# Patient Record
Sex: MALE | Race: BLACK OR AFRICAN AMERICAN | Employment: OTHER | URBAN - METROPOLITAN AREA
[De-identification: names, ages, dates, MRNs, and addresses within clinical notes are randomized per-mention and may not be internally consistent; named-entity substitution may affect disease eponyms.]

---

## 2018-06-16 ENCOUNTER — APPOINTMENT (OUTPATIENT)
Dept: GENERAL RADIOLOGY | Age: 73
End: 2018-06-16
Attending: EMERGENCY MEDICINE
Payer: MEDICARE

## 2018-06-16 ENCOUNTER — HOSPITAL ENCOUNTER (EMERGENCY)
Age: 73
Discharge: HOME | End: 2018-06-16
Attending: EMERGENCY MEDICINE
Payer: MEDICARE

## 2018-06-16 ENCOUNTER — APPOINTMENT (OUTPATIENT)
Dept: CT IMAGING | Age: 73
End: 2018-06-16
Attending: EMERGENCY MEDICINE
Payer: MEDICARE

## 2018-06-16 VITALS
SYSTOLIC BLOOD PRESSURE: 152 MMHG | BODY MASS INDEX: 33.75 KG/M2 | RESPIRATION RATE: 20 BRPM | TEMPERATURE: 98 F | HEART RATE: 80 BPM | HEIGHT: 66 IN | WEIGHT: 210 LBS | DIASTOLIC BLOOD PRESSURE: 73 MMHG | OXYGEN SATURATION: 95 %

## 2018-06-16 DIAGNOSIS — S16.1XXA STRAIN OF NECK MUSCLE, INITIAL ENCOUNTER: ICD-10-CM

## 2018-06-16 DIAGNOSIS — S05.12XA PERIORBITAL CONTUSION, LEFT, INITIAL ENCOUNTER: Primary | ICD-10-CM

## 2018-06-16 DIAGNOSIS — C91.10 CLL (CHRONIC LYMPHOCYTIC LEUKEMIA) (HCC): ICD-10-CM

## 2018-06-16 DIAGNOSIS — R06.89 CHRONIC RESPIRATORY INSUFFICIENCY: ICD-10-CM

## 2018-06-16 DIAGNOSIS — Z91.199 MEDICAL NON-COMPLIANCE: ICD-10-CM

## 2018-06-16 LAB
ALBUMIN SERPL-MCNC: 3.3 G/DL (ref 3.4–5)
ALBUMIN/GLOB SERPL: 0.8 {RATIO} (ref 0.8–1.7)
ALP SERPL-CCNC: 80 U/L (ref 45–117)
ALT SERPL-CCNC: 25 U/L (ref 16–61)
AMPHET UR QL SCN: NEGATIVE
ANION GAP SERPL CALC-SCNC: 5 MMOL/L (ref 3–18)
APPEARANCE UR: CLEAR
ARTERIAL PATENCY WRIST A: YES
AST SERPL-CCNC: 44 U/L (ref 15–37)
ATRIAL RATE: 91 BPM
BACTERIA URNS QL MICRO: NEGATIVE /HPF
BARBITURATES UR QL SCN: NEGATIVE
BASE EXCESS BLD CALC-SCNC: 17 MMOL/L
BASOPHILS # BLD: 0 K/UL (ref 0–0.1)
BASOPHILS NFR BLD: 0 % (ref 0–3)
BDY SITE: ABNORMAL
BENZODIAZ UR QL: NEGATIVE
BILIRUB SERPL-MCNC: 0.4 MG/DL (ref 0.2–1)
BILIRUB UR QL: NEGATIVE
BUN SERPL-MCNC: 21 MG/DL (ref 7–18)
BUN/CREAT SERPL: 13 (ref 12–20)
CALCIUM SERPL-MCNC: 9.1 MG/DL (ref 8.5–10.1)
CALCULATED P AXIS, ECG09: 66 DEGREES
CALCULATED R AXIS, ECG10: 5 DEGREES
CALCULATED T AXIS, ECG11: 67 DEGREES
CANNABINOIDS UR QL SCN: NEGATIVE
CHLORIDE SERPL-SCNC: 102 MMOL/L (ref 100–108)
CK MB CFR SERPL CALC: 0.9 % (ref 0–4)
CK MB SERPL-MCNC: 2.7 NG/ML (ref 5–25)
CK SERPL-CCNC: 292 U/L (ref 39–308)
CO2 SERPL-SCNC: 38 MMOL/L (ref 21–32)
COCAINE UR QL SCN: NEGATIVE
COLOR UR: YELLOW
CREAT SERPL-MCNC: 1.56 MG/DL (ref 0.6–1.3)
DIAGNOSIS, 93000: NORMAL
DIFFERENTIAL METHOD BLD: ABNORMAL
EOSINOPHIL # BLD: 0.2 K/UL (ref 0–0.4)
EOSINOPHIL NFR BLD: 1 % (ref 0–5)
EPITH CASTS URNS QL MICRO: NORMAL /LPF (ref 0–5)
ERYTHROCYTE [DISTWIDTH] IN BLOOD BY AUTOMATED COUNT: 14.7 % (ref 11.6–14.5)
ETHANOL SERPL-MCNC: <3 MG/DL (ref 0–3)
GAS FLOW.O2 O2 DELIVERY SYS: ABNORMAL L/MIN
GAS FLOW.O2 SETTING OXYMISER: 2 L/M
GLOBULIN SER CALC-MCNC: 4.2 G/DL (ref 2–4)
GLUCOSE SERPL-MCNC: 94 MG/DL (ref 74–99)
GLUCOSE UR STRIP.AUTO-MCNC: NEGATIVE MG/DL
HCO3 BLD-SCNC: 41.7 MMOL/L (ref 22–26)
HCT VFR BLD AUTO: 34.4 % (ref 36–48)
HDSCOM,HDSCOM: NORMAL
HGB BLD-MCNC: 10.1 G/DL (ref 13–16)
HGB UR QL STRIP: NEGATIVE
HYALINE CASTS URNS QL MICRO: NORMAL /LPF (ref 0–2)
KETONES UR QL STRIP.AUTO: NEGATIVE MG/DL
LACTATE BLD-SCNC: 1.4 MMOL/L (ref 0.4–2)
LACTATE SERPL-SCNC: 1.3 MMOL/L (ref 0.4–2)
LEUKOCYTE ESTERASE UR QL STRIP.AUTO: NEGATIVE
LYMPHOCYTES # BLD: 12.2 K/UL (ref 0.8–3.5)
LYMPHOCYTES NFR BLD: 62 % (ref 20–51)
MCH RBC QN AUTO: 25.1 PG (ref 24–34)
MCHC RBC AUTO-ENTMCNC: 29.4 G/DL (ref 31–37)
MCV RBC AUTO: 85.6 FL (ref 74–97)
METHADONE UR QL: NEGATIVE
MONOCYTES # BLD: 1.6 K/UL (ref 0–1)
MONOCYTES NFR BLD: 8 % (ref 2–9)
NEUTS BAND NFR BLD MANUAL: 1 % (ref 0–5)
NEUTS SEG # BLD: 5.5 K/UL (ref 1.8–8)
NEUTS SEG NFR BLD: 28 % (ref 42–75)
NITRITE UR QL STRIP.AUTO: NEGATIVE
O2/TOTAL GAS SETTING VFR VENT: 28 %
OPIATES UR QL: NEGATIVE
P-R INTERVAL, ECG05: 172 MS
PCO2 BLD: 68.2 MMHG (ref 35–45)
PCP UR QL: NEGATIVE
PH BLD: 7.39 [PH] (ref 7.35–7.45)
PH UR STRIP: 8.5 [PH] (ref 5–8)
PLATELET # BLD AUTO: 171 K/UL (ref 135–420)
PLATELET COMMENTS,PCOM: ABNORMAL
PMV BLD AUTO: 11.4 FL (ref 9.2–11.8)
PO2 BLD: 69 MMHG (ref 80–100)
POTASSIUM SERPL-SCNC: 4.7 MMOL/L (ref 3.5–5.5)
PROT SERPL-MCNC: 7.5 G/DL (ref 6.4–8.2)
PROT UR STRIP-MCNC: 100 MG/DL
Q-T INTERVAL, ECG07: 330 MS
QRS DURATION, ECG06: 72 MS
QTC CALCULATION (BEZET), ECG08: 405 MS
RBC # BLD AUTO: 4.02 M/UL (ref 4.7–5.5)
RBC #/AREA URNS HPF: NORMAL /HPF (ref 0–5)
RBC MORPH BLD: ABNORMAL
SAO2 % BLD: 92 % (ref 92–97)
SERVICE CMNT-IMP: ABNORMAL
SODIUM SERPL-SCNC: 145 MMOL/L (ref 136–145)
SP GR UR REFRACTOMETRY: 1.02 (ref 1–1.03)
SPECIMEN TYPE: ABNORMAL
TOTAL RESP. RATE, ITRR: 20
TROPONIN I SERPL-MCNC: <0.02 NG/ML (ref 0–0.06)
UROBILINOGEN UR QL STRIP.AUTO: 1 EU/DL (ref 0.2–1)
VENTRICULAR RATE, ECG03: 91 BPM
WBC # BLD AUTO: 19.7 K/UL (ref 4.6–13.2)
WBC MORPH BLD: ABNORMAL
WBC URNS QL MICRO: NORMAL /HPF (ref 0–5)

## 2018-06-16 PROCEDURE — 93005 ELECTROCARDIOGRAM TRACING: CPT

## 2018-06-16 PROCEDURE — 83605 ASSAY OF LACTIC ACID: CPT | Performed by: EMERGENCY MEDICINE

## 2018-06-16 PROCEDURE — 36600 WITHDRAWAL OF ARTERIAL BLOOD: CPT

## 2018-06-16 PROCEDURE — 82550 ASSAY OF CK (CPK): CPT | Performed by: EMERGENCY MEDICINE

## 2018-06-16 PROCEDURE — 80053 COMPREHEN METABOLIC PANEL: CPT | Performed by: EMERGENCY MEDICINE

## 2018-06-16 PROCEDURE — 81001 URINALYSIS AUTO W/SCOPE: CPT | Performed by: EMERGENCY MEDICINE

## 2018-06-16 PROCEDURE — 83605 ASSAY OF LACTIC ACID: CPT

## 2018-06-16 PROCEDURE — 80307 DRUG TEST PRSMV CHEM ANLYZR: CPT | Performed by: EMERGENCY MEDICINE

## 2018-06-16 PROCEDURE — 87040 BLOOD CULTURE FOR BACTERIA: CPT | Performed by: EMERGENCY MEDICINE

## 2018-06-16 PROCEDURE — 70450 CT HEAD/BRAIN W/O DYE: CPT

## 2018-06-16 PROCEDURE — 82803 BLOOD GASES ANY COMBINATION: CPT

## 2018-06-16 PROCEDURE — 85025 COMPLETE CBC W/AUTO DIFF WBC: CPT | Performed by: EMERGENCY MEDICINE

## 2018-06-16 PROCEDURE — 72125 CT NECK SPINE W/O DYE: CPT

## 2018-06-16 PROCEDURE — 99285 EMERGENCY DEPT VISIT HI MDM: CPT

## 2018-06-16 PROCEDURE — 71045 X-RAY EXAM CHEST 1 VIEW: CPT

## 2018-06-16 RX ORDER — CARVEDILOL 6.25 MG/1
TABLET ORAL 2 TIMES DAILY WITH MEALS
COMMUNITY
End: 2018-07-12

## 2018-06-16 RX ORDER — ALLOPURINOL 100 MG/1
TABLET ORAL DAILY
COMMUNITY
End: 2018-07-12

## 2018-06-16 RX ORDER — FUROSEMIDE 40 MG/1
TABLET ORAL DAILY
COMMUNITY

## 2018-06-16 NOTE — ED TRIAGE NOTES
Patient ambu to ED with wife with c/o unwitnessed fall occurring this AM. Second fall this week. Patient noted to have bump to LEFT side of eye and abrasion to nose and 3rd digit of LEFT hand. Patient on continuous 2L of O2 via NC.  PmHx COPD

## 2018-06-16 NOTE — DISCHARGE INSTRUCTIONS
COPD Exacerbation Plan: Care Instructions  Your Care Instructions    If you have chronic obstructive pulmonary disease (COPD), your usual shortness of breath could suddenly get worse. You may start coughing more and have more mucus. This flare-up is called a COPD exacerbation (say \"vr-OTK-[id number]-BAY-vamsi\"). A lung infection or air pollution could set off an exacerbation. Sometimes it can happen after a quick change in temperature or being around chemicals. Work with your doctor to make a plan for dealing with an exacerbation. You can better manage it if you plan ahead. Follow-up care is a key part of your treatment and safety. Be sure to make and go to all appointments, and call your doctor if you are having problems. It's also a good idea to know your test results and keep a list of the medicines you take. How can you care for yourself at home? During an exacerbation  · Do not panic if you start to have one. Quick treatment at home may help you prevent serious breathing problems. If you have a COPD exacerbation plan that you developed with your doctor, follow it. · Take your medicines exactly as your doctor tells you. ¨ Use your inhaler as directed by your doctor. If your symptoms do not get better after you use your medicine, have someone take you to the emergency room. Call an ambulance if necessary. ¨ With inhaled medicines, a spacer or a nebulizer may help you get more medicine to your lungs. Ask your doctor or pharmacist how to use them properly. Practice using the spacer in front of a mirror before you have an exacerbation. This may help you get the medicine into your lungs quickly. ¨ If your doctor has given you steroid pills, take them as directed. ¨ Your doctor may have given you a prescription for antibiotics, which you can fill if you need it. ¨ Talk to your doctor if you have any problems with your medicine.  And call your doctor if you have to use your antibiotic or steroid pills.  Preventing an exacerbation  · Do not smoke. This is the most important step you can take to prevent more damage to your lungs and prevent problems. If you already smoke, it is never too late to stop. If you need help quitting, talk to your doctor about stop-smoking programs and medicines. These can increase your chances of quitting for good. · Take your daily medicines as prescribed. · Avoid colds and flu. ¨ Get a pneumococcal vaccine. ¨ Get a flu vaccine each year, as soon as it is available. Ask those you live or work with to do the same, so they will not get the flu and infect you. ¨ Try to stay away from people with colds or the flu. ¨ Wash your hands often. · Avoid secondhand smoke; air pollution; cold, dry air; hot, humid air; and high altitudes. Stay at home with your windows closed when air pollution is bad. · Learn breathing techniques for COPD, such as breathing through pursed lips. These techniques can help you breathe easier during an exacerbation. When should you call for help? Call 911 anytime you think you may need emergency care. For example, call if:  ? · You have severe trouble breathing. ? · You have severe chest pain. ?Call your doctor now or seek immediate medical care if:  ? · You have new or worse shortness of breath. ? · You develop new chest pain. ? · You are coughing more deeply or more often, especially if you notice more mucus or a change in the color of your mucus. ? · You cough up blood. ? · You have new or increased swelling in your legs or belly. ? · You have a fever. ? Watch closely for changes in your health, and be sure to contact your doctor if:  ? · You need to use your antibiotic or steroid pills. ? · Your symptoms are getting worse. Where can you learn more? Go to http://dee-marisel.info/. Enter R710 in the search box to learn more about \"COPD Exacerbation Plan: Care Instructions. \"  Current as of:  May 12, 2017  Content Version: 11.4  © 0896-8520 [employer]. Care instructions adapted under license by [employer] (which disclaims liability or warranty for this information). If you have questions about a medical condition or this instruction, always ask your healthcare professional. [address] any warranty or liability for your use of this information. Neck Strain: Care Instructions  Your Care Instructions    You have strained the muscles and ligaments in your neck. A sudden, awkward movement can strain the neck. This often occurs with falls or car accidents or during certain sports. Everyday activities like working on a computer or sleeping can also cause neck strain if they force you to hold your neck in an awkward position for a long time. It is common for neck pain to get worse for a day or two after an injury, but it should start to feel better after that. You may have more pain and stiffness for several days before it gets better. This is expected. It may take a few weeks or longer for it to heal completely. Good home treatment can help you get better faster and avoid future neck problems. Follow-up care is a key part of your treatment and safety. Be sure to make and go to all appointments, and call your doctor if you are having problems. It's also a good idea to know your test results and keep a list of the medicines you take. How can you care for yourself at home? · If you were given a neck brace (cervical collar) to limit neck motion, wear it as instructed for as many days as your doctor tells you to. Do not wear it longer than you were told to. Wearing a brace for too long can make neck stiffness worse and weaken the neck muscles. · You can try using heat or ice to see if it helps. ¨ Try using a heating pad on a low or medium setting for 15 to 20 minutes every 2 to 3 hours. Try a warm shower in place of one session with the heating pad.  You can also buy single-use heat wraps that last up to 8 hours. ¨ You can also try an ice pack for 10 to 15 minutes every 2 to 3 hours. · Take pain medicines exactly as directed. ¨ If the doctor gave you a prescription medicine for pain, take it as prescribed. ¨ If you are not taking a prescription pain medicine, ask your doctor if you can take an over-the-counter medicine. · Gently rub the area to relieve pain and help with blood flow. Do not massage the area if it hurts to do so. · Do not do anything that makes the pain worse. Take it easy for a couple of days. You can do your usual activities if they do not hurt your neck or put it at risk for more stress or injury. · Try sleeping on a special neck pillow. Place it under your neck, not under your head. Placing a tightly rolled-up towel under your neck while you sleep will also work. If you use a neck pillow or rolled towel, do not use your regular pillow at the same time. · To prevent future neck pain, do exercises to stretch and strengthen your neck and back. Learn how to use good posture, safe lifting techniques, and proper body mechanics. When should you call for help? Call 911 anytime you think you may need emergency care. For example, call if:  ? · You are unable to move an arm or a leg at all. ?Call your doctor now or seek immediate medical care if:  ? · You have new or worse symptoms in your arms, legs, chest, belly, or buttocks. Symptoms may include:  ¨ Numbness or tingling. ¨ Weakness. ¨ Pain. ? · You lose bladder or bowel control. ? Watch closely for changes in your health, and be sure to contact your doctor if:  ? · You are not getting better as expected. Where can you learn more? Go to http://dee-marisel.info/. Enter M253 in the search box to learn more about \"Neck Strain: Care Instructions. \"  Current as of: March 21, 2017  Content Version: 11.4  © 4984-3844 [employer].  Care instructions adapted under license by [facility] (which disclaims liability or warranty for this information). If you have questions about a medical condition or this instruction, always ask your healthcare professional. [place] [address] any warranty or liability for your use of this information. Bruises: Care Instructions  Your Care Instructions    Bruises occur when small blood vessels under the skin tear or rupture, most often from a twist, bump, or fall. Blood leaks into tissues under the skin and causes a black-and-blue spot that often turns colors, including purplish black, reddish blue, or yellowish green, as the bruise heals. Bruises hurt, but most are not serious and will go away on their own within 2 to 4 weeks. Sometimes, gravity causes them to spread down the body. A leg bruise usually will take longer to heal than a bruise on the face or arms. Follow-up care is a key part of your treatment and safety. Be sure to make and go to all appointments, and call your doctor if you are having problems. It's also a good idea to know your test results and keep a list of the medicines you take. How can you care for yourself at home? · Take pain medicines exactly as directed. ¨ If the doctor gave you a prescription medicine for pain, take it as prescribed. ¨ If you are not taking a prescription pain medicine, ask your doctor if you can take an over-the-counter medicine. · Put ice or a cold pack on the area for 10 to 20 minutes at a time. Put a thin cloth between the ice and your skin. · If you can, prop up the bruised area on pillows as much as possible for the next few days. Try to keep the bruise above the level of your heart. When should you call for help? Call your doctor now or seek immediate medical care if:  ? · You have signs of infection, such as:  ¨ Increased pain, swelling, warmth, or redness. ¨ Red streaks leading from the bruise. ¨ Pus draining from the bruise. ¨ A fever.    ? · You have a bruise on your leg and signs of a blood clot, such as:  ¨ Increasing redness and swelling along with warmth, tenderness, and pain in the bruised area. ¨ Pain in your calf, back of the knee, thigh, or groin. ¨ Redness and swelling in your leg or groin. ? · Your pain gets worse. ? Watch closely for changes in your health, and be sure to contact your doctor if:  ? · You do not get better as expected. Where can you learn more? Go to http://dee-marisel.info/. Enter [phone] [id number] in the search box to learn more about \"Bruises: Care Instructions. \"  Current as of: March 20, 2017  Content Version: 11.4  © 5863-0495 [employer]. Care instructions adapted under license by [employer] (which disclaims liability or warranty for this information). If you have questions about a medical condition or this instruction, always ask your healthcare professional. [place] [address] any warranty or liability for your use of this information.

## 2018-06-16 NOTE — ED NOTES
Discharge instructions reviewed with the patient/spouse with opportunity for questions given. The patient/spouse verbalized understanding. Patient armband removed and shredded. Patient in stable condition at time of discharge.

## 2018-06-16 NOTE — ED PROVIDER NOTES
EMERGENCY DEPARTMENT HISTORY AND PHYSICAL EXAM    Date: 6/16/2018  Patient Name: Lanre Charles. History of Presenting Illness     Chief Complaint   Patient presents with    Fall    Altered mental status         History Provided By: Patient and Patients partner    Chief Complaint: Mechanical fall  Duration: 2 Hours  Timing:  Acute  Location: Head  Severity: Mild  Associated Symptoms:  Back pain, laceration on nose and forehead, edema on his right side of his face (below eye), hematoma to left upper eyebrow, and confusion    Additional History (Context):   9:16 AM  Lanre Garduno is a 74 y.o. male with PMHX of COPD, emphysema, CLL, and arthritis who presents to the emergency department via EMS with wife C/O mechanical fall, onset PTA s/p bending down to  his slipper while outside, slipping, and hitting the left side of his head. Associated sxs include abrasion to the bridge of the nose and swelling to the right eyebrow. No LOC, no blood thinner use. Pt was seen on 4 days ago s/p falling onto concrete while walking outside to find his step-daughter and was subsequently seen at [facility]. CT Head was done which was normal, and pt was discharged. Wife complains that pt is confused and is talking about some step-daughter, but he doesnt have one because he hasnt been  to anyone else.  In the ED, pt is alert and oriented to person, place, time, and situation. Pt has been evaluated by neurology this year and Alzheimers and dementia were ruled out. Wife states pt was hospitalized and intubated twice in the past 6 months. Pt refused to be put on BiPAP. Pulmonologist is Dr. Jorgito Arguelles. Endorses former tobacco use (quit 1 month ago). Pt denies any other sxs or complaints.          PCP: Dinah Lockwood MD      Past History     Past Medical History:  Past Medical History:   Diagnosis Date    Chronic obstructive pulmonary disease [facility])        Past Surgical History:  Past Surgical History:   Procedure Laterality Date    HX CATARACT REMOVAL      HX HERNIA REPAIR      HX NEPHROSTOMY         Family History:  History reviewed. No pertinent family history. Social History:  Social History   Substance Use Topics    Smoking status: Former Smoker    Smokeless tobacco: Never Used    Alcohol use No      Comment: former ETOH user; stopped December 2017       Allergies:  No Known Allergies      Review of Systems   Review of Systems   Constitutional: Negative for activity change, appetite change, chills, fever and unexpected weight change. HENT: Positive for facial swelling (swelling to the left eyebrow). Negative for congestion and sore throat. Eyes: Negative for pain and redness. Respiratory: Negative for cough and shortness of breath. Cardiovascular: Negative for chest pain and palpitations. Gastrointestinal: Negative for abdominal pain, diarrhea, nausea and vomiting. Endocrine: Negative for polydipsia and polyuria. Genitourinary: Negative for difficulty urinating and dysuria. Musculoskeletal: Negative for neck pain. Skin: Positive for color change (bruising to right side of the face) and wound (on nasal bridge, forehead, and under right eye). Negative for pallor and rash. Neurological: Negative for headaches. Psychiatric/Behavioral: Positive for confusion. All other systems reviewed and are negative. Physical Exam     Vitals:    06/16/18 1227 06/16/18 1256 06/16/18 1325 06/16/18 1326   BP:   152/73    Pulse:   78 80   Resp:   27 20   Temp:  98 °F (36.7 °C)     SpO2: 94%   95%   Weight:       Height:         Physical Exam   Constitutional: He is oriented to person, place, and time. He appears well-developed and well-nourished. No distress. On O2 NC   HENT:   Head: Normocephalic and atraumatic.    Right Ear: External ear normal.   Left Ear: External ear normal.   Nose: Nose normal.   Mouth/Throat: Oropharynx is clear and moist.   With old facial abrasions at base of nose and right cheek.   Eyes: Conjunctivae and EOM are normal. Pupils are equal, round, and reactive to light. Right eye exhibits no discharge. Left eye exhibits no discharge. No scleral icterus. Left upper eyelid and eyebrow swelling and tenderness. Neck: Normal range of motion. Neck supple. No JVD present. No tracheal deviation present. No thyromegaly present. With lower midline and paracervical tenderness   Cardiovascular: Normal rate, regular rhythm, normal heart sounds and intact distal pulses. Exam reveals no gallop and no friction rub. No murmur heard. Pulmonary/Chest: Effort normal and breath sounds normal. No respiratory distress. He has no wheezes. He has no rales. Abdominal: Soft. Bowel sounds are normal. He exhibits no distension and no mass. There is no tenderness. There is no rebound and no guarding. Musculoskeletal: Normal range of motion. He exhibits no edema. Cervical back: He exhibits tenderness (mild lower). Neurological: He is alert and oriented to person, place, and time. He has normal reflexes. No cranial nerve deficit. He exhibits normal muscle tone. Coordination normal.   CN II-XII intact, no facial droop or asymmetry; No pronator drift; finger-nose-finger intact; good  and equal strength 5/5 bilateral upper and lower extremities; DTRs: 2+ upper and lower extremities, symmetric bilaterally; sensation is intact to light touch and position sense upper and lower extremities symmetric bilaterally; gait intact   Skin: Skin is warm and dry. Abrasion (bridge of nose) and bruising (right facial contusion with abrasion) noted. No rash noted. Left upper eyebrow hematoma which is expanding   Psychiatric: He has a normal mood and affect. His behavior is normal.   Slow to respond to questioning   Nursing note and vitals reviewed.         Diagnostic Study Results     Labs -     Recent Results (from the past 12 hour(s))   CBC WITH AUTOMATED DIFF    Collection Time: 06/16/18  9:39 AM   Result Value Ref Range    WBC 19.7 (H) 4.6 - 13.2 K/uL    RBC 4.02 (L) 4.70 - 5.50 M/uL    HGB 10.1 (L) 13.0 - 16.0 g/dL    HCT 34.4 (L) 36.0 - 48.0 %    MCV 85.6 74.0 - 97.0 FL    MCH 25.1 24.0 - 34.0 PG    MCHC 29.4 (L) 31.0 - 37.0 g/dL    RDW 14.7 (H) 11.6 - 14.5 %    PLATELET 537 113 - 291 K/uL    MPV 11.4 9.2 - 11.8 FL    NEUTROPHILS 28 (L) 42 - 75 %    BAND NEUTROPHILS 1 0 - 5 %    LYMPHOCYTES 62 (H) 20 - 51 %    MONOCYTES 8 2 - 9 %    EOSINOPHILS 1 0 - 5 %    BASOPHILS 0 0 - 3 %    ABS. NEUTROPHILS 5.5 1.8 - 8.0 K/UL    ABS. LYMPHOCYTES 12.2 (H) 0.8 - 3.5 K/UL    ABS. MONOCYTES 1.6 (H) 0 - 1.0 K/UL    ABS. EOSINOPHILS 0.2 0.0 - 0.4 K/UL    ABS. BASOPHILS 0.0 0.0 - 0.1 K/UL    PLATELET COMMENTS LARGE PLATELETS      RBC COMMENTS TARGET CELLS  FEW        WBC COMMENTS REACTIVE LYMPHS      DF MANUAL     METABOLIC PANEL, COMPREHENSIVE    Collection Time: 06/16/18  9:39 AM   Result Value Ref Range    Sodium 145 136 - 145 mmol/L    Potassium 4.7 3.5 - 5.5 mmol/L    Chloride 102 100 - 108 mmol/L    CO2 38 (H) 21 - 32 mmol/L    Anion gap 5 3.0 - 18 mmol/L    Glucose 94 74 - 99 mg/dL    BUN 21 (H) 7.0 - 18 MG/DL    Creatinine 1.56 (H) 0.6 - 1.3 MG/DL    BUN/Creatinine ratio 13 12 - 20      GFR est AA 53 (L) >60 ml/min/1.73m2    GFR est non-AA 44 (L) >60 ml/min/1.73m2    Calcium 9.1 8.5 - 10.1 MG/DL    Bilirubin, total 0.4 0.2 - 1.0 MG/DL    ALT (SGPT) 25 16 - 61 U/L    AST (SGOT) 44 (H) 15 - 37 U/L    Alk.  phosphatase 80 45 - 117 U/L    Protein, total 7.5 6.4 - 8.2 g/dL    Albumin 3.3 (L) 3.4 - 5.0 g/dL    Globulin 4.2 (H) 2.0 - 4.0 g/dL    A-G Ratio 0.8 0.8 - 1.7     ETHYL ALCOHOL    Collection Time: 06/16/18  9:39 AM   Result Value Ref Range    ALCOHOL(ETHYL),SERUM <3 0 - 3 MG/DL   CARDIAC PANEL,(CK, CKMB & TROPONIN)    Collection Time: 06/16/18  9:39 AM   Result Value Ref Range     39 - 308 U/L    CK - MB 2.7 <3.6 ng/ml    CK-MB Index 0.9 0.0 - 4.0 %    Troponin-I, Qt. <0.02 0.00 - 0.06 NG/ML   POC G3    Collection Time: 06/16/18  9:47 AM   Result Value Ref Range    Device: NASAL CANNULA      Flow rate (POC) 2 L/M    FIO2 (POC) 28 %    pH (POC) 7.394 7.35 - 7.45      pCO2 (POC) 68.2 (H) 35.0 - 45.0 MMHG    pO2 (POC) 69 (L) 80 - 100 MMHG    HCO3 (POC) 41.7 (H) 22 - 26 MMOL/L    sO2 (POC) 92 92 - 97 %    Base excess (POC) 17 mmol/L    Allens test (POC) YES      Total resp.  rate 20      Site RIGHT RADIAL      Specimen type (POC) ARTERIAL      Performed by [facility]    EKG, 12 LEAD, INITIAL    Collection Time: 06/16/18 10:01 AM   Result Value Ref Range    Ventricular Rate 91 BPM    Atrial Rate 91 BPM    P-R Interval 172 ms    QRS Duration 72 ms    Q-T Interval 330 ms    QTC Calculation (Bezet) 405 ms    Calculated P Axis 66 degrees    Calculated R Axis 5 degrees    Calculated T Axis 67 degrees    Diagnosis       Normal sinus rhythm  Normal ECG  Confirmed by Murphy Chadwick MD, [facility] ([id number]) on 6/16/2018 3:28:03 PM     LACTIC ACID    Collection Time: 06/16/18 11:29 AM   Result Value Ref Range    Lactic acid 1.3 0.4 - 2.0 MMOL/L   POC LACTIC ACID    Collection Time: 06/16/18 11:47 AM   Result Value Ref Range    Lactic Acid (POC) 1.4 0.4 - 2.0 mmol/L   URINALYSIS W/ RFLX MICROSCOPIC    Collection Time: 06/16/18 12:20 PM   Result Value Ref Range    Color YELLOW      Appearance CLEAR      Specific gravity 1.018 1.005 - 1.030      pH (UA) 8.5 (H) 5.0 - 8.0      Protein 100 (A) NEG mg/dL    Glucose NEGATIVE  NEG mg/dL    Ketone NEGATIVE  NEG mg/dL    Bilirubin NEGATIVE  NEG      Blood NEGATIVE  NEG      Urobilinogen 1.0 0.2 - 1.0 EU/dL    Nitrites NEGATIVE  NEG      Leukocyte Esterase NEGATIVE  NEG     DRUG SCREEN, URINE    Collection Time: 06/16/18 12:20 PM   Result Value Ref Range    BENZODIAZEPINES NEGATIVE  NEG      BARBITURATES NEGATIVE  NEG      THC (TH-CANNABINOL) NEGATIVE  NEG      OPIATES NEGATIVE  NEG      PCP(PHENCYCLIDINE) NEGATIVE  NEG      COCAINE NEGATIVE  NEG      AMPHETAMINES NEGATIVE  NEG      METHADONE NEGATIVE  NEG HDSCOM (NOTE)    URINE MICROSCOPIC ONLY    Collection Time: 06/16/18 12:20 PM   Result Value Ref Range    WBC 0 to 1 0 - 5 /hpf    RBC NONE 0 - 5 /hpf    Epithelial cells FEW 0 - 5 /lpf    Bacteria NEGATIVE  NEG /hpf    Hyaline cast 0 to 1 0 - 2 /lpf       Radiologic Studies -     CT Results  (Last 48 hours)               06/16/18 1043  CT SPINE CERV WO CONT Final result    Impression:  IMPRESSION:       No cervical spine fracture or subluxation. Multilevel degenerative changes involving the endplates and facet joints with   additional OPLL contributing to spinal canal, narrowing including at least   moderate central stenosis at C3-C4, asymmetric to the left. No prior comparison   study. Please note this cervical spine CT was performed with patient supine. This   supine study does not evaluate for ligamentous injury or exclude instability. If the patient has persistent symptoms or if otherwise clinically indicated,   erect cervical spine plain films are recommended. Narrative:  EXAM: CT Cervical spine       INDICATION: Cervical Pain, trauma       COMPARISON: No prior comparison study       TECHNIQUE: Axial CT imaging of the cervical spine was performed from the skull   base to the upper thoracic spine without intravenous contrast. Multiplanar   reformats were generated. One or more dose reduction techniques were used on   this CT: automated exposure control, adjustment of the mAs and/or kVp according   to patient's size, and iterative reconstruction techniques. The specific   techniques utilized on this CT exam have been documented in the patient's   electronic medical record.       _______________       FINDINGS:       VERTEBRAE AND DISCS: Straightening of the normal lordosis is present which is   nonspecific but may be due to muscle spasm or positioning. No fracture or   subluxation is seen. Multilevel mild facet arthropathy is present.  Multilevel   disc height loss with endplate osteophyte formation is seen. SPINAL CANAL AND FORAMINA: Elongated calcification is seen along the posterior   aspects of the mid cervical vertebral bodies, C3 through the C6 levels   representing mild ossification of the posterior longitudinal ligament. There is   associated at least moderate central stenosis at the C3-4 level asymmetric   towards the left. Additional multilevel foraminal stenosis is present due to   hypertrophic degenerative changes. PREVERTEBRAL SOFT TISSUES: Unremarkable       VISIBLE INTRACRANIAL CONTENTS: Unremarkable. LUNG APICES: Clear. OTHER: None.       _______________           06/16/18 1042  CT HEAD WO CONT Final result    Impression:  IMPRESSION:       1. No acute intracranial hemorrhage, mass effect, midline shift, or herniation. No definite CT evidence of acute cortical infarct is seen. Please note that   noncontrast head CT may be normal in early acute infarct. 2. Minimal nonspecific white matter disease likely representing chronic small   vessel changes. 3. Diffuse volume loss. 4. Lateral left periorbital soft tissue contusion. No underlying fracture is   seen. Narrative:  EXAM: CT head       INDICATION: Trauma, facial and forehead lacerations, confusion       COMPARISON: No prior comparison study. TECHNIQUE: Axial CT imaging of the head was performed without intravenous   contrast.  Additional coronal and sagittal reconstructions were performed. One   or more dose reduction techniques were used on this CT: automated exposure   control, adjustment of the mAs and/or kVp according to patient's size, and   iterative reconstruction techniques. The specific techniques utilized on this CT   exam have been documented in the patient's electronic medical record.   _______________       FINDINGS:       Motion artifact is present which limits evaluation.        VENTRICLES/EXTRA-AXIAL SPACES: The ventricles and sulci are mildly enlarged consistent with diffuse volume loss. BRAIN PARENCHYMA: There is no evidence of acute intracranial hemorrhage, mass   effect, midline shift, or herniation. No definite CT evidence of acute cortical   infarct is seen. A minimal amount of hypodense white matter lesions are seen   within the periventricular and subcortical white matter which are nonspecific,   but likely represent chronic small vessel changes. ORBITS: Bilateral left periorbital hyperdensity and soft tissue swelling is   present overlying the zygoma consistent with contusion. Left ocular globe is   intact. Orbits otherwise unremarkable. PARANASAL SINUSES/MASTOIDS: Visualized paranasal sinuses are clear. Visualized   mastoid air cells are clear. OSSEOUS STRUCTURES: No fracture is seen. OTHER: None.         _______________               CXR Results  (Last 48 hours)               06/16/18 1002  XR CHEST PORT Final result    Impression:  IMPRESSION:       Probable subsegmental atelectasis versus scarring lateral left lung base. Hypoinflation. Narrative:  Portable Chest         History: Shortness of breath       Comparison: No prior studies       Portable view of the chest demonstrates the cardiomediastinal silhouette is   within normal limits. Linear opacity is present laterally at the left lung base. Mild hypoinflation is present. Cardiac monitoring leads are present. No pleural   effusion or pneumothorax is seen. Degenerative changes are in the spine. Medications given in the ED-  Medications - No data to display      Medical Decision Making   I am the first provider for this patient. I reviewed the vital signs, available nursing notes, past medical history, past surgical history, family history and social history. Vital Signs-Reviewed the patient's vital signs.     Pulse Oximetry Analysis - 93% on NC    Cardiac Monitor:  Rate: 90 bpm  Rhythm: NSR    EKG interpretation: (Preliminary)  10:01 AM   91 bpm, NSR, no acute changes. EKG read by Jocelyn Woodward MD at 10:01 AM     Records Reviewed: Nursing Notes and Old Medical Records   On EMR review, CT Head showed NAP. CT facial showed evidence of nasal fracture and arthritis of the TMJ. CT Neck showed DJD on the C-Spine. Provider Notes (Medical Decision Making):   DDX: COPD exacerbation, hypercapnia, hypoxemia, hypoglycemia, dehydration, infection    Procedures:  Procedures    ED Course:   9:16 AM Initial assessment performed. The patients presenting problems have been discussed, and they are in agreement with the care plan formulated and outlined with them. I have encouraged them to ask questions as they arise throughout their visit.    1:09 PM Discussed results with pt and wife. Extensively discussed the importance of pt following up with his pulmonologist.     Diagnosis and Disposition       DISCHARGE NOTE:  1:09 PM   Eliseo Ewinganne Lopez's  results have been reviewed with him. He has been counseled regarding his diagnosis, treatment, and plan. He verbally conveys understanding and agreement of the signs, symptoms, diagnosis, treatment and prognosis and additionally agrees to follow up as discussed. He also agrees with the care-plan and conveys that all of his questions have been answered. I have also provided discharge instructions for him that include: educational information regarding their diagnosis and treatment, and list of reasons why they would want to return to the ED prior to their follow-up appointment, should his condition change. He has been provided with education for proper emergency department utilization. Discussion: Pt was stable in the ED. ECG and troponin showed no evidence of ACS. CT Head and C-spine showed no evidence intracranial hemorrhage, fx, or dislocation. Pt has DJD and left periorbital hematoma. CXR showed atelectasis, which is stable compared to CXR from April 2018.  Labs were remarkable for elevated CO2, BUN/Cr, and leukocytosis with predominant lymphocytes. ABG showed compensated respiratory insufficiency, which is at baseline compared to prior ABGs reviewed from [facility] noting pts baseline CO2 runs in the high 60s low 70s. Leukocytosis is also stable, pt has CLL. The pt was at his baseline mental status which as per daughter and has been deteriorating slowly over the past year. Pt is noncompliant with medical therapy. He has refused BiPAP. I spoke with the pt and wife at length about the necessity for utilizing BiPAP. I spoke to wife stating they need to f/u with pulmonologist on Monday to make sure he can get a prescription for BiPAP filled and start using immediately. I also spoke with the pt and recommended that he stay in the house and not wander. The wife states she is trying to get placement for him so he can have increased care. Advise them to return if he has worse confusion, fever, cough, or recurrent falls. CLINICAL IMPRESSION:    1. Periorbital contusion, left, initial encounter    2. Strain of neck muscle, initial encounter    3. Chronic respiratory insufficiency    4. CLL (chronic lymphocytic leukemia) ([facility] [address]    5. Medical non-compliance        PLAN:  1. D/C Home  2. Discharge Medication List as of 6/16/2018  1:09 PM        3. Follow-up Information     Follow up With Details Comments Contact Info    Rachael Faulkner MD Schedule an appointment as soon as possible for a visit in 2 days For follow up with PCP Kelvin Mott [facility]  [phone]      Ilia Carrasco MD Schedule an appointment as soon as possible for a visit in 2 days For follow up with pulmonologist  [address] [place] [address] [facility] [phone]          _______________________________    Attestations:   This note is prepared by [employer], acting as Scribe for Socorro Montgomery MD.    Socorro Montgomery MD:  The scribe's documentation has been prepared under my direction and personally reviewed by me in its entirety.   I confirm that the note above accurately reflects all work, treatment, procedures, and medical decision making performed by me.  _______________________________

## 2018-06-18 LAB — PERIPHERAL SMEAR,PSM: NORMAL

## 2018-06-22 LAB
BACTERIA SPEC CULT: NORMAL
BACTERIA SPEC CULT: NORMAL
SERVICE CMNT-IMP: NORMAL
SERVICE CMNT-IMP: NORMAL

## 2018-07-03 ENCOUNTER — APPOINTMENT (OUTPATIENT)
Dept: GENERAL RADIOLOGY | Age: 73
DRG: 189 | End: 2018-07-03
Attending: EMERGENCY MEDICINE
Payer: MEDICARE

## 2018-07-03 ENCOUNTER — HOSPITAL ENCOUNTER (INPATIENT)
Age: 73
LOS: 9 days | Discharge: NURSING, REHAB OR LONG-TERM CARE | DRG: 189 | End: 2018-07-12
Attending: EMERGENCY MEDICINE | Admitting: HOSPITALIST
Payer: MEDICARE

## 2018-07-03 ENCOUNTER — APPOINTMENT (OUTPATIENT)
Dept: CT IMAGING | Age: 73
DRG: 189 | End: 2018-07-03
Attending: EMERGENCY MEDICINE
Payer: MEDICARE

## 2018-07-03 DIAGNOSIS — R41.82 ALTERED MENTAL STATUS, UNSPECIFIED ALTERED MENTAL STATUS TYPE: ICD-10-CM

## 2018-07-03 DIAGNOSIS — I10 HYPERTENSION, UNSPECIFIED TYPE: ICD-10-CM

## 2018-07-03 DIAGNOSIS — R06.89 HYPERCAPNIA: Primary | ICD-10-CM

## 2018-07-03 PROBLEM — J96.02 ACUTE RESPIRATORY FAILURE WITH HYPERCAPNIA (HCC): Status: ACTIVE | Noted: 2018-07-03

## 2018-07-03 PROBLEM — G93.40 ACUTE ENCEPHALOPATHY: Status: ACTIVE | Noted: 2018-07-03

## 2018-07-03 PROBLEM — J96.21 ACUTE ON CHRONIC RESPIRATORY FAILURE WITH HYPOXIA AND HYPERCAPNIA (HCC): Status: ACTIVE | Noted: 2018-07-03

## 2018-07-03 PROBLEM — C91.10 CLL (CHRONIC LYMPHOCYTIC LEUKEMIA) (HCC): Status: ACTIVE | Noted: 2018-07-03

## 2018-07-03 PROBLEM — R41.3 MEMORY LOSS: Status: ACTIVE | Noted: 2018-07-03

## 2018-07-03 PROBLEM — J43.9 EMPHYSEMA LUNG (HCC): Status: ACTIVE | Noted: 2018-07-03

## 2018-07-03 PROBLEM — J96.22 ACUTE ON CHRONIC RESPIRATORY FAILURE WITH HYPOXIA AND HYPERCAPNIA (HCC): Status: ACTIVE | Noted: 2018-07-03

## 2018-07-03 LAB
ALBUMIN SERPL-MCNC: 3.6 G/DL (ref 3.4–5)
ALBUMIN/GLOB SERPL: 0.7 {RATIO} (ref 0.8–1.7)
ALP SERPL-CCNC: 99 U/L (ref 45–117)
ALT SERPL-CCNC: 26 U/L (ref 16–61)
ANION GAP SERPL CALC-SCNC: 3 MMOL/L (ref 3–18)
APPEARANCE UR: CLEAR
ARTERIAL PATENCY WRIST A: ABNORMAL
ARTERIAL PATENCY WRIST A: YES
AST SERPL-CCNC: 26 U/L (ref 15–37)
BACTERIA URNS QL MICRO: ABNORMAL /HPF
BASE EXCESS BLD CALC-SCNC: 15 MMOL/L
BASE EXCESS BLD CALC-SCNC: 16 MMOL/L
BASOPHILS # BLD: 0 K/UL (ref 0–0.06)
BASOPHILS NFR BLD: 0 % (ref 0–2)
BDY SITE: ABNORMAL
BDY SITE: ABNORMAL
BILIRUB SERPL-MCNC: 0.3 MG/DL (ref 0.2–1)
BILIRUB UR QL: NEGATIVE
BNP SERPL-MCNC: 89 PG/ML (ref 0–900)
BODY TEMPERATURE: 97.8
BUN SERPL-MCNC: 17 MG/DL (ref 7–18)
BUN/CREAT SERPL: 15 (ref 12–20)
CALCIUM SERPL-MCNC: 9 MG/DL (ref 8.5–10.1)
CHLORIDE SERPL-SCNC: 101 MMOL/L (ref 100–108)
CK MB CFR SERPL CALC: 1.2 % (ref 0–4)
CK MB SERPL-MCNC: 1.9 NG/ML (ref 5–25)
CK SERPL-CCNC: 160 U/L (ref 39–308)
CO2 SERPL-SCNC: 37 MMOL/L (ref 21–32)
COLOR UR: YELLOW
CREAT SERPL-MCNC: 1.17 MG/DL (ref 0.6–1.3)
DIFFERENTIAL METHOD BLD: ABNORMAL
EOSINOPHIL # BLD: 0.2 K/UL (ref 0–0.4)
EOSINOPHIL NFR BLD: 1 % (ref 0–5)
EPITH CASTS URNS QL MICRO: ABNORMAL /LPF (ref 0–5)
ERYTHROCYTE [DISTWIDTH] IN BLOOD BY AUTOMATED COUNT: 15 % (ref 11.6–14.5)
GAS FLOW.O2 O2 DELIVERY SYS: ABNORMAL L/MIN
GAS FLOW.O2 O2 DELIVERY SYS: ABNORMAL L/MIN
GAS FLOW.O2 SETTING OXYMISER: 2 L/M
GAS FLOW.O2 SETTING OXYMISER: 3 L/M
GLOBULIN SER CALC-MCNC: 5 G/DL (ref 2–4)
GLUCOSE SERPL-MCNC: 89 MG/DL (ref 74–99)
GLUCOSE UR STRIP.AUTO-MCNC: NEGATIVE MG/DL
HCO3 BLD-SCNC: 41.1 MMOL/L (ref 22–26)
HCO3 BLD-SCNC: 41.3 MMOL/L (ref 22–26)
HCT VFR BLD AUTO: 40.9 % (ref 36–48)
HGB BLD-MCNC: 11.9 G/DL (ref 13–16)
HGB UR QL STRIP: NEGATIVE
HYALINE CASTS URNS QL MICRO: ABNORMAL /LPF (ref 0–2)
KETONES UR QL STRIP.AUTO: NEGATIVE MG/DL
LEUKOCYTE ESTERASE UR QL STRIP.AUTO: NEGATIVE
LYMPHOCYTES # BLD: 13.1 K/UL (ref 0.9–3.6)
LYMPHOCYTES NFR BLD: 68 % (ref 21–52)
MCH RBC QN AUTO: 24.4 PG (ref 24–34)
MCHC RBC AUTO-ENTMCNC: 29.1 G/DL (ref 31–37)
MCV RBC AUTO: 84 FL (ref 74–97)
MONOCYTES # BLD: 1.4 K/UL (ref 0.05–1.2)
MONOCYTES NFR BLD: 7 % (ref 3–10)
MUCOUS THREADS URNS QL MICRO: ABNORMAL /LPF
NEUTS SEG # BLD: 4.6 K/UL (ref 1.8–8)
NEUTS SEG NFR BLD: 24 % (ref 40–73)
NITRITE UR QL STRIP.AUTO: NEGATIVE
O2/TOTAL GAS SETTING VFR VENT: 0.32 %
O2/TOTAL GAS SETTING VFR VENT: 28 %
PCO2 BLD: 64.3 MMHG (ref 35–45)
PCO2 BLD: 85 MMHG (ref 35–45)
PH BLD: 7.29 [PH] (ref 7.35–7.45)
PH BLD: 7.41 [PH] (ref 7.35–7.45)
PH UR STRIP: 7 [PH] (ref 5–8)
PLATELET # BLD AUTO: 220 K/UL (ref 135–420)
PMV BLD AUTO: 10.3 FL (ref 9.2–11.8)
PO2 BLD: 69 MMHG (ref 80–100)
PO2 BLD: 69 MMHG (ref 80–100)
POTASSIUM SERPL-SCNC: 4.4 MMOL/L (ref 3.5–5.5)
PROT SERPL-MCNC: 8.6 G/DL (ref 6.4–8.2)
PROT UR STRIP-MCNC: 300 MG/DL
RBC # BLD AUTO: 4.87 M/UL (ref 4.7–5.5)
RBC #/AREA URNS HPF: NEGATIVE /HPF (ref 0–5)
RBC MORPH BLD: ABNORMAL
RBC MORPH BLD: ABNORMAL
SAO2 % BLD: 90 % (ref 92–97)
SAO2 % BLD: 93 % (ref 92–97)
SERVICE CMNT-IMP: ABNORMAL
SERVICE CMNT-IMP: ABNORMAL
SODIUM SERPL-SCNC: 141 MMOL/L (ref 136–145)
SP GR UR REFRACTOMETRY: 1.02 (ref 1–1.03)
SPECIMEN TYPE: ABNORMAL
SPECIMEN TYPE: ABNORMAL
TOTAL RESP. RATE, ITRR: 17
TOTAL RESP. RATE, ITRR: 33
TROPONIN I SERPL-MCNC: <0.02 NG/ML (ref 0–0.06)
UROBILINOGEN UR QL STRIP.AUTO: 0.2 EU/DL (ref 0.2–1)
WBC # BLD AUTO: 19.3 K/UL (ref 4.6–13.2)
WBC URNS QL MICRO: ABNORMAL /HPF (ref 0–5)

## 2018-07-03 PROCEDURE — 77030029184 HC NEB SOLO AERONEB AEPM -A

## 2018-07-03 PROCEDURE — 96374 THER/PROPH/DIAG INJ IV PUSH: CPT

## 2018-07-03 PROCEDURE — 77010033678 HC OXYGEN DAILY

## 2018-07-03 PROCEDURE — 82803 BLOOD GASES ANY COMBINATION: CPT

## 2018-07-03 PROCEDURE — 80307 DRUG TEST PRSMV CHEM ANLYZR: CPT | Performed by: HOSPITALIST

## 2018-07-03 PROCEDURE — 74011000250 HC RX REV CODE- 250: Performed by: HOSPITALIST

## 2018-07-03 PROCEDURE — 51798 US URINE CAPACITY MEASURE: CPT

## 2018-07-03 PROCEDURE — 94660 CPAP INITIATION&MGMT: CPT

## 2018-07-03 PROCEDURE — 99285 EMERGENCY DEPT VISIT HI MDM: CPT

## 2018-07-03 PROCEDURE — 5A09357 ASSISTANCE WITH RESPIRATORY VENTILATION, LESS THAN 24 CONSECUTIVE HOURS, CONTINUOUS POSITIVE AIRWAY PRESSURE: ICD-10-PCS | Performed by: HOSPITALIST

## 2018-07-03 PROCEDURE — 80053 COMPREHEN METABOLIC PANEL: CPT | Performed by: EMERGENCY MEDICINE

## 2018-07-03 PROCEDURE — 36600 WITHDRAWAL OF ARTERIAL BLOOD: CPT

## 2018-07-03 PROCEDURE — 81001 URINALYSIS AUTO W/SCOPE: CPT | Performed by: EMERGENCY MEDICINE

## 2018-07-03 PROCEDURE — 71045 X-RAY EXAM CHEST 1 VIEW: CPT

## 2018-07-03 PROCEDURE — 82550 ASSAY OF CK (CPK): CPT | Performed by: EMERGENCY MEDICINE

## 2018-07-03 PROCEDURE — 94640 AIRWAY INHALATION TREATMENT: CPT

## 2018-07-03 PROCEDURE — 93005 ELECTROCARDIOGRAM TRACING: CPT

## 2018-07-03 PROCEDURE — 83880 ASSAY OF NATRIURETIC PEPTIDE: CPT | Performed by: EMERGENCY MEDICINE

## 2018-07-03 PROCEDURE — 74011250636 HC RX REV CODE- 250/636: Performed by: HOSPITALIST

## 2018-07-03 PROCEDURE — 74011250637 HC RX REV CODE- 250/637: Performed by: HOSPITALIST

## 2018-07-03 PROCEDURE — 85025 COMPLETE CBC W/AUTO DIFF WBC: CPT | Performed by: EMERGENCY MEDICINE

## 2018-07-03 PROCEDURE — 70450 CT HEAD/BRAIN W/O DYE: CPT

## 2018-07-03 PROCEDURE — 74011000250 HC RX REV CODE- 250: Performed by: EMERGENCY MEDICINE

## 2018-07-03 PROCEDURE — 65610000006 HC RM INTENSIVE CARE

## 2018-07-03 RX ORDER — FUROSEMIDE 10 MG/ML
20 INJECTION INTRAMUSCULAR; INTRAVENOUS DAILY
Status: DISCONTINUED | OUTPATIENT
Start: 2018-07-03 | End: 2018-07-04

## 2018-07-03 RX ORDER — NITROGLYCERIN 40 MG/1
1 PATCH TRANSDERMAL EVERY 24 HOURS
Status: DISCONTINUED | OUTPATIENT
Start: 2018-07-03 | End: 2018-07-12 | Stop reason: HOSPADM

## 2018-07-03 RX ORDER — ALFUZOSIN HYDROCHLORIDE 10 MG/1
10 TABLET, EXTENDED RELEASE ORAL DAILY
COMMUNITY

## 2018-07-03 RX ORDER — ARFORMOTEROL TARTRATE 15 UG/2ML
15 SOLUTION RESPIRATORY (INHALATION)
Status: DISCONTINUED | OUTPATIENT
Start: 2018-07-03 | End: 2018-07-10

## 2018-07-03 RX ORDER — LATANOPROST 50 UG/ML
1 SOLUTION/ DROPS OPHTHALMIC
COMMUNITY

## 2018-07-03 RX ORDER — LABETALOL HYDROCHLORIDE 5 MG/ML
20 INJECTION, SOLUTION INTRAVENOUS
Status: COMPLETED | OUTPATIENT
Start: 2018-07-03 | End: 2018-07-03

## 2018-07-03 RX ORDER — NALOXONE HYDROCHLORIDE 0.4 MG/ML
0.4 INJECTION, SOLUTION INTRAMUSCULAR; INTRAVENOUS; SUBCUTANEOUS AS NEEDED
Status: DISCONTINUED | OUTPATIENT
Start: 2018-07-03 | End: 2018-07-12 | Stop reason: HOSPADM

## 2018-07-03 RX ORDER — GUAIFENESIN 100 MG/5ML
81 LIQUID (ML) ORAL DAILY
COMMUNITY

## 2018-07-03 RX ORDER — LEVOFLOXACIN 5 MG/ML
500 INJECTION, SOLUTION INTRAVENOUS EVERY 24 HOURS
Status: COMPLETED | OUTPATIENT
Start: 2018-07-03 | End: 2018-07-08

## 2018-07-03 RX ORDER — DOCUSATE SODIUM 100 MG/1
100 CAPSULE, LIQUID FILLED ORAL 2 TIMES DAILY
Status: DISCONTINUED | OUTPATIENT
Start: 2018-07-03 | End: 2018-07-12 | Stop reason: HOSPADM

## 2018-07-03 RX ORDER — ONDANSETRON 2 MG/ML
4 INJECTION INTRAMUSCULAR; INTRAVENOUS
Status: DISCONTINUED | OUTPATIENT
Start: 2018-07-03 | End: 2018-07-12 | Stop reason: HOSPADM

## 2018-07-03 RX ORDER — IPRATROPIUM BROMIDE AND ALBUTEROL SULFATE 2.5; .5 MG/3ML; MG/3ML
3 SOLUTION RESPIRATORY (INHALATION)
Status: DISCONTINUED | OUTPATIENT
Start: 2018-07-03 | End: 2018-07-12 | Stop reason: HOSPADM

## 2018-07-03 RX ORDER — CYCLOSPORINE 0.5 MG/ML
1 EMULSION OPHTHALMIC 2 TIMES DAILY
COMMUNITY

## 2018-07-03 RX ORDER — ACETAMINOPHEN 325 MG/1
650 TABLET ORAL
Status: DISCONTINUED | OUTPATIENT
Start: 2018-07-03 | End: 2018-07-12 | Stop reason: HOSPADM

## 2018-07-03 RX ORDER — GLUCOSAMINE SULFATE 1500 MG
POWDER IN PACKET (EA) ORAL DAILY
COMMUNITY
End: 2018-07-12

## 2018-07-03 RX ORDER — HYDRALAZINE HYDROCHLORIDE 20 MG/ML
10 INJECTION INTRAMUSCULAR; INTRAVENOUS
Status: DISCONTINUED | OUTPATIENT
Start: 2018-07-03 | End: 2018-07-04

## 2018-07-03 RX ORDER — HEPARIN SODIUM 5000 [USP'U]/ML
5000 INJECTION, SOLUTION INTRAVENOUS; SUBCUTANEOUS EVERY 8 HOURS
Status: DISCONTINUED | OUTPATIENT
Start: 2018-07-03 | End: 2018-07-08

## 2018-07-03 RX ORDER — DIPHENHYDRAMINE HYDROCHLORIDE 50 MG/ML
12.5 INJECTION, SOLUTION INTRAMUSCULAR; INTRAVENOUS
Status: DISCONTINUED | OUTPATIENT
Start: 2018-07-03 | End: 2018-07-06

## 2018-07-03 RX ORDER — BUDESONIDE 0.5 MG/2ML
500 INHALANT ORAL
Status: DISCONTINUED | OUTPATIENT
Start: 2018-07-03 | End: 2018-07-12 | Stop reason: HOSPADM

## 2018-07-03 RX ORDER — DICLOFENAC SODIUM 10 MG/G
GEL TOPICAL 4 TIMES DAILY
COMMUNITY
End: 2018-07-12

## 2018-07-03 RX ORDER — KETOROLAC TROMETHAMINE 30 MG/ML
15 INJECTION, SOLUTION INTRAMUSCULAR; INTRAVENOUS
Status: DISPENSED | OUTPATIENT
Start: 2018-07-03 | End: 2018-07-08

## 2018-07-03 RX ORDER — FAMOTIDINE 20 MG/1
20 TABLET, FILM COATED ORAL 2 TIMES DAILY
COMMUNITY

## 2018-07-03 RX ADMIN — LABETALOL HYDROCHLORIDE 20 MG: 5 INJECTION, SOLUTION INTRAVENOUS at 17:56

## 2018-07-03 RX ADMIN — FUROSEMIDE 20 MG: 10 INJECTION, SOLUTION INTRAMUSCULAR; INTRAVENOUS at 19:03

## 2018-07-03 RX ADMIN — DOCUSATE SODIUM 100 MG: 100 CAPSULE, LIQUID FILLED ORAL at 20:07

## 2018-07-03 RX ADMIN — ARFORMOTEROL TARTRATE 15 MCG: 15 SOLUTION RESPIRATORY (INHALATION) at 21:05

## 2018-07-03 RX ADMIN — IPRATROPIUM BROMIDE AND ALBUTEROL SULFATE 3 ML: .5; 3 SOLUTION RESPIRATORY (INHALATION) at 23:35

## 2018-07-03 RX ADMIN — IPRATROPIUM BROMIDE AND ALBUTEROL SULFATE 3 ML: .5; 3 SOLUTION RESPIRATORY (INHALATION) at 21:05

## 2018-07-03 RX ADMIN — LEVOFLOXACIN 500 MG: 5 INJECTION, SOLUTION INTRAVENOUS at 20:00

## 2018-07-03 RX ADMIN — HYDRALAZINE HYDROCHLORIDE 10 MG: 20 INJECTION INTRAMUSCULAR; INTRAVENOUS at 22:02

## 2018-07-03 RX ADMIN — HEPARIN SODIUM 5000 UNITS: 5000 INJECTION, SOLUTION INTRAVENOUS; SUBCUTANEOUS at 20:00

## 2018-07-03 RX ADMIN — FAMOTIDINE 20 MG: 10 INJECTION, SOLUTION INTRAVENOUS at 20:07

## 2018-07-03 RX ADMIN — METHYLPREDNISOLONE SODIUM SUCCINATE 60 MG: 125 INJECTION, POWDER, FOR SOLUTION INTRAMUSCULAR; INTRAVENOUS at 22:02

## 2018-07-03 RX ADMIN — BUDESONIDE 500 MCG: 0.5 INHALANT RESPIRATORY (INHALATION) at 21:05

## 2018-07-03 NOTE — ED TRIAGE NOTES
Per family member pt has had altered mental status today, slurred speech, no PO intake today, refused his medications today.

## 2018-07-03 NOTE — ED NOTES
Patient alert to voice, follows commands equal  noted. Patient attempted to communicated without much success d/t the patient on bipap.awaiting respiratory therapist for patient to go to ct.

## 2018-07-03 NOTE — H&P
History & Physical    Patient: Willy Nuñez MRN: [id number]  CSN: [id number]    YOB: 1945  Age: 74 y.o. Sex: male      DOA: 7/3/2018  Primary Care Provider:  Ailyn Woodard MD      Assessment/Plan     Hospital Problems  Never Reviewed          Codes Class Noted POA    Hypercapnia ICD-10-CM: R06.89  ICD-9-CM: 786.09  7/3/2018 Unknown        Acute on chronic respiratory failure with hypoxia and hypercapnia (HCC) ICD-10-CM: J96.21, J96.22  ICD-9-CM: 518.84, 786.09, 799.02  7/3/2018 Unknown        CLL (chronic lymphocytic leukemia) ([facility] ICD-10-CM: C91.90  ICD-9-CM: 204.10  7/3/2018 Unknown        Emphysema lung ([facility] ICD-10-CM: J43.9  ICD-9-CM: 492.8  7/3/2018 Unknown        Memory loss ICD-10-CM: R41.3  ICD-9-CM: 780.93  7/3/2018 Unknown        Acute encephalopathy ICD-10-CM: G93.40  ICD-9-CM: 348.30  7/3/2018 Unknown                Admit to icu     Respiratory   Acute on chronic respiratory failure with hypercapnia and hypoxia   pco2 85 po2 69  Continue  bipap, breath tx     iv lasix and iv steroid   cxr reviewed   emphysema lung-case discussed with Dr. Alphonso Gregory       card:  Hypertensive urgency   Lasix and nitro patch   Better controlled     Hemo  cll , leukocytosis no change     Neuro   Acute encephalopathy due to hypercapnia    Ct head scheduled     Renal  Will continue monitor renal function and  Urine out-put. Endo   F/u glucose   Full code      70 minutes of critical care time spent in the direct evaluation and treatment of this high risk patient. The reason for providing this level of medical care for this critically ill patient was due a critical illness that impaired one or more vital organ systems such that there was a high probability of imminent or life threatening deterioration in the patients condition.  This care involved high complexity decision making to assess, manipulate, and support vital system functions, to treat this degreee vital organ system failure and to prevent further life threatening deterioration of the patients condition. DVT : heparin ppi proph  CC: ams        HPI:     Nitin Eagle. is a 74 y.o. male who hx emphysema, htn,memorry loss, CLL was sent to ER due to ams. Per er reported he acting abnormal since morning, refused to eat/drinking/taking medication. He responded slowly and lethargic. In ER, abg indicated pH 7.294. PCO2 85, pO2 69, HCO3 41.3.  bipap was put on. Ct head was pending. cxr: Linear opacity at the left lung base appears similar, could be scarring or partial atelectasis. There is mild crowding of basilar pulmonary  markings related to hypoventilation. The costophrenic angles are indistinct, right greater than left, suggest tiny effusions. UA was clear, ce wnl. His bp was elevated and labetalol was given in er. The above hx was coming from ER report. Visit Vitals    /83 (BP 1 Location: Left arm, BP Patient Position: Head of bed elevated (Comment degrees))    Pulse 68    Temp 98.2 °F (36.8 °C)    Resp (!) 37    Ht 5' 6\" (1.676 m)    Wt 86.2 kg (190 lb)    SpO2 98%    BMI 30.67 kg/m2    O2 Flow Rate (L/min): 2 l/min O2 Device: BIPAP      Past Medical History:   Diagnosis Date    Chronic kidney disease     stage 3 kidney disease    Chronic obstructive pulmonary disease (HCC)     CLL (chronic lymphocytic leukemia) (HCC)     Delirium     Emphysema lung (HCC)     Fall     Frequent hospital admissions     Gout     Hyperlipidemia     Hypoxemia     Memory loss     Noncompliance     Oxygen dependent     Pneumonia     Pulmonary nodules     Respiratory failure (HCC)     Risk for falls     Septic shock (HCC)     Sleep apnea     Tobacco abuse     Vitamin D deficiency        Past Surgical History:   Procedure Laterality Date    HX CATARACT REMOVAL      HX HERNIA REPAIR      HX NEPHROSTOMY         No family history on file.     Social History     Social History    Marital status: LEGALLY      Spouse name: N/A    Number of children: N/A    Years of education: N/A     Social History Main Topics    Smoking status: Former Smoker    Smokeless tobacco: Never Used    Alcohol use No      Comment: former ETOH user; stopped December 2017    Drug use: Not on file    Sexual activity: Not on file     Other Topics Concern    Not on file     Social History Narrative       Prior to Admission medications    Medication Sig Start Date End Date Taking? Authorizing Provider   alfuzosin SR (UROXATRAL) 10 mg SR tablet Take 10 mg by mouth daily. Yes Ronni Hernandez MD   aspirin 81 mg chewable tablet Take 81 mg by mouth daily. Yes Ronni Hernandez MD   cholecalciferol (VITAMIN D3) 1,000 unit cap Take  by mouth daily. Yes Ronni Hernandez MD   cycloSPORINE (RESTASIS) 0.05 % ophthalmic emulsion Administer 1 Drop to both eyes two (2) times a day. Yes Ronni Hernandez MD   diclofenac (VOLTAREN) 1 % gel Apply  to affected area four (4) times daily. Yes Ronni Hernandez MD   famotidine (PEPCID) 20 mg tablet Take 20 mg by mouth two (2) times a day. Yes Ronni Hernandez MD   latanoprost (XALATAN) 0.005 % ophthalmic solution Administer 1 Drop to both eyes nightly. Yes Ronni Hernandez MD   allopurinol (ZYLOPRIM) 100 mg tablet Take  by mouth daily. Ronni Hernandez MD   carvedilol (COREG) 6.25 mg tablet Take  by mouth two (2) times daily (with meals). Ronni Hernandez MD   furosemide (LASIX) 40 mg tablet Take  by mouth daily. Ronni Hernandez MD   Oxygen     Ronni Hernandez MD   pravastatin sodium (PRAVASTATIN PO) Take  by mouth. Ronni Hernandez MD   tiotropium (SPIRIVA WITH HANDIHALER) 18 mcg inhalation capsule Take 1 Cap by inhalation daily. Ronni Hernandez MD   fluticasone/salmeterol (ADVAIR HFA IN) Take  by inhalation. Ronni Hernandez MD   ALBUTEROL IN Take  by inhalation.     Ronni Hernandez MD       No Known Allergies    Review of Systems  Unable to obtain due to mental status         Physical Exam:     Physical Exam:  Visit Vitals    /83 (BP 1 Location: Left arm, BP Patient Position: Head of bed elevated (Comment degrees))    Pulse 68    Temp 98.2 °F (36.8 °C)    Resp (!) 37    Ht 5' 6\" (1.676 m)    Wt 86.2 kg (190 lb)    SpO2 98%    BMI 30.67 kg/m2    O2 Flow Rate (L/min): 2 l/min O2 Device: BIPAP    Temp (24hrs), Av.2 °F (36.8 °C), Min:98.2 °F (36.8 °C), Max:98.2 °F (36.8 °C)             General:   open eyes per voice , some cooperative, in  distress. Head:  Normocephalic, without obvious abnormality, atraumatic. Eyes:  Conjunctivae/corneas clear, sclera anicteric, PERRL, EOMs intact. Nose: Nares normal. No drainage or sinus tenderness. Throat: Lips, mucosa, and tongue normal. .bipap mask noted    Neck: Supple, symmetrical, trachea midline, no adenopathy. Lungs:   Coarse  Bilaterally        Heart:  Regular rate and rhythm, S1, S2 normal, +murmur, click, rub or gallop. Abdomen: Soft, non-tender. Bowel sounds normal. No masses,  No organomegaly. Extremities: Extremities normal, atraumatic, no cyanosis or edema. Pulses: 2+ and symmetric all extremities.    Skin: Skin color-pink, texture, turgor normal. Dry skin  Capillary refill normal    Neurologic: Unable to obtain due to mental status, but follow some command        Labs Reviewed:    BMP:   Lab Results   Component Value Date/Time     2018 04:40 PM    K 4.4 2018 04:40 PM     2018 04:40 PM    CO2 37 (H) 2018 04:40 PM    AGAP 3 2018 04:40 PM    GLU 89 2018 04:40 PM    BUN 17 2018 04:40 PM    CREA 1.17 2018 04:40 PM    GFRAA >60 2018 04:40 PM    GFRNA >60 2018 04:40 PM     CMP:   Lab Results   Component Value Date/Time     2018 04:40 PM    K 4.4 2018 04:40 PM     2018 04:40 PM    CO2 37 (H) 2018 04:40 PM    AGAP 3 2018 04:40 PM    GLU 89 2018 04:40 PM    BUN 17 2018 04:40 PM    CREA 1.17 2018 04:40 PM    GFRAA >60 2018 04:40 PM    GFRNA >60 2018 04:40 PM CA 9.0 07/03/2018 04:40 PM    ALB 3.6 07/03/2018 04:40 PM    TP 8.6 (H) 07/03/2018 04:40 PM    GLOB 5.0 (H) 07/03/2018 04:40 PM    AGRAT 0.7 (L) 07/03/2018 04:40 PM    SGOT 26 07/03/2018 04:40 PM    ALT 26 07/03/2018 04:40 PM     CBC:   Lab Results   Component Value Date/Time    WBC 19.3 (H) 07/03/2018 04:40 PM    HGB 11.9 (L) 07/03/2018 04:40 PM    HCT 40.9 07/03/2018 04:40 PM     07/03/2018 04:40 PM     All Cardiac Markers in the last 24 hours:   Lab Results   Component Value Date/Time     07/03/2018 04:40 PM    CKMB 1.9 07/03/2018 04:40 PM    CKND1 1.2 07/03/2018 04:40 PM    TROIQ <0.02 07/03/2018 04:40 PM     Recent Glucose Results:   Lab Results   Component Value Date/Time    GLU 89 07/03/2018 04:40 PM     ABG:   Lab Results   Component Value Date/Time    PHI 7.294 (L) 07/03/2018 05:16 PM    PCO2I 85.0 (H) 07/03/2018 05:16 PM    PO2I 69 (L) 07/03/2018 05:16 PM    HCO3I 41.3 (H) 07/03/2018 05:16 PM    FIO2I 28 07/03/2018 05:16 PM     COAGS: No results found for: APTT, PTP, INR  Liver Panel:   Lab Results   Component Value Date/Time    ALB 3.6 07/03/2018 04:40 PM    TP 8.6 (H) 07/03/2018 04:40 PM    GLOB 5.0 (H) 07/03/2018 04:40 PM    AGRAT 0.7 (L) 07/03/2018 04:40 PM    SGOT 26 07/03/2018 04:40 PM    ALT 26 07/03/2018 04:40 PM    AP 99 07/03/2018 04:40 PM     Pancreatic Markers: No results found for: AMYLPOCT, AML, LIPPOCT, LPSE    Procedures/imaging: see electronic medical records for all procedures/Xrays and details which were not copied into this note but were reviewed prior to creation of Lawanda Louis MD, Internal Medicine     CC: Cherie Fabian MD

## 2018-07-03 NOTE — ED PROVIDER NOTES
EMERGENCY DEPARTMENT HISTORY AND PHYSICAL EXAM    Date: 7/3/2018  Patient Name: Keira David. History of Presenting Illness     Chief Complaint   Patient presents with    Altered mental status         History Provided By: Patient's Wife and Patient's Daughter    Chief Complaint: AMS  Duration: 9 Hours  Timing:  Constant  Location: Generalized  Modifying Factors: No medications taken this morning  Associated Symptoms: cough, lack of appetite/thirst, slurred speech, unresponsive behavior    Additional History (Context):   4:04 PM  Keira Sandoval is a 74 y.o. male with PMHX of COPD, recurrent falls, septic shock, PNA, memory loss, hypoxemia, CKD, gout, hyperlipidemia, pulmonary nodules, and CLL who presents to the emergency department C/O AMS, onset this morning. Associated sxs include cough, lack of appetite/thirst, slurred speech, unresponsive behavior. Pt's wife reports that the pt was acting abnormally all morning; she reports that he sat in the same chair for 9 hours straight and refused to eat, drink, or take his medications. She notes that he zones out at times. Pt is able to state who he is and where he is; however, it took quite a while. At baseline, pt uses a walker or sometimes walks alone. Pt has had 2 falls in the past month. Pt denies N/V, hx of Parkinson's Disease, hx of CVA/TIA, and any other sxs or complaints. PCP: Wu Schaeffer MD    Current Facility-Administered Medications   Medication Dose Route Frequency Provider Last Rate Last Dose    nitroglycerin (NITRODUR) 0.2 mg/hr patch 1 Patch  1 Patch TransDERmal DAILY Darlene Lucero MD         Current Outpatient Prescriptions   Medication Sig Dispense Refill    alfuzosin SR (UROXATRAL) 10 mg SR tablet Take 10 mg by mouth daily.  aspirin 81 mg chewable tablet Take 81 mg by mouth daily.  cholecalciferol (VITAMIN D3) 1,000 unit cap Take  by mouth daily.       cycloSPORINE (RESTASIS) 0.05 % ophthalmic emulsion Administer 1 Drop to both eyes two (2) times a day.  diclofenac (VOLTAREN) 1 % gel Apply  to affected area four (4) times daily.  famotidine (PEPCID) 20 mg tablet Take 20 mg by mouth two (2) times a day.  latanoprost (XALATAN) 0.005 % ophthalmic solution Administer 1 Drop to both eyes nightly.  allopurinol (ZYLOPRIM) 100 mg tablet Take  by mouth daily.  carvedilol (COREG) 6.25 mg tablet Take  by mouth two (2) times daily (with meals).  furosemide (LASIX) 40 mg tablet Take  by mouth daily.  Oxygen       pravastatin sodium (PRAVASTATIN PO) Take  by mouth.  tiotropium (SPIRIVA WITH HANDIHALER) 18 mcg inhalation capsule Take 1 Cap by inhalation daily.  fluticasone/salmeterol (ADVAIR HFA IN) Take  by inhalation.  ALBUTEROL IN Take  by inhalation. Past History     Past Medical History:  Past Medical History:   Diagnosis Date    Chronic kidney disease     stage 3 kidney disease    Chronic obstructive pulmonary disease (HCC)     CLL (chronic lymphocytic leukemia) (HCC)     Delirium     Emphysema lung ([facility]     Fall     Frequent hospital admissions     Gout     Hyperlipidemia     Hypoxemia     Memory loss     Noncompliance     Oxygen dependent     Pneumonia     Pulmonary nodules     Respiratory failure (HCC)     Risk for falls     Septic shock (HCC)     Sleep apnea     Tobacco abuse     Vitamin D deficiency        Past Surgical History:  Past Surgical History:   Procedure Laterality Date    HX CATARACT REMOVAL      HX HERNIA REPAIR      HX NEPHROSTOMY         Family History:  No family history on file. Social History:  Social History   Substance Use Topics    Smoking status: Former Smoker    Smokeless tobacco: Never Used    Alcohol use No      Comment: former ETOH user; stopped December 2017       Allergies:  No Known Allergies      Review of Systems   Review of Systems   Unable to perform ROS: Mental status change   Respiratory: Positive for cough. Gastrointestinal: Negative for nausea and vomiting. Bowel incontinence   Genitourinary:        Urinary incontinence   Neurological: Positive for speech difficulty. Physical Exam     Vitals:    07/03/18 1727 07/03/18 1730 07/03/18 1738 07/03/18 1745   BP:  (!) 185/91 (!) 197/99 (!) 204/93   Pulse:  78 75 73   Resp:  19 19 (!) 38   Temp:       SpO2: 97% 97% 98% 98%   Weight:       Height:         Physical Exam   Nursing note and vitals reviewed.     Constitutional: Elderly, chronically ill appearing, no acute distress  Head: Normocephalic, Atraumatic  Eyes: Pupils are equal, round, and reactive to light, EOMI  Neck: Supple, non-tender  Cardiovascular: Regular rate and rhythm, no murmurs, rubs, or gallops; 4/6 systolic ejection murmur  Chest: Normal work of breathing and chest excursion bilaterally  Lungs: Clear to ausculation bilaterally  Abdomen: Soft, non tender, non distended, normoactive bowel sounds  Back: No evidence of trauma or deformity  Extremities: No evidence of trauma or deformity, moderate BLE edema  Skin: Warm and dry, normal cap refill  Neuro: Alert and appropriate, CN intact, slightly slurred speech, strength and sensation and symmetric bilaterally  Psychiatric: Normal mood and affect    Diagnostic Study Results     Labs -     Recent Results (from the past 12 hour(s))   URINALYSIS W/ RFLX MICROSCOPIC    Collection Time: 07/03/18  4:15 PM   Result Value Ref Range    Color YELLOW      Appearance CLEAR      Specific gravity 1.019 1.005 - 1.030      pH (UA) 7.0 5.0 - 8.0      Protein 300 (A) NEG mg/dL    Glucose NEGATIVE  NEG mg/dL    Ketone NEGATIVE  NEG mg/dL    Bilirubin NEGATIVE  NEG      Blood NEGATIVE  NEG      Urobilinogen 0.2 0.2 - 1.0 EU/dL    Nitrites NEGATIVE  NEG      Leukocyte Esterase NEGATIVE  NEG     EKG, 12 LEAD, INITIAL    Collection Time: 07/03/18  4:19 PM   Result Value Ref Range    Ventricular Rate 84 BPM    Atrial Rate 84 BPM    P-R Interval 160 ms    QRS Duration 82 ms Q-T Interval 352 ms    QTC Calculation (Bezet) 415 ms    Calculated P Axis 46 degrees    Calculated R Axis -10 degrees    Calculated T Axis 62 degrees    Diagnosis       Normal sinus rhythm  Moderate voltage criteria for LVH, may be normal variant  Borderline ECG  When compared with ECG of 16-JUN-2018 10:01,  No significant change was found     CBC WITH AUTOMATED DIFF    Collection Time: 07/03/18  4:40 PM   Result Value Ref Range    WBC 19.3 (H) 4.6 - 13.2 K/uL    RBC 4.87 4.70 - 5.50 M/uL    HGB 11.9 (L) 13.0 - 16.0 g/dL    HCT 40.9 36.0 - 48.0 %    MCV 84.0 74.0 - 97.0 FL    MCH 24.4 24.0 - 34.0 PG    MCHC 29.1 (L) 31.0 - 37.0 g/dL    RDW 15.0 (H) 11.6 - 14.5 %    PLATELET 959 329 - 880 K/uL    MPV 10.3 9.2 - 11.8 FL    NEUTROPHILS 24 (L) 40 - 73 %    LYMPHOCYTES 68 (H) 21 - 52 %    MONOCYTES 7 3 - 10 %    EOSINOPHILS 1 0 - 5 %    BASOPHILS 0 0 - 2 %    ABS. NEUTROPHILS 4.6 1.8 - 8.0 K/UL    ABS. LYMPHOCYTES 13.1 (H) 0.9 - 3.6 K/UL    ABS. MONOCYTES 1.4 (H) 0.05 - 1.2 K/UL    ABS. EOSINOPHILS 0.2 0.0 - 0.4 K/UL    ABS. BASOPHILS 0.0 0.0 - 0.06 K/UL    RBC COMMENTS ANISOCYTOSIS  1+        RBC COMMENTS HYPOCHROMIA  1+        DF MANUAL     METABOLIC PANEL, COMPREHENSIVE    Collection Time: 07/03/18  4:40 PM   Result Value Ref Range    Sodium 141 136 - 145 mmol/L    Potassium 4.4 3.5 - 5.5 mmol/L    Chloride 101 100 - 108 mmol/L    CO2 37 (H) 21 - 32 mmol/L    Anion gap 3 3.0 - 18 mmol/L    Glucose 89 74 - 99 mg/dL    BUN 17 7.0 - 18 MG/DL    Creatinine 1.17 0.6 - 1.3 MG/DL    BUN/Creatinine ratio 15 12 - 20      GFR est AA >60 >60 ml/min/1.73m2    GFR est non-AA >60 >60 ml/min/1.73m2    Calcium 9.0 8.5 - 10.1 MG/DL    Bilirubin, total 0.3 0.2 - 1.0 MG/DL    ALT (SGPT) 26 16 - 61 U/L    AST (SGOT) 26 15 - 37 U/L    Alk.  phosphatase 99 45 - 117 U/L    Protein, total 8.6 (H) 6.4 - 8.2 g/dL    Albumin 3.6 3.4 - 5.0 g/dL    Globulin 5.0 (H) 2.0 - 4.0 g/dL    A-G Ratio 0.7 (L) 0.8 - 1.7     CARDIAC PANEL,(CK, CKMB & TROPONIN)    Collection Time: 07/03/18  4:40 PM   Result Value Ref Range     39 - 308 U/L    CK - MB 1.9 <3.6 ng/ml    CK-MB Index 1.2 0.0 - 4.0 %    Troponin-I, Qt. <0.02 0.00 - 0.06 NG/ML   POC G3    Collection Time: 07/03/18  5:16 PM   Result Value Ref Range    Device: NASAL CANNULA      Flow rate (POC) 2 L/M    FIO2 (POC) 28 %    pH (POC) 7.294 (L) 7.35 - 7.45      pCO2 (POC) 85.0 (H) 35.0 - 45.0 MMHG    pO2 (POC) 69 (L) 80 - 100 MMHG    HCO3 (POC) 41.3 (H) 22 - 26 MMOL/L    sO2 (POC) 90 (L) 92 - 97 %    Base excess (POC) 15 mmol/L    Allens test (POC) YES      Total resp. rate 33      Site RIGHT RADIAL      Specimen type (POC) ARTERIAL      Performed by Munira Collier        Radiologic Studies -  XR CHEST PORT   Final Result   IMPRESSION: Linear opacity at the left lung base appears similar, could be  scarring or partial atelectasis. There is mild crowding of basilar pulmonary  markings related to hypoventilation. The costophrenic angles are indistinct,  right greater than left, suggest tiny effusions. As read by the radiologist.   Nurys FORD    (Results Pending)     CT Results  (Last 48 hours)    None        CXR Results  (Last 48 hours)               07/03/18 1649  XR CHEST PORT Final result    Impression:  IMPRESSION: Linear opacity at the left lung base appears similar, could be   scarring or partial atelectasis. There is mild crowding of basilar pulmonary   markings related to hypoventilation. The costophrenic angles are indistinct,   right greater than left, suggest tiny effusions. Narrative:  INDICATION:  altered mental status       COMPARISON:  6/16/2018       FINDINGS: A portable AP radiograph of the chest was obtained. The patient is on   a cardiac monitor. Slightly low lung volumes. Linear opacity at the left lung   base appears similar, could be scarring or partial atelectasis. There is mild   crowding of basilar pulmonary markings related to hypoventilation.  The   costophrenic angles are indistinct, right greater than left, suggest tiny   effusions. Upper lungs clear. No vascular congestion. No pneumothorax. Mediastinal contour stable. No acute bony finding. Medications given in the ED-  Medications   nitroglycerin (NITRODUR) 0.2 mg/hr patch 1 Patch (not administered)   labetalol (NORMODYNE;TRANDATE) injection 20 mg (20 mg IntraVENous Given 7/3/18 1756)         Medical Decision Making   I am the first provider for this patient. I reviewed the vital signs, available nursing notes, past medical history, past surgical history, family history and social history. Vital Signs-Reviewed the patient's vital signs. Pulse Oximetry Analysis - 97% on RA     EKG interpretation: (Preliminary)  4:19 PM   84 bpm. NSR. QRS duration of 82 ms. EKG read by Vicente Woody MD at 4:19 PM     Records Reviewed: Nursing Notes and Old Medical Records    Procedures:  Procedures    ED Course:   4:04 PM Initial assessment performed. The patients presenting problems have been discussed, and they are in agreement with the care plan formulated and outlined with them. I have encouraged them to ask questions as they arise throughout their visit. 5:15 PM  Elevated white count noted. This is baseline because he has CLL and not an indicator of sepsis. 5:16 PM  CO2 is over 80%, so he will need bipap. 6:40 PM  Updated wife that he is on bipap and going to ICU. DISCUSSION:  75 y/o male brought by family for AMS. Here, no focal abnormalities, though sleepy and slow to respond. Family reports noncompliance with c-pap at night. Labs consistent w/ hypercapnic respiratory failure here. Requires bi-pap. BP also elevated d/t medication noncompliance. Will require further ICU management. Diagnosis and Disposition     CONSULT NOTE:   6:04 PM  Vicente Woody MD spoke with Belinda Kate MD   Specialty: Hospitalist  Discussed pt's hx, disposition, and available diagnostic and imaging results.  Reviewed care plans. Consulting physician agrees with plans as outlined. Accepts pt. Recommends consulting intensivist.   Written by CHANG Jacobs, as dictated by Suri Carson MD    CONSULT NOTE:   6:05 PM  Suri Carson MD spoke with    Specialty: Intensivist  Discussed pt's hx, disposition, and available diagnostic and imaging results  over the telephone. Reviewed care plans. Consulting physician agrees with plans as outlined. Will see pt in the morning. ADMISSION NOTE:  6:08 PM  Patient is being admitted to the hospital by Estrada Fuller MD. The results of their tests and reasons for their admission have been discussed with them and/or available family. They convey agreement and understanding for the need to be admitted and for their admission diagnosis. Written by CHANG Jacobs, as dictated by Suri Carson MD.    CONDITIONS ON ADMISSION:  Sepsis is not present at the time of admission. Deep Vein Thrombosis is not present at the time of admission. Thrombosis is not present at the time of admission. Urinary Tract Infection is present at the time of admission. Pneumonia is not present at the time of admission. MRSA is not present at the time of admission. Wound infection is not present at the time of admission. Pressure Ulcer is not present at the time of admission. 6:03 PM  I have spent 50 minutes of critical care time involved in lab review, consultations with specialist, family decision-making, and documentation. During this entire length of time I was immediately available to the patient. Critical Care: The reason for providing this level of medical care for this critically ill patient was due a critical illness that impaired one or more vital organ systems such that there was a high probability of imminent or life threatening deterioration in the patients condition.  This care involved high complexity decision making to assess, manipulate, and support vital system functions, to treat this degreee vital organ system failure and to prevent further life threatening deterioration of the patients condition. CLINICAL IMPRESSION    1. Hypercapnia    2. Altered mental status, unspecified altered mental status type    3. Hypertension, unspecified type        _______________________________    Attestations: This note is prepared by Harrison Ren, acting as Scribe for Lamonte Amor MD.    Lamonte Amor MD:  The scribe's documentation has been prepared under my direction and personally reviewed by me in its entirety.   I confirm that the note above accurately reflects all work, treatment, procedures, and medical decision making performed by me.  _______________________________

## 2018-07-04 LAB
AMPHET UR QL SCN: NEGATIVE
ANION GAP SERPL CALC-SCNC: 4 MMOL/L (ref 3–18)
ARTERIAL PATENCY WRIST A: YES
BARBITURATES UR QL SCN: NEGATIVE
BASE EXCESS BLD CALC-SCNC: 15 MMOL/L
BASOPHILS # BLD: 0 K/UL (ref 0–0.06)
BASOPHILS NFR BLD: 0 % (ref 0–2)
BDY SITE: ABNORMAL
BENZODIAZ UR QL: NEGATIVE
BUN SERPL-MCNC: 16 MG/DL (ref 7–18)
BUN/CREAT SERPL: 12 (ref 12–20)
CALCIUM SERPL-MCNC: 9.4 MG/DL (ref 8.5–10.1)
CANNABINOIDS UR QL SCN: NEGATIVE
CHLORIDE SERPL-SCNC: 98 MMOL/L (ref 100–108)
CO2 SERPL-SCNC: 37 MMOL/L (ref 21–32)
COCAINE UR QL SCN: NEGATIVE
CREAT SERPL-MCNC: 1.3 MG/DL (ref 0.6–1.3)
DIFFERENTIAL METHOD BLD: ABNORMAL
EOSINOPHIL # BLD: 0 K/UL (ref 0–0.4)
EOSINOPHIL NFR BLD: 0 % (ref 0–5)
ERYTHROCYTE [DISTWIDTH] IN BLOOD BY AUTOMATED COUNT: 15 % (ref 11.6–14.5)
GAS FLOW.O2 O2 DELIVERY SYS: ABNORMAL L/MIN
GAS FLOW.O2 SETTING OXYMISER: 3 L/M
GLUCOSE SERPL-MCNC: 167 MG/DL (ref 74–99)
HCO3 BLD-SCNC: 39.4 MMOL/L (ref 22–26)
HCT VFR BLD AUTO: 41.3 % (ref 36–48)
HDSCOM,HDSCOM: NORMAL
HGB BLD-MCNC: 12.2 G/DL (ref 13–16)
LYMPHOCYTES # BLD: 4.7 K/UL (ref 0.9–3.6)
LYMPHOCYTES NFR BLD: 34 % (ref 21–52)
MAGNESIUM SERPL-MCNC: 1.8 MG/DL (ref 1.6–2.6)
MCH RBC QN AUTO: 24.4 PG (ref 24–34)
MCHC RBC AUTO-ENTMCNC: 29.5 G/DL (ref 31–37)
MCV RBC AUTO: 82.8 FL (ref 74–97)
METHADONE UR QL: NEGATIVE
MONOCYTES # BLD: 0.2 K/UL (ref 0.05–1.2)
MONOCYTES NFR BLD: 2 % (ref 3–10)
NEUTS SEG # BLD: 8.7 K/UL (ref 1.8–8)
NEUTS SEG NFR BLD: 64 % (ref 40–73)
O2/TOTAL GAS SETTING VFR VENT: 32 %
OPIATES UR QL: NEGATIVE
PCO2 BLD: 58.9 MMHG (ref 35–45)
PCP UR QL: NEGATIVE
PH BLD: 7.43 [PH] (ref 7.35–7.45)
PLATELET # BLD AUTO: 243 K/UL (ref 135–420)
PMV BLD AUTO: 10.9 FL (ref 9.2–11.8)
PO2 BLD: 65 MMHG (ref 80–100)
POTASSIUM SERPL-SCNC: 3.8 MMOL/L (ref 3.5–5.5)
RBC # BLD AUTO: 4.99 M/UL (ref 4.7–5.5)
SAO2 % BLD: 92 % (ref 92–97)
SERVICE CMNT-IMP: ABNORMAL
SODIUM SERPL-SCNC: 139 MMOL/L (ref 136–145)
SPECIMEN TYPE: ABNORMAL
WBC # BLD AUTO: 13.7 K/UL (ref 4.6–13.2)

## 2018-07-04 PROCEDURE — 74011250637 HC RX REV CODE- 250/637: Performed by: HOSPITALIST

## 2018-07-04 PROCEDURE — 36600 WITHDRAWAL OF ARTERIAL BLOOD: CPT

## 2018-07-04 PROCEDURE — 85025 COMPLETE CBC W/AUTO DIFF WBC: CPT | Performed by: HOSPITALIST

## 2018-07-04 PROCEDURE — 74011000250 HC RX REV CODE- 250: Performed by: INTERNAL MEDICINE

## 2018-07-04 PROCEDURE — 94640 AIRWAY INHALATION TREATMENT: CPT

## 2018-07-04 PROCEDURE — 36415 COLL VENOUS BLD VENIPUNCTURE: CPT | Performed by: HOSPITALIST

## 2018-07-04 PROCEDURE — 65610000006 HC RM INTENSIVE CARE

## 2018-07-04 PROCEDURE — 77010033678 HC OXYGEN DAILY

## 2018-07-04 PROCEDURE — 74011250636 HC RX REV CODE- 250/636: Performed by: HOSPITALIST

## 2018-07-04 PROCEDURE — 87040 BLOOD CULTURE FOR BACTERIA: CPT | Performed by: INTERNAL MEDICINE

## 2018-07-04 PROCEDURE — 74011250637 HC RX REV CODE- 250/637: Performed by: INTERNAL MEDICINE

## 2018-07-04 PROCEDURE — 74011250636 HC RX REV CODE- 250/636: Performed by: INTERNAL MEDICINE

## 2018-07-04 PROCEDURE — 80048 BASIC METABOLIC PNL TOTAL CA: CPT | Performed by: HOSPITALIST

## 2018-07-04 PROCEDURE — 83735 ASSAY OF MAGNESIUM: CPT | Performed by: HOSPITALIST

## 2018-07-04 PROCEDURE — 82803 BLOOD GASES ANY COMBINATION: CPT

## 2018-07-04 PROCEDURE — 74011000250 HC RX REV CODE- 250: Performed by: HOSPITALIST

## 2018-07-04 RX ORDER — AMLODIPINE BESYLATE 5 MG/1
10 TABLET ORAL DAILY
Status: DISCONTINUED | OUTPATIENT
Start: 2018-07-04 | End: 2018-07-12 | Stop reason: HOSPADM

## 2018-07-04 RX ORDER — FUROSEMIDE 10 MG/ML
40 INJECTION INTRAMUSCULAR; INTRAVENOUS DAILY
Status: DISCONTINUED | OUTPATIENT
Start: 2018-07-04 | End: 2018-07-05

## 2018-07-04 RX ORDER — CARVEDILOL 3.12 MG/1
6.25 TABLET ORAL 2 TIMES DAILY WITH MEALS
Status: DISCONTINUED | OUTPATIENT
Start: 2018-07-04 | End: 2018-07-06

## 2018-07-04 RX ORDER — LORAZEPAM 2 MG/ML
1 INJECTION INTRAMUSCULAR
Status: DISCONTINUED | OUTPATIENT
Start: 2018-07-04 | End: 2018-07-06

## 2018-07-04 RX ORDER — HYDRALAZINE HYDROCHLORIDE 20 MG/ML
20 INJECTION INTRAMUSCULAR; INTRAVENOUS
Status: DISCONTINUED | OUTPATIENT
Start: 2018-07-04 | End: 2018-07-12 | Stop reason: HOSPADM

## 2018-07-04 RX ORDER — OLANZAPINE 2.5 MG/1
2.5 TABLET ORAL EVERY EVENING
Status: DISCONTINUED | OUTPATIENT
Start: 2018-07-04 | End: 2018-07-05

## 2018-07-04 RX ADMIN — IPRATROPIUM BROMIDE AND ALBUTEROL SULFATE 3 ML: .5; 3 SOLUTION RESPIRATORY (INHALATION) at 07:25

## 2018-07-04 RX ADMIN — CARVEDILOL 6.25 MG: 3.12 TABLET, FILM COATED ORAL at 11:08

## 2018-07-04 RX ADMIN — OLANZAPINE 2.5 MG: 2.5 TABLET ORAL at 11:32

## 2018-07-04 RX ADMIN — FUROSEMIDE 20 MG: 10 INJECTION, SOLUTION INTRAMUSCULAR; INTRAVENOUS at 08:34

## 2018-07-04 RX ADMIN — DOCUSATE SODIUM 100 MG: 100 CAPSULE, LIQUID FILLED ORAL at 08:34

## 2018-07-04 RX ADMIN — AMLODIPINE BESYLATE 10 MG: 5 TABLET ORAL at 11:07

## 2018-07-04 RX ADMIN — FOLIC ACID: 5 INJECTION, SOLUTION INTRAMUSCULAR; INTRAVENOUS; SUBCUTANEOUS at 12:30

## 2018-07-04 RX ADMIN — ARFORMOTEROL TARTRATE 15 MCG: 15 SOLUTION RESPIRATORY (INHALATION) at 20:57

## 2018-07-04 RX ADMIN — METHYLPREDNISOLONE SODIUM SUCCINATE 60 MG: 125 INJECTION, POWDER, FOR SOLUTION INTRAMUSCULAR; INTRAVENOUS at 05:48

## 2018-07-04 RX ADMIN — FAMOTIDINE 20 MG: 10 INJECTION, SOLUTION INTRAVENOUS at 20:09

## 2018-07-04 RX ADMIN — HEPARIN SODIUM 5000 UNITS: 5000 INJECTION, SOLUTION INTRAVENOUS; SUBCUTANEOUS at 02:58

## 2018-07-04 RX ADMIN — BUDESONIDE 500 MCG: 0.5 INHALANT RESPIRATORY (INHALATION) at 07:25

## 2018-07-04 RX ADMIN — LEVOFLOXACIN 500 MG: 5 INJECTION, SOLUTION INTRAVENOUS at 20:04

## 2018-07-04 RX ADMIN — METHYLPREDNISOLONE SODIUM SUCCINATE 40 MG: 125 INJECTION, POWDER, FOR SOLUTION INTRAMUSCULAR; INTRAVENOUS at 20:09

## 2018-07-04 RX ADMIN — HEPARIN SODIUM 5000 UNITS: 5000 INJECTION, SOLUTION INTRAVENOUS; SUBCUTANEOUS at 10:43

## 2018-07-04 RX ADMIN — IPRATROPIUM BROMIDE AND ALBUTEROL SULFATE 3 ML: .5; 3 SOLUTION RESPIRATORY (INHALATION) at 11:08

## 2018-07-04 RX ADMIN — HYDRALAZINE HYDROCHLORIDE 20 MG: 20 INJECTION INTRAMUSCULAR; INTRAVENOUS at 11:08

## 2018-07-04 RX ADMIN — HYDRALAZINE HYDROCHLORIDE 20 MG: 20 INJECTION INTRAMUSCULAR; INTRAVENOUS at 23:05

## 2018-07-04 RX ADMIN — FUROSEMIDE 40 MG: 10 INJECTION, SOLUTION INTRAMUSCULAR; INTRAVENOUS at 11:08

## 2018-07-04 RX ADMIN — HEPARIN SODIUM 5000 UNITS: 5000 INJECTION, SOLUTION INTRAVENOUS; SUBCUTANEOUS at 18:39

## 2018-07-04 RX ADMIN — BUDESONIDE 500 MCG: 0.5 INHALANT RESPIRATORY (INHALATION) at 20:57

## 2018-07-04 RX ADMIN — IPRATROPIUM BROMIDE AND ALBUTEROL SULFATE 3 ML: .5; 3 SOLUTION RESPIRATORY (INHALATION) at 20:57

## 2018-07-04 RX ADMIN — DOCUSATE SODIUM 100 MG: 100 CAPSULE, LIQUID FILLED ORAL at 20:09

## 2018-07-04 RX ADMIN — ARFORMOTEROL TARTRATE 15 MCG: 15 SOLUTION RESPIRATORY (INHALATION) at 07:25

## 2018-07-04 RX ADMIN — HYDRALAZINE HYDROCHLORIDE 10 MG: 20 INJECTION INTRAMUSCULAR; INTRAVENOUS at 09:56

## 2018-07-04 RX ADMIN — IPRATROPIUM BROMIDE AND ALBUTEROL SULFATE 3 ML: .5; 3 SOLUTION RESPIRATORY (INHALATION) at 15:11

## 2018-07-04 RX ADMIN — IPRATROPIUM BROMIDE AND ALBUTEROL SULFATE 3 ML: .5; 3 SOLUTION RESPIRATORY (INHALATION) at 23:34

## 2018-07-04 RX ADMIN — IPRATROPIUM BROMIDE AND ALBUTEROL SULFATE 3 ML: .5; 3 SOLUTION RESPIRATORY (INHALATION) at 04:46

## 2018-07-04 RX ADMIN — FAMOTIDINE 20 MG: 10 INJECTION, SOLUTION INTRAVENOUS at 08:34

## 2018-07-04 RX ADMIN — KETOROLAC TROMETHAMINE 15 MG: 30 INJECTION, SOLUTION INTRAMUSCULAR at 12:16

## 2018-07-04 RX ADMIN — CARVEDILOL 6.25 MG: 3.12 TABLET, FILM COATED ORAL at 17:02

## 2018-07-04 NOTE — PROGRESS NOTES
Hospitalist Progress Note    Patient: Jonah Bennett MRN: [id number]  CSN: [id number]    YOB: 1945  Age: 74 y.o. Sex: male    DOA: 7/3/2018 LOS:  LOS: 1 day            Assessment/Plan     Principal Problem:    Acute on chronic respiratory failure with hypoxia and hypercapnia ([address] (7/3/2018)    Active Problems:    Hypercapnia (7/3/2018)      CLL (chronic lymphocytic leukemia) ([address] (7/3/2018)      Emphysema lung ([address] (7/3/2018)      Memory loss (7/3/2018)      Acute encephalopathy (7/3/2018)         Respiratory   Acute on chronic respiratory failure with hypercapnia and hypoxia   pco2 85 po2 69 yesterday, repeat ABG  Continue  bipap, breath tx     iv lasix and iv steroid   cxr reviewed   emphysema lung-case discussed with Dr. Josesito Burris  CAD:   Hypertensive urgency   Lasix and nitro patch, add coreg and norvasc  Better controlled      Hemo  CLL , leukocytosis no change      Neuro   Acute encephalopathy due to hypercapnia    CT head scheduled      Renal  Will continue monitor renal function and  Urine out-put. ID: On Levaquin, will f/u Cx     Endo   F/u glucose     DVT/GI prophylaxis     Full code      CC:   \" I live by myself, my wife is somewhere on the street\"         Subjective:     Pt was seen and examined with the nurse in the morning round. Off BIPAP, more alert and awake, oriented X 3. Review of systems  General: No fevers or chills. Cardiovascular: No chest pain or pressure. No palpitations. Pulmonary: No cough, SOB  Gastrointestinal: No nausea, vomiting. Objective:      Visit Vitals    /85    Pulse (!) 103    Temp 98.1 °F (36.7 °C)    Resp (!) 45    Ht 5' 6\" (1.676 m)    Wt 86.2 kg (190 lb)    SpO2 96%    BMI 30.67 kg/m2       Physical Exam:    Gen: NAD, non-toxic. Heent:  MMM, NC, AT. Cor: Tachycardia  Resp:  Decreased BS b/l  Abd:  NT ND.  BS positive. No rebound or guarding. No masses. Ext: No edema or cyanosis.       Intake and Output:  Current Shift:     Last three shifts:  07/02 1901 - 07/04 0700  In: -   Out: 800 [Urine:800]    Labs: Results:       Chemistry Recent Labs      07/04/18   0254  07/03/18   1640   GLU  167*  89   NA  139  141   K  3.8  4.4   CL  98*  101   CO2  37*  37*   BUN  16  17   CREA  1.30  1.17   CA  9.4  9.0   AGAP  4  3   BUCR  12  15   AP   --   99   TP   --   8.6*   ALB   --   3.6   GLOB   --   5.0*   AGRAT   --   0.7*      CBC w/Diff Recent Labs      07/04/18   0254  07/03/18   1640   WBC  13.7*  19.3*   RBC  4.99  4.87   HGB  12.2*  11.9*   HCT  41.3  40.9   PLT  243  220   GRANS  64  24*   LYMPH  34  68*   EOS  0  1      Cardiac Enzymes Recent Labs      07/03/18   1640   CPK  160   CKND1  1.2      Coagulation No results for input(s): PTP, INR, APTT in the last 72 hours. No lab exists for component: INREXT    Lipid Panel No results found for: CHOL, CHOLPOCT, CHOLX, CHLST, CHOLV, 022090, HDL, LDL, LDLC, DLDLP, 408906, VLDLC, VLDL, TGLX, TRIGL, TRIGP, TGLPOCT, CHHD, CHHDX   BNP No results for input(s): BNPP in the last 72 hours.    Liver Enzymes Recent Labs      07/03/18   1640   TP  8.6*   ALB  3.6   AP  99   SGOT  26      Thyroid Studies No results found for: T4, T3U, TSH, TSHEXT     Procedures/imaging: see electronic medical records for all procedures/Xrays and details which were not copied into this note but were reviewed prior to creation of Plan      Medications Reviewed  Doug Molina MD

## 2018-07-04 NOTE — PROGRESS NOTES
0710 Completed shift report and assumed care from Jaskaran Woods, BRYN. We reviewed SBAR, labs, meds, and plan of care  0745 Completed shift assessment  1746 Spoke with Arleen Arrieta RT, via telephone. Pt is resting with RR 35-45/min and O2 sat 95%, might be helpful to put back on bipap. She advised she will come assess pt asap  [address], from respiratory evaluating pt  1115 Notified pt and sitter that we need to obtain sputum sample if pt coughs any mucus, provided sample cup  1130 Reassessed pt  [address] to inquire about pt's IV fluids, they said they will bring ASAP  1300 Pt resting with equal, bilateral breathing  1400 Attempted to complete admission documentation, but pt is not very informative, is sleepy  1545 Reassessed pt, asleep, but arousable  1730 Pt resting with unlabored breathing, sitter present  1915 Completed shift report and transferred care to Jaskaran Woods RN.  We reviewed SBAR, labs, meds, and plan of care

## 2018-07-04 NOTE — ED NOTES
Pt personal belongings, home o2 concentrator, and walker were transported to ICU with pt and given to RN taking over care in ICU.

## 2018-07-04 NOTE — PROGRESS NOTES
@2130 pt admitted from ER awake alert oriented x3 in bed on BIPAP accompanied by RT and nurse, on monitor, CHG bath completed and skin assessment done. Lungs remains diminished. Assessment done and documented in appropriate flow sheets Nursing management continues. @2343 pt pulled BIPAP off saying that he is claustrophobic and that he wanted to speak to his wife. Pt wife was called and phone given to him and wife updated . Pt is awake alert and oriented x4 refuses BIPAP, Pt was placed on 3L/nc and RT was called and came to bedside. Zackary was called and updated on patient stat ABG was ordered and done. @0000 Dr Zion Bird was called and informed of consult updated on patient, and ABG's were communicated to him , he already aware of consult no new orders care continues. @0130 pt requested to speak to wife , wife was called and pt given phone, Pt insist that he is leaving hospital and wanted to speak to physician. Dr De Felipe was called and informed. Dr De Felipe spoke to pt on phone care continues , pt agree to stay at this time but continues to refuse BIPAP. Nurse remains in room with patient. @0200 Nursing assistant sitting with patient as he has continuously attempted to climb out of bed, bed alarm remains on, Bed in lowered position side rails raised care continues. @4224 pt sitting up at the side of bed . Vital signs fluctuates. Pt alert and oriented observation continues. @5011 pt requested that wife be called same was done and pt verbalized he will let her rest while he wait for dinner. Pt refused to wear continuous pulse ox so spo2 is being spot checked  @0720 Bedside and Verbal shift change report given to Velia Orozco  (oncoming nurse) by Ricardo Wagner (offgoing nurse). Report included the following information SBAR, Kardex, Intake/Output, MAR, Recent Results and Med Rec Status.    Alarm parameters reviewed, on and audible Appropriate for patient clinical condition

## 2018-07-04 NOTE — PROGRESS NOTES
Problem: Pressure Injury - Risk of  Goal: *Prevention of pressure injury  Document Percy Scale and appropriate interventions in the flowsheet.    Outcome: Progressing Towards Goal  Pressure Injury Interventions:  Sensory Interventions: Assess changes in LOC    Moisture Interventions: Absorbent underpads, Check for incontinence Q2 hours and as needed    Activity Interventions: PT/OT evaluation    Mobility Interventions: PT/OT evaluation    Nutrition Interventions: Discuss nutritional consult with provider    Friction and Shear Interventions: Lift sheet, Minimize layers

## 2018-07-04 NOTE — ED NOTES
Pt heavily incontinent post lasix admin. Complete bed linen change performed. Bath given.   Nitro patch noted to pt left chest.

## 2018-07-04 NOTE — CONSULTS
[address]  [place] [id number] [address] [facility] [address]  Phone [phone] Fax [phone]  Pulmonary Critical Care & Sleep Medicine       Name: Aurora Ortiz. MRN: [id number]   : 1945 Hospital: [facility]   Date: 2018        Critical Care Initial Patient Consult    Requesting MD:                                                  Reason for CC Consult:    IMPRESSION:   Patient Active Problem List   Diagnosis Code    Hypercapnia R06.89    Acute on chronic respiratory failure with hypoxia and hypercapnia (HCC) J96.21, J96.22    CLL (chronic lymphocytic leukemia) (HCC) C91.90    Emphysema lung ([address] J43.9    Memory loss R41.3    Acute encephalopathy G93.40 ·   AMS ? Due to co2 narcosis but back to baseline, ? Due to htn urgency and htn encephalopathy, ?  Delirium due to etoh  · Advance dementia per notes  · Possible fluid overload due to elevated BP and right sided effusion   · COPD with FEV1 of 1.23 or 55% predicted but normal ratio follows with Dr. Sydni Leiva on Spiriva and advair at home- Recent 6 min walk in may suggested requires home oxygen 2-4 liter  · RUPERTO unknown severity on BIPAP2 non compliant     PLAN:  -- CXR with possible congetion will add lasix  -- No wheezing on exam will decrease solumedrol  -- On levaquin recently treated for sepsis at [facility] will recheck cultures and sputum culture will continue levaquin <sepsis was 3 month ago> adjust per culture  -- Add thiamine and folic acid due to ETOH history  -- Monitor for DTs  -- Add zyprexa  -- Use ativan prn but again balance with respiratory failure and CO2 narcosis   -- Monitor for HTN add coreg <home dose> add norvasc use hydralazine prn if needed will start cardene drip   CVS:BP is elevated monitor with Coreg Norvasc and prn meds, Give lasix due to possible Right sided effusion   RS:Steroids BD, Aspiration precaution, Keep HOB elevated, Recheck ABG, Use bipap 21/17 at night <home setting>  ID:On levaquin check culture and adjsut   ENDO:ssi  GI:PPI diet as tolerated   RENAL:Monitor Cr   CNS:AMS multifactorial treat for HTN, Start Thiamine and Folic acid   HEMATOLOGY:CLL by history WBC normal monitor if needed consider hematology consult  MUSCULOSKELETAL:no acute issue  PAIN AND SEDATION:PRN ativan add zyprexa  · Skin/Wound: No acute issue  · Electrolytes: Replace electrolytes per ICU electrolyte replacement protocol. · IVF: Banana Bag  · Nutrition: Diet as tolerated  · Prophylaxis: DVT Prophylaxis with Heparin,. GI Prophylaxis. · Restraints: none  · PT/OT eval and treat. OOB when appropriate. · Lines/Tubes: none. No muller or central line. ADVANCE DIRECTIVE:Full code needs palliative consult   FAMILY DISCUSSION:spoke with patinent no one else available  Quality Care: PPI, DVT prophylaxis, HOB elevated, Infection control all reviewed and addressed. Events and notes from last 24 hours reviewed. Care plan discussed with nursing CC TIME:45 min    · Labs and images personally seen and available reports reviewed. · All current medicines are reviewed and doses and prescription adjusted. Subjective/History:   Jung Reynolds is a 74 y.o. male who hx emphysema, htn,memorry loss, CLL was sent to ER due to ams. Per er reported he acting abnormal since morning, refused to eat/drinking/taking medication. He responded slowly and lethargic. In ER, abg indicated pH 7.294. PCO2 85, pO2 69, HCO3 41.3.  bipap was put on. Ct head was pending. cxr: Linear opacity at the left lung base appears similar, could be scarring or partial atelectasis.  There is mild crowding of basilar pulmonary  Recently admitted to [facility] with respiratory failure, Aspiration PNA  Advance dementia recent fall  Overnight found to be agitated and confused managed with sitter  ABG showed improvement   On home BIPAP 21/17   H/O ETOH per notes       Past Medical History:   Diagnosis Date    Chronic kidney disease     stage 3 kidney disease    Chronic obstructive pulmonary disease (HCC)     CLL (chronic lymphocytic leukemia) (HCC)     Delirium     [facility]     Fall     Frequent hospital admissions     Gout     Hyperlipidemia     Hypoxemia     Memory loss     Noncompliance     Oxygen dependent     Pneumonia     Pulmonary nodules     Respiratory failure (HCC)     Risk for falls     Septic shock (HCC)     Sleep apnea     Tobacco abuse     Vitamin D deficiency       Past Surgical History:   Procedure Laterality Date    HX CATARACT REMOVAL      HX HERNIA REPAIR      HX NEPHROSTOMY        Prior to Admission medications    Medication Sig Start Date End Date Taking? Authorizing Provider   alfuzosin SR (UROXATRAL) 10 mg SR tablet Take 10 mg by mouth daily. Yes Ronni Hernandez MD   aspirin 81 mg chewable tablet Take 81 mg by mouth daily. Yes Ronni Hernandez MD   cholecalciferol (VITAMIN D3) 1,000 unit cap Take  by mouth daily. Yes Ronni Hernandez MD   cycloSPORINE (RESTASIS) 0.05 % ophthalmic emulsion Administer 1 Drop to both eyes two (2) times a day. Yes Ronni Hernandez MD   diclofenac (VOLTAREN) 1 % gel Apply  to affected area four (4) times daily. Yes Ronni Hernandez MD   famotidine (PEPCID) 20 mg tablet Take 20 mg by mouth two (2) times a day. Yes Ronni Hernandez MD   latanoprost (XALATAN) 0.005 % ophthalmic solution Administer 1 Drop to both eyes nightly. Yes Ronni Hernandez MD   allopurinol (ZYLOPRIM) 100 mg tablet Take  by mouth daily. Ronni Hernandez MD   carvedilol (COREG) 6.25 mg tablet Take  by mouth two (2) times daily (with meals). Ronni Hernandez MD   furosemide (LASIX) 40 mg tablet Take  by mouth daily. Ronni Hernandez MD   Oxygen     oRnni Hernandez MD   pravastatin sodium (PRAVASTATIN PO) Take  by mouth. Ronni Hernandez MD   tiotropium (SPIRIVA WITH HANDIHALER) 18 mcg inhalation capsule Take 1 Cap by inhalation daily.     Ronni Hernandez MD   fluticasone/salmeterol (ADVAIR HFA IN) Take  by inhalation. Ronni Hernandez MD   ALBUTEROL IN Take  by inhalation. Ronni Hernandez MD     Current Facility-Administered Medications   Medication Dose Route Frequency    furosemide (LASIX) injection 40 mg  40 mg IntraVENous DAILY    methylPREDNISolone (PF) (SOLU-MEDROL) injection 40 mg  40 mg IntraVENous Q12H    carvedilol (COREG) tablet 6.25 mg  6.25 mg Oral BID WITH MEALS    OLANZapine (ZyPREXA) tablet 2.5 mg  2.5 mg Oral QPM    amLODIPine (NORVASC) tablet 10 mg  10 mg Oral DAILY    nitroglycerin (NITRODUR) 0.2 mg/hr patch 1 Patch  1 Patch TransDERmal Q24H    docusate sodium (COLACE) capsule 100 mg  100 mg Oral BID    heparin (porcine) injection 5,000 Units  5,000 Units SubCUTAneous Q8H    famotidine (PF) (PEPCID) 20 mg in sodium chloride 0.9% 10 mL injection  20 mg IntraVENous Q12H    arformoterol (BROVANA) neb solution 15 mcg  15 mcg Nebulization BID RT    budesonide (PULMICORT) 500 mcg/2 ml nebulizer suspension  500 mcg Nebulization BID RT    albuterol-ipratropium (DUO-NEB) 2.5 MG-0.5 MG/3 ML  3 mL Nebulization Q4H RT    levoFLOXacin (LEVAQUIN) 500 mg in D5W IVPB  500 mg IntraVENous Q24H     No Known Allergies   Social History   Substance Use Topics    Smoking status: Former Smoker    Smokeless tobacco: Never Used    Alcohol use No      Comment: former ETOH user; stopped 2017      No family history on file.      Objective:   Vital Signs:    Visit Vitals    /81    Pulse (!) 105    Temp 98.7 °F (37.1 °C)    Resp (!) 35    Ht 5' 6\" (1.676 m)    Wt 86.2 kg (190 lb)    SpO2 95%    BMI 30.67 kg/m2       O2 Device: Nasal cannula   O2 Flow Rate (L/min): 3 l/min   Temp (24hrs), Av.2 °F (36.8 °C), Min:97.4 °F (36.3 °C), Max:98.7 °F (37.1 °C)       Intake/Output:   Last shift:         Last 3 shifts:  1901 -  0700  In: -   Out: 800 [Urine:800]    Intake/Output Summary (Last 24 hours) at 18 1050  Last data filed at 18 0318   Gross per 24 hour   Intake 0 ml   Output              800 ml   Net             -800 ml       Ventilator Settings:  Mode Rate Tidal Volume Pressure FiO2 PEEP            32 %       Peak airway pressure:      Minute ventilation: 12.2 l/min        Review of Systems:  Not able to do due to patient condition     Objective:    General: comfortable, no acute distress altered not oriented  HEENT: pupils reactive, sclera anicteric, EOM intact  Neck: No adenopathy or thyroid swelling, no lymphadenopathy or JVD, supple  CVS: S1S2 no murmurs  RS: Mod AE bilaterally, decrease at base  Abd: soft, non tender, no hepatosplenomegaly  Neuro: non focal, awake, alert  Extrm: no leg edema, clubbing or cyanosis  Lymph node: No obvious palpable lymph node appreciated. Skin: no rash  CBC w/Diff Recent Labs      07/04/18   0254  07/03/18   1640   WBC  13.7*  19.3*   RBC  4.99  4.87   HGB  12.2*  11.9*   HCT  41.3  40.9   PLT  243  220   GRANS  64  24*   LYMPH  34  68*   EOS  0  1        Chemistry Recent Labs      07/04/18   0254  07/03/18   1640   GLU  167*  89   NA  139  141   K  3.8  4.4   CL  98*  101   CO2  37*  37*   BUN  16  17   CREA  1.30  1.17   CA  9.4  9.0   MG  1.8   --    AGAP  4  3   BUCR  12  15   AP   --   99   TP   --   8.6*   ALB   --   3.6   GLOB   --   5.0*   AGRAT   --   0.7*        Lactic Acid Lactic acid   Date Value Ref Range Status   06/16/2018 1.3 0.4 - 2.0 MMOL/L Final     No results for input(s): LAC in the last 72 hours. Micro  No results for input(s): SDES, CULT in the last 72 hours. No results for input(s): CULT in the last 72 hours.      ABG Recent Labs      07/03/18   2343  07/03/18   1716   PHI  7.412  7.294*   PCO2I  64.3*  85.0*   PO2I  69*  69*   HCO3I  41.1*  41.3*   FIO2I  0.32  28        Liver Enzymes Protein, total   Date Value Ref Range Status   07/03/2018 8.6 (H) 6.4 - 8.2 g/dL Final     Albumin   Date Value Ref Range Status   07/03/2018 3.6 3.4 - 5.0 g/dL Final     Globulin   Date Value Ref Range Status   07/03/2018 5.0 (H) 2.0 - 4.0 g/dL Final     A-G Ratio   Date Value Ref Range Status   07/03/2018 0.7 (L) 0.8 - 1.7   Final     AST (SGOT)   Date Value Ref Range Status   07/03/2018 26 15 - 37 U/L Final     Alk. phosphatase   Date Value Ref Range Status   07/03/2018 99 45 - 117 U/L Final     Recent Labs      07/03/18   1640   TP  8.6*   ALB  3.6   GLOB  5.0*   AGRAT  0.7*   SGOT  26   AP  99        Cardiac Enzymes Lab Results   Component Value Date/Time     07/03/2018 04:40 PM    CKMB 1.9 07/03/2018 04:40 PM    CKND1 1.2 07/03/2018 04:40 PM    TROIQ <0.02 07/03/2018 04:40 PM        BNP No results found for: BNP, BNPP, XBNPT     Coagulation No results for input(s): PTP, INR, APTT in the last 72 hours. No lab exists for component: INREXT      Thyroid  No results found for: T4, T3U, TSH, TSHEXT       Lipid Panel No results found for: CHOL, CHOLPOCT, CHOLX, CHLST, CHOLV, 909534, HDL, LDL, LDLC, DLDLP, 976012, VLDLC, VLDL, TGLX, TRIGL, TRIGP, TGLPOCT, CHHD, CHHDX       Urinalysis Lab Results   Component Value Date/Time    Color YELLOW 07/03/2018 04:15 PM    Appearance CLEAR 07/03/2018 04:15 PM    Specific gravity 1.019 07/03/2018 04:15 PM    pH (UA) 7.0 07/03/2018 04:15 PM    Protein 300 (A) 07/03/2018 04:15 PM    Glucose NEGATIVE  07/03/2018 04:15 PM    Ketone NEGATIVE  07/03/2018 04:15 PM    Bilirubin NEGATIVE  07/03/2018 04:15 PM    Urobilinogen 0.2 07/03/2018 04:15 PM    Nitrites NEGATIVE  07/03/2018 04:15 PM    Leukocyte Esterase NEGATIVE  07/03/2018 04:15 PM    Epithelial cells FEW 07/03/2018 04:15 PM    Bacteria FEW (A) 07/03/2018 04:15 PM    WBC 0 to 3 07/03/2018 04:15 PM    RBC NEGATIVE  07/03/2018 04:15 PM        XR (Most Recent). CXR reviewed by me and compared with previous CXR   Results from Hospital Encounter encounter on 07/03/18   XR CHEST PORT   Narrative INDICATION:  altered mental status    COMPARISON:  6/16/2018    FINDINGS: A portable AP radiograph of the chest was obtained.  The patient is on  a cardiac monitor. Slightly low lung volumes. Linear opacity at the left lung  base appears similar, could be scarring or partial atelectasis. There is mild  crowding of basilar pulmonary markings related to hypoventilation. The  costophrenic angles are indistinct, right greater than left, suggest tiny  effusions. Upper lungs clear. No vascular congestion. No pneumothorax. Mediastinal contour stable. No acute bony finding. Impression IMPRESSION: Linear opacity at the left lung base appears similar, could be  scarring or partial atelectasis. There is mild crowding of basilar pulmonary  markings related to hypoventilation. The costophrenic angles are indistinct,  right greater than left, suggest tiny effusions. CT (Most Recent)   Results from Hospital Encounter encounter on 07/03/18   CT HEAD WO CONT   Narrative EXAM: CT head    INDICATION: Altered mental status. Decreased alertness. COMPARISON: 6/16/2018    TECHNIQUE: Axial CT imaging of the head was performed without intravenous  contrast. One or more dose reduction techniques were used on this CT: automated  exposure control, adjustment of the mAs and/or kVp according to patient's size,  and iterative reconstruction techniques. The specific techniques utilized on  this CT exam have been documented in the patient's electronic medical record.    _______________    FINDINGS:    BRAIN:  Intraparenchymal hemorrhage: None. Mass effect/edema: None. Infarcts/encephalomalacia: None. White matter: Normal.  Brain volume: Normal for age. EXTRA-AXIAL SPACES:  Hemorrhage: None. Mass: None. Hydrocephalus: None. SINUSES: Clear. CALVARIUM: Unremarkable. OTHER: None.    _______________         Impression IMPRESSION:     No evidence of acute intracranial process. EKG No results found for this or any previous visit. ECHO  12/2017 [facility]    NORMAL LEFT VENTRICULAR CAVITY SIZE. MODERATE CONCENTRIC LEFT VENTRICULAR HYPERTROPHY. NORMAL LEFT VENTRICULAR SYSTOLIC FUNCTION WITH AN EJECTION FRACTION   ESTIMATED AT 60%. MILD  DIASTOLIC DYSFUNCTION. NORMAL LEFT ATRIAL SIZE. PRIOR ECHO REPORT FINDINGS ON 10-:     NORMAL LEFT VENTRICULAR CAVITY SIZE. MILD CONCENTRIC LEFT VENTRICULAR HYPERTROPHY. DYNAMIC  GLOBAL LEFT VENTRICULAR SYSTOLIC FUNCTION. EJECTION FRACTION IS 70%. MILD DIASTOLIC DYSFUNCTION. NORMAL CHAMBER SIZES. NO HEMODYNAMICALLY SIGNIFICANT VALVULAR PATHOLOGY. NORMAL PULMONARY ARTERY PRESSURE OF 22 MMHG. PFT PFTs: 1/11/18    FVC  1.35 (44 %)  FEV1 1.23 (55 %)  FEV1/FVC  92 %  TLC 62 %   %  DL/VA 31 %       Other Interpretation: Ambulatory oximetry Does show significant desaturation;starting O2 sat 90 %; low O2 sat 85 %;   starting HR 78 bpm; high HR 99 bpm  Dyspnea scale: beginning 0; ending 0  Total distance : 330 ft   Total time: 3 minutes    Recommendation: Ambulatory O2 was recommended. Imaging:  I have personally reviewed the patients radiographs and have reviewed the reports:     Best practice :  Fluids started at 30 ml/kg with aim to keep CVP 8 or above  Lactic acid ordered- initial and repeat Q6hrs if elevated till normalized. Cultures drawn. Antibiotic administered within 1hr-ICU  And 3hrs ED  Pressors aim MAP >65mmHg         Sharan Coulter M.D.   Pulmonary Critical Care & Sleep Medicine

## 2018-07-05 ENCOUNTER — APPOINTMENT (OUTPATIENT)
Dept: GENERAL RADIOLOGY | Age: 73
DRG: 189 | End: 2018-07-05
Attending: INTERNAL MEDICINE
Payer: MEDICARE

## 2018-07-05 PROBLEM — R79.89 ELEVATED SERUM CREATININE: Status: ACTIVE | Noted: 2018-07-05

## 2018-07-05 LAB
ALBUMIN SERPL-MCNC: 3.5 G/DL (ref 3.4–5)
ALBUMIN/GLOB SERPL: 0.7 {RATIO} (ref 0.8–1.7)
ALP SERPL-CCNC: 94 U/L (ref 45–117)
ALT SERPL-CCNC: 25 U/L (ref 16–61)
ANION GAP SERPL CALC-SCNC: 4 MMOL/L (ref 3–18)
ANION GAP SERPL CALC-SCNC: 8 MMOL/L (ref 3–18)
ARTERIAL PATENCY WRIST A: YES
AST SERPL-CCNC: 20 U/L (ref 15–37)
BASE EXCESS BLD CALC-SCNC: 14 MMOL/L
BASOPHILS # BLD: 0.2 K/UL (ref 0–0.1)
BASOPHILS NFR BLD: 1 % (ref 0–3)
BDY SITE: ABNORMAL
BILIRUB SERPL-MCNC: 0.2 MG/DL (ref 0.2–1)
BODY TEMPERATURE: 98.6
BUN SERPL-MCNC: 32 MG/DL (ref 7–18)
BUN SERPL-MCNC: 40 MG/DL (ref 7–18)
BUN/CREAT SERPL: 19 (ref 12–20)
BUN/CREAT SERPL: 23 (ref 12–20)
CALCIUM SERPL-MCNC: 9.1 MG/DL (ref 8.5–10.1)
CALCIUM SERPL-MCNC: 9.6 MG/DL (ref 8.5–10.1)
CHLORIDE SERPL-SCNC: 102 MMOL/L (ref 100–108)
CHLORIDE SERPL-SCNC: 102 MMOL/L (ref 100–108)
CO2 SERPL-SCNC: 31 MMOL/L (ref 21–32)
CO2 SERPL-SCNC: 36 MMOL/L (ref 21–32)
CREAT SERPL-MCNC: 1.65 MG/DL (ref 0.6–1.3)
CREAT SERPL-MCNC: 1.74 MG/DL (ref 0.6–1.3)
DIFFERENTIAL METHOD BLD: ABNORMAL
EOSINOPHIL # BLD: 0 K/UL (ref 0–0.4)
EOSINOPHIL NFR BLD: 0 % (ref 0–5)
ERYTHROCYTE [DISTWIDTH] IN BLOOD BY AUTOMATED COUNT: 15.5 % (ref 11.6–14.5)
GAS FLOW.O2 O2 DELIVERY SYS: ABNORMAL L/MIN
GAS FLOW.O2 SETTING OXYMISER: 3 L/M
GLOBULIN SER CALC-MCNC: 5.2 G/DL (ref 2–4)
GLUCOSE BLD STRIP.AUTO-MCNC: 130 MG/DL (ref 70–110)
GLUCOSE BLD STRIP.AUTO-MCNC: 137 MG/DL (ref 70–110)
GLUCOSE BLD STRIP.AUTO-MCNC: 152 MG/DL (ref 70–110)
GLUCOSE BLD STRIP.AUTO-MCNC: 89 MG/DL (ref 70–110)
GLUCOSE SERPL-MCNC: 139 MG/DL (ref 74–99)
GLUCOSE SERPL-MCNC: 139 MG/DL (ref 74–99)
HCO3 BLD-SCNC: 39.6 MMOL/L (ref 22–26)
HCT VFR BLD AUTO: 40.5 % (ref 36–48)
HGB BLD-MCNC: 12 G/DL (ref 13–16)
LYMPHOCYTES # BLD: 6.1 K/UL (ref 0.8–3.5)
LYMPHOCYTES NFR BLD: 30 % (ref 20–51)
MAGNESIUM SERPL-MCNC: 2.2 MG/DL (ref 1.6–2.6)
MCH RBC QN AUTO: 24 PG (ref 24–34)
MCHC RBC AUTO-ENTMCNC: 29.6 G/DL (ref 31–37)
MCV RBC AUTO: 81.2 FL (ref 74–97)
MONOCYTES # BLD: 1 K/UL (ref 0–1)
MONOCYTES NFR BLD: 5 % (ref 2–9)
NEUTS SEG # BLD: 13.1 K/UL (ref 1.8–8)
NEUTS SEG NFR BLD: 64 % (ref 42–75)
O2/TOTAL GAS SETTING VFR VENT: 32 %
PCO2 BLD: 73.7 MMHG (ref 35–45)
PH BLD: 7.34 [PH] (ref 7.35–7.45)
PLATELET # BLD AUTO: 255 K/UL (ref 135–420)
PLATELET COMMENTS,PCOM: ABNORMAL
PMV BLD AUTO: 10.5 FL (ref 9.2–11.8)
PO2 BLD: 85 MMHG (ref 80–100)
POTASSIUM SERPL-SCNC: 4.3 MMOL/L (ref 3.5–5.5)
POTASSIUM SERPL-SCNC: 4.4 MMOL/L (ref 3.5–5.5)
PROT SERPL-MCNC: 8.7 G/DL (ref 6.4–8.2)
RBC # BLD AUTO: 4.99 M/UL (ref 4.7–5.5)
RBC MORPH BLD: ABNORMAL
SAO2 % BLD: 95 % (ref 92–97)
SERVICE CMNT-IMP: ABNORMAL
SODIUM SERPL-SCNC: 141 MMOL/L (ref 136–145)
SODIUM SERPL-SCNC: 142 MMOL/L (ref 136–145)
SPECIMEN TYPE: ABNORMAL
TOTAL RESP. RATE, ITRR: 20
WBC # BLD AUTO: 20.4 K/UL (ref 4.6–13.2)
WBC MORPH BLD: ABNORMAL

## 2018-07-05 PROCEDURE — 74011000250 HC RX REV CODE- 250: Performed by: INTERNAL MEDICINE

## 2018-07-05 PROCEDURE — 80053 COMPREHEN METABOLIC PANEL: CPT | Performed by: INTERNAL MEDICINE

## 2018-07-05 PROCEDURE — 77010033678 HC OXYGEN DAILY

## 2018-07-05 PROCEDURE — 71045 X-RAY EXAM CHEST 1 VIEW: CPT

## 2018-07-05 PROCEDURE — 83735 ASSAY OF MAGNESIUM: CPT | Performed by: HOSPITALIST

## 2018-07-05 PROCEDURE — 85025 COMPLETE CBC W/AUTO DIFF WBC: CPT | Performed by: HOSPITALIST

## 2018-07-05 PROCEDURE — 74011000250 HC RX REV CODE- 250: Performed by: HOSPITALIST

## 2018-07-05 PROCEDURE — 76450000000

## 2018-07-05 PROCEDURE — 82803 BLOOD GASES ANY COMBINATION: CPT

## 2018-07-05 PROCEDURE — 94640 AIRWAY INHALATION TREATMENT: CPT

## 2018-07-05 PROCEDURE — 65610000006 HC RM INTENSIVE CARE

## 2018-07-05 PROCEDURE — 36415 COLL VENOUS BLD VENIPUNCTURE: CPT | Performed by: HOSPITALIST

## 2018-07-05 PROCEDURE — 74011250636 HC RX REV CODE- 250/636: Performed by: HOSPITALIST

## 2018-07-05 PROCEDURE — 74011250636 HC RX REV CODE- 250/636: Performed by: INTERNAL MEDICINE

## 2018-07-05 PROCEDURE — 36600 WITHDRAWAL OF ARTERIAL BLOOD: CPT

## 2018-07-05 PROCEDURE — 80048 BASIC METABOLIC PNL TOTAL CA: CPT | Performed by: HOSPITALIST

## 2018-07-05 PROCEDURE — 74011250637 HC RX REV CODE- 250/637: Performed by: HOSPITALIST

## 2018-07-05 PROCEDURE — 74011250637 HC RX REV CODE- 250/637: Performed by: INTERNAL MEDICINE

## 2018-07-05 PROCEDURE — 82962 GLUCOSE BLOOD TEST: CPT

## 2018-07-05 PROCEDURE — 94660 CPAP INITIATION&MGMT: CPT

## 2018-07-05 RX ORDER — OLANZAPINE 2.5 MG/1
2.5 TABLET ORAL 2 TIMES DAILY
Status: DISCONTINUED | OUTPATIENT
Start: 2018-07-05 | End: 2018-07-08

## 2018-07-05 RX ADMIN — OLANZAPINE 2.5 MG: 2.5 TABLET ORAL at 21:08

## 2018-07-05 RX ADMIN — CARVEDILOL 6.25 MG: 3.12 TABLET, FILM COATED ORAL at 16:33

## 2018-07-05 RX ADMIN — IPRATROPIUM BROMIDE AND ALBUTEROL SULFATE 3 ML: .5; 3 SOLUTION RESPIRATORY (INHALATION) at 11:34

## 2018-07-05 RX ADMIN — ARFORMOTEROL TARTRATE 15 MCG: 15 SOLUTION RESPIRATORY (INHALATION) at 19:52

## 2018-07-05 RX ADMIN — FAMOTIDINE 20 MG: 10 INJECTION, SOLUTION INTRAVENOUS at 21:07

## 2018-07-05 RX ADMIN — IPRATROPIUM BROMIDE AND ALBUTEROL SULFATE 3 ML: .5; 3 SOLUTION RESPIRATORY (INHALATION) at 19:52

## 2018-07-05 RX ADMIN — BUDESONIDE 500 MCG: 0.5 INHALANT RESPIRATORY (INHALATION) at 19:52

## 2018-07-05 RX ADMIN — LEVOFLOXACIN 500 MG: 5 INJECTION, SOLUTION INTRAVENOUS at 21:09

## 2018-07-05 RX ADMIN — CARVEDILOL 6.25 MG: 3.12 TABLET, FILM COATED ORAL at 07:18

## 2018-07-05 RX ADMIN — FOLIC ACID: 5 INJECTION, SOLUTION INTRAMUSCULAR; INTRAVENOUS; SUBCUTANEOUS at 12:00

## 2018-07-05 RX ADMIN — FUROSEMIDE 40 MG: 10 INJECTION, SOLUTION INTRAMUSCULAR; INTRAVENOUS at 09:36

## 2018-07-05 RX ADMIN — BUDESONIDE 500 MCG: 0.5 INHALANT RESPIRATORY (INHALATION) at 07:22

## 2018-07-05 RX ADMIN — FAMOTIDINE 20 MG: 10 INJECTION, SOLUTION INTRAVENOUS at 09:38

## 2018-07-05 RX ADMIN — IPRATROPIUM BROMIDE AND ALBUTEROL SULFATE 3 ML: .5; 3 SOLUTION RESPIRATORY (INHALATION) at 07:22

## 2018-07-05 RX ADMIN — HEPARIN SODIUM 5000 UNITS: 5000 INJECTION, SOLUTION INTRAVENOUS; SUBCUTANEOUS at 11:41

## 2018-07-05 RX ADMIN — IPRATROPIUM BROMIDE AND ALBUTEROL SULFATE 3 ML: .5; 3 SOLUTION RESPIRATORY (INHALATION) at 04:45

## 2018-07-05 RX ADMIN — ARFORMOTEROL TARTRATE 15 MCG: 15 SOLUTION RESPIRATORY (INHALATION) at 07:22

## 2018-07-05 RX ADMIN — DOCUSATE SODIUM 100 MG: 100 CAPSULE, LIQUID FILLED ORAL at 21:08

## 2018-07-05 RX ADMIN — METHYLPREDNISOLONE SODIUM SUCCINATE 40 MG: 125 INJECTION, POWDER, FOR SOLUTION INTRAMUSCULAR; INTRAVENOUS at 09:37

## 2018-07-05 RX ADMIN — HEPARIN SODIUM 5000 UNITS: 5000 INJECTION, SOLUTION INTRAVENOUS; SUBCUTANEOUS at 03:19

## 2018-07-05 RX ADMIN — METHYLPREDNISOLONE SODIUM SUCCINATE 20 MG: 125 INJECTION, POWDER, FOR SOLUTION INTRAMUSCULAR; INTRAVENOUS at 21:07

## 2018-07-05 RX ADMIN — DOCUSATE SODIUM 100 MG: 100 CAPSULE, LIQUID FILLED ORAL at 09:36

## 2018-07-05 RX ADMIN — HEPARIN SODIUM 5000 UNITS: 5000 INJECTION, SOLUTION INTRAVENOUS; SUBCUTANEOUS at 18:48

## 2018-07-05 RX ADMIN — AMLODIPINE BESYLATE 10 MG: 5 TABLET ORAL at 09:36

## 2018-07-05 RX ADMIN — IPRATROPIUM BROMIDE AND ALBUTEROL SULFATE 3 ML: .5; 3 SOLUTION RESPIRATORY (INHALATION) at 15:12

## 2018-07-05 NOTE — PROGRESS NOTES
Hospitalist Progress Note-critical care note     Patient: Keira Sandoval MRN: [id number]  CSN: [id number]    YOB: 1945  Age: 74 y.o. Sex: male    DOA: 7/3/2018 LOS:  LOS: 2 days            Chief complaint: acute on chronic respiratory failure , emphysema lung,     Assessment/Plan         Hospital Problems  Never Reviewed          Codes Class Noted POA    Elevated serum creatinine ICD-10-CM: R79.89  ICD-9-CM: 790.99  7/5/2018 Unknown        Hypercapnia ICD-10-CM: R06.89  ICD-9-CM: 786.09  7/3/2018 Unknown        * (Principal)Acute on chronic respiratory failure with hypoxia and hypercapnia (HCC) ICD-10-CM: J96.21, J96.22  ICD-9-CM: 518.84, 786.09, 799.02  7/3/2018 Unknown        CLL (chronic lymphocytic leukemia) ([facility] ICD-10-CM: C91.90  ICD-9-CM: 204.10  7/3/2018 Unknown        Emphysema lung ([facility] ICD-10-CM: J43.9  ICD-9-CM: 492.8  7/3/2018 Unknown        Memory loss ICD-10-CM: R41.3  ICD-9-CM: 780.93  7/3/2018 Unknown        Acute encephalopathy ICD-10-CM: G93.40  ICD-9-CM: 348.30  7/3/2018 Unknown            Acute on chronic respiratory failure with hypercapnia and hypoxia   Repeated abg still pCO2 73.7   Continue  bipap, breath tx    iv lasix and iv steroid   cxr reviewed     emphysema lung  pulm on board per Dr. Liss Jain    Hypertensive urgency   Lasix , norvasc and coreg , resolved          cll , leukocytosis no change        Acute encephalopathy due to hypercapnia    Improving,   Ct head : no acute issue      Cr elevated   Will continue monitor renal function and  Urine out-put.          Disposition : tbd   Subjective: I feel fine   Nurse: repeated abg , co2 still high   Review of systems:    General: No fevers or chills. Cardiovascular: No chest pain or pressure. No palpitations. Pulmonary: No shortness of breath. Gastrointestinal: No nausea, vomiting.      Vital signs/Intake and Output:  Visit Vitals    /89    Pulse 93    Temp 97.9 °F (36.6 °C)    Resp (!) 36    Ht 5' 6\" (1.676 m)    Wt 90 kg (198 lb 6.6 oz)    SpO2 100%    BMI 32.02 kg/m2     Current Shift:  07/05 0701 - 07/05 1900  In: 600 [P.O.:600]  Out: -   Last three shifts:  07/03 1901 - 07/05 0700  In: 1327.5 [P.O.:90; I.V.:1237.5]  Out: 1925 [Urine:1925]    Physical Exam:  General: WD, WN. Alert, cooperative, no acute distress    HEENT: NC, Atraumatic. PERRLA, anicteric sclerae. Lungs: CTA Bilaterally. No Wheezing/Rhonchi/Rales. Heart:  Regular  rhythm,  +murmur, No Rubs, No Gallops  Abdomen: Soft, Non distended, Non tender.  +Bowel sounds,   Extremities: No c/c/e  Psych:   Not anxious or agitated. Neurologic:  No acute neurological deficit. Labs: Results:       Chemistry Recent Labs      07/05/18   0630 07/04/18 0254 07/03/18   1640   GLU  139*  167*  89   NA  142  139  141   K  4.3  3.8  4.4   CL  102  98*  101   CO2  36*  37*  37*   BUN  32*  16  17   CREA  1.65*  1.30  1.17   CA  9.1  9.4  9.0   AGAP  4  4  3   BUCR  19  12  15   AP   --    --   99   TP   --    --   8.6*   ALB   --    --   3.6   GLOB   --    --   5.0*   AGRAT   --    --   0.7*      CBC w/Diff Recent Labs      07/05/18 0630 07/04/18   0254 07/03/18   1640   WBC  20.4*  13.7*  19.3*   RBC  4.99  4.99  4.87   HGB  12.0*  12.2*  11.9*   HCT  40.5  41.3  40.9   PLT  255  243  220   GRANS  64  64  24*   LYMPH  30  34  68*   EOS  0  0  1      Cardiac Enzymes Recent Labs      07/03/18   1640   CPK  160   CKND1  1.2      Coagulation No results for input(s): PTP, INR, APTT in the last 72 hours. No lab exists for component: INREXT, INREXT    Lipid Panel No results found for: CHOL, CHOLPOCT, CHOLX, CHLST, CHOLV, 185034, HDL, LDL, LDLC, DLDLP, 212389, VLDLC, VLDL, TGLX, TRIGL, TRIGP, TGLPOCT, CHHD, CHHDX   BNP No results for input(s): BNPP in the last 72 hours.    Liver Enzymes Recent Labs      07/03/18   1640   TP  8.6*   ALB  3.6   AP  99   SGOT  26      Thyroid Studies No results found for: T4, T3U, TSH, TSHEXT, TSHEXT Procedures/imaging: see electronic medical records for all procedures/Xrays and details which were not copied into this note but were reviewed prior to creation of Luis Andres MD

## 2018-07-05 NOTE — PROGRESS NOTES
@0324 pt taken over awake alert oriented to self and place, sitter at bedside. Remains on 3L/nc spo2 99% lungs sound clear but diminished in bases. Pt eating dinner, bed rails raised, bed in lowered position. Vital signs fluctuates, pt voids clear yellow urine via urinal. Assessment done and documented in appropriate flow sheets nursing management continues. @2100 no new developments care continues. @7966 RT in with patient , pt refused BIPAP he says he do not wear it at home and he is not going to wear it tonight. Pt care continues. @2320 pt resting comfortably, sitter remains present, assisted with repositioning . Nursing observation continues. @0130 pt allowed BIPAP to be placed on him he continues to have intermittent confusion. Sitter remains with patient. @0300 pt asleep intermittently, continues to deny pain , vital signs continues to fluctuate, nursing management continues. @0500 no changes. @0600 CHG bath completed pt maintains BIPAP care continues. @6600 pt removed BIPAP sitter remains with patient  observation continues. @0700 breakfast was called in for patient as requested. @6732 Bedside and Verbal shift change report given to Gertrude Valentine (oncoming nurse) by Evangelina Guerra (offgoing nurse). Report included the following information SBAR, Kardex, Intake/Output, MAR, Recent Results and Med Rec Status.     Alarm parameters reviewed, on and audible Appropriate for patient clinical condition

## 2018-07-05 NOTE — PROGRESS NOTES
Pt refusing to wear bipap for HS. Pt states he cannot tolerate to wear it, and that he does not use his machine at home. Pt respiratory status stable and pt is in no distress. RN notified. Will continue to monitor.

## 2018-07-05 NOTE — PROGRESS NOTES
Pt fell asleep and became difficult to arouse. Bipap placed on pt with settings 21/17, Fio2 28%. Pt very tachypneic with a respiratory rate of 46 and shallow. Pt compliant with placement of bipap at this time. RN aware. Will continue to monitor.

## 2018-07-05 NOTE — PROGRESS NOTES
Problem: Pressure Injury - Risk of  Goal: *Prevention of pressure injury  Document Percy Scale and appropriate interventions in the flowsheet. Outcome: Progressing Towards Goal  Pressure Injury Interventions:  Sensory Interventions: Assess changes in LOC, Check visual cues for pain, Discuss PT/OT consult with provider, Keep linens dry and wrinkle-free, Minimize linen layers, Pressure redistribution bed/mattress (bed type), Turn and reposition approx. every two hours (pillows and wedges if needed)    Moisture Interventions: Absorbent underpads, Apply protective barrier, creams and emollients, Check for incontinence Q2 hours and as needed, Maintain skin hydration (lotion/cream), Offer toileting Q_hr    Activity Interventions: Increase time out of bed, Pressure redistribution bed/mattress(bed type)    Mobility Interventions: Pressure redistribution bed/mattress (bed type), PT/OT evaluation, Turn and reposition approx. every two hours(pillow and wedges)    Nutrition Interventions: Document food/fluid/supplement intake    Friction and Shear Interventions: Apply protective barrier, creams and emollients, Minimize layers               Problem: Falls - Risk of  Goal: *Absence of Falls  Document Stan Fall Risk and appropriate interventions in the flowsheet.    Outcome: Progressing Towards Goal  Fall Risk Interventions:  Mobility Interventions: Patient to call before getting OOB, Strengthening exercises (ROM-active/passive)    Mentation Interventions: Adequate sleep, hydration, pain control, Door open when patient unattended, Evaluate medications/consider consulting pharmacy, Family/sitter at bedside, Increase mobility, More frequent rounding, Reorient patient, Toileting rounds, Update white board    Medication Interventions: Evaluate medications/consider consulting pharmacy, Patient to call before getting OOB    Elimination Interventions: Call light in reach, Patient to call for help with toileting needs, Toileting schedule/hourly rounds, Urinal in reach    History of Falls Interventions: Door open when patient unattended, Investigate reason for fall, Evaluate medications/consider consulting pharmacy

## 2018-07-05 NOTE — PROGRESS NOTES
Dr. Tiara Cleary made aware of ABGs- attempted to place pt on BIPAP- constantly pulling off and refusing.

## 2018-07-05 NOTE — PROGRESS NOTES
Reason for Admission:  AMS                   RRAT Score: 14                 Do you (patient/family) have any concerns for transition/discharge? Pt has confusion with memory loss, not safe to care for self               Plan for utilizing home health: prior to admission using [facility] home health services. Likelihood of readmission? TBD             Transition of Care Plan:    Chart reviewed and attended IDR's ,per ED noted family had c/o pt having altered mental status,slurred speech and not eating,cm did speak with pt at bedside,sitter present, pt alert oriented to self,able to recognize wife voice during phone conversation,cm did speak with wife via phone,informed cm that  lives alone  for many years,noticed  has become more forgetful and confused which he has been found hours later wandering outside,states she has been looking for assistance for her  but hasn't found help,considering long term placement at this point,cm informed that pt does have adult children in [address] [place] [address] Street lives approx 5 min away,wife states pt is an  unsure he has long term benefits with [facility],wife informed at this time she and her family should plan as if pt is returning back home and to come up with plan that pt is not left alone for backup cm will contact [employer] in AM to verify any benefits for long term care,notify [employer] of medicaid status wife states  applied in June of this year,patient may have to go to inpatient rehab if he meets criteria first than follow thru with transition to long term care if no [facility] benefits and medicaid pending which can take up to 45 days, if he goes to rehab wife prefers [facility] or the [facility] completed.

## 2018-07-05 NOTE — PROGRESS NOTES
9895 Completed shift report and assumed care from Ric Estrada RN. We reviewed SBAR, labs, meds, and plan of care  0730 Completed shift assessment  1045 Completed IDR for pt  1120 Completed reassessment  [address] to ask for banana bag, will bring ASAP they said  1145 Pt's ABG came back with elevated CO2 (74), so Dr. Abbe Wu requested pt be put back on bipap, place pt on bipap  1215 Pt refusing bipap, keeps taking off, Alicia Craig, RT, also tried to get him to wear it, but still refusing  1430 Pt resting with unlabored breathing  1600 Reassessed pt  1930 Completed shift report and transferred care to Cheryl Tellez RN.  We reviewed SBAR, labs, meds, and plan of care

## 2018-07-05 NOTE — PROGRESS NOTES
GAURAV [address] [place] [address]  [address] [facility] [address]  Phone [phone] Fax [phone]  Pulmonary Critical Care & Sleep Medicine       Name: Susan Ferguson MRN: [id number]   : 1945 Hospital: [facility] [place]   Date: 2018        CCM follow up     Requesting MD:                                                  Reason for CC Consult:    IMPRESSION:   Patient Active Problem List   Diagnosis Code    Hypercapnia R06.89    Acute on chronic respiratory failure with hypoxia and hypercapnia (HCC) J96.21, J96.22    CLL (chronic lymphocytic leukemia) (HCC) C91.90    Emphysema lung ([address] J43.9    Memory loss R41.3    Acute encephalopathy G93.40 ·   AMS ? Due to co2 narcosis but back to baseline, ? Due to htn urgency and htn encephalopathy, ? Delirium due to etoh  · Advance dementia per notes  · Possible fluid overload due to elevated BP and right sided effusion   · COPD with FEV1 of 1.23 or 55% predicted but normal ratio follows with Dr. Ketan Tobin on Spiriva and advair at home- Recent 6 min walk in may suggested requires home oxygen 2-4 liter  · RUPERTO unknown severity on BIPAP2 non compliant   PLAN:  --On and off confusion will increase zyprexa not sure sundowning or dementia  -- Decrease/stop lasix as Cr going up  -- Back to baseline oxygen 2-4 liter  -- Elevated WBC ?  Due to CLL vs Steroids  -- BP improved with PO meds  -- Check ABG  --cxr is better less effusion   -- No wheezing on exam will decrease solumedrol-- 20Q12 and po from tomorrow  -- On levaquin recently treated for sepsis at [facility] will recheck cultures and sputum culture will continue levaquin <sepsis was 3 month ago> adjust per culture-- Blood culture negative sputum never sent  -- On thiamine and folic acid due to ETOH history  -- Monitor for DTs  -- Use ativan prn but again balance with respiratory failure and CO2 narcosis   -- Monitor for HTN add coreg <home dose> add norvasc use hydralazine prn if needed will start cardene drip  -- BIPAP 21/17 at night and prn    -- Overall doing well on and off confusion but hemodynamically stable oxygenating well will check abg if ok can be transferred out of ICU    CVS:BP is elevated monitor with Coreg Norvasc and prn meds,   RS:Steroids BD, Aspiration precaution, Keep HOB elevated, Recheck ABG, Use bipap 21/17 at night <home setting>  ID:On levaquin check culture and adjsut   ENDO:ssi  GI:PPI diet as tolerated   RENAL:Monitor Cr   CNS:AMS multifactorial treat for HTN, Start Thiamine and Folic acid ? [employer]  HEMATOLOGY:CLL by history WBC normal monitor if needed consider hematology consult  MUSCULOSKELETAL:no acute issue  PAIN AND SEDATION:PRN ativan add zyprexa  · Skin/Wound: No acute issue  · Electrolytes: Replace electrolytes per ICU electrolyte replacement protocol. · IVF: Banana Bag  · Nutrition: Diet as tolerated  · Prophylaxis: DVT Prophylaxis with Heparin,. GI Prophylaxis. · Restraints: none  · PT/OT eval and treat. OOB when appropriate. · Lines/Tubes: none. No muller or central line. ADVANCE DIRECTIVE:Full code needs palliative consult   FAMILY DISCUSSION:spoke with patinent no one else available  Quality Care: PPI, DVT prophylaxis, HOB elevated, Infection control all reviewed and addressed. Events and notes from last 24 hours reviewed. Care plan discussed with nursing CC TIME:42 min    · Labs and images personally seen and available reports reviewed. · All current medicines are reviewed and doses and prescription adjusted. Subjective/History:  7/4/18  Doing well BP is better  On and off confused at night ? Due to underline dementia  Oxygenation wise ok on 2-3 liter  Currently have a sitter   Rebecca is 30  WBC elevated 20 but on steroids and h/o CLL   Xiomara Cords. is a 74 y.o. male who hx emphysema, htn,memorry loss, CLL was sent to ER due to ams.  Per er reported he acting abnormal since morning, refused to eat/drinking/taking medication. He responded slowly and lethargic. In ER, abg indicated pH 7.294. PCO2 85, pO2 69, HCO3 41.3.  bipap was put on. Ct head was pending. cxr: Linear opacity at the left lung base appears similar, could be scarring or partial atelectasis. There is mild crowding of basilar pulmonary  Recently admitted to [facility] with respiratory failure, Aspiration PNA  Advance dementia recent fall  Overnight found to be agitated and confused managed with sitter  ABG showed improvement   On home BIPAP 21/17   H/O ETOH per notes     Prior to Admission medications    Medication Sig Start Date End Date Taking? Authorizing Provider   alfuzosin SR (UROXATRAL) 10 mg SR tablet Take 10 mg by mouth daily. Yes Ronni Hernandez MD   aspirin 81 mg chewable tablet Take 81 mg by mouth daily. Yes Ronni Hernandez MD   cholecalciferol (VITAMIN D3) 1,000 unit cap Take  by mouth daily. Yes Ronni Hernandez MD   cycloSPORINE (RESTASIS) 0.05 % ophthalmic emulsion Administer 1 Drop to both eyes two (2) times a day. Yes Ronni Hernandez MD   diclofenac (VOLTAREN) 1 % gel Apply  to affected area four (4) times daily. Yes Ronni Hernandez MD   famotidine (PEPCID) 20 mg tablet Take 20 mg by mouth two (2) times a day. Yes Ronni Hernandez MD   latanoprost (XALATAN) 0.005 % ophthalmic solution Administer 1 Drop to both eyes nightly. Yes Ronni Hernandez MD   allopurinol (ZYLOPRIM) 100 mg tablet Take  by mouth daily. Ronni Hernandez MD   carvedilol (COREG) 6.25 mg tablet Take  by mouth two (2) times daily (with meals). Ronni Hernandez MD   furosemide (LASIX) 40 mg tablet Take  by mouth daily. Ronni Hernandez MD   Oxygen     Ronni Hernandez MD   pravastatin sodium (PRAVASTATIN PO) Take  by mouth. Ronni Hernandez MD   tiotropium (SPIRIVA WITH HANDIHALER) 18 mcg inhalation capsule Take 1 Cap by inhalation daily. Ronni Hernandez MD   fluticasone/salmeterol (ADVAIR HFA IN) Take  by inhalation. Ronni Hernandez MD   ALBUTEROL IN Take  by inhalation.     Ronni Hernandez MD Current Facility-Administered Medications   Medication Dose Route Frequency    methylPREDNISolone (PF) (SOLU-MEDROL) injection 20 mg  20 mg IntraVENous Q12H    OLANZapine (ZyPREXA) tablet 2.5 mg  2.5 mg Oral BID    carvedilol (COREG) tablet 6.25 mg  6.25 mg Oral BID WITH MEALS    amLODIPine (NORVASC) tablet 10 mg  10 mg Oral DAILY    0.9% sodium chloride 1,000 mL with mvi, adult no. 4 with vit K 10 mL, thiamine 019 mg, folic acid 1 mg infusion   IntraVENous Q24H    nitroglycerin (NITRODUR) 0.2 mg/hr patch 1 Patch  1 Patch TransDERmal Q24H    docusate sodium (COLACE) capsule 100 mg  100 mg Oral BID    heparin (porcine) injection 5,000 Units  5,000 Units SubCUTAneous Q8H    famotidine (PF) (PEPCID) 20 mg in sodium chloride 0.9% 10 mL injection  20 mg IntraVENous Q12H    arformoterol (BROVANA) neb solution 15 mcg  15 mcg Nebulization BID RT    budesonide (PULMICORT) 500 mcg/2 ml nebulizer suspension  500 mcg Nebulization BID RT    albuterol-ipratropium (DUO-NEB) 2.5 MG-0.5 MG/3 ML  3 mL Nebulization Q4H RT    levoFLOXacin (LEVAQUIN) 500 mg in D5W IVPB  500 mg IntraVENous Q24H     No Known Allergies   Social History   Substance Use Topics    Smoking status: Former Smoker    Smokeless tobacco: Never Used    Alcohol use No      Comment: former ETOH user; stopped 2017      No family history on file.      Objective:   Vital Signs:    Visit Vitals    /60    Pulse 97    Temp 98.3 °F (36.8 °C)    Resp (!) 37    Ht 5' 6\" (1.676 m)    Wt 90 kg (198 lb 6.6 oz)    SpO2 92%    BMI 32.02 kg/m2       O2 Device: Nasal cannula   O2 Flow Rate (L/min): 3 l/min   Temp (24hrs), Av.4 °F (36.9 °C), Min:97.8 °F (36.6 °C), Max:99.4 °F (37.4 °C)       Intake/Output:   Last shift:      701 -  1900  In: 360 [P.O.:360]  Out: -   Last 3 shifts: 1901 -  0700  In: 1327.5 [P.O.:90; I.V.:1237.5]  Out:  [Urine:]    Intake/Output Summary (Last 24 hours) at 18 1011  Last data filed at 07/05/18 3758   Gross per 24 hour   Intake           1687.5 ml   Output             1125 ml   Net            562.5 ml       Ventilator Settings:  Mode Rate Tidal Volume Pressure FiO2 PEEP            28 %       Peak airway pressure:      Minute ventilation: 7.5 l/min        Review of Systems:  Not able to do due to patient condition     Objective:    General: comfortable, no acute distress altered not oriented  HEENT: pupils reactive, sclera anicteric, EOM intact  Neck: No adenopathy or thyroid swelling, no lymphadenopathy or JVD, supple  CVS: S1S2 no murmurs  RS: Mod AE bilaterally, decrease at base  Abd: soft, non tender, no hepatosplenomegaly  Neuro: non focal, awake, alert  Extrm: no leg edema, clubbing or cyanosis  Lymph node: No obvious palpable lymph node appreciated. Skin: no rash  CBC w/Diff Recent Labs      07/05/18   0630  07/04/18   0254  07/03/18   1640   WBC  20.4*  13.7*  19.3*   RBC  4.99  4.99  4.87   HGB  12.0*  12.2*  11.9*   HCT  40.5  41.3  40.9   PLT  255  243  220   GRANS  64  64  24*   LYMPH  30  34  68*   EOS  0  0  1        Chemistry Recent Labs      07/05/18   0630  07/04/18   0254  07/03/18   1640   GLU  139*  167*  89   NA  142  139  141   K  4.3  3.8  4.4   CL  102  98*  101   CO2  36*  37*  37*   BUN  32*  16  17   CREA  1.65*  1.30  1.17   CA  9.1  9.4  9.0   MG  2.2  1.8   --    AGAP  4  4  3   BUCR  19  12  15   AP   --    --   99   TP   --    --   8.6*   ALB   --    --   3.6   GLOB   --    --   5.0*   AGRAT   --    --   0.7*        Lactic Acid Lactic acid   Date Value Ref Range Status   06/16/2018 1.3 0.4 - 2.0 MMOL/L Final     No results for input(s): LAC in the last 72 hours.      Micro  Recent Labs      07/04/18   1433   CULT  NO GROWTH AFTER 16 HOURS     Recent Labs      07/04/18   1433   CULT  NO GROWTH AFTER 16 HOURS        ABG Recent Labs      07/04/18   1110  07/03/18   2343  07/03/18   1716   PHI  7.433  7.412  7.294*   PCO2I  58.9*  64.3*  85.0*   PO2I  65* 69*  69*   HCO3I  39.4*  41.1*  41.3*   FIO2I  32  0.32  28        Liver Enzymes Protein, total   Date Value Ref Range Status   07/03/2018 8.6 (H) 6.4 - 8.2 g/dL Final     Albumin   Date Value Ref Range Status   07/03/2018 3.6 3.4 - 5.0 g/dL Final     Globulin   Date Value Ref Range Status   07/03/2018 5.0 (H) 2.0 - 4.0 g/dL Final     A-G Ratio   Date Value Ref Range Status   07/03/2018 0.7 (L) 0.8 - 1.7   Final     AST (SGOT)   Date Value Ref Range Status   07/03/2018 26 15 - 37 U/L Final     Alk. phosphatase   Date Value Ref Range Status   07/03/2018 99 45 - 117 U/L Final     Recent Labs      07/03/18   1640   TP  8.6*   ALB  3.6   GLOB  5.0*   AGRAT  0.7*   SGOT  26   AP  99        Cardiac Enzymes No results found for: CPK, CK, CKMMB, CKMB, RCK3, CKMBT, CKNDX, CKND1, LOREN, TROPT, TROIQ, APRIL, TROPT, TNIPOC, BNP, BNPP     BNP No results found for: BNP, BNPP, XBNPT     Coagulation No results for input(s): PTP, INR, APTT in the last 72 hours. No lab exists for component: INREXT, INREXT      Thyroid  No results found for: T4, T3U, TSH, TSHEXT, TSHEXT       Lipid Panel No results found for: CHOL, CHOLPOCT, CHOLX, CHLST, CHOLV, 944215, HDL, LDL, LDLC, DLDLP, 877210, VLDLC, VLDL, TGLX, TRIGL, TRIGP, TGLPOCT, CHHD, CHHDX       Urinalysis Lab Results   Component Value Date/Time    Color YELLOW 07/03/2018 04:15 PM    Appearance CLEAR 07/03/2018 04:15 PM    Specific gravity 1.019 07/03/2018 04:15 PM    pH (UA) 7.0 07/03/2018 04:15 PM    Protein 300 (A) 07/03/2018 04:15 PM    Glucose NEGATIVE  07/03/2018 04:15 PM    Ketone NEGATIVE  07/03/2018 04:15 PM    Bilirubin NEGATIVE  07/03/2018 04:15 PM    Urobilinogen 0.2 07/03/2018 04:15 PM    Nitrites NEGATIVE  07/03/2018 04:15 PM    Leukocyte Esterase NEGATIVE  07/03/2018 04:15 PM    Epithelial cells FEW 07/03/2018 04:15 PM    Bacteria FEW (A) 07/03/2018 04:15 PM    WBC 0 to 3 07/03/2018 04:15 PM    RBC NEGATIVE  07/03/2018 04:15 PM        XR (Most Recent).  CXR reviewed by me and compared with previous CXR   Results from [place] encounter on 07/03/18   XR CHEST PORT   Narrative Chest, single view    Indication: Hypoxia. Altered mental status with decreased level of alertness    Comparison: July 3, 2018    Findings:  Portable upright AP view of the chest was obtained. No focal  pneumonic opacity, pneumothorax, or pleural effusion. Linear scarring and/or  atelectasis at the left lung is noted, unchanged. Cardiac size and mediastinal  contours are normal. No acute osseous abnormality. Impression IMPRESSION:  1. Minimal linear scarring or atelectasis at the left lung base unchanged from  prior available exams without superimposed acute radiographic abnormality. CT (Most Recent)   Results from Hospital Encounter encounter on 07/03/18   CT HEAD WO CONT   Narrative EXAM: CT head    INDICATION: Altered mental status. Decreased alertness. COMPARISON: 6/16/2018    TECHNIQUE: Axial CT imaging of the head was performed without intravenous  contrast. One or more dose reduction techniques were used on this CT: automated  exposure control, adjustment of the mAs and/or kVp according to patient's size,  and iterative reconstruction techniques. The specific techniques utilized on  this CT exam have been documented in the patient's electronic medical record.    _______________    FINDINGS:    BRAIN:  Intraparenchymal hemorrhage: None. Mass effect/edema: None. Infarcts/encephalomalacia: None. White matter: Normal.  Brain volume: Normal for age. EXTRA-AXIAL SPACES:  Hemorrhage: None. Mass: None. Hydrocephalus: None. SINUSES: Clear. CALVARIUM: Unremarkable. OTHER: None.    _______________         Impression IMPRESSION:     No evidence of acute intracranial process. EKG No results found for this or any previous visit. ECHO  12/2017 [facility]    NORMAL LEFT VENTRICULAR CAVITY SIZE. MODERATE CONCENTRIC LEFT VENTRICULAR HYPERTROPHY.    NORMAL LEFT VENTRICULAR SYSTOLIC FUNCTION WITH AN EJECTION FRACTION   ESTIMATED AT 60%. MILD  DIASTOLIC DYSFUNCTION. NORMAL LEFT ATRIAL SIZE. PRIOR ECHO REPORT FINDINGS ON 10-:     NORMAL LEFT VENTRICULAR CAVITY SIZE. MILD CONCENTRIC LEFT VENTRICULAR HYPERTROPHY. DYNAMIC  GLOBAL LEFT VENTRICULAR SYSTOLIC FUNCTION. EJECTION FRACTION IS 70%. MILD DIASTOLIC DYSFUNCTION. NORMAL CHAMBER SIZES. NO HEMODYNAMICALLY SIGNIFICANT VALVULAR PATHOLOGY. NORMAL PULMONARY ARTERY PRESSURE OF 22 MMHG. PFT PFTs: 1/11/18    FVC  1.35 (44 %)  FEV1 1.23 (55 %)  FEV1/FVC  92 %  TLC 62 %   %  DL/VA 31 %       Other Interpretation: Ambulatory oximetry Does show significant desaturation;starting O2 sat 90 %; low O2 sat 85 %;   starting HR 78 bpm; high HR 99 bpm  Dyspnea scale: beginning 0; ending 0  Total distance : 330 ft   Total time: 3 minutes    Recommendation: Ambulatory O2 was recommended. Imaging:  I have personally reviewed the patients radiographs and have reviewed the reports:              Salvador Kumar M.D.   Pulmonary Critical Care & Sleep Medicine

## 2018-07-05 NOTE — INTERDISCIPLINARY ROUNDS
CRITICAL CARE INTERDISCIPLINARY ROUNDS    Patient Information:    Name:   Yonathan Wren. Age:   74 y.o. Admission Date:   7/3/2018    Critical Care Day: 3    Surgery Date: n/a    Attending Provider:   Mickey Baltazar MD    Surgeon: Santosh Mcdonald):     Critical Care Physician:  Dr. Jarett Montiel    Code Status: Full Code    Problem List:     Patient Active Problem List   Diagnosis Code    Hypercapnia R06.89    Acute on chronic respiratory failure with hypoxia and hypercapnia (HCC) J96.21, J96.22    CLL (chronic lymphocytic leukemia) ([facility] C91.90    Emphysema lung ([facility] J43.9    Memory loss R41.3    Acute encephalopathy G93.40       Principal Problem:  Acute on chronic respiratory failure with hypoxia and hypercapnia ([facility]    During rounds the following quality care indicators and evidence based practices were addressed :  DVT Prophylaxis, PUD Prophylaxis, Pressure Injury Prevention, Nutritional Status and Critical Care Interventions Airways and Lines Shanks Day n/a (M-Care n/a) ; Central Line Day: n/a Isolation:n/a; Antibiotic Stewardship: levaquin      Glycemic Control:   Recent Labs      07/05/18   0630  07/04/18   0254  07/03/18   1640   GLU  139*  167*  89   ;  Recent Labs      07/04/18   1110  07/03/18   2343  07/03/18   1716   PHI  7.433  7.412  7.294*   PCO2I  58.9*  64.3*  85.0*   PO2I  65*  69*  69*    Adjustments     Acute MI/PCI:   Not applicable    Door to Balloon: Admission Time: 1557      Heart Failure:    Not applicable     SCIP Measures for other Surgeries:   Not applicable CHG Bath Daily yes    Pneumonia:    Not applicable    Interdisciplinary team rounds were held with the following team membersCare Management, Nursing, Nutrition, Palliative Care, Pharmacy, Physical Therapy, Physician, Respiratory Therapy and Speech Therapy. Plan of care discussed.      Goals of Care/ Recommendations: continue to monitor pt and transfer safely  See clinical pathway and/or care plan for interventions and desired outcomes.     Critical Care Discharge Status:  Klaudia Shabazz

## 2018-07-05 NOTE — PROGRESS NOTES
ABG reviewed PH is 7.34 and PCO2 is 75  Patient is refusing to wear bipap  Patient is awake and sitting up in bed and eating his food  Most likely its chronic CO2 with compensated PH  If agreeable bipap might help but if not agreeable not sure what else we can offer  Ok to transfer out of ICU as we are pressed for beds and nothing much to offer  Will let hospitalist know      GAURAV Mclean.  Roel Narvaez [address] [address]  Phone [phone]   Fax [phone]  Pulmonary Critical Care & Sleep Medicine

## 2018-07-06 ENCOUNTER — APPOINTMENT (OUTPATIENT)
Dept: GENERAL RADIOLOGY | Age: 73
DRG: 189 | End: 2018-07-06
Attending: INTERNAL MEDICINE
Payer: MEDICARE

## 2018-07-06 LAB
ANION GAP SERPL CALC-SCNC: 2 MMOL/L (ref 3–18)
ARTERIAL PATENCY WRIST A: YES
BASE EXCESS BLD CALC-SCNC: 12 MMOL/L
BASE EXCESS BLD CALC-SCNC: 13 MMOL/L
BASE EXCESS BLD CALC-SCNC: 14 MMOL/L
BASE EXCESS BLD CALC-SCNC: 15 MMOL/L
BASOPHILS # BLD: 0 K/UL (ref 0–0.1)
BASOPHILS NFR BLD: 0 % (ref 0–3)
BDY SITE: ABNORMAL
BODY TEMPERATURE: 97.9
BODY TEMPERATURE: 98.6
BUN SERPL-MCNC: 41 MG/DL (ref 7–18)
BUN/CREAT SERPL: 26 (ref 12–20)
CALCIUM SERPL-MCNC: 8.9 MG/DL (ref 8.5–10.1)
CHLORIDE SERPL-SCNC: 103 MMOL/L (ref 100–108)
CO2 SERPL-SCNC: 35 MMOL/L (ref 21–32)
CREAT SERPL-MCNC: 1.55 MG/DL (ref 0.6–1.3)
DIFFERENTIAL METHOD BLD: ABNORMAL
EOSINOPHIL # BLD: 0 K/UL (ref 0–0.4)
EOSINOPHIL NFR BLD: 0 % (ref 0–5)
ERYTHROCYTE [DISTWIDTH] IN BLOOD BY AUTOMATED COUNT: 15.5 % (ref 11.6–14.5)
GAS FLOW.O2 O2 DELIVERY SYS: ABNORMAL L/MIN
GAS FLOW.O2 SETTING OXYMISER: 2 L/M
GLUCOSE BLD STRIP.AUTO-MCNC: 101 MG/DL (ref 70–110)
GLUCOSE BLD STRIP.AUTO-MCNC: 101 MG/DL (ref 70–110)
GLUCOSE BLD STRIP.AUTO-MCNC: 185 MG/DL (ref 70–110)
GLUCOSE BLD STRIP.AUTO-MCNC: 96 MG/DL (ref 70–110)
GLUCOSE SERPL-MCNC: 132 MG/DL (ref 74–99)
HCO3 BLD-SCNC: 39.5 MMOL/L (ref 22–26)
HCO3 BLD-SCNC: 39.6 MMOL/L (ref 22–26)
HCO3 BLD-SCNC: 40.1 MMOL/L (ref 22–26)
HCO3 BLD-SCNC: 41.3 MMOL/L (ref 22–26)
HCT VFR BLD AUTO: 41.2 % (ref 36–48)
HGB BLD-MCNC: 12 G/DL (ref 13–16)
LYMPHOCYTES # BLD: 12.1 K/UL (ref 0.8–3.5)
LYMPHOCYTES NFR BLD: 55 % (ref 20–51)
MAGNESIUM SERPL-MCNC: 2.5 MG/DL (ref 1.6–2.6)
MCH RBC QN AUTO: 24.3 PG (ref 24–34)
MCHC RBC AUTO-ENTMCNC: 29.1 G/DL (ref 31–37)
MCV RBC AUTO: 83.6 FL (ref 74–97)
MONOCYTES # BLD: 1.5 K/UL (ref 0–1)
MONOCYTES NFR BLD: 7 % (ref 2–9)
NEUTS SEG # BLD: 8.4 K/UL (ref 1.8–8)
NEUTS SEG NFR BLD: 38 % (ref 42–75)
O2/TOTAL GAS SETTING VFR VENT: 21 %
O2/TOTAL GAS SETTING VFR VENT: 28 %
PCO2 BLD: 75.3 MMHG (ref 35–45)
PCO2 BLD: 83.3 MMHG (ref 35–45)
PCO2 BLD: 88.9 MMHG (ref 35–45)
PCO2 BLD: 94.1 MMHG (ref 35–45)
PEEP RESPIRATORY: 12 CMH2O
PEEP RESPIRATORY: 14 CMH2O
PEEP RESPIRATORY: 17 CMH2O
PH BLD: 7.24 [PH] (ref 7.35–7.45)
PH BLD: 7.26 [PH] (ref 7.35–7.45)
PH BLD: 7.3 [PH] (ref 7.35–7.45)
PH BLD: 7.33 [PH] (ref 7.35–7.45)
PIP ISTAT,IPIP: 21
PIP ISTAT,IPIP: 24
PIP ISTAT,IPIP: 25
PLATELET # BLD AUTO: 221 K/UL (ref 135–420)
PLATELET COMMENTS,PCOM: ABNORMAL
PMV BLD AUTO: 11.3 FL (ref 9.2–11.8)
PO2 BLD: 108 MMHG (ref 80–100)
PO2 BLD: 49 MMHG (ref 80–100)
PO2 BLD: 74 MMHG (ref 80–100)
PO2 BLD: 98 MMHG (ref 80–100)
POTASSIUM SERPL-SCNC: 5.1 MMOL/L (ref 3.5–5.5)
RBC # BLD AUTO: 4.93 M/UL (ref 4.7–5.5)
RBC MORPH BLD: ABNORMAL
SAO2 % BLD: 79 % (ref 92–97)
SAO2 % BLD: 92 % (ref 92–97)
SAO2 % BLD: 96 % (ref 92–97)
SAO2 % BLD: 97 % (ref 92–97)
SERVICE CMNT-IMP: 28 L/MIN
SERVICE CMNT-IMP: ABNORMAL
SODIUM SERPL-SCNC: 140 MMOL/L (ref 136–145)
SPECIMEN TYPE: ABNORMAL
SPONTANEOUS TIMED, IST: YES
TOTAL RESP. RATE, ITRR: 20
TOTAL RESP. RATE, ITRR: 23
TOTAL RESP. RATE, ITRR: 45
WBC # BLD AUTO: 22 K/UL (ref 4.6–13.2)
WBC MORPH BLD: ABNORMAL

## 2018-07-06 PROCEDURE — 74011250636 HC RX REV CODE- 250/636: Performed by: HOSPITALIST

## 2018-07-06 PROCEDURE — 65610000006 HC RM INTENSIVE CARE

## 2018-07-06 PROCEDURE — 94660 CPAP INITIATION&MGMT: CPT

## 2018-07-06 PROCEDURE — 74011000250 HC RX REV CODE- 250: Performed by: INTERNAL MEDICINE

## 2018-07-06 PROCEDURE — 74011250636 HC RX REV CODE- 250/636: Performed by: INTERNAL MEDICINE

## 2018-07-06 PROCEDURE — 83735 ASSAY OF MAGNESIUM: CPT | Performed by: HOSPITALIST

## 2018-07-06 PROCEDURE — 36600 WITHDRAWAL OF ARTERIAL BLOOD: CPT

## 2018-07-06 PROCEDURE — 94640 AIRWAY INHALATION TREATMENT: CPT

## 2018-07-06 PROCEDURE — 36415 COLL VENOUS BLD VENIPUNCTURE: CPT | Performed by: HOSPITALIST

## 2018-07-06 PROCEDURE — 74011250637 HC RX REV CODE- 250/637: Performed by: INTERNAL MEDICINE

## 2018-07-06 PROCEDURE — 74011000250 HC RX REV CODE- 250: Performed by: HOSPITALIST

## 2018-07-06 PROCEDURE — 82803 BLOOD GASES ANY COMBINATION: CPT

## 2018-07-06 PROCEDURE — 71045 X-RAY EXAM CHEST 1 VIEW: CPT

## 2018-07-06 PROCEDURE — 77010033678 HC OXYGEN DAILY

## 2018-07-06 PROCEDURE — 85025 COMPLETE CBC W/AUTO DIFF WBC: CPT | Performed by: HOSPITALIST

## 2018-07-06 PROCEDURE — 82962 GLUCOSE BLOOD TEST: CPT

## 2018-07-06 PROCEDURE — 80048 BASIC METABOLIC PNL TOTAL CA: CPT | Performed by: HOSPITALIST

## 2018-07-06 PROCEDURE — 74011250637 HC RX REV CODE- 250/637: Performed by: HOSPITALIST

## 2018-07-06 RX ORDER — FAMOTIDINE 20 MG/1
20 TABLET, FILM COATED ORAL EVERY 12 HOURS
Status: DISCONTINUED | OUTPATIENT
Start: 2018-07-06 | End: 2018-07-12 | Stop reason: HOSPADM

## 2018-07-06 RX ORDER — MEDROXYPROGESTERONE ACETATE 2.5 MG/1
10 TABLET ORAL DAILY
Status: DISPENSED | OUTPATIENT
Start: 2018-07-06 | End: 2018-07-09

## 2018-07-06 RX ORDER — CARVEDILOL 12.5 MG/1
12.5 TABLET ORAL 2 TIMES DAILY WITH MEALS
Status: DISCONTINUED | OUTPATIENT
Start: 2018-07-06 | End: 2018-07-12 | Stop reason: HOSPADM

## 2018-07-06 RX ADMIN — IPRATROPIUM BROMIDE AND ALBUTEROL SULFATE 3 ML: .5; 3 SOLUTION RESPIRATORY (INHALATION) at 00:01

## 2018-07-06 RX ADMIN — AMLODIPINE BESYLATE 10 MG: 5 TABLET ORAL at 10:16

## 2018-07-06 RX ADMIN — HEPARIN SODIUM 5000 UNITS: 5000 INJECTION, SOLUTION INTRAVENOUS; SUBCUTANEOUS at 10:16

## 2018-07-06 RX ADMIN — CARVEDILOL 12.5 MG: 12.5 TABLET, FILM COATED ORAL at 17:49

## 2018-07-06 RX ADMIN — IPRATROPIUM BROMIDE AND ALBUTEROL SULFATE 3 ML: .5; 3 SOLUTION RESPIRATORY (INHALATION) at 07:28

## 2018-07-06 RX ADMIN — METHYLPREDNISOLONE SODIUM SUCCINATE 20 MG: 125 INJECTION, POWDER, FOR SOLUTION INTRAMUSCULAR; INTRAVENOUS at 20:32

## 2018-07-06 RX ADMIN — IPRATROPIUM BROMIDE AND ALBUTEROL SULFATE 3 ML: .5; 3 SOLUTION RESPIRATORY (INHALATION) at 20:05

## 2018-07-06 RX ADMIN — IPRATROPIUM BROMIDE AND ALBUTEROL SULFATE 3 ML: .5; 3 SOLUTION RESPIRATORY (INHALATION) at 15:23

## 2018-07-06 RX ADMIN — DOCUSATE SODIUM 100 MG: 100 CAPSULE, LIQUID FILLED ORAL at 10:16

## 2018-07-06 RX ADMIN — BUDESONIDE 500 MCG: 0.5 INHALANT RESPIRATORY (INHALATION) at 20:05

## 2018-07-06 RX ADMIN — HEPARIN SODIUM 5000 UNITS: 5000 INJECTION, SOLUTION INTRAVENOUS; SUBCUTANEOUS at 03:08

## 2018-07-06 RX ADMIN — HEPARIN SODIUM 5000 UNITS: 5000 INJECTION, SOLUTION INTRAVENOUS; SUBCUTANEOUS at 20:32

## 2018-07-06 RX ADMIN — CARVEDILOL 6.25 MG: 3.12 TABLET, FILM COATED ORAL at 10:17

## 2018-07-06 RX ADMIN — BUDESONIDE 500 MCG: 0.5 INHALANT RESPIRATORY (INHALATION) at 07:28

## 2018-07-06 RX ADMIN — IPRATROPIUM BROMIDE AND ALBUTEROL SULFATE 3 ML: .5; 3 SOLUTION RESPIRATORY (INHALATION) at 04:27

## 2018-07-06 RX ADMIN — FOLIC ACID: 5 INJECTION, SOLUTION INTRAMUSCULAR; INTRAVENOUS; SUBCUTANEOUS at 11:47

## 2018-07-06 RX ADMIN — LEVOFLOXACIN 500 MG: 5 INJECTION, SOLUTION INTRAVENOUS at 20:31

## 2018-07-06 RX ADMIN — FAMOTIDINE 20 MG: 20 TABLET ORAL at 20:33

## 2018-07-06 RX ADMIN — MEDROXYPROGESTERONE ACETATE 10 MG: 2.5 TABLET ORAL at 17:49

## 2018-07-06 RX ADMIN — OLANZAPINE 2.5 MG: 2.5 TABLET ORAL at 10:17

## 2018-07-06 RX ADMIN — DOCUSATE SODIUM 100 MG: 100 CAPSULE, LIQUID FILLED ORAL at 20:33

## 2018-07-06 RX ADMIN — ARFORMOTEROL TARTRATE 15 MCG: 15 SOLUTION RESPIRATORY (INHALATION) at 07:28

## 2018-07-06 RX ADMIN — FAMOTIDINE 20 MG: 10 INJECTION, SOLUTION INTRAVENOUS at 10:16

## 2018-07-06 RX ADMIN — ARFORMOTEROL TARTRATE 15 MCG: 15 SOLUTION RESPIRATORY (INHALATION) at 20:05

## 2018-07-06 RX ADMIN — IPRATROPIUM BROMIDE AND ALBUTEROL SULFATE 3 ML: .5; 3 SOLUTION RESPIRATORY (INHALATION) at 11:23

## 2018-07-06 RX ADMIN — OLANZAPINE 2.5 MG: 2.5 TABLET ORAL at 20:33

## 2018-07-06 RX ADMIN — METHYLPREDNISOLONE SODIUM SUCCINATE 20 MG: 125 INJECTION, POWDER, FOR SOLUTION INTRAMUSCULAR; INTRAVENOUS at 10:16

## 2018-07-06 NOTE — ROUTINE PROCESS
Bedside shift change report given to Neris Hughes RN (oncoming nurse) by Brett Sheikh RN (offgoing nurse).  Report included the following information SBAR, Kardex and MAR. '

## 2018-07-06 NOTE — PROGRESS NOTES
Pt resting in bed, no acute distress noted, refused bipap placement, will attempt placement later,sitter at bedside.

## 2018-07-06 NOTE — PROGRESS NOTES
Palliative medicine consult noted and appreciated. Call placed to patient's wife as PM team is not able to discuss goals of care with Mr. Sravan Pittman. Message left with contact information. PM team will continue attempts to speak with Mrs. Gomes. Thank you for the opportunity to participate in the care of this patient.     Andrez Marie RN

## 2018-07-06 NOTE — PROGRESS NOTES
Problem: Pressure Injury - Risk of  Goal: *Prevention of pressure injury  Document Percy Scale and appropriate interventions in the flowsheet. Outcome: Progressing Towards Goal  Pressure Injury Interventions:  Sensory Interventions: Assess changes in LOC    Moisture Interventions: Absorbent underpads, Minimize layers, Offer toileting Q_hr    Activity Interventions: Increase time out of bed    Mobility Interventions: PT/OT evaluation, HOB 30 degrees or less    Nutrition Interventions: Document food/fluid/supplement intake    Friction and Shear Interventions: Minimize layers, HOB 30 degrees or less               Problem: Falls - Risk of  Goal: *Absence of Falls  Document Stan Fall Risk and appropriate interventions in the flowsheet.    Outcome: Progressing Towards Goal  Fall Risk Interventions:  Mobility Interventions: Patient to call before getting OOB, Strengthening exercises (ROM-active/passive)    Mentation Interventions: Adequate sleep, hydration, pain control, More frequent rounding    Medication Interventions: Patient to call before getting OOB, Teach patient to arise slowly, Evaluate medications/consider consulting pharmacy    Elimination Interventions: Call light in reach, Toilet paper/wipes in reach, Toileting schedule/hourly rounds, Urinal in reach    History of Falls Interventions: Evaluate medications/consider consulting pharmacy, Door open when patient unattended (sitter at bedside)

## 2018-07-06 NOTE — PROGRESS NOTES
Assumed care of pt from [facility]. 0900: Pt assessed by Dr. Dakota Greco and respiratory blood gases drawn slight improvement pt still needs Bipap on. 0930: pt pulled off Bipap Dr. Zion Bird wanted to take pt off bipap for 1 hr to give pt a break and redraw ABGs after. 1145:pt had to go back on Bipap while sleeping. Pt was hard to arouse while asleep. 1200: Pt remains on bipap no signs of distress. 1300: Pt still on Bipap no sign of distress  saw pt blood gas redraw at 2pm.  1405: Blood gas redrawn C02 slightly improved. Pt to remain on bipap  Til pt came remain awake for longer period of time. 1700: Pt alert no sign of distress. Pt off bipap and alert awaiting dinner. 1830. Pt eating dinner with sitter at bedside.

## 2018-07-06 NOTE — PROGRESS NOTES
Bipap changes made post-abg results. A.M. abg results were as follows; PH: 7.24, PCO2: 94.1, PO2: 98, HCO3: 40.1, BE: 13, SO2: 96%. New bipap settings: 25/14, I-time: 0.8. Critical results called to Dr. Chela Nuñez; orders are to repeat ABG later in the A.M.  Nurse made aware of changes and discussion with Dr. Chela Nuñez.

## 2018-07-06 NOTE — PROGRESS NOTES
Palliative Medicine  [place] [phone] (COPE)        GOALS OF CARE: Continue current care of aggressive measures       TREATMENT PREFERENCES:   Code Status: Full Code    Advance Care Planning:  Advance Care Planning 7/6/2018   Patient's Healthcare Decision Maker is: Named in scanned ACP document   Primary Decision Maker Name Buck Arceo   Primary Decision Maker Phone Number [phone]   Primary Decision Maker Relationship to Patient Spouse   Secondary Decision Maker Name Kelsey Herrera   Secondary Decision Maker Phone Number [phone]   Secondary Decision Maker Relationship to Patient Adult child   Confirm Advance Directive Yes, on file   Does the patient have other document types Power m2p-labs [facility]       Family Meeting Documentation    Participants: Wife Pankaj Harrell, patient was unresponsive wearing BiPAP, Palliative RN Cassie Whitney, and myself. Psychosocial, Spiritual History: wife shared patient retired from the [employer]. They are legally still  but haven't been living together for 16 years of the 36 years of marriage. They have three children; one daughter who lives [facility], son Majo Sneed lives [facility] and then another son who lives in [place]. No spiritual concerns were mentioned. Discussion: Wife shared about patient's decline physically and mentally over the past 6 months. Since the start of his decline the family has had difficulty with patient's ability to understand medical necessity of BiPAP, medications, and physical safety in home. Patient was living alone down in [address] [place] [address] but they recently relocated him to live closer to two children. Wife has taken on the majority of care for patient. She's feeling burnt out but is relieved patient's in safe environment now. She has inquired about ling-term care in NH or community based assistance through [place]. She's started the Medicaid application and has been speaking with a . Patient has been intubated twice before while hospitalized at [facility] and [facility]. Wife shared both time were due to low CO2 levels and in respiratory distress. He was on ventilator support for about 3 days. Wife stated wanting to continue with aggressive care at this time and would be ok with intubation if needed. She shared Ajay Davey thought he was going to die at [facility]". Patient remains Full Code. Wife brought in patient's Advanced Medical Directive and Durable POA paperwork. AMD names wife Aureliano Hennessy as primary medical agent and then son Alexa Green secondary agent. Currently wife states wish is for all aggressive measures to include intubation and CPR. We recognized wife needed a safe environment to vent stressors and share concerns. We provided her compassionate listening and safe environment. Outcome / Plan: Continue current aggressive care. Full Code. Follow Up: As needed with wife and family. Thank you for the opportunity to assist in the care of Mr. Gomes. Waldo Lockwood, MSW  Palliative Medicine         Total Time:  Time spent in counseling / coordination:  >50% of time in counseling / coordination?  Yes

## 2018-07-06 NOTE — ACP (ADVANCE CARE PLANNING)
Wife brought in patient's Advanced Medical Directive and Durable POA paperwork. AMD names wife Zuleima Henry as primary medical agent and then son Emilia Miles secondary agent. Currently wife states wish is for all aggressive measures to include intubation and CPR. FULL CODE.     MPOA: Zuleima Henry (wife)  [phone]    2nd MPOA: Emilia Miles (son)  [phone]

## 2018-07-06 NOTE — PROGRESS NOTES
Problem: Falls - Risk of  Goal: *Absence of Falls  Document Stan Fall Risk and appropriate interventions in the flowsheet.    Outcome: Progressing Towards Goal  Fall Risk Interventions:  Mobility Interventions: Patient to call before getting OOB, Strengthening exercises (ROM-active/passive)    Mentation Interventions: Adequate sleep, hydration, pain control, More frequent rounding    Medication Interventions: Patient to call before getting OOB, Teach patient to arise slowly, Evaluate medications/consider consulting pharmacy    Elimination Interventions: Call light in reach, Toilet paper/wipes in reach, Toileting schedule/hourly rounds, Urinal in reach    History of Falls Interventions: Evaluate medications/consider consulting pharmacy, Door open when patient unattended (sitter at bedside)

## 2018-07-06 NOTE — PROGRESS NOTES
1915- Report and care received, assessment completed per flow sheet. Alert, pleasantly confused, sitter at bedside. IVF infusing via IV pump without difficulty. NAD.    2300- Reassessment completed and without change. 0330- Reassessment completed and without change.

## 2018-07-06 NOTE — PROGRESS NOTES
Hospitalist Progress Note    Patient: Ninfa Fonseca MRN: [id number]  CSN: [id number]    YOB: 1945  Age: 74 y.o. Sex: male    DOA: 7/3/2018 LOS:  LOS: 3 days          Chief Complaint: Ac hypercapneic resp failure        Assessment/Plan     Acute on chronic hypercapneic resp failure  RUPERTO noncompliant with CPAP  CLL  Dementia  COPD exacerbation  Leukocytosis likely due to CLL      Continue to monitor in ICU  He was confused last night and c02 was high on ANG, repeat it has come down  Continue current treatments with Pulvon STAPLETON on case  DVT prophylaxis  Nebs  steroids IV    DVT prophylaxis    He may require rehab placement  Disposition :  Patient Active Problem List   Diagnosis Code    Hypercapnia R06.89    Acute on chronic respiratory failure with hypoxia and hypercapnia (HCC) J96.21, J96.22    CLL (chronic lymphocytic leukemia) ([facility]) C91.90    Emphysema lung ([facility] [address] J43.9    Memory loss R41.3    Acute encephalopathy G93.40    Elevated serum creatinine R79.89       Subjective: This am, he denies CP, SOB  Feels ok  co2 was high and he is back on bipap    Review of systems:    Constitutional: denies fevers, chills, myalgias  Respiratory: denies SOB, cough  Cardiovascular: denies chest pain, palpitations  Gastrointestinal: denies nausea, vomiting, diarrhea      Vital signs/Intake and Output:  Visit Vitals    /60    Pulse 89    Temp 97.3 °F (36.3 °C)    Resp (!) 40    Ht 5' 6\" (1.676 m)    Wt 90 kg (198 lb 6.6 oz)    SpO2 97%    BMI 32.02 kg/m2     Current Shift:     Last three shifts:  07/04 1901 - 07/06 0700  In: 5187 [P.O.:1320;  I.V.:1385]  Out: 850 [Urine:850]    Exam:    General: elderly BM, NAD, alert, NAD, OX1  Head/Neck: NCAT, supple, No masses, No lymphadenopathy  CVS:Regular rate and rhythm, no M/R/G, S1/S2 heard, no thrill  Lungs:Clear to auscultation bilaterally, no wheezes, rhonchi, or rales  Abdomen: Soft, Nontender, No distention, Normal Bowel sounds, No hepatomegaly  Extremities: stasis edema LE BL  Skin:normal texture and turgor, no rashes, no lesions  Neuro:grossly normal , follows commands  Psych:appropriate                Labs: Results:       Chemistry Recent Labs      07/06/18   0301 07/05/18   1701  07/05/18   0630   GLU  132*  139*  139*   NA  140  141  142   K  5.1  4.4  4.3   CL  103  102  102   CO2  35*  31  36*   BUN  41*  40*  32*   CREA  1.55*  1.74*  1.65*   CA  8.9  9.6  9.1   AGAP  2*  8  4   BUCR  26*  23*  19   AP   --   94   --    TP   --   8.7*   --    ALB   --   3.5   --    GLOB   --   5.2*   --    AGRAT   --   0.7*   --       CBC w/Diff Recent Labs      07/06/18   0301 07/05/18   0630  07/04/18   0254   WBC  22.0*  20.4*  13.7*   RBC  4.93  4.99  4.99   HGB  12.0*  12.0*  12.2*   HCT  41.2  40.5  41.3   PLT  221  255  243   GRANS  38*  64  64   LYMPH  55*  30  34   EOS  0  0  0      Cardiac Enzymes No results for input(s): CPK, CKND1, LOREN in the last 72 hours. No lab exists for component: CKRMB, TROIP   Coagulation No results for input(s): PTP, INR, APTT in the last 72 hours. No lab exists for component: INREXT    Lipid Panel No results found for: CHOL, CHOLPOCT, CHOLX, CHLST, CHOLV, 805065, HDL, LDL, LDLC, DLDLP, 451492, VLDLC, VLDL, TGLX, TRIGL, TRIGP, TGLPOCT, CHHD, CHHDX   BNP No results for input(s): BNPP in the last 72 hours.    Liver Enzymes Recent Labs      07/05/18   1701   TP  8.7*   ALB  3.5   AP  94   SGOT  20      Thyroid Studies No results found for: T4, T3U, TSH, TSHEXT     Procedures/imaging: see electronic medical records for all procedures/Xrays and details which were not copied into this note but were reviewed prior to creation of Callie MD Olamide

## 2018-07-06 NOTE — PROGRESS NOTES
ABG rechecked on Current BPAP 24/12  PH 7.33 PCO2 75 improved from am  Will continue current BIPAP  Recheck abg tomorrow at 7 am  Will follow along  Monitor in ICU      Render GAURAV Amaro.  Kenny Craig [address] [facility] [address]  Phone [phone]   Fax [phone]  Pulmonary Critical Care & Sleep Medicine

## 2018-07-07 ENCOUNTER — APPOINTMENT (OUTPATIENT)
Dept: GENERAL RADIOLOGY | Age: 73
DRG: 189 | End: 2018-07-07
Attending: INTERNAL MEDICINE
Payer: MEDICARE

## 2018-07-07 LAB
ALBUMIN SERPL-MCNC: 2.8 G/DL (ref 3.4–5)
ALBUMIN/GLOB SERPL: 0.7 {RATIO} (ref 0.8–1.7)
ALP SERPL-CCNC: 72 U/L (ref 45–117)
ALT SERPL-CCNC: 20 U/L (ref 16–61)
ANION GAP SERPL CALC-SCNC: 0 MMOL/L (ref 3–18)
ARTERIAL PATENCY WRIST A: YES
AST SERPL-CCNC: 13 U/L (ref 15–37)
BASE EXCESS BLD CALC-SCNC: 12 MMOL/L
BDY SITE: ABNORMAL
BILIRUB SERPL-MCNC: 0.2 MG/DL (ref 0.2–1)
BODY TEMPERATURE: 97.3
BUN SERPL-MCNC: 38 MG/DL (ref 7–18)
BUN/CREAT SERPL: 26 (ref 12–20)
CALCIUM SERPL-MCNC: 8.6 MG/DL (ref 8.5–10.1)
CHLORIDE SERPL-SCNC: 106 MMOL/L (ref 100–108)
CO2 SERPL-SCNC: 35 MMOL/L (ref 21–32)
CREAT SERPL-MCNC: 1.45 MG/DL (ref 0.6–1.3)
ERYTHROCYTE [DISTWIDTH] IN BLOOD BY AUTOMATED COUNT: 15.4 % (ref 11.6–14.5)
GAS FLOW.O2 O2 DELIVERY SYS: ABNORMAL L/MIN
GAS FLOW.O2 SETTING OXYMISER: 2 L/M
GLOBULIN SER CALC-MCNC: 3.9 G/DL (ref 2–4)
GLUCOSE BLD STRIP.AUTO-MCNC: 111 MG/DL (ref 70–110)
GLUCOSE BLD STRIP.AUTO-MCNC: 171 MG/DL (ref 70–110)
GLUCOSE BLD STRIP.AUTO-MCNC: 177 MG/DL (ref 70–110)
GLUCOSE BLD STRIP.AUTO-MCNC: 95 MG/DL (ref 70–110)
GLUCOSE SERPL-MCNC: 139 MG/DL (ref 74–99)
HCO3 BLD-SCNC: 38.5 MMOL/L (ref 22–26)
HCT VFR BLD AUTO: 35.9 % (ref 36–48)
HGB BLD-MCNC: 10.2 G/DL (ref 13–16)
MAGNESIUM SERPL-MCNC: 2.4 MG/DL (ref 1.6–2.6)
MCH RBC QN AUTO: 23.7 PG (ref 24–34)
MCHC RBC AUTO-ENTMCNC: 28.4 G/DL (ref 31–37)
MCV RBC AUTO: 83.5 FL (ref 74–97)
O2/TOTAL GAS SETTING VFR VENT: 28 %
PCO2 BLD: 74.9 MMHG (ref 35–45)
PH BLD: 7.32 [PH] (ref 7.35–7.45)
PLATELET # BLD AUTO: 193 K/UL (ref 135–420)
PMV BLD AUTO: 11.2 FL (ref 9.2–11.8)
PO2 BLD: 79 MMHG (ref 80–100)
POTASSIUM SERPL-SCNC: 4.6 MMOL/L (ref 3.5–5.5)
PROT SERPL-MCNC: 6.7 G/DL (ref 6.4–8.2)
RBC # BLD AUTO: 4.3 M/UL (ref 4.7–5.5)
SAO2 % BLD: 94 % (ref 92–97)
SERVICE CMNT-IMP: ABNORMAL
SODIUM SERPL-SCNC: 141 MMOL/L (ref 136–145)
SPECIMEN TYPE: ABNORMAL
WBC # BLD AUTO: 11.3 K/UL (ref 4.6–13.2)

## 2018-07-07 PROCEDURE — 74011250636 HC RX REV CODE- 250/636: Performed by: INTERNAL MEDICINE

## 2018-07-07 PROCEDURE — 74011000250 HC RX REV CODE- 250: Performed by: INTERNAL MEDICINE

## 2018-07-07 PROCEDURE — 94760 N-INVAS EAR/PLS OXIMETRY 1: CPT

## 2018-07-07 PROCEDURE — 74011250637 HC RX REV CODE- 250/637: Performed by: HOSPITALIST

## 2018-07-07 PROCEDURE — 65660000000 HC RM CCU STEPDOWN

## 2018-07-07 PROCEDURE — 82803 BLOOD GASES ANY COMBINATION: CPT

## 2018-07-07 PROCEDURE — 36415 COLL VENOUS BLD VENIPUNCTURE: CPT | Performed by: HOSPITALIST

## 2018-07-07 PROCEDURE — 94640 AIRWAY INHALATION TREATMENT: CPT

## 2018-07-07 PROCEDURE — 74011250636 HC RX REV CODE- 250/636: Performed by: HOSPITALIST

## 2018-07-07 PROCEDURE — 77010033678 HC OXYGEN DAILY

## 2018-07-07 PROCEDURE — 85027 COMPLETE CBC AUTOMATED: CPT | Performed by: HOSPITALIST

## 2018-07-07 PROCEDURE — 74011250637 HC RX REV CODE- 250/637: Performed by: INTERNAL MEDICINE

## 2018-07-07 PROCEDURE — 36600 WITHDRAWAL OF ARTERIAL BLOOD: CPT

## 2018-07-07 PROCEDURE — 97161 PT EVAL LOW COMPLEX 20 MIN: CPT

## 2018-07-07 PROCEDURE — 74011000250 HC RX REV CODE- 250: Performed by: HOSPITALIST

## 2018-07-07 PROCEDURE — 71045 X-RAY EXAM CHEST 1 VIEW: CPT

## 2018-07-07 PROCEDURE — 83735 ASSAY OF MAGNESIUM: CPT | Performed by: HOSPITALIST

## 2018-07-07 PROCEDURE — 74011636637 HC RX REV CODE- 636/637: Performed by: INTERNAL MEDICINE

## 2018-07-07 PROCEDURE — 80053 COMPREHEN METABOLIC PANEL: CPT | Performed by: HOSPITALIST

## 2018-07-07 PROCEDURE — 94660 CPAP INITIATION&MGMT: CPT

## 2018-07-07 PROCEDURE — 82962 GLUCOSE BLOOD TEST: CPT

## 2018-07-07 PROCEDURE — 97116 GAIT TRAINING THERAPY: CPT

## 2018-07-07 RX ORDER — PREDNISONE 20 MG/1
20 TABLET ORAL
Status: DISCONTINUED | OUTPATIENT
Start: 2018-07-07 | End: 2018-07-08

## 2018-07-07 RX ORDER — FOLIC ACID 1 MG/1
1 TABLET ORAL DAILY
Status: DISCONTINUED | OUTPATIENT
Start: 2018-07-07 | End: 2018-07-12 | Stop reason: HOSPADM

## 2018-07-07 RX ORDER — ASPIRIN 325 MG/1
100 TABLET, FILM COATED ORAL DAILY
Status: DISCONTINUED | OUTPATIENT
Start: 2018-07-07 | End: 2018-07-12 | Stop reason: HOSPADM

## 2018-07-07 RX ADMIN — PREDNISONE 20 MG: 20 TABLET ORAL at 12:00

## 2018-07-07 RX ADMIN — HEPARIN SODIUM 5000 UNITS: 5000 INJECTION, SOLUTION INTRAVENOUS; SUBCUTANEOUS at 18:03

## 2018-07-07 RX ADMIN — AMLODIPINE BESYLATE 10 MG: 5 TABLET ORAL at 08:28

## 2018-07-07 RX ADMIN — CARVEDILOL 12.5 MG: 12.5 TABLET, FILM COATED ORAL at 18:03

## 2018-07-07 RX ADMIN — IPRATROPIUM BROMIDE AND ALBUTEROL SULFATE 3 ML: .5; 3 SOLUTION RESPIRATORY (INHALATION) at 07:22

## 2018-07-07 RX ADMIN — FAMOTIDINE 20 MG: 20 TABLET ORAL at 22:57

## 2018-07-07 RX ADMIN — ARFORMOTEROL TARTRATE 15 MCG: 15 SOLUTION RESPIRATORY (INHALATION) at 20:19

## 2018-07-07 RX ADMIN — IPRATROPIUM BROMIDE AND ALBUTEROL SULFATE 3 ML: .5; 3 SOLUTION RESPIRATORY (INHALATION) at 11:57

## 2018-07-07 RX ADMIN — Medication 100 MG: at 11:48

## 2018-07-07 RX ADMIN — HEPARIN SODIUM 5000 UNITS: 5000 INJECTION, SOLUTION INTRAVENOUS; SUBCUTANEOUS at 10:34

## 2018-07-07 RX ADMIN — OLANZAPINE 2.5 MG: 2.5 TABLET ORAL at 08:28

## 2018-07-07 RX ADMIN — DOCUSATE SODIUM 100 MG: 100 CAPSULE, LIQUID FILLED ORAL at 08:28

## 2018-07-07 RX ADMIN — IPRATROPIUM BROMIDE AND ALBUTEROL SULFATE 3 ML: .5; 3 SOLUTION RESPIRATORY (INHALATION) at 15:38

## 2018-07-07 RX ADMIN — METHYLPREDNISOLONE SODIUM SUCCINATE 20 MG: 40 INJECTION, POWDER, FOR SOLUTION INTRAMUSCULAR; INTRAVENOUS at 08:28

## 2018-07-07 RX ADMIN — HYDRALAZINE HYDROCHLORIDE 20 MG: 20 INJECTION INTRAMUSCULAR; INTRAVENOUS at 15:33

## 2018-07-07 RX ADMIN — FOLIC ACID 1 MG: 1 TABLET ORAL at 11:48

## 2018-07-07 RX ADMIN — CARVEDILOL 12.5 MG: 12.5 TABLET, FILM COATED ORAL at 08:28

## 2018-07-07 RX ADMIN — ARFORMOTEROL TARTRATE 15 MCG: 15 SOLUTION RESPIRATORY (INHALATION) at 07:22

## 2018-07-07 RX ADMIN — HEPARIN SODIUM 5000 UNITS: 5000 INJECTION, SOLUTION INTRAVENOUS; SUBCUTANEOUS at 03:38

## 2018-07-07 RX ADMIN — IPRATROPIUM BROMIDE AND ALBUTEROL SULFATE 3 ML: .5; 3 SOLUTION RESPIRATORY (INHALATION) at 20:19

## 2018-07-07 RX ADMIN — IPRATROPIUM BROMIDE AND ALBUTEROL SULFATE 3 ML: .5; 3 SOLUTION RESPIRATORY (INHALATION) at 04:57

## 2018-07-07 RX ADMIN — FOLIC ACID: 5 INJECTION, SOLUTION INTRAMUSCULAR; INTRAVENOUS; SUBCUTANEOUS at 09:29

## 2018-07-07 RX ADMIN — DOCUSATE SODIUM 100 MG: 100 CAPSULE, LIQUID FILLED ORAL at 22:56

## 2018-07-07 RX ADMIN — BUDESONIDE 500 MCG: 0.5 INHALANT RESPIRATORY (INHALATION) at 20:19

## 2018-07-07 RX ADMIN — IPRATROPIUM BROMIDE AND ALBUTEROL SULFATE 3 ML: .5; 3 SOLUTION RESPIRATORY (INHALATION) at 00:30

## 2018-07-07 RX ADMIN — LEVOFLOXACIN 500 MG: 5 INJECTION, SOLUTION INTRAVENOUS at 22:56

## 2018-07-07 RX ADMIN — MEDROXYPROGESTERONE ACETATE 10 MG: 2.5 TABLET ORAL at 09:28

## 2018-07-07 RX ADMIN — BUDESONIDE 500 MCG: 0.5 INHALANT RESPIRATORY (INHALATION) at 07:22

## 2018-07-07 RX ADMIN — FAMOTIDINE 20 MG: 20 TABLET ORAL at 08:29

## 2018-07-07 RX ADMIN — OLANZAPINE 2.5 MG: 2.5 TABLET ORAL at 22:57

## 2018-07-07 NOTE — PROGRESS NOTES
Lata Carey M.D 
[address] [facility] [address] [place]  [address] [facility] [address] [facility] [address] Phone [phone] Fax [phone] Pulmonary Critical Care & Sleep Medicine Name: Felipe Mercedes MRN: [id number] : 1945 Hospital: [facility] [place] Date: 2018 CCM follow up Requesting MD:                                                  Reason for CC Consult: 
 
IMPRESSION:  
Patient Active Problem List  
Diagnosis Code  Hypercapnia R06.89  
 Acute on chronic respiratory failure with hypoxia and hypercapnia (HCC) J96.21, J96.22  
 CLL (chronic lymphocytic leukemia) ([facility]) C91.90  Emphysema lung ([address] J43.9  Memory loss R41.3  Acute encephalopathy G93.40  Elevated serum creatinine R79.89 · AMS ? Due to co2 narcosis but back to baseline, ? Due to htn urgency and htn encephalopathy, ? Delirium due to etoh doing better · Advance dementia per notes · Possible fluid overload due to elevated BP and right sided effusion -- On lasix cr going up so on hold · COPD with FEV1 of 1.23 or 55% predicted but normal ratio follows with Dr. Patterson Habermann on Spiriva and advair at home- Recent 6 min walk in may suggested requires home oxygen 2-4 liter · RUPERTO unknown severity on BIPAP2 non compliant-- not able to tolerate but best response noted on BIPAP  PLAN: 
-- On provera-- ABG done on oxygen looks well compensated 
-- Increase coreg bp better  
--On and off confusion will increase zyprexa not sure sundowning or dementia -- Decrease/stop lasix as Cr going up-- CR is improved will dc iv fluids today and start on PO thiamine and folic acid -- Back to baseline oxygen 2-4 liter 
-- Elevated WBC ?  Due to CLL vs Steroids-- Improving 
-- No wheezing on exam will decrease solumedrol--  po from today 
-- On levaquin recently treated for sepsis at [facility] [place] will recheck cultures and sputum culture will continue levaquin <sepsis was 3 month ago> adjust per culture-- Blood culture negative sputum never sent-- Change to PO on 7/6 tptal 7 days planned 
-- BIPAP 24/12 at night and prn 
-- Advance demential lives alone will need long term care and placement 
-- OT and PT to evaluate 
-- Overall doing well, Looks like back to baseline more awak and answering appropriately ok to transfer out of ICU to tele with nocturnal BIPAP will follow on floor CVS:BP monitor with Coreg Norvasc and prn meds, RS:Steroids BD, Aspiration precaution, Keep HOB elevated, Use bipap 24/12 at night ID:On levaquin check culture and adjsut -- Change to PO give total 5-7 days for LRTI 
ENDO:ssi GI:PPI diet as tolerated RENAL:Monitor Cr -- Mild improvement noted decrease Banana bag to 50/hr CNS:AMS multifactorial treat for HTN, Start Thiamine and Folic acid ? [employer] HEMATOLOGY:CLL by history WBC normal monitor if needed consider hematology consult MUSCULOSKELETAL:no acute issue PAIN AND SEDATION: zyprexa · Skin/Wound: No acute issue · Electrolytes: Replace electrolytes per ICU electrolyte replacement protocol. · IVF: Banana Bag · Nutrition: Diet as tolerated · Prophylaxis: DVT Prophylaxis with Heparin,. GI Prophylaxis. · Restraints: none 
· PT/OT eval and treat. OOB when appropriate. · Lines/Tubes: none. No muller or central line. ADVANCE DIRECTIVE:Full code needs palliative consult FAMILY DISCUSSION:spoke with patinent no one else available Quality Care: PPI, DVT prophylaxis, HOB elevated, Infection control all reviewed and addressed. Events and notes from last 24 hours reviewed. Care plan discussed with nursing CC TIME:34 min · Labs and images personally seen and available reports reviewed. · All current medicines are reviewed and doses and prescription adjusted. Subjective/History: 
7/7/18 Doing well BP is better CO2 and mentation is better -- Refused BIPAP at night wore for 4 horus On and off confused at night ?  Due to underline dementia vs worsening CO2 went up to 88 BIPAP adjusted in am ABG done on oxygen was doing better with Ph and PCO2 improvement Oxygenation wise ok on 2-3 liter Currently have a sitter WBC elevated 11 but on steroids and h/o CLL-- Improvement noted Yonathan Wren. is a 74 y.o. male who hx emphysema, htn,memorry loss, CLL was sent to ER due to ams. Per er reported he acting abnormal since morning, refused to eat/drinking/taking medication. He responded slowly and lethargic. In ER, abg indicated pH 7.294. PCO2 85, pO2 69, HCO3 41.3.  bipap was put on. Ct head was pending. cxr: Linear opacity at the left lung base appears similar, could be scarring or partial atelectasis. There is mild crowding of basilar pulmonary [facility], Aspiration PNA Advance dementia recent fall Overnight found to be agitated and confused managed with sitter ABG showed improvement On home BIPAP 21/17 H/O ETOH per notes Prior to Admission medications Medication Sig Start Date End Date Taking? Authorizing Provider  
alfuzosin SR (UROXATRAL) 10 mg SR tablet Take 10 mg by mouth daily. Yes Ronni Hernandez MD  
aspirin 81 mg chewable tablet Take 81 mg by mouth daily. Yes Ronni Hernandez MD  
cholecalciferol (VITAMIN D3) 1,000 unit cap Take  by mouth daily. Yes Ronni Hernandez MD  
cycloSPORINE (RESTASIS) 0.05 % ophthalmic emulsion Administer 1 Drop to both eyes two (2) times a day. Yes Ronni Hernandez MD  
diclofenac (VOLTAREN) 1 % gel Apply  to affected area four (4) times daily. Yes Ronni Hernandez MD  
famotidine (PEPCID) 20 mg tablet Take 20 mg by mouth two (2) times a day. Yes Ronni Hernandez MD  
latanoprost (XALATAN) 0.005 % ophthalmic solution Administer 1 Drop to both eyes nightly. Yes Ronni Hernandez MD  
allopurinol (ZYLOPRIM) 100 mg tablet Take  by mouth daily. Ronni Hernandez MD  
carvedilol (COREG) 6.25 mg tablet Take  by mouth two (2) times daily (with meals).     Ronni Hernandez MD  
furosemide (LASIX) 40 mg tablet Take  by mouth daily. Ronni Hernandez MD  
Oxygen     Ronni Hernandez MD  
pravastatin sodium (PRAVASTATIN PO) Take  by mouth. Ronni Hernandez MD  
tiotropium (SPIRIVA WITH HANDIHALER) 18 mcg inhalation capsule Take 1 Cap by inhalation daily. Ronni Hernandez MD  
fluticasone/salmeterol (ADVAIR HFA IN) Take  by inhalation. Ronni Hernandez MD  
ALBUTEROL IN Take  by inhalation. Ronni Hernandez MD  
 
Current Facility-Administered Medications Medication Dose Route Frequency  methylPREDNISolone (PF) (SOLU-MEDROL) injection 20 mg  20 mg IntraVENous Q12H  
 0.9% sodium chloride 1,000 mL with mvi, adult no. 4 with vit K 10 mL, thiamine 642 mg, folic acid 1 mg infusion   IntraVENous DAILY  carvedilol (COREG) tablet 12.5 mg  12.5 mg Oral BID WITH MEALS  medroxyPROGESTERone (PROVERA) tablet 10 mg  10 mg Oral DAILY  famotidine (PEPCID) tablet 20 mg  20 mg Oral Q12H  
 OLANZapine (ZyPREXA) tablet 2.5 mg  2.5 mg Oral BID  amLODIPine (NORVASC) tablet 10 mg  10 mg Oral DAILY  nitroglycerin (NITRODUR) 0.2 mg/hr patch 1 Patch  1 Patch TransDERmal Q24H  
 docusate sodium (COLACE) capsule 100 mg  100 mg Oral BID  heparin (porcine) injection 5,000 Units  5,000 Units SubCUTAneous Q8H  
 arformoterol (BROVANA) neb solution 15 mcg  15 mcg Nebulization BID RT  
 budesonide (PULMICORT) 500 mcg/2 ml nebulizer suspension  500 mcg Nebulization BID RT  
 albuterol-ipratropium (DUO-NEB) 2.5 MG-0.5 MG/3 ML  3 mL Nebulization Q4H RT  
 levoFLOXacin (LEVAQUIN) 500 mg in D5W IVPB  500 mg IntraVENous Q24H No Known Allergies Social History Substance Use Topics  Smoking status: Former Smoker  Smokeless tobacco: Never Used  Alcohol use No  
   Comment: former ETOH user; stopped December 2017 No family history on file. Objective:  
Vital Signs:   
Visit Vitals  /65  Pulse 92  Temp 98 °F (36.7 °C)  Resp 18  Ht 5' 6\" (1.676 m)  Wt 90 kg (198 lb 6.6 oz)  SpO2 100%  BMI 32.02 kg/m2 O2 Device: Nasal cannula O2 Flow Rate (L/min): 2 l/min Temp (24hrs), Av.7 °F (36.5 °C), Min:97.3 °F (36.3 °C), Max:98 °F (36.7 °C) Intake/Output:  
Last shift:      701 - 1900 In: 240 [P.O.:240] Out: 300 [Urine:300] Last 3 shifts: 1901 -  07 In: 6227 [P.O.:1680; I.V.:790] Out: 650 [Urine:650] Intake/Output Summary (Last 24 hours) at 18 1055 Last data filed at 18 1043 Gross per 24 hour Intake             1890 ml Output              750 ml Net             1140 ml Ventilator Settings: 
Mode Rate Tidal Volume Pressure FiO2 PEEP  
         25 % Peak airway pressure:     
Minute ventilation: 9.7 l/min Review of Systems: 
Not able to do due to patient condition Objective: 
 
General: comfortable, no acute distress altered not oriented HEENT: pupils reactive, sclera anicteric, EOM intact Neck: No adenopathy or thyroid swelling, no lymphadenopathy or JVD, supple CVS: S1S2 no murmurs RS: Mod AE bilaterally, decrease at base Abd: soft, non tender, no hepatosplenomegaly Neuro: non focal, awake, alert Extrm: no leg edema, clubbing or cyanosis Lymph node: No obvious palpable lymph node appreciated. Skin: no rash CBC w/Diff Recent Labs  
   18 
 04018 
 0301  18 
 0630 WBC  11.3  22.0*  20.4*  
RBC  4.30*  4.93  4.99  
HGB  10.2*  12.0*  12.0*  
HCT  35.9*  41.2  40.5 PLT  193  221  255 GRANS   --   38*  64  
LYMPH   --   55*  30 EOS   --   0  0 Chemistry Recent Labs  
   18 
 0401  18 
 0301  18 
 1701  18 
 0630 GLU  139*  132*  139*  139* NA  141  140  141  142  
K  4.6  5.1  4.4  4.3 CL  106  103  102  102 CO2  35*  35*  31  36* BUN  38*  41*  40*  32* CREA  1.45*  1.55*  1.74*  1.65* CA  8.6  8.9  9.6  9.1 MG  2.4  2.5   --   2.2 AGAP  0*  2*  8  4 BUCR  26*  26*  23*  19  
AP  72   --   94   --   
TP  6.7   --   8.7*   --   
ALB  2.8*   -- 3.5   --   
GLOB  3.9   --   5.2*   --   
AGRAT  0.7*   --   0.7*   --   
  
 
Lactic Acid Lactic acid Date Value Ref Range Status 06/16/2018 1.3 0.4 - 2.0 MMOL/L Final  
 
No results for input(s): LAC in the last 72 hours. Micro  Recent Labs  
   07/04/18 
 1433  07/04/18 
 1337 CULT  NO GROWTH 3 DAYS  NO GROWTH 3 DAYS Recent Labs  
   07/04/18 
 1433  07/04/18 
 1337 CULT  NO GROWTH 3 DAYS  NO GROWTH 3 DAYS  
  
 
ABG Recent Labs  
   07/07/18 
 0552  07/06/18 
 1358  07/06/18 
 1042 PHI  7.316*  7.328*  7.303* PCO2I  74.9*  75.3*  83.3*  
PO2I  79*  49*  74* HCO3I  38.5*  39.5*  41.3*  
FIO2I  28  21  28 Liver Enzymes Protein, total  
Date Value Ref Range Status 07/07/2018 6.7 6.4 - 8.2 g/dL Final  
 
Albumin Date Value Ref Range Status 07/07/2018 2.8 (L) 3.4 - 5.0 g/dL Final  
 
Globulin Date Value Ref Range Status 07/07/2018 3.9 2.0 - 4.0 g/dL Final  
 
A-G Ratio Date Value Ref Range Status 07/07/2018 0.7 (L) 0.8 - 1.7   Final  
 
AST (SGOT) Date Value Ref Range Status 07/07/2018 13 (L) 15 - 37 U/L Final  
 
Alk. phosphatase Date Value Ref Range Status 07/07/2018 72 45 - 117 U/L Final  
 
Recent Labs  
   07/07/18 
 0401  07/05/18 
 1701 TP  6.7  8.7* ALB  2.8*  3.5 GLOB  3.9  5.2* AGRAT  0.7*  0.7* SGOT  13*  20  
AP  72  94 Cardiac Enzymes No results found for: CPK, CK, CKMMB, CKMB, RCK3, CKMBT, CKNDX, CKND1, LOREN, TROPT, TROIQ, APRIL, TROPT, TNIPOC, BNP, BNPP  
 
BNP No results found for: BNP, BNPP, XBNPT Coagulation No results for input(s): PTP, INR, APTT in the last 72 hours. No lab exists for component: INREXT, INREXT Thyroid  No results found for: T4, T3U, TSH, TSHEXT, TSHEXT Lipid Panel No results found for: CHOL, CHOLPOCT, CHOLX, CHLST, CHOLV, 916076, HDL, LDL, LDLC, DLDLP, 317607, VLDLC, VLDL, TGLX, TRIGL, TRIGP, TGLPOCT, CHHD, CHHDX Urinalysis Lab Results Component Value Date/Time  Color YELLOW 07/03/2018 04:15 PM  
 Appearance CLEAR 07/03/2018 04:15 PM  
 Specific gravity 1.019 07/03/2018 04:15 PM  
 pH (UA) 7.0 07/03/2018 04:15 PM  
 Protein 300 (A) 07/03/2018 04:15 PM  
 Glucose NEGATIVE  07/03/2018 04:15 PM  
 Ketone NEGATIVE  07/03/2018 04:15 PM  
 Bilirubin NEGATIVE  07/03/2018 04:15 PM  
 Urobilinogen 0.2 07/03/2018 04:15 PM  
 Nitrites NEGATIVE  07/03/2018 04:15 PM  
 Leukocyte Esterase NEGATIVE  07/03/2018 04:15 PM  
 Epithelial cells FEW 07/03/2018 04:15 PM  
 Bacteria FEW (A) 07/03/2018 04:15 PM  
 WBC 0 to 3 07/03/2018 04:15 PM  
 RBC NEGATIVE  07/03/2018 04:15 PM  
  
 
XR (Most Recent). CXR reviewed by me and compared with previous CXR Results from Hospital Encounter encounter on 07/03/18 XR CHEST PORT Narrative Chest, single view Indication: Hypoxia Comparison: July 6, 2018 Findings:  Portable upright AP view of the chest was obtained. The lungs are 
progressively underexpanded by comparison to the prior days exam, with increased 
linear opacities at each lung base. No pneumothorax or pleural effusion. Stable 
cardiac size and mediastinal contours. No acute osseous abnormality. Impression Impression: 
Mild progressive pulmonary hypoinflation with parallel increased in bibasilar 
atelectasis. CT (Most Recent) Results from Hospital Encounter encounter on 07/03/18 CT HEAD WO CONT Narrative EXAM: CT head INDICATION: Altered mental status. Decreased alertness. COMPARISON: 6/16/2018 TECHNIQUE: Axial CT imaging of the head was performed without intravenous 
contrast. One or more dose reduction techniques were used on this CT: automated 
exposure control, adjustment of the mAs and/or kVp according to patient's size, 
and iterative reconstruction techniques. The specific techniques utilized on 
this CT exam have been documented in the patient's electronic medical record. 
 
_______________ FINDINGS: 
 
BRAIN: 
Intraparenchymal hemorrhage: None.  
Mass effect/edema: None. Infarcts/encephalomalacia: None. White matter: Normal. 
Brain volume: Normal for age. EXTRA-AXIAL SPACES: 
Hemorrhage: None. Mass: None. Hydrocephalus: None. SINUSES: Clear. CALVARIUM: Unremarkable. OTHER: None. 
 
_______________ Impression IMPRESSION:  
 
No evidence of acute intracranial process. EKG No results found for this or any previous visit. ECHO 
12/2017 [facility] NORMAL LEFT VENTRICULAR CAVITY SIZE. MODERATE CONCENTRIC LEFT VENTRICULAR HYPERTROPHY. NORMAL LEFT VENTRICULAR SYSTOLIC FUNCTION WITH AN EJECTION FRACTION 
 ESTIMATED AT 60%. MILD  DIASTOLIC DYSFUNCTION. NORMAL LEFT ATRIAL SIZE. PRIOR ECHO REPORT FINDINGS ON 10-: 
 
 NORMAL LEFT VENTRICULAR CAVITY SIZE. MILD CONCENTRIC LEFT VENTRICULAR HYPERTROPHY. DYNAMIC  GLOBAL LEFT VENTRICULAR SYSTOLIC FUNCTION. EJECTION FRACTION IS 70%. MILD DIASTOLIC DYSFUNCTION. NORMAL CHAMBER SIZES. NO HEMODYNAMICALLY SIGNIFICANT VALVULAR PATHOLOGY. NORMAL PULMONARY ARTERY PRESSURE OF 22 MMHG. PFT PFTs: 1/11/18   
FVC  1.35 (44 %) FEV1 1.23 (55 %) FEV1/FVC  92 % TLC 62 %  % DL/VA 31 % Other Interpretation: Ambulatory oximetry Does show significant desaturation;starting O2 sat 90 %; low O2 sat 85 %;  
starting HR 78 bpm; high HR 99 bpm 
Dyspnea scale: beginning 0; ending 0 Total distance : 330 ft Total time: 3 minutes Recommendation: Ambulatory O2 was recommended. Imaging: 
I have personally reviewed the patients radiographs and have reviewed the reports: 
  
 
 
  
 
Mira Pickett M.D. Pulmonary Critical Care & Sleep Medicine

## 2018-07-07 NOTE — PROGRESS NOTES
Hospitalist Progress Note Patient: Mallory Farias MRN: [id number]  CSN: [id number] YOB: 1945  Age: 74 y.o. Sex: male DOA: 7/3/2018 LOS:  LOS: 4 days Assessment/Plan 1. Metabolic encephalopathy, multifactorial- hypercapnia, advance dementia w sundowning, alcohol 2. CLL 3. HTn controlled 4. COPD Plan: 
- continue steroids, inhaled bronchodialtors 
- transfer out of [facility] 
- continue zyprexa 
- pt/ot Patient Active Problem List  
Diagnosis Code  Hypercapnia R06.89  
 Acute on chronic respiratory failure with hypoxia and hypercapnia (HCC) J96.21, J96.22  
 CLL (chronic lymphocytic leukemia) ([facility]) C91.90  Emphysema lung ([address] J43.9  Memory loss R41.3  Acute encephalopathy G93.40  Elevated serum creatinine R79.89 Subjective:  
 cc: encephaloathy Pt confused No acute events overnight REVIEW OF SYSTEMS: 
Unable to obtain 2/2 confusion Objective:  
  
 
Vital signs/Intake and Output: 
Visit Vitals  /65  Pulse 92  Temp 98 °F (36.7 °C)  Resp 18  Ht 5' 6\" (1.676 m)  Wt 90 kg (198 lb 6.6 oz)  SpO2 100%  BMI 32.02 kg/m2 Current Shift:  07/07 0701 - 07/07 1900 In: 240 [P.O.:240] Out: 300 [Urine:300] Last three shifts:  07/05 1901 - 07/07 0700 In: 9432 [P.O.:1680; I.V.:790] Out: 650 [Urine:650] Body mass index is 32.02 kg/(m^2). Physical Exam: 
GEN: Alert and oriented times one, NAD 
EYES: conjunctiva normal, lids with out lesions HEENT: MMM, No thyromegaly, no lymphadenopathy HEART: RRR +S1 +S2, no JVD, pulses 2+ distally LUNGS: CTA B/L no wheezes, rales or rhonchi ABDOMEN: + BS, soft NT/ND no organomegaly,  No rebound EXTREMITIES: No edema cyanosis, cap refill normal 
 SKIN: no rashes or skin breakdown, no nodules, normal turgor Current Facility-Administered Medications Medication Dose Route Frequency  Thiamine Mononitrate (B-1) tablet 100 mg  100 mg Oral DAILY  folic acid (FOLVITE) tablet 1 mg  1 mg Oral DAILY  carvedilol (COREG) tablet 12.5 mg  12.5 mg Oral BID WITH MEALS  medroxyPROGESTERone (PROVERA) tablet 10 mg  10 mg Oral DAILY  famotidine (PEPCID) tablet 20 mg  20 mg Oral Q12H  
 OLANZapine (ZyPREXA) tablet 2.5 mg  2.5 mg Oral BID  hydrALAZINE (APRESOLINE) 20 mg/mL injection 20 mg  20 mg IntraVENous Q6H PRN  
 amLODIPine (NORVASC) tablet 10 mg  10 mg Oral DAILY  nitroglycerin (NITRODUR) 0.2 mg/hr patch 1 Patch  1 Patch TransDERmal Q24H  
 acetaminophen (TYLENOL) tablet 650 mg  650 mg Oral Q4H PRN  
 ketorolac (TORADOL) injection 15 mg  15 mg IntraVENous Q6H PRN  
 naloxone (NARCAN) injection 0.4 mg  0.4 mg IntraVENous PRN  
 ondansetron (ZOFRAN) injection 4 mg  4 mg IntraVENous Q4H PRN  
 docusate sodium (COLACE) capsule 100 mg  100 mg Oral BID  heparin (porcine) injection 5,000 Units  5,000 Units SubCUTAneous Q8H  
 arformoterol (BROVANA) neb solution 15 mcg  15 mcg Nebulization BID RT  
 budesonide (PULMICORT) 500 mcg/2 ml nebulizer suspension  500 mcg Nebulization BID RT  
 albuterol-ipratropium (DUO-NEB) 2.5 MG-0.5 MG/3 ML  3 mL Nebulization Q4H RT  
 levoFLOXacin (LEVAQUIN) 500 mg in D5W IVPB  500 mg IntraVENous Q24H All the patient's labs over the past 24 hours were reviewed both during my initial daily workflow process and at the time notated as \"note time\" in [address] [place] [address]. (It is not time stamped separately in this workflow.)  Select labs are listed below. Labs: Results:  
   
Chemistry Recent Labs  
   07/07/18 
 0401  07/06/18 
 0301  07/05/18 
 1701 GLU  139*  132*  139* NA  141  140  141  
K  4.6  5.1  4.4 CL  106  103  102 CO2  35*  35*  31 BUN  38*  41*  40* CREA  1.45*  1.55*  1.74* CA  8.6  8.9  9.6 AGAP  0*  2*  8  
BUCR  26*  26*  23* AP  72   --   94  
TP  6.7   --   8.7* ALB  2.8*   --   3.5 GLOB  3.9   --   5.2* AGRAT  0.7*   --   0.7* CBC w/Diff Recent Labs 07/07/18 
 0401  07/06/18 
 0301  07/05/18 
 0630 WBC  11.3  22.0*  20.4*  
RBC  4.30*  4.93  4.99  
HGB  10.2*  12.0*  12.0*  
HCT  35.9*  41.2  40.5 PLT  193  221  255 GRANS   --   38*  64  
LYMPH   --   55*  30 EOS   --   0  0 Liver Enzymes Recent Labs  
   07/07/18 
 0401 TP  6.7 ALB  2.8* AP  72 SGOT  13* Procedures/imaging: see electronic medical records for all procedures/Xrays and details which were not copied into this note but were reviewed prior to creation of Plan Flip Potter DO Internal Medicine/Geriatrics

## 2018-07-07 NOTE — PROGRESS NOTES
Bedside and Verbal shift change report given to MARY BETH Barrios RN (oncoming nurse) by Kelsey Wise RN (offgoing nurse). Report included the following information SBAR, Kardex and MAR.  
 
0800 - Patient assessed and medications administered. 1055 - Patient was assessed by Intensivist at bedside. MD stated that patient is cleared to be transferred to telemetry if Hospitalist orders. Dr. Winter Cowan was notified that patient was cleared. 1300 - Nurse notified patient will transfer to [address] [place] [address]. Report given via telephone to Beverly Greco RN.  
 
1400 - Patient was transported to [address] [place] [address]. Patient appeared to have no noted distress. Patient skin was intact. Questions answered at bedside for nurse. No additional issues discussed. Signed telemetry strip of NSR.

## 2018-07-07 NOTE — PROGRESS NOTES
Problem: Mobility Impaired (Adult and Pediatric) Goal: *Acute Goals and Plan of Care (Insert Text) In 1-7 days pt will be able to perform: ST.  Bed mobility:  Rolling L to R to L modified independent for positioning. 2.  Supine to sit to supine S with HR for meals. 3.  Sit to stand to sit S with RW in prep for ambulation. LT.  Gait:  Ambulate >150ft S with RW, WBAT, for home/community mobility. 2.  Activity tolerance: Tolerate up in chair 1-2 hours for ADLs. 3.  Patient/Family Education:  Patient/family to be independent with HEP for follow-up care and safe discharge. physical Therapy EVALUATION Patient: Rita Morton (69 y.o. male) Date: 2018 Primary Diagnosis: Hypercapnia Precautions:   Fall ASSESSMENT : 
Based on the objective data described below, the patient presents with decreased functional mobility and independence in regard to bed mobility, transfers, gt quality and tolerance, generalized strength, impaired balance, and safety. Pt denies pain at this time. Pt confused (A&O x3) and cantankerous but agreeable to participate w/ PT. Pt required SBA for supine>sit. Sit <>stand CGA. Pt able to participate in gt training w/ RW,  GB and CGA/min A in hallway w/ deviated pattern. Pt returned from stand to supine without sitting min A as pt unsafe but wanting to \"levitate\" into bed. Pt has safety concerns and needs A for all mobility due to fall risk. Pt needs RW for stability/balance during gt. Noted SpO2 on RA prior to gt 98% and after gt 88% w/ mild BARRERA. With rest pt able to improve <1' to 97%. Pt left supine in bed w/ all needs within reach. Nurse [place]side aware and sitter present. [facility] w/  care upon hospital d/c. Patient will benefit from skilled intervention to address the above impairments. Patients rehabilitation potential is considered to be Good Factors which may influence rehabilitation potential include:  
[]         None noted [x]         Mental ability/status []         Medical condition 
[x]         Home/family situation and support systems 
[x]         Safety awareness 
[]         Pain tolerance/management 
[]         Other: PLAN : 
Recommendations and Planned Interventions: 
[x]           Bed Mobility Training             []    Neuromuscular Re-Education 
[x]           Transfer Training                   []    Orthotic/Prosthetic Training 
[x]           Gait Training                          []    Modalities [x]           Therapeutic Exercises          []    Edema Management/Control 
[x]           Therapeutic Activities            [x]    Patient and Family Training/Education 
[]           Other (comment): Frequency/Duration: Patient will be followed by physical therapy 1-2 times per day/4-7 days per week to address goals. Discharge Recommendations: Aftab Huang Further Equipment Recommendations for Discharge: N/A  
 
SUBJECTIVE:  
Patient stated Oh no you should have come in earlier. I can't be having all these people come.  OBJECTIVE DATA SUMMARY:  
 
Past Medical History:  
Diagnosis Date  Chronic kidney disease   
 stage 3 kidney disease  [facility] obstructive pulmonary disease [facility]  CLL (chronic lymphocytic leukemia) ([facility]  Delirium  Emphysema lung ([facility]  Fall  Frequent hospital admissions  Gout  Hyperlipidemia  Hypoxemia  Memory loss  Noncompliance  Oxygen dependent  Pneumonia  Pulmonary nodules  Respiratory failure ([facility]  Risk for falls  Septic shock ([facility]  Sleep apnea  Tobacco abuse  Vitamin D deficiency Past Surgical History:  
Procedure Laterality Date  HX CATARACT REMOVAL    
 HX HERNIA REPAIR    
 HX NEPHROSTOMY Barriers to Learning/Limitations: yes;  altered mental status (i.e.Sedation, Confusion) Compensate with: visual, verbal, tactile, kinesthetic cues/model Prior Level of Function/Home Situation:  
Home Situation Home Environment: Apartment # Steps to Enter: 0 One/Two Story Residence: One story Living Alone: Yes Support Systems: Family member(s) Patient Expects to be [facility] Current DME Used/Available at Home: Walker, rolling Critical Behavior: 
Neurologic State: Alert;Confused;Irritable Orientation Level: Oriented to person;Oriented to place;Oriented to time;Disoriented to situation Cognition: Follows commands; Impaired decision making;Poor safety awareness Psychosocial 
Patient Behaviors: Appears defiant;Confused; Cooperative;Flat affect Purposeful Interaction: Yes Pt Identified Daily Priority: Clinical issues (comment) [employer] Process: Nurture loving kindness;Establish trust;Attend basic human needs; Teaching/learning Caring Interventions: Reassure Reassure: Therapeutic listening; Informing Therapeutic Modalities: Intentional therapeutic touch;Humor Strength:   
Strength: Generally decreased, functional 
Tone & Sensation:  
Tone: Normal 
Sensation: Intact Range Of Motion: 
AROM: Generally decreased, functional 
Functional Mobility: 
Bed Mobility: 
Supine to Sit: Stand-by assistance; Additional time (vc) Sit to Supine: Contact guard assistance;Minimum assistance (vc) Scooting: Contact guard assistance (vc) Transfers: 
Sit to Stand: Contact guard assistance (vc) Stand to Sit: Contact guard assistance (vc) 
Balance:  
Sitting: Intact Standing: With support; Impaired Standing - Static: Fair;Constant support Standing - Dynamic : Poor Ambulation/Gait Training: 
Distance (ft): 80 Feet (ft) Assistive Device: Walker, rolling;Gait belt Ambulation - Level of Assistance: Minimal assistance;Contact guard assistance (vc) 
Gait Abnormalities: Decreased step clearance; Path deviations Base of Support: Narrowed Stance: Weight shift;Time Speed/Carie: Pace decreased (<100 feet/min) Step Length: Left shortened;Right shortened Swing Pattern: Left asymmetrical;Right asymmetrical 
Interventions: Safety awareness training; Tactile cues; Verbal cues Therapeutic Exercises:  
Pain: 
Pain Scale 1: Numeric (0 - 10) Pain Intensity 1: 0 Activity Tolerance:  
Fair Please refer to the flowsheet for vital signs taken during this treatment. After treatment:  
[]         Patient left in no apparent distress sitting up in chair 
[x]         Patient left in no apparent distress in bed 
[x]         Call bell left within reach [x]         Nursing notified 
[x]         Sitter present 
[]         Bed alarm activated COMMUNICATION/EDUCATION:  
[x]         Fall prevention education was provided and the patient/caregiver indicated understanding. [x]         Patient/family have participated as able in goal setting and plan of care. [x]         Patient/family agree to work toward stated goals and plan of care. []         Patient understands intent and goals of therapy, but is neutral about his/her participation. []         Patient is unable to participate in goal setting and plan of care. Thank you for this referral. 
Cabrera Roberts, PT Time Calculation: 34 mins Eval Complexity: History: HIGH Complexity :3+ comorbidities / personal factors will impact the outcome/ POC Exam:MEDIUM Complexity : 3 Standardized tests and measures addressing body structure, function, activity limitation and / or participation in recreation  Presentation: MEDIUM Complexity : Evolving with changing characteristics  Clinical Decision Making:Low Complexity amb >30' Overall Complexity:LOW Mobility [id number] Current  CJ= 20-39%   Goal  CI= 1-19%. The severity rating is based on the Level of Assistance required for Functional Mobility and ADLs.

## 2018-07-07 NOTE — PROGRESS NOTES
1415 TRANSFER - IN REPORT: 
 
Verbal report received from Anastasiia Cr RN(name) on [employer].  being received from ICU(unit) for routine progression of care Report consisted of patients Situation, Background, Assessment and  
Recommendations(SBAR). Information from the following report(s) SBAR, Kardex and MAR was reviewed with the receiving nurse. Opportunity for questions and clarification was provided. Assessment completed upon patients arrival to unit and care assumed. Bedside shift change report given to Ana King RN (oncoming nurse) by Ailyn Brown RN (offgoing nurse). Report included the following information SBAR, Kardex and MAR.

## 2018-07-07 NOTE — PROGRESS NOTES
1930: Verbal shift change report received from Jeevan Torres RN (off-going nurse) to [facility], RN (oncoming nurse) Report included the following information SBAR, Kardex, Intake/Output, MAR, Recent Results, Med Rec Status and anticipated POC. Pt stable. In NAD. 2020: Shift assessment complete. Pt sitting in bed talking loudly to sitter. Pt oriented to self. He stated \"You need to get me my keys so I can go home and use the bathroom; and then I'll be right back. Iris Barrio Iris Barrio \" He believed he was at the home of a neighbor. Pt reoriented. Sitter at bedside. 2315: Shift reassessment complete. Pt BP elevated 187/96; HR 97 while pt sitting on commode. Pt returned to bed. BP taken again on opposite arm = 184/96; HR: 90.  
 
0020: IV hydralazine administered slow IV push. Requested sitter recheck BP in one hour. Dr. Tennille Guerrero notified. No additional orders at this time. 0125: BP recheck 180/96. Will continue to monitor. 2322: Pt had incontinent episode. He stated that he did not feel the urge to urinate because he was sleeping. Pt cleaned, linen and gown changed. Bipap is still on with max encouragement. 0715: Bedside and Verbal shift change report given to Jeevan Torres RN (oncoming nurse) by Liya Sheppard RN (off-going nurse). Report included the following information SBAR, Kardex, Intake/Output, MAR, Recent Results, Med Rec Status and anticipated POC. Pt stable. In NAD. Manpreet Yousif RN

## 2018-07-08 ENCOUNTER — APPOINTMENT (OUTPATIENT)
Dept: GENERAL RADIOLOGY | Age: 73
DRG: 189 | End: 2018-07-08
Attending: INTERNAL MEDICINE
Payer: MEDICARE

## 2018-07-08 LAB
ALBUMIN SERPL-MCNC: 3.2 G/DL (ref 3.4–5)
ALBUMIN SERPL-MCNC: 3.3 G/DL (ref 3.4–5)
ALBUMIN/GLOB SERPL: 0.7 {RATIO} (ref 0.8–1.7)
ALBUMIN/GLOB SERPL: 0.8 {RATIO} (ref 0.8–1.7)
ALP SERPL-CCNC: 82 U/L (ref 45–117)
ALP SERPL-CCNC: 87 U/L (ref 45–117)
ALT SERPL-CCNC: 22 U/L (ref 16–61)
ALT SERPL-CCNC: 23 U/L (ref 16–61)
AMMONIA PLAS-SCNC: <10 UMOL/L (ref 11–32)
ANION GAP SERPL CALC-SCNC: 0 MMOL/L (ref 3–18)
ANION GAP SERPL CALC-SCNC: 1 MMOL/L (ref 3–18)
ARTERIAL PATENCY WRIST A: YES
AST SERPL-CCNC: 18 U/L (ref 15–37)
AST SERPL-CCNC: 24 U/L (ref 15–37)
ATRIAL RATE: 84 BPM
BASE EXCESS BLD CALC-SCNC: 17 MMOL/L
BDY SITE: ABNORMAL
BILIRUB SERPL-MCNC: 0.2 MG/DL (ref 0.2–1)
BILIRUB SERPL-MCNC: 0.2 MG/DL (ref 0.2–1)
BUN SERPL-MCNC: 26 MG/DL (ref 7–18)
BUN SERPL-MCNC: 28 MG/DL (ref 7–18)
BUN/CREAT SERPL: 21 (ref 12–20)
BUN/CREAT SERPL: 25 (ref 12–20)
CALCIUM SERPL-MCNC: 9 MG/DL (ref 8.5–10.1)
CALCIUM SERPL-MCNC: 9.4 MG/DL (ref 8.5–10.1)
CALCULATED P AXIS, ECG09: 46 DEGREES
CALCULATED R AXIS, ECG10: -10 DEGREES
CALCULATED T AXIS, ECG11: 62 DEGREES
CHLORIDE SERPL-SCNC: 102 MMOL/L (ref 100–108)
CHLORIDE SERPL-SCNC: 103 MMOL/L (ref 100–108)
CO2 SERPL-SCNC: 38 MMOL/L (ref 21–32)
CO2 SERPL-SCNC: 42 MMOL/L (ref 21–32)
CREAT SERPL-MCNC: 1.14 MG/DL (ref 0.6–1.3)
CREAT SERPL-MCNC: 1.23 MG/DL (ref 0.6–1.3)
DIAGNOSIS, 93000: NORMAL
ERYTHROCYTE [DISTWIDTH] IN BLOOD BY AUTOMATED COUNT: 15.2 % (ref 11.6–14.5)
GAS FLOW.O2 O2 DELIVERY SYS: ABNORMAL L/MIN
GLOBULIN SER CALC-MCNC: 4.2 G/DL (ref 2–4)
GLOBULIN SER CALC-MCNC: 4.3 G/DL (ref 2–4)
GLUCOSE BLD STRIP.AUTO-MCNC: 104 MG/DL (ref 70–110)
GLUCOSE SERPL-MCNC: 103 MG/DL (ref 74–99)
GLUCOSE SERPL-MCNC: 112 MG/DL (ref 74–99)
HCO3 BLD-SCNC: 42.3 MMOL/L (ref 22–26)
HCT VFR BLD AUTO: 40.1 % (ref 36–48)
HGB BLD-MCNC: 11.7 G/DL (ref 13–16)
MAGNESIUM SERPL-MCNC: 2.2 MG/DL (ref 1.6–2.6)
MCH RBC QN AUTO: 23.8 PG (ref 24–34)
MCHC RBC AUTO-ENTMCNC: 29.2 G/DL (ref 31–37)
MCV RBC AUTO: 81.7 FL (ref 74–97)
O2/TOTAL GAS SETTING VFR VENT: 24 %
P-R INTERVAL, ECG05: 160 MS
PCO2 BLD: 69.4 MMHG (ref 35–45)
PEEP RESPIRATORY: 12 CMH2O
PH BLD: 7.39 [PH] (ref 7.35–7.45)
PIP ISTAT,IPIP: 24
PLATELET # BLD AUTO: 222 K/UL (ref 135–420)
PMV BLD AUTO: 10.8 FL (ref 9.2–11.8)
PO2 BLD: 77 MMHG (ref 80–100)
POTASSIUM SERPL-SCNC: 4.1 MMOL/L (ref 3.5–5.5)
POTASSIUM SERPL-SCNC: 4.9 MMOL/L (ref 3.5–5.5)
PROT SERPL-MCNC: 7.5 G/DL (ref 6.4–8.2)
PROT SERPL-MCNC: 7.5 G/DL (ref 6.4–8.2)
Q-T INTERVAL, ECG07: 352 MS
QRS DURATION, ECG06: 82 MS
QTC CALCULATION (BEZET), ECG08: 415 MS
RBC # BLD AUTO: 4.91 M/UL (ref 4.7–5.5)
SAO2 % BLD: 94 % (ref 92–97)
SERVICE CMNT-IMP: ABNORMAL
SODIUM SERPL-SCNC: 141 MMOL/L (ref 136–145)
SODIUM SERPL-SCNC: 145 MMOL/L (ref 136–145)
SPECIMEN TYPE: ABNORMAL
SPONTANEOUS TIMED, IST: YES
TOTAL RESP. RATE, ITRR: 43
VENTRICULAR RATE, ECG03: 84 BPM
WBC # BLD AUTO: 26.3 K/UL (ref 4.6–13.2)

## 2018-07-08 PROCEDURE — 74011250636 HC RX REV CODE- 250/636: Performed by: INTERNAL MEDICINE

## 2018-07-08 PROCEDURE — 74011000250 HC RX REV CODE- 250: Performed by: HOSPITALIST

## 2018-07-08 PROCEDURE — 82140 ASSAY OF AMMONIA: CPT | Performed by: INTERNAL MEDICINE

## 2018-07-08 PROCEDURE — 65610000006 HC RM INTENSIVE CARE

## 2018-07-08 PROCEDURE — 74011250636 HC RX REV CODE- 250/636: Performed by: HOSPITALIST

## 2018-07-08 PROCEDURE — 82962 GLUCOSE BLOOD TEST: CPT

## 2018-07-08 PROCEDURE — 36600 WITHDRAWAL OF ARTERIAL BLOOD: CPT

## 2018-07-08 PROCEDURE — 74011250637 HC RX REV CODE- 250/637: Performed by: INTERNAL MEDICINE

## 2018-07-08 PROCEDURE — 95816 EEG AWAKE AND DROWSY: CPT | Performed by: INTERNAL MEDICINE

## 2018-07-08 PROCEDURE — 74011250637 HC RX REV CODE- 250/637: Performed by: HOSPITALIST

## 2018-07-08 PROCEDURE — 94660 CPAP INITIATION&MGMT: CPT

## 2018-07-08 PROCEDURE — 94640 AIRWAY INHALATION TREATMENT: CPT

## 2018-07-08 PROCEDURE — 82803 BLOOD GASES ANY COMBINATION: CPT

## 2018-07-08 PROCEDURE — 85027 COMPLETE CBC AUTOMATED: CPT | Performed by: HOSPITALIST

## 2018-07-08 PROCEDURE — 71045 X-RAY EXAM CHEST 1 VIEW: CPT

## 2018-07-08 PROCEDURE — 94760 N-INVAS EAR/PLS OXIMETRY 1: CPT

## 2018-07-08 PROCEDURE — 77010033678 HC OXYGEN DAILY

## 2018-07-08 PROCEDURE — 83735 ASSAY OF MAGNESIUM: CPT | Performed by: HOSPITALIST

## 2018-07-08 PROCEDURE — 80053 COMPREHEN METABOLIC PANEL: CPT | Performed by: INTERNAL MEDICINE

## 2018-07-08 PROCEDURE — 36415 COLL VENOUS BLD VENIPUNCTURE: CPT | Performed by: INTERNAL MEDICINE

## 2018-07-08 PROCEDURE — 80053 COMPREHEN METABOLIC PANEL: CPT | Performed by: HOSPITALIST

## 2018-07-08 RX ORDER — LORAZEPAM 0.5 MG/1
0.5 TABLET ORAL
Status: DISCONTINUED | OUTPATIENT
Start: 2018-07-08 | End: 2018-07-12 | Stop reason: HOSPADM

## 2018-07-08 RX ORDER — OLANZAPINE 2.5 MG/1
1.25 TABLET ORAL 2 TIMES DAILY
Status: DISCONTINUED | OUTPATIENT
Start: 2018-07-08 | End: 2018-07-12 | Stop reason: HOSPADM

## 2018-07-08 RX ORDER — HYDRALAZINE HYDROCHLORIDE 25 MG/1
25 TABLET, FILM COATED ORAL 3 TIMES DAILY
Status: DISCONTINUED | OUTPATIENT
Start: 2018-07-08 | End: 2018-07-12 | Stop reason: HOSPADM

## 2018-07-08 RX ORDER — HEPARIN SODIUM 5000 [USP'U]/ML
5000 INJECTION, SOLUTION INTRAVENOUS; SUBCUTANEOUS EVERY 8 HOURS
Status: DISCONTINUED | OUTPATIENT
Start: 2018-07-08 | End: 2018-07-12 | Stop reason: HOSPADM

## 2018-07-08 RX ADMIN — ARFORMOTEROL TARTRATE 15 MCG: 15 SOLUTION RESPIRATORY (INHALATION) at 07:10

## 2018-07-08 RX ADMIN — DOCUSATE SODIUM 100 MG: 100 CAPSULE, LIQUID FILLED ORAL at 20:28

## 2018-07-08 RX ADMIN — LORAZEPAM 0.5 MG: 0.5 TABLET ORAL at 14:48

## 2018-07-08 RX ADMIN — HYDRALAZINE HYDROCHLORIDE 25 MG: 25 TABLET, FILM COATED ORAL at 21:45

## 2018-07-08 RX ADMIN — IPRATROPIUM BROMIDE AND ALBUTEROL SULFATE 3 ML: .5; 3 SOLUTION RESPIRATORY (INHALATION) at 23:56

## 2018-07-08 RX ADMIN — FOLIC ACID 1 MG: 1 TABLET ORAL at 14:45

## 2018-07-08 RX ADMIN — LORAZEPAM 0.5 MG: 0.5 TABLET ORAL at 20:35

## 2018-07-08 RX ADMIN — HYDRALAZINE HYDROCHLORIDE 20 MG: 20 INJECTION INTRAMUSCULAR; INTRAVENOUS at 00:20

## 2018-07-08 RX ADMIN — OLANZAPINE 1.25 MG: 2.5 TABLET ORAL at 20:29

## 2018-07-08 RX ADMIN — IPRATROPIUM BROMIDE AND ALBUTEROL SULFATE 3 ML: .5; 3 SOLUTION RESPIRATORY (INHALATION) at 00:31

## 2018-07-08 RX ADMIN — METHYLPREDNISOLONE SODIUM SUCCINATE 40 MG: 40 INJECTION, POWDER, FOR SOLUTION INTRAMUSCULAR; INTRAVENOUS at 09:32

## 2018-07-08 RX ADMIN — HEPARIN SODIUM 5000 UNITS: 5000 INJECTION, SOLUTION INTRAVENOUS; SUBCUTANEOUS at 23:24

## 2018-07-08 RX ADMIN — IPRATROPIUM BROMIDE AND ALBUTEROL SULFATE 3 ML: .5; 3 SOLUTION RESPIRATORY (INHALATION) at 11:35

## 2018-07-08 RX ADMIN — AMLODIPINE BESYLATE 10 MG: 5 TABLET ORAL at 14:45

## 2018-07-08 RX ADMIN — HEPARIN SODIUM 5000 UNITS: 5000 INJECTION, SOLUTION INTRAVENOUS; SUBCUTANEOUS at 03:44

## 2018-07-08 RX ADMIN — IPRATROPIUM BROMIDE AND ALBUTEROL SULFATE 3 ML: .5; 3 SOLUTION RESPIRATORY (INHALATION) at 19:35

## 2018-07-08 RX ADMIN — BUDESONIDE 500 MCG: 0.5 INHALANT RESPIRATORY (INHALATION) at 19:35

## 2018-07-08 RX ADMIN — METHYLPREDNISOLONE SODIUM SUCCINATE 40 MG: 40 INJECTION, POWDER, FOR SOLUTION INTRAMUSCULAR; INTRAVENOUS at 21:45

## 2018-07-08 RX ADMIN — IPRATROPIUM BROMIDE AND ALBUTEROL SULFATE 3 ML: .5; 3 SOLUTION RESPIRATORY (INHALATION) at 15:22

## 2018-07-08 RX ADMIN — CARVEDILOL 12.5 MG: 12.5 TABLET, FILM COATED ORAL at 20:28

## 2018-07-08 RX ADMIN — DOCUSATE SODIUM 100 MG: 100 CAPSULE, LIQUID FILLED ORAL at 14:45

## 2018-07-08 RX ADMIN — FAMOTIDINE 20 MG: 20 TABLET ORAL at 14:45

## 2018-07-08 RX ADMIN — HEPARIN SODIUM 5000 UNITS: 5000 INJECTION, SOLUTION INTRAVENOUS; SUBCUTANEOUS at 14:43

## 2018-07-08 RX ADMIN — HYDRALAZINE HYDROCHLORIDE 20 MG: 20 INJECTION INTRAMUSCULAR; INTRAVENOUS at 20:35

## 2018-07-08 RX ADMIN — BUDESONIDE 500 MCG: 0.5 INHALANT RESPIRATORY (INHALATION) at 07:10

## 2018-07-08 RX ADMIN — HYDRALAZINE HYDROCHLORIDE 20 MG: 20 INJECTION INTRAMUSCULAR; INTRAVENOUS at 06:36

## 2018-07-08 RX ADMIN — IPRATROPIUM BROMIDE AND ALBUTEROL SULFATE 3 ML: .5; 3 SOLUTION RESPIRATORY (INHALATION) at 07:10

## 2018-07-08 RX ADMIN — IPRATROPIUM BROMIDE AND ALBUTEROL SULFATE 3 ML: .5; 3 SOLUTION RESPIRATORY (INHALATION) at 04:42

## 2018-07-08 RX ADMIN — CARVEDILOL 12.5 MG: 12.5 TABLET, FILM COATED ORAL at 15:00

## 2018-07-08 RX ADMIN — METHYLPREDNISOLONE SODIUM SUCCINATE 40 MG: 40 INJECTION, POWDER, FOR SOLUTION INTRAMUSCULAR; INTRAVENOUS at 17:17

## 2018-07-08 RX ADMIN — HYDRALAZINE HYDROCHLORIDE 20 MG: 20 INJECTION INTRAMUSCULAR; INTRAVENOUS at 13:57

## 2018-07-08 RX ADMIN — FAMOTIDINE 20 MG: 20 TABLET ORAL at 20:28

## 2018-07-08 RX ADMIN — ARFORMOTEROL TARTRATE 15 MCG: 15 SOLUTION RESPIRATORY (INHALATION) at 19:35

## 2018-07-08 NOTE — PROGRESS NOTES
PATIENT NOW REFUSING BIPAP. ANGRY. SAYS HE CANNOT TOLERATE IT.  RR 32-36. SCHEDULE NEB TX GIVEN.  CURRENTLY ON 2L NC.

## 2018-07-08 NOTE — PROGRESS NOTES
2681 Assumed responsibility for patient from [facility] Elisa, RN    0900 Patient appearing lethargic and tachypneic. Spoke with Dr. Vaishali Cabrera and respiratory. Patient placed back on bipap. 0753 Dr Vaishali Cabrera at bedside. Patient to be transferred to ICU.    0195 TRANSFER - OUT REPORT:    Verbal report given to BRYN Ramirez(name) on Yonathan Obey.  being transferred to ICU(unit) for change in patient condition(respiratory distress)       Report consisted of patients Situation, Background, Assessment and   Recommendations(SBAR). Information from the following report(s) SBAR, Kardex and MAR was reviewed with the receiving nurse. Lines:   Peripheral IV 07/07/18 Left; Lower Arm (Active)   Site Assessment Clean, dry, & intact 7/8/2018  3:50 AM   Phlebitis Assessment 0 7/8/2018  3:50 AM   Infiltration Assessment 0 7/8/2018  3:50 AM   Dressing Status Clean, dry, & intact 7/8/2018  3:50 AM   Dressing Type Transparent;Tape 7/8/2018  3:50 AM   Hub Color/Line Status Blue;Capped 7/8/2018  3:50 AM   Action Taken Open ports on tubing capped 7/8/2018  3:50 AM   Alcohol Cap Used Yes 7/8/2018  3:50 AM        Opportunity for questions and clarification was provided.       Patient transported with:   Monitor  O2 @ Bipap liters  Registered Nurse

## 2018-07-08 NOTE — PROGRESS NOTES
Mini Liu M.D 
[address] [facility] [place] Brianne Lin [place] [address] [facility] [place] [address] Phone [phone] Fax [phone] Pulmonary Critical Care & Sleep Medicine Name: Nitin Eagle. MRN: [id number] : 1945 Hospital: [facility] [place] Date: 2018 CCM follow up Requesting MD:                                                  Reason for CC Consult: 
 
IMPRESSION:  
Patient Active Problem List  
Diagnosis Code  Hypercapnia R06.89  
 Acute on chronic respiratory failure with hypoxia and hypercapnia (HCC) J96.21, J96.22  
 CLL (chronic lymphocytic leukemia) ([facility]) C91.90  Emphysema lung ([address] J43.9  Memory loss R41.3  Acute encephalopathy G93.40  Elevated serum creatinine R79.89 · AMS ? Due to co2 narcosis but back to baseline, ? Due to htn urgency and htn encephalopathy, ? Delirium due to etoh doing better · Advance dementia per notes · Possible fluid overload due to elevated BP and right sided effusion -- On lasix cr going up so on hold · COPD with FEV1 of 1.23 or 55% predicted but normal ratio follows with Dr. Paige Ernst on Spiriva and advair at home- Recent 6 min walk in may suggested requires home oxygen 2-4 liter · RUPERTO unknown severity on BIPAP2 non compliant-- not able to tolerate but best response noted on BIPAP  PLAN: 
-- Continue BIPAP and monitor in ICU 
-- If another episode will consider repeat Head ct was done initially and was ok 
-- I will get EEG+ ammonia levels as on and off AMS might be due to CO2 retention but abgs are ok and no further worsening noted 
-- On provera-- finish tomorrow 
-- Increase coreg bp better  
-- On zyprexa for sundowning and confusion will decrease dose  
-- Off iv fluids Cr continue to improve -- Elevated WBC ?  Due to CLL vs Steroids-- mild elevation today monitor all culture is negative so far 
-- No wheezing on exam , Resumed on solumedrol by PCP due to respiratory distress. -- On levaquin recently treated for sepsis at [facility] will recheck cultures and sputum culture will continue levaquin <sepsis was 3 month ago> adjust per culture-- Blood culture negative sputum never sent-- Change to PO on 7/6 tptal 7 days planned 
-- BIPAP 24/12 at night and prn 
-- Advance demential lives alone will need long term care and placement 
-- OT and PT to evaluate 
-- Due to on and off RRT will monitor in ICU today CVS:BP monitor with Coreg Norvasc and prn meds, RS:Steroids BD, Aspiration precaution, Keep HOB elevated, Use bipap 24/12 at night ID:On levaquin check culture and adjsut -- Change to PO give total 5-7 days for LRTI 
ENDO:ssi GI:PPI diet as tolerated RENAL:Monitor Cr -- Mild improvement noted off banana bag on thiamine and folic acid CNS:AMS multifactorial treat for HTN, Start Thiamine and Folic acid ? Baseline will get EEG ammonia levels HEMATOLOGY:CLL by history WBC elevated monitor if needed consider hematology consult MUSCULOSKELETAL:no acute issue PAIN AND SEDATION: zyprexa decrease dose · Skin/Wound: No acute issue · Electrolytes: Replace electrolytes per ICU electrolyte replacement protocol. · IVF: Off iv fluids · Nutrition: Diet as tolerated · Prophylaxis: DVT Prophylaxis with Heparin,. GI Prophylaxis. · Restraints: none 
· PT/OT eval and treat. OOB when appropriate. · Lines/Tubes: none. No muller or central line. ADVANCE DIRECTIVE:Full code Palliative is following FAMILY DISCUSSION:spoke with patinent no one else available Quality Care: PPI, DVT prophylaxis, HOB elevated, Infection control all reviewed and addressed. Events and notes from last 24 hours reviewed. Care plan discussed with nursing CC TIME:35 min · Labs and images personally seen and available reports reviewed. · All current medicines are reviewed and doses and prescription adjusted. Subjective/History: 
7/8/18 Patient was transferred to floor in am found to be lethargic ? Etiology, Did not received any meds ABG on bipap was ok now waking up BP is better On and off confused at night ? Due to underline dementia vs worsening CO2 went up to 88 BIPAP adjusted in am ABG done on oxygen was doing better with Ph and PCO2 improvement Currently have a sitter WBC elevated 26 but on steroids and h/o CLL-- Improvement noted Dunn Faster. is a 74 y.o. male who hx emphysema, htn,memorry loss, CLL was sent to ER due to ams. Per er reported he acting abnormal since morning, refused to eat/drinking/taking medication. He responded slowly and lethargic. In ER, abg indicated pH 7.294. PCO2 85, pO2 69, HCO3 41.3.  bipap was put on. Ct head was pending. cxr: Linear opacity at the left lung base appears similar, could be scarring or partial atelectasis. There is mild crowding of basilar pulmonary Recently admitted to [facility] with respiratory failure, Aspiration PNA Advance dementia recent fall Overnight found to be agitated and confused managed with sitter ABG showed improvement On home BIPAP 21/17 H/O ETOH per notes Prior to Admission medications Medication Sig Start Date End Date Taking? Authorizing Provider  
alfuzosin SR (UROXATRAL) 10 mg SR tablet Take 10 mg by mouth daily. Yes Ronni Hernandez MD  
aspirin 81 mg chewable tablet Take 81 mg by mouth daily. Yes Ronni Hernandez MD  
cholecalciferol (VITAMIN D3) 1,000 unit cap Take  by mouth daily. Yes Ronni Hernandez MD  
cycloSPORINE (RESTASIS) 0.05 % ophthalmic emulsion Administer 1 Drop to both eyes two (2) times a day. Yes Ronni Hernandez MD  
diclofenac (VOLTAREN) 1 % gel Apply  to affected area four (4) times daily. Yes Ronni Hernandez MD  
famotidine (PEPCID) 20 mg tablet Take 20 mg by mouth two (2) times a day. Yes Ronni Hernandez MD  
latanoprost (XALATAN) 0.005 % ophthalmic solution Administer 1 Drop to both eyes nightly. Yes Ronni Hernandez MD  
allopurinol (ZYLOPRIM) 100 mg tablet Take  by mouth daily.     Ronni Hernandez MD carvedilol (COREG) 6.25 mg tablet Take  by mouth two (2) times daily (with meals). Ronni Hernandez MD  
furosemide (LASIX) 40 mg tablet Take  by mouth daily. Ronni Hernandez MD  
Oxygen     Ronni Hernandez MD  
pravastatin sodium (PRAVASTATIN PO) Take  by mouth. Ronni Hernandez MD  
tiotropium (SPIRIVA WITH HANDIHALER) 18 mcg inhalation capsule Take 1 Cap by inhalation daily. Ronni Hernandez MD  
fluticasone/salmeterol (ADVAIR HFA IN) Take  by inhalation. Ronni Hernandez MD  
ALBUTEROL IN Take  by inhalation. Ronni Hernandez MD  
 
Current Facility-Administered Medications Medication Dose Route Frequency  methylPREDNISolone (PF) (SOLU-MEDROL) injection 40 mg  40 mg IntraVENous Q6H  Thiamine Mononitrate (B-1) tablet 100 mg  100 mg Oral DAILY  folic acid (FOLVITE) tablet 1 mg  1 mg Oral DAILY  carvedilol (COREG) tablet 12.5 mg  12.5 mg Oral BID WITH MEALS  medroxyPROGESTERone (PROVERA) tablet 10 mg  10 mg Oral DAILY  famotidine (PEPCID) tablet 20 mg  20 mg Oral Q12H  
 OLANZapine (ZyPREXA) tablet 2.5 mg  2.5 mg Oral BID  amLODIPine (NORVASC) tablet 10 mg  10 mg Oral DAILY  nitroglycerin (NITRODUR) 0.2 mg/hr patch 1 Patch  1 Patch TransDERmal Q24H  
 docusate sodium (COLACE) capsule 100 mg  100 mg Oral BID  heparin (porcine) injection 5,000 Units  5,000 Units SubCUTAneous Q8H  
 arformoterol (BROVANA) neb solution 15 mcg  15 mcg Nebulization BID RT  
 budesonide (PULMICORT) 500 mcg/2 ml nebulizer suspension  500 mcg Nebulization BID RT  
 albuterol-ipratropium (DUO-NEB) 2.5 MG-0.5 MG/3 ML  3 mL Nebulization Q4H RT  
 
 
 
Objective:  
Vital Signs:   
Visit Vitals  /60 (BP 1 Location: Left arm, BP Patient Position: At rest)  Pulse 98  Temp 97.8 °F (36.6 °C)  Resp 26  
 Ht 5' 6\" (1.676 m)  Wt 89.6 kg (197 lb 8.5 oz)  SpO2 95%  BMI 31.88 kg/m2 O2 Device: BIPAP  
O2 Flow Rate (L/min): 2 l/min Temp (24hrs), Av.1 °F (36.7 °C), Min:97.6 °F (36.4 °C), Max:98.5 °F (36.9 °C) Intake/Output:  
Last shift:      07/08 0701 - 07/08 1900 In: 240 [P.O.:240] Out: - Last 3 shifts: 07/06 1901 - 07/08 0700 In: 2130 [P.O.:1440; I.V.:690] Out: 1550 [PUHYB:8304] Intake/Output Summary (Last 24 hours) at 07/08/18 1114 Last data filed at 07/08/18 9768 Gross per 24 hour Intake              960 ml Output              800 ml Net              160 ml Ventilator Settings: 
Mode Rate Tidal Volume Pressure FiO2 PEEP  
         24 % Peak airway pressure:     
Minute ventilation: 14.1 l/min Review of Systems: 
Not able to do due to patient condition Objective: 
 
General: comfortable, no acute distress altered not oriented HEENT: pupils reactive, sclera anicteric, EOM intact Neck: No adenopathy or thyroid swelling, no lymphadenopathy or JVD, supple CVS: S1S2 no murmurs RS: Mod AE bilaterally, decrease at base Abd: soft, non tender, no hepatosplenomegaly Neuro: non focal, awake, alert Extrm: no leg edema, clubbing or cyanosis Lymph node: No obvious palpable lymph node appreciated. Skin: no rash CBC w/Diff Recent Labs  
   07/08/18 0330 07/07/18 
 0401 07/06/18 
 0301 WBC  26.3*  11.3  22.0*  
RBC  4.91  4.30*  4.93  
HGB  11.7*  10.2*  12.0*  
HCT  40.1  35.9*  41.2 PLT  222  193  221 GRANS   --    --   38* LYMPH   --    --   55* EOS   --    --   0 Chemistry Recent Labs  
   07/08/18 
 0330  07/07/18 
 0401  07/06/18 
 0301  07/05/18 
 1701 GLU  103*  139*  132*  139* NA  141  141  140  141  
K  4.1  4.6  5.1  4.4 CL  103  106  103  102 CO2  38*  35*  35*  31 BUN  28*  38*  41*  40* CREA  1.14  1.45*  1.55*  1.74* CA  9.0  8.6  8.9  9.6 MG  2.2  2.4  2.5   --   
AGAP  0*  0*  2*  8  
BUCR  25*  26*  26*  23* AP  82  72   --   94  
TP  7.5  6.7   --   8.7* ALB  3.2*  2.8*   --   3.5 GLOB  4.3*  3.9   --   5.2* AGRAT  0.7*  0.7*   --   0.7* Lactic Acid Lactic acid Date Value Ref Range Status 06/16/2018 1.3 0.4 - 2.0 MMOL/L Final  
 
No results for input(s): LAC in the last 72 hours. Micro  No results for input(s): SDES, CULT in the last 72 hours. No results for input(s): CULT in the last 72 hours. ABG Recent Labs  
   07/08/18 
 0924  07/07/18 
 0552  07/06/18 
 1358 PHI  7.393  7.316*  7.328* PCO2I  69.4*  74.9*  75.3*  
PO2I  77*  79*  49* HCO3I  42.3*  38.5*  39.5*  
FIO2I  24  28  21 Liver Enzymes Protein, total  
Date Value Ref Range Status 07/08/2018 7.5 6.4 - 8.2 g/dL Final  
 
Albumin Date Value Ref Range Status 07/08/2018 3.2 (L) 3.4 - 5.0 g/dL Final  
 
Globulin Date Value Ref Range Status 07/08/2018 4.3 (H) 2.0 - 4.0 g/dL Final  
 
A-G Ratio Date Value Ref Range Status 07/08/2018 0.7 (L) 0.8 - 1.7   Final  
 
AST (SGOT) Date Value Ref Range Status 07/08/2018 18 15 - 37 U/L Final  
 
Alk. phosphatase Date Value Ref Range Status 07/08/2018 82 45 - 117 U/L Final  
 
Recent Labs  
   07/08/18 
 0330  07/07/18 
 0401  07/05/18 
 1701 TP  7.5  6.7  8.7* ALB  3.2*  2.8*  3.5 GLOB  4.3*  3.9  5.2* AGRAT  0.7*  0.7*  0.7* SGOT  18  13*  20  
AP  82  72  94 Cardiac Enzymes No results found for: CPK, CK, CKMMB, CKMB, RCK3, CKMBT, CKNDX, CKND1, LOREN, TROPT, TROIQ, APRIL, TROPT, TNIPOC, BNP, BNPP  
 
BNP No results found for: BNP, BNPP, XBNPT Coagulation No results for input(s): PTP, INR, APTT in the last 72 hours. No lab exists for component: INREXT, INREXT Thyroid  No results found for: T4, T3U, TSH, TSHEXT, TSHEXT Lipid Panel No results found for: CHOL, CHOLPOCT, CHOLX, CHLST, CHOLV, 194398, HDL, LDL, LDLC, DLDLP, 266227, VLDLC, VLDL, TGLX, TRIGL, TRIGP, TGLPOCT, CHHD, CHHDX Urinalysis Lab Results Component Value Date/Time  Color YELLOW 07/03/2018 04:15 PM  
 Appearance CLEAR 07/03/2018 04:15 PM  
 Specific gravity 1.019 07/03/2018 04:15 PM  
 pH (UA) 7.0 07/03/2018 04:15 PM  
 Protein 300 (A) 07/03/2018 04:15 PM Glucose NEGATIVE  07/03/2018 04:15 PM  
 Ketone NEGATIVE  07/03/2018 04:15 PM  
 Bilirubin NEGATIVE  07/03/2018 04:15 PM  
 Urobilinogen 0.2 07/03/2018 04:15 PM  
 Nitrites NEGATIVE  07/03/2018 04:15 PM  
 Leukocyte Esterase NEGATIVE  07/03/2018 04:15 PM  
 Epithelial cells FEW 07/03/2018 04:15 PM  
 Bacteria FEW (A) 07/03/2018 04:15 PM  
 WBC 0 to 3 07/03/2018 04:15 PM  
 RBC NEGATIVE  07/03/2018 04:15 PM  
  
 
XR (Most Recent). CXR reviewed by me and compared with previous CXR Results from Hospital Encounter encounter on 07/03/18 XR CHEST PORT Narrative Chest, single view Indication: Shortness of breath Comparison: July 7, 2018 Findings:  Portable upright AP view of the chest was obtained. When he or 
atelectasis at the left lung base noted, without focal pneumonic consolidation, 
pneumothorax, or pleural effusion. Normal cardiac size and stable mediastinal 
contours. No acute osseous abnormality. Impression Impression: 
Linear atelectasis at the left lung base without superimposed acute radiographic 
abnormality. CT (Most Recent) Results from Hospital Encounter encounter on 07/03/18 CT HEAD WO CONT Narrative EXAM: CT head INDICATION: Altered mental status. Decreased alertness. COMPARISON: 6/16/2018 TECHNIQUE: Axial CT imaging of the head was performed without intravenous 
contrast. One or more dose reduction techniques were used on this CT: automated 
exposure control, adjustment of the mAs and/or kVp according to patient's size, 
and iterative reconstruction techniques. The specific techniques utilized on 
this CT exam have been documented in the patient's electronic medical record. 
 
_______________ FINDINGS: 
 
BRAIN: 
Intraparenchymal hemorrhage: None. Mass effect/edema: None. Infarcts/encephalomalacia: None. White matter: Normal. 
Brain volume: Normal for age. EXTRA-AXIAL SPACES: 
Hemorrhage: None. Mass: None. Hydrocephalus: None.  
 
SINUSES: Clear. 
 
CALVARIUM: Unremarkable. OTHER: None. 
 
_______________ Impression IMPRESSION:  
 
No evidence of acute intracranial process. EKG No results found for this or any previous visit. ECHO 
12/2017 [facility] NORMAL LEFT VENTRICULAR CAVITY SIZE. MODERATE CONCENTRIC LEFT VENTRICULAR HYPERTROPHY. NORMAL LEFT VENTRICULAR SYSTOLIC FUNCTION WITH AN EJECTION FRACTION 
 ESTIMATED AT 60%. MILD  DIASTOLIC DYSFUNCTION. NORMAL LEFT ATRIAL SIZE. PRIOR ECHO REPORT FINDINGS ON 10-: 
 
 NORMAL LEFT VENTRICULAR CAVITY SIZE. MILD CONCENTRIC LEFT VENTRICULAR HYPERTROPHY. DYNAMIC  GLOBAL LEFT VENTRICULAR SYSTOLIC FUNCTION. EJECTION FRACTION IS 70%. MILD DIASTOLIC DYSFUNCTION. NORMAL CHAMBER SIZES. NO HEMODYNAMICALLY SIGNIFICANT VALVULAR PATHOLOGY. NORMAL PULMONARY ARTERY PRESSURE OF 22 MMHG. PFT PFTs: 1/11/18   
FVC  1.35 (44 %) FEV1 1.23 (55 %) FEV1/FVC  92 % TLC 62 %  % DL/VA 31 % Other Interpretation: Ambulatory oximetry Does show significant desaturation;starting O2 sat 90 %; low O2 sat 85 %;  
starting HR 78 bpm; high HR 99 bpm 
Dyspnea scale: beginning 0; ending 0 Total distance : 330 ft Total time: 3 minutes Recommendation: Ambulatory O2 was recommended. Imaging: 
I have personally reviewed the patients radiographs and have reviewed the reports: 
  
 
 
  
 
Amrita Jamil M.D. Pulmonary Critical Care & Sleep Medicine

## 2018-07-08 NOTE — PROGRESS NOTES
DECREASED TO 1L NC PER DR. BENSON [facility]. IF PATIENT AGREES, WILL ATTEMPT ABG THIS AFTERNOON.

## 2018-07-08 NOTE — PROGRESS NOTES
Hospitalist Progress Note Patient: Fredrik Homans. MRN: [id number]  CSN: [id number] YOB: 1945  Age: 74 y.o. Sex: male DOA: 7/3/2018 LOS:  LOS: 5 days Assessment/Plan 1. Metabolic encephalopathy, waxing and waning. Lethargic w decreased respiratory drive this AM requiring BIPAP and transfer back to ICU 2. Respiratory distress 3. Leukocytosis suspect due to CLL + steroid effect rather than infectious etiology. All cultures negative thus far 4. COPD Plan: 
- transfer to ICU 
- contineu bipap 
- change steroids back to IV 
- continue levaquin 
- decrease dose of zyprexa - palliative care & pulmonology following 
- full code Patient Active Problem List  
Diagnosis Code  Hypercapnia R06.89  
 Acute on chronic respiratory failure with hypoxia and hypercapnia (HCC) J96.21, J96.22  
 CLL (chronic lymphocytic leukemia) ([facility]) C91.90  Emphysema lung ([address] J43.9  Memory loss R41.3  Acute encephalopathy G93.40  Elevated serum creatinine R79.89 Subjective:  
 cc: encephalopathy Pt lethargic this AM in respiratory distress ABG better than yesterday CXR clear REVIEW OF SYSTEMS: 
Unable to obtain 2/2 AMS. Objective:  
  
 
Vital signs/Intake and Output: 
Visit Vitals  /60 (BP 1 Location: Left arm, BP Patient Position: At rest)  Pulse 98  Temp 97.8 °F (36.6 °C)  Resp 26  
 Ht 5' 6\" (1.676 m)  Wt 89.6 kg (197 lb 8.5 oz)  SpO2 92%  BMI 31.88 kg/m2 Current Shift:  07/08 0701 - 07/08 1900 In: 240 [P.O.:240] Out: - Last three shifts:  07/06 1901 - 07/08 0700 In: 2130 [P.O.:1440; I.V.:690] Out: 1550 [RSTRL:2966] Body mass index is 31.88 kg/(m^2). Physical Exam: 
GEN: lethargic, answers questions inappropriately, in distress EYES: conjunctiva normal, lids with out lesions HEENT: MMM, No thyromegaly, no lymphadenopathy HEART: RRR +S1 +S2, no JVD, pulses 2+ distally LUNGS: CTA B/L no wheezes, rales or rhonchi ABDOMEN: + BS, soft NT/ND no organomegaly,  No rebound EXTREMITIES: No edema cyanosis, cap refill normal 
 SKIN: no rashes or skin breakdown, no nodules, normal turgor Current Facility-Administered Medications Medication Dose Route Frequency  methylPREDNISolone (PF) (SOLU-MEDROL) injection 40 mg  40 mg IntraVENous Q6H  
 OLANZapine (ZyPREXA) tablet 1.25 mg  1.25 mg Oral BID  Thiamine Mononitrate (B-1) tablet 100 mg  100 mg Oral DAILY  folic acid (FOLVITE) tablet 1 mg  1 mg Oral DAILY  carvedilol (COREG) tablet 12.5 mg  12.5 mg Oral BID WITH MEALS  medroxyPROGESTERone (PROVERA) tablet 10 mg  10 mg Oral DAILY  famotidine (PEPCID) tablet 20 mg  20 mg Oral Q12H  hydrALAZINE (APRESOLINE) 20 mg/mL injection 20 mg  20 mg IntraVENous Q6H PRN  
 amLODIPine (NORVASC) tablet 10 mg  10 mg Oral DAILY  nitroglycerin (NITRODUR) 0.2 mg/hr patch 1 Patch  1 Patch TransDERmal Q24H  
 acetaminophen (TYLENOL) tablet 650 mg  650 mg Oral Q4H PRN  
 ketorolac (TORADOL) injection 15 mg  15 mg IntraVENous Q6H PRN  
 naloxone (NARCAN) injection 0.4 mg  0.4 mg IntraVENous PRN  
 ondansetron (ZOFRAN) injection 4 mg  4 mg IntraVENous Q4H PRN  
 docusate sodium (COLACE) capsule 100 mg  100 mg Oral BID  heparin (porcine) injection 5,000 Units  5,000 Units SubCUTAneous Q8H  
 arformoterol (BROVANA) neb solution 15 mcg  15 mcg Nebulization BID RT  
 budesonide (PULMICORT) 500 mcg/2 ml nebulizer suspension  500 mcg Nebulization BID RT  
 albuterol-ipratropium (DUO-NEB) 2.5 MG-0.5 MG/3 ML  3 mL Nebulization Q4H RT All the patient's labs over the past 24 hours were reviewed both during my initial daily workflow process and at the time notated as \"note time\" in [facility]. (It is not time stamped separately in this workflow.)  Select labs are listed below.  
 
 
 
Labs: Results:  
   
Chemistry Recent Labs  
   07/08/18 
 0330  07/07/18 
 7031 07/06/18 
 0301  07/05/18 
 1701 GLU  103*  139*  132*  139* NA  141  141  140  141  
K  4.1  4.6  5.1  4.4 CL  103  106  103  102 CO2  38*  35*  35*  31 BUN  28*  38*  41*  40* CREA  1.14  1.45*  1.55*  1.74* CA  9.0  8.6  8.9  9.6 AGAP  0*  0*  2*  8  
BUCR  25*  26*  26*  23* AP  82  72   --   94  
TP  7.5  6.7   --   8.7* ALB  3.2*  2.8*   --   3.5 GLOB  4.3*  3.9   --   5.2* AGRAT  0.7*  0.7*   --   0.7* CBC w/Diff Recent Labs  
   07/08/18 
 0330  07/07/18 
 0401  07/06/18 
 0301 WBC  26.3*  11.3  22.0*  
RBC  4.91  4.30*  4.93  
HGB  11.7*  10.2*  12.0*  
HCT  40.1  35.9*  41.2 PLT  222  193  221 GRANS   --    --   38* LYMPH   --    --   55* EOS   --    --   0 Liver Enzymes Recent Labs  
   07/08/18 
 0330 TP  7.5 ALB  3.2* AP  82 SGOT  18 Procedures/imaging: see electronic medical records for all procedures/Xrays and details which were not copied into this note but were reviewed prior to creation of Plan Imaging personally reviewed: CXR: felix Guillory,  Internal Medicine/Geriatrics

## 2018-07-08 NOTE — PROGRESS NOTES
Problem: Falls - Risk of  Goal: *Absence of Falls  Document Stan Fall Risk and appropriate interventions in the flowsheet.    Outcome: Progressing Towards Goal  Fall Risk Interventions:  Mobility Interventions: Communicate number of staff needed for ambulation/transfer, Strengthening exercises (ROM-active/passive)    Mentation Interventions: Adequate sleep, hydration, pain control    Medication Interventions: Teach patient to arise slowly    Elimination Interventions: Call light in reach     Sitter at bedside    History of Falls Interventions: Door open when patient unattended

## 2018-07-08 NOTE — PROGRESS NOTES
PATIENT WITH RESP RATE IN 40'S/BS COARSE. MORE LETHARGIC. PLACED ON BIPAP AS ORDERED BY DR. Mihir Hunter AND ABG'S WERE OBTAINED. CXR PENDING. TO BE TRANSFERRED BACK TO ICU.

## 2018-07-08 NOTE — PROGRESS NOTES
Patient is refusing BIPAP awake and kind of upset but oriented to place WIll monitor closely Spoke in the room GAURAV Welch [address] [facility] Brianne Hsu [address] [place] [address]356 Phone [phone]   Fax [phone] Pulmonary Critical Care & Sleep Medicine

## 2018-07-08 NOTE — PROGRESS NOTES
Patient is more awake refusing BIPAP again On 1 liter oxygen Check abg to make sure not going back on CO2 narcosis Will monitor in ICU Ambreen Simpson M.D 
[address] [facility] Brianne Hsu [address] [place] [address] 1746 Phone [phone]   Fax [phone] Pulmonary Critical Care & Sleep Medicine

## 2018-07-09 ENCOUNTER — APPOINTMENT (OUTPATIENT)
Dept: GENERAL RADIOLOGY | Age: 73
DRG: 189 | End: 2018-07-09
Attending: INTERNAL MEDICINE
Payer: MEDICARE

## 2018-07-09 LAB
ALBUMIN SERPL-MCNC: 3 G/DL (ref 3.4–5)
ALBUMIN/GLOB SERPL: 0.8 {RATIO} (ref 0.8–1.7)
ALP SERPL-CCNC: 84 U/L (ref 45–117)
ALT SERPL-CCNC: 24 U/L (ref 16–61)
ANION GAP SERPL CALC-SCNC: 3 MMOL/L (ref 3–18)
ARTERIAL PATENCY WRIST A: YES
AST SERPL-CCNC: 17 U/L (ref 15–37)
BASE EXCESS BLD CALC-SCNC: 16 MMOL/L
BDY SITE: ABNORMAL
BILIRUB SERPL-MCNC: 0.2 MG/DL (ref 0.2–1)
BODY TEMPERATURE: 98.6
BUN SERPL-MCNC: 29 MG/DL (ref 7–18)
BUN/CREAT SERPL: 23 (ref 12–20)
CALCIUM SERPL-MCNC: 9.2 MG/DL (ref 8.5–10.1)
CHLORIDE SERPL-SCNC: 103 MMOL/L (ref 100–108)
CO2 SERPL-SCNC: 37 MMOL/L (ref 21–32)
CREAT SERPL-MCNC: 1.28 MG/DL (ref 0.6–1.3)
ERYTHROCYTE [DISTWIDTH] IN BLOOD BY AUTOMATED COUNT: 15.4 % (ref 11.6–14.5)
GAS FLOW.O2 O2 DELIVERY SYS: ABNORMAL L/MIN
GLOBULIN SER CALC-MCNC: 3.9 G/DL (ref 2–4)
GLUCOSE SERPL-MCNC: 124 MG/DL (ref 74–99)
HCO3 BLD-SCNC: 41.2 MMOL/L (ref 22–26)
HCT VFR BLD AUTO: 41.4 % (ref 36–48)
HGB BLD-MCNC: 12.1 G/DL (ref 13–16)
MAGNESIUM SERPL-MCNC: 2.1 MG/DL (ref 1.6–2.6)
MCH RBC QN AUTO: 23.8 PG (ref 24–34)
MCHC RBC AUTO-ENTMCNC: 29.2 G/DL (ref 31–37)
MCV RBC AUTO: 81.3 FL (ref 74–97)
O2/TOTAL GAS SETTING VFR VENT: 0.24 %
PCO2 BLD: 70.5 MMHG (ref 35–45)
PEEP RESPIRATORY: 12 CMH2O
PH BLD: 7.38 [PH] (ref 7.35–7.45)
PIP ISTAT,IPIP: 24
PLATELET # BLD AUTO: 206 K/UL (ref 135–420)
PMV BLD AUTO: 11.4 FL (ref 9.2–11.8)
PO2 BLD: 84 MMHG (ref 80–100)
POTASSIUM SERPL-SCNC: 4.7 MMOL/L (ref 3.5–5.5)
PRESSURE SUPPORT SETTING VENT: 12 CMH2O
PROT SERPL-MCNC: 6.9 G/DL (ref 6.4–8.2)
RBC # BLD AUTO: 5.09 M/UL (ref 4.7–5.5)
SAO2 % BLD: 95 % (ref 92–97)
SERVICE CMNT-IMP: ABNORMAL
SODIUM SERPL-SCNC: 143 MMOL/L (ref 136–145)
SPECIMEN TYPE: ABNORMAL
SPONTANEOUS TIMED, IST: YES
TOTAL RESP. RATE, ITRR: 22
WBC # BLD AUTO: 16.4 K/UL (ref 4.6–13.2)

## 2018-07-09 PROCEDURE — 97535 SELF CARE MNGMENT TRAINING: CPT

## 2018-07-09 PROCEDURE — 97167 OT EVAL HIGH COMPLEX 60 MIN: CPT

## 2018-07-09 PROCEDURE — 74011000250 HC RX REV CODE- 250: Performed by: HOSPITALIST

## 2018-07-09 PROCEDURE — 65660000000 HC RM CCU STEPDOWN

## 2018-07-09 PROCEDURE — 74011250637 HC RX REV CODE- 250/637: Performed by: INTERNAL MEDICINE

## 2018-07-09 PROCEDURE — 80053 COMPREHEN METABOLIC PANEL: CPT | Performed by: HOSPITALIST

## 2018-07-09 PROCEDURE — 82803 BLOOD GASES ANY COMBINATION: CPT

## 2018-07-09 PROCEDURE — 97116 GAIT TRAINING THERAPY: CPT

## 2018-07-09 PROCEDURE — 97530 THERAPEUTIC ACTIVITIES: CPT

## 2018-07-09 PROCEDURE — 85027 COMPLETE CBC AUTOMATED: CPT | Performed by: HOSPITALIST

## 2018-07-09 PROCEDURE — 74011250636 HC RX REV CODE- 250/636: Performed by: HOSPITALIST

## 2018-07-09 PROCEDURE — 74011250636 HC RX REV CODE- 250/636: Performed by: INTERNAL MEDICINE

## 2018-07-09 PROCEDURE — 83735 ASSAY OF MAGNESIUM: CPT | Performed by: HOSPITALIST

## 2018-07-09 PROCEDURE — 74011250637 HC RX REV CODE- 250/637: Performed by: HOSPITALIST

## 2018-07-09 PROCEDURE — 94640 AIRWAY INHALATION TREATMENT: CPT

## 2018-07-09 PROCEDURE — 94660 CPAP INITIATION&MGMT: CPT

## 2018-07-09 PROCEDURE — 71045 X-RAY EXAM CHEST 1 VIEW: CPT

## 2018-07-09 PROCEDURE — 94760 N-INVAS EAR/PLS OXIMETRY 1: CPT

## 2018-07-09 PROCEDURE — 77010033678 HC OXYGEN DAILY

## 2018-07-09 PROCEDURE — 36600 WITHDRAWAL OF ARTERIAL BLOOD: CPT

## 2018-07-09 PROCEDURE — 36415 COLL VENOUS BLD VENIPUNCTURE: CPT | Performed by: HOSPITALIST

## 2018-07-09 RX ORDER — LORAZEPAM 2 MG/ML
0.5 INJECTION INTRAMUSCULAR
Status: DISCONTINUED | OUTPATIENT
Start: 2018-07-09 | End: 2018-07-10

## 2018-07-09 RX ORDER — HALOPERIDOL 5 MG/ML
1 INJECTION INTRAMUSCULAR
Status: DISCONTINUED | OUTPATIENT
Start: 2018-07-09 | End: 2018-07-10

## 2018-07-09 RX ADMIN — HYDRALAZINE HYDROCHLORIDE 25 MG: 25 TABLET, FILM COATED ORAL at 21:07

## 2018-07-09 RX ADMIN — BUDESONIDE 500 MCG: 0.5 INHALANT RESPIRATORY (INHALATION) at 20:08

## 2018-07-09 RX ADMIN — LORAZEPAM 0.5 MG: 0.5 TABLET ORAL at 17:42

## 2018-07-09 RX ADMIN — ARFORMOTEROL TARTRATE 15 MCG: 15 SOLUTION RESPIRATORY (INHALATION) at 20:08

## 2018-07-09 RX ADMIN — METHYLPREDNISOLONE SODIUM SUCCINATE 40 MG: 40 INJECTION, POWDER, FOR SOLUTION INTRAMUSCULAR; INTRAVENOUS at 10:24

## 2018-07-09 RX ADMIN — FAMOTIDINE 20 MG: 20 TABLET ORAL at 10:22

## 2018-07-09 RX ADMIN — OLANZAPINE 1.25 MG: 2.5 TABLET ORAL at 10:23

## 2018-07-09 RX ADMIN — HEPARIN SODIUM 5000 UNITS: 5000 INJECTION, SOLUTION INTRAVENOUS; SUBCUTANEOUS at 10:24

## 2018-07-09 RX ADMIN — HALOPERIDOL LACTATE 1 MG: 5 INJECTION, SOLUTION INTRAMUSCULAR at 23:29

## 2018-07-09 RX ADMIN — IPRATROPIUM BROMIDE AND ALBUTEROL SULFATE 3 ML: .5; 3 SOLUTION RESPIRATORY (INHALATION) at 23:33

## 2018-07-09 RX ADMIN — METHYLPREDNISOLONE SODIUM SUCCINATE 40 MG: 40 INJECTION, POWDER, FOR SOLUTION INTRAMUSCULAR; INTRAVENOUS at 04:39

## 2018-07-09 RX ADMIN — AMLODIPINE BESYLATE 10 MG: 5 TABLET ORAL at 10:22

## 2018-07-09 RX ADMIN — Medication 100 MG: at 10:23

## 2018-07-09 RX ADMIN — FOLIC ACID 1 MG: 1 TABLET ORAL at 10:24

## 2018-07-09 RX ADMIN — IPRATROPIUM BROMIDE AND ALBUTEROL SULFATE 3 ML: .5; 3 SOLUTION RESPIRATORY (INHALATION) at 04:59

## 2018-07-09 RX ADMIN — IPRATROPIUM BROMIDE AND ALBUTEROL SULFATE 3 ML: .5; 3 SOLUTION RESPIRATORY (INHALATION) at 15:39

## 2018-07-09 RX ADMIN — CARVEDILOL 12.5 MG: 12.5 TABLET, FILM COATED ORAL at 10:22

## 2018-07-09 RX ADMIN — HYDRALAZINE HYDROCHLORIDE 25 MG: 25 TABLET, FILM COATED ORAL at 10:24

## 2018-07-09 RX ADMIN — FAMOTIDINE 20 MG: 20 TABLET ORAL at 21:04

## 2018-07-09 RX ADMIN — BUDESONIDE 500 MCG: 0.5 INHALANT RESPIRATORY (INHALATION) at 07:38

## 2018-07-09 RX ADMIN — HYDRALAZINE HYDROCHLORIDE 25 MG: 25 TABLET, FILM COATED ORAL at 16:37

## 2018-07-09 RX ADMIN — METHYLPREDNISOLONE SODIUM SUCCINATE 40 MG: 40 INJECTION, POWDER, FOR SOLUTION INTRAMUSCULAR; INTRAVENOUS at 16:37

## 2018-07-09 RX ADMIN — OLANZAPINE 1.25 MG: 2.5 TABLET ORAL at 21:04

## 2018-07-09 RX ADMIN — IPRATROPIUM BROMIDE AND ALBUTEROL SULFATE 3 ML: .5; 3 SOLUTION RESPIRATORY (INHALATION) at 11:10

## 2018-07-09 RX ADMIN — ARFORMOTEROL TARTRATE 15 MCG: 15 SOLUTION RESPIRATORY (INHALATION) at 07:38

## 2018-07-09 RX ADMIN — IPRATROPIUM BROMIDE AND ALBUTEROL SULFATE 3 ML: .5; 3 SOLUTION RESPIRATORY (INHALATION) at 20:08

## 2018-07-09 RX ADMIN — HYDRALAZINE HYDROCHLORIDE 20 MG: 20 INJECTION INTRAMUSCULAR; INTRAVENOUS at 06:37

## 2018-07-09 RX ADMIN — DOCUSATE SODIUM 100 MG: 100 CAPSULE, LIQUID FILLED ORAL at 21:03

## 2018-07-09 RX ADMIN — METHYLPREDNISOLONE SODIUM SUCCINATE 40 MG: 40 INJECTION, POWDER, FOR SOLUTION INTRAMUSCULAR; INTRAVENOUS at 21:07

## 2018-07-09 RX ADMIN — HEPARIN SODIUM 5000 UNITS: 5000 INJECTION, SOLUTION INTRAVENOUS; SUBCUTANEOUS at 16:37

## 2018-07-09 RX ADMIN — DOCUSATE SODIUM 100 MG: 100 CAPSULE, LIQUID FILLED ORAL at 10:23

## 2018-07-09 RX ADMIN — CARVEDILOL 12.5 MG: 12.5 TABLET, FILM COATED ORAL at 21:04

## 2018-07-09 RX ADMIN — IPRATROPIUM BROMIDE AND ALBUTEROL SULFATE 3 ML: .5; 3 SOLUTION RESPIRATORY (INHALATION) at 07:38

## 2018-07-09 RX ADMIN — HEPARIN SODIUM 5000 UNITS: 5000 INJECTION, SOLUTION INTRAVENOUS; SUBCUTANEOUS at 23:17

## 2018-07-09 NOTE — PROGRESS NOTES
conducted a Follow up consultation and Spiritual Assessment for Raman Paniagua., who is a 74 y.o.,male. Patient stated that his kids live [place] in [place] so they don't get to see me too much. \"  Patient stated repeatedly that he \"wants a ticket out of here. I want to go home - my home. Today. \"    The  provided the following Interventions:  Continued the relationship of care and support. Listened empathically. Offered assurance of continued prayer on patients behalf. Chart reviewed. The following outcomes were achieved:  Patient expressed gratitude for Spiritual Care visit. Patient was reviewed in ICU Interdisciplinary Rounds. Assessment:  There are no further spiritual or Confucianist issues which require Spiritual Care Services intervention at this time. Plan:  Chaplains will continue to follow and will provide pastoral care as needed or requested.  recommends bedside caregivers page the  on duty if patient shows signs of acute spiritual or emotional distress. [address], [place].    Board Certified   [phone] - Office

## 2018-07-09 NOTE — PROGRESS NOTES
Hospitalist Progress Note-critical care note Patient: Jung Reynolds MRN: [id number]  CSN: [id number] YOB: 1945  Age: 74 y.o. Sex: male DOA: 7/3/2018 LOS:  LOS: 6 days Chief complaint: acute on chronic respiratory failure , emphysema lung,  
 
Assessment/Plan Hospital Problems  Never Reviewed Codes Class Noted POA Elevated serum creatinine ICD-10-CM: R79.89 ICD-9-CM: 790.99  7/5/2018 Unknown Hypercapnia ICD-10-CM: R06.89 
ICD-9-CM: 786.09  7/3/2018 Unknown * (Principal)Acute on chronic respiratory failure with hypoxia and hypercapnia (HCC) ICD-10-CM: J96.21, J96.22 
ICD-9-CM: 518.84, 786.09, 799.02  7/3/2018 Unknown CLL (chronic lymphocytic leukemia) (HCC) ICD-10-CM: C91.90 ICD-9-CM: 204.10  7/3/2018 Unknown Emphysema lung ([facility] [address] ICD-10-CM: J43.9 ICD-9-CM: 492.8  7/3/2018 Unknown Memory loss ICD-10-CM: R41.3 ICD-9-CM: 780.93  7/3/2018 Unknown Acute encephalopathy ICD-10-CM: G93.40 ICD-9-CM: 348.30  7/3/2018 Unknown Acute on chronic respiratory failure with hypercapnia and hypoxia Refused to have bipap. pCo2 70.5 Continue  bipap if he agrees  
 iv lasix and iv steroid  
 
 emphysema lung 
pulm on board and on nc o2  
   
 
Hypertensive urgency Resolved, continue Lasix , norvasc and coreg ,  
 
  
 
cll , leukocytosis  
  
 
Acute encephalopathy due to hypercapnia Improving,  
Ct head : no acute issue  
  
Cr elevated Will continue monitor renal function and  Urine out-put.  
  
 
 
Disposition : tbd Subjective: I feel ok, I need to go home Nurse: refused bipap Addendum : 
Back to room due to pt wants to leave AMA. He is alert and oriented, benefit for staying  explained to pt. Family member called. He agreed to stay finally. Need replacement asap. All questions have been answered. 55 total min's spent on patient care including >50% on counseling/coordinating care.   
 
Review of systems: 
 
General: No fevers or chills. Cardiovascular: No chest pain or pressure. No palpitations. Pulmonary: No shortness of breath. Gastrointestinal: No nausea, vomiting. Vital signs/Intake and Output: 
Visit Vitals  /85  Pulse 93  Temp 98.6 °F (37 °C)  Resp 15  Ht 5' 6\" (1.676 m)  Wt 91.5 kg (201 lb 11.5 oz)  SpO2 99%  BMI 32.56 kg/m2 Current Shift:    
Last three shifts:  07/07 1901 - 07/09 0700 In: 960 [P.O.:960] Out: 823 [LKRWI:125] Physical Exam: 
General: WD, WN. Alert, cooperative, no acute distress   
HEENT: NC, Atraumatic. PERRLA, anicteric sclerae. Lungs: CTA Bilaterally. No Wheezing/Rhonchi/Rales. Heart:  Regular  rhythm,  +murmur, No Rubs, No Gallops Abdomen: Soft, Non distended, Non tender.  +Bowel sounds, Extremities: No c/c/e Psych:   Not anxious or agitated. Neurologic:  No acute neurological deficit. Labs: Results:  
   
Chemistry Recent Labs  
   07/09/18 
 0400  07/08/18 
 1145  07/08/18 
 0330 GLU  124*  112*  103* NA  143  145  141  
K  4.7  4.9  4.1 CL  103  102  103 CO2  37*  42*  38* BUN  29*  26*  28* CREA  1.28  1.23  1.14  
CA  9.2  9.4  9.0 AGAP  3  1*  0*  
BUCR  23*  21*  25* AP  84  87  82  
TP  6.9  7.5  7.5 ALB  3.0*  3.3*  3.2*  
GLOB  3.9  4.2*  4.3* AGRAT  0.8  0.8  0.7* CBC w/Diff Recent Labs  
   07/09/18 
 0400  07/08/18 
 0330  07/07/18 
 0401 WBC  16.4*  26.3*  11.3 RBC  5.09  4.91  4.30* HGB  12.1*  11.7*  10.2* HCT  41.4  40.1  35.9*  
PLT  206  222  193 Cardiac Enzymes No results for input(s): CPK, CKND1, LOREN in the last 72 hours. No lab exists for component: Rik Ribera Coagulation No results for input(s): PTP, INR, APTT in the last 72 hours. No lab exists for component: INREXT, INREXT Lipid Panel No results found for: CHOL, CHOLPOCT, CHOLX, CHLST, CHOLV, 624774, HDL, LDL, LDLC, DLDLP, 348935, VLDLC, VLDL, TGLX, TRIGL, TRIGP, TGLPOCT, CHHD, CHHDX BNP No results for input(s): BNPP in the last 72 hours. Liver Enzymes Recent Labs  
   07/09/18 
 0400 TP  6.9 ALB  3.0* AP  84 SGOT  17 Thyroid Studies No results found for: T4, T3U, TSH, TSHEXT, TSHEXT Procedures/imaging: see electronic medical records for all procedures/Xrays and details which were not copied into this note but were reviewed prior to creation of Plan Roopa Murrell MD

## 2018-07-09 NOTE — PROGRESS NOTES
CRISTHIAN, Surinder, ED Summary, Intake/Output, MAR, Recent Results and Cardiac Rhythm TRANSFER - IN REPORT:    Verbal report received from ASIM Padron RN(name) on [employer].  being received from 3N(unit) for change in patient condition(Resp. distress)      Report consisted of patients Situation, Background, Assessment and   Recommendations(SBAR). Information from the following report(s) Intake/Output, Recent Results and Cardiac Rhythm NSR was reviewed with the receiving nurse. Opportunity for questions and clarification was provided. Assessment completed upon patients arrival to unit and care assumed. 1100: Assessment completed. Patient Alert and oriented to self and place. On Bipap and tolerating well. Lung sounds coarse and dim in bases. NSR on monitor. Bowel sounds active. Sitter at bedside. 1200: Reassessment completed. Patient refusing Bi-pap. On 2L NC. Dr. Zay Brewster made aware. RT, MD, and this RN educated importance of wearing bi-pap. States he will not wear it and there is no point in him wearing it and that he wants to go home. 1400: Patient agitated and getting out of bed. States he wants to speak to the doctor so he can go home. Dr. Canelo Ivan called. States he will come down if he can and order 0.5mg ativan prn for agitation. 1500: Wife in to see patient. States he can not go home and she will not agree to him leaving. Patient appears more agitated and restless and states he wants to leave and he doesn't need to be here. Agreed to EEG. 1700: SBP continues to stay elevated. Consulted pharmacy about re timing medications. Changed per recommendations per pharmacist. Dr. Canelo Ivan made aware. MD also updated on patient HTN. All prn orders given. Orders for scheduled hydralazine, 25mg po TID.     1800: Patient in bed sleeping. Bedside and Verbal shift change report given to Pietro Ambriz RN (oncoming nurse) by Elizabeth Vanegas RN (offgoing nurse).  Report included the following informatiSTSSurinder REDMAN, ED Summary, Intake/Output, MAR, Recent Results and Cardiac Rhythm ST.

## 2018-07-09 NOTE — PROGRESS NOTES
Problem: Self Care Deficits Care Plan (Adult)  Goal: *Acute Goals and Plan of Care (Insert Text)  Initial Occupational Therapy Goals (7/9/2018) Within 7 day(s):    1. Patient will perform grooming standing at sink with CGA x 2-3 minutes & 1-2 verbal cues for attention to task for increased independence with ADLs. 2. Patient will perform UB dressing with setup/Jeimy & 1-2 verbal cues for attention to task for increased independence with ADLs. 3. Patient will perform LB dressing with Jeimy & 1-2 verbal cues for attention to task  for increased independence with ADLs. 4. Patient will perform all aspects of toileting with Jeimy & 1-2 verbal cues for attention to task for increased independence in ADLs  5. Patient will independently apply energy conservation techniques with 1 verbal cue(s) for increased independence with ADLs. 6. Patient will utilize good body mechanics during ADLs with 1 verbal cue(s). 7. Patient will perform self-feeding w/ setup and trace spillage & 1-2 verbal cues for attention to task for increased independence and safety in ADLs. Outcome: Progressing Towards Goal  Occupational Therapy EVALUATION    Patient: Willy Nuñez (69 y.o. male)  Date: 7/9/2018  Primary Diagnosis: Hypercapnia        Precautions: CIWA,  Fall, Skin, Seizure    ASSESSMENT :  Based on the objective data described below, the patient presents with decreased functional strength, decreased functional balance, decreased overall activity tolerance limiting independence with ADLs. Patient found to have maximal spillage on gown, food attached to beard and offered change of gown w/ pt declining. Provided gown and offered again with pt agitated stating he needed to use the bathroom for a BM. Pt highly distractible w/ poor initiation. Pt becoming agitated with OT stating \"I'm waiting on you\". Pt performed best w/ tactile cues with gait belt and constant cues for task.  Pt with poor hygiene and ability to care for himself, d/t lack of initiation to utilize urinal or toilet, lack of problem solving vs initiation when soiled to get assistance. Pt would benefit from continued OT services to maximize independence and safety in ADLs. Education: Patient instructed on home safety, body mechanics for optimal respiratory effort, Energy Conservation/Work Simplification Techniques, adaptive strategies and adaptive dressing techniques including clothing modifications with patient verbalizing understanding at this time. Patient will benefit from skilled intervention to address the above impairments. Patients rehabilitation potential is considered to be Guarded  Factors which may influence rehabilitation potential include:   []             None noted  [x]             Mental ability/status  [x]             Medical condition  [x]             Home/family situation and support systems  [x]             Safety awareness  []             Pain tolerance/management  []             Other:      PLAN :  Recommendations and Planned Interventions:  [x]               Self Care Training                  [x]        Therapeutic Activities  [x]               Functional Mobility Training    [x]        Cognitive Retraining  [x]               Therapeutic Exercises           [x]        Endurance Activities  [x]               Balance Training                   [x]        Neuromuscular Re-Education  [x]               Visual/Perceptual Training     [x]   Home Safety Training  [x]               Patient Education                 [x]        Family Training/Education  []               Other (comment):    Frequency/Duration: Patient will be followed by occupational therapy 1-2 times per day/3-5 days per week to address goals. Discharge Recommendations: Rehab and Skilled Nursing Facility/LTC pending progress  Further Equipment Recommendations for Discharge: To Be Determined (TBD) at next level of care      SUBJECTIVE:   Patient stated I'm waiting on you.     OBJECTIVE DATA SUMMARY:     Past Medical History:   Diagnosis Date    Chronic kidney disease     stage 3 kidney disease    Chronic obstructive pulmonary disease (HCC)     CLL (chronic lymphocytic leukemia) (HCC)     Delirium     Em[facility] lung ([facility] [address]     Fall     Frequent hospital admissions     Gout     Hyperlipidemia     Hypoxemia     Memory loss     Noncompliance     Oxygen dependent     Pneumonia     Pulmonary nodules     Respiratory failure (HCC)     Risk for falls     Septic shock (HCC)     Sleep apnea     Tobacco abuse     Vitamin D deficiency      Past Surgical History:   Procedure Laterality Date    HX CATARACT REMOVAL      HX HERNIA REPAIR      HX NEPHROSTOMY       Barriers to Learning/Limitations: yes;  emotional, cognitive and sensory deficits-vision/hearing/speech  Compensate with: visual, verbal, tactile, kinesthetic cues/model    Prior Level of Function/Home Situation: Pt lives alone; spouse apparently left pt (per nursing); pt falling at home d/t ETOH  Home Situation  Home Environment: Apartment  # Steps to Enter: 0  One/Two Story Residence: One story  Living Alone: Yes  Support Systems: Family member(s)  Patient Expects to be Discharged to[de-identified] Apartment  Current DME Used/Available at Home: leora Dong  [x]  Right hand dominant   []  Left hand dominant    Cognitive/Behavioral Status:  Neurologic State: Alert;Confused;Irritable  Orientation Level: Disoriented to time;Oriented to person;Oriented to place;Oriented to situation  Cognition: Decreased attention/concentration;Memory loss; Impaired decision making;Poor safety awareness;Decreased command following  Safety/Judgement: Awareness of environment;Decreased awareness of need for assistance;Decreased awareness of need for safety; Lack of insight into deficits    Skin: L knee abrasion likely d/t fall; skin fragile d/t poor hygiene  Edema: no significant edema noted    Vision/Perceptual:     unable to assess d/t patient factors; pt w/ neutral gaze, but doesn't appear to be looking at OT during conversation  Pt not fully participatory in assessment process; will continue to monitor/assess      Coordination:  Coordination: Generally decreased, functional  Fine Motor Skills-Upper: Left Intact; Right Intact    Gross Motor Skills-Upper: Left Intact; Right Intact    Balance:  Sitting: Intact  Standing: Impaired; With support    Strength:  Strength: Generally decreased, functional  Tone & Sensation:  Tone: Normal  Sensation: Intact  Range of Motion:  AROM: Generally decreased, functional  PROM: Generally decreased, functional    Functional Mobility and Transfers for ADLs:  Bed Mobility:   Supine to sit: moderate assistance   Sit to supine: maximal assistance  Transfers:  Sit to Stand: Contact guard assistance; Additional time; Adaptive equipment;Minimum assistance (constant cues)  Bed to Chair: Minimum assistance; Adaptive equipment; Additional time (constant cues)   Toilet Transfer : Minimum assistance; Adaptive equipment; Additional time (constant cues; BSC)     ADL Assessment:   Feeding: Contact guard assistance (moderate spillage)    Oral Facial Hygiene/Grooming: Moderate assistance    Bathing: Maximum assistance    Upper Body Dressing: Maximum assistance    Lower Body Dressing: Maximum assistance; Total assistance    Toileting: Maximum assistance    ADL Intervention:  Feeding  Feeding Assistance: Contact guard assistance    Grooming  Washing Hands: Moderate assistance;Maximum assistance (hand wipes)    Upper Body [address]: Maximum assistance    Lower Body Dressing Assistance  Protective Undergarmet: Total assistance (dependent)  Socks: Total assistance (dependent)    Toileting  Toileting Assistance: Moderate assistance;Maximum assistance  Clothing Management: Total assistance (dependent)    Cognitive Retraining  Orientation Retraining: Reorienting;Situation;Time  Problem Solving: Inductive reason; Identifying the task; Identifying the problem;General alternative solution;Deductive reason  Executive Functions: Regulating behavior; Executing cognitive plans;Managing time  Organizing/Sequencing: Breaking task down;Prioritizing  Attention to Task: Single task;Distractibility  Maintains Attention For (Time):  (<10)  Safety/Judgement: Awareness of environment;Decreased awareness of need for assistance;Decreased awareness of need for safety; Lack of insight into deficits  Cues: Tactile cues provided;Verbal cues provided;Visual cues provided    Therapeutic Exercise:  YAA VIEIRA w/ poor participation    Pain:  Pre-treatment: 0/10  Post-treatment: 0/10    Activity Tolerance:   Patient able to stand <1 minute(s). Patient able to complete ADLs with constant rest breaks. Patient limited by cognition/strength/balance. Patient unsteady     Please refer to the flowsheet for vital signs taken during this treatment. After treatment:   [] Patient left in no apparent distress sitting up in chair  [x] Patient left in no apparent distress in bed  [x] Call bell left within reach  [x] Nursing notified/[facility]  [] Caregiver present  [x] Bed alarm activated    COMMUNICATION/EDUCATION:   [x] Home safety education was provided and the patient/caregiver indicated understanding. [] Patient/family have participated as able in goal setting and plan of care. [] Patient/family agree to work toward stated goals and plan of care. [x] Patient understands intent and goals of therapy, but is neutral about his/her participation. [x] Patient is unable to participate in goal setting and plan of care. Thank you for this referral.  Tahira Sanchez, OTR/L  Time Calculation: 24 mins    G-Codes (GP)  Self Care   Current  CM= 80-99%   Goal  CJ= 20-39%  The severity rating is based on the professional judgement & direct observation of Level of Assistance required for Functional Mobility and ADLs.     Eval Complexity: History: HIGH Complexity : Extensive review of history including physical, cognitive and psychosocial history ; Examination: HIGH Complexity : 5 or more performance deficits relating to physical, cognitive , or psychosocial skils that result in activity limitations and / or participation restrictions; Decision Making:HIGH Complexity : Patient presents with comorbidities that affect occupational performance.  Signifigant modification of tasks or assistance (eg, physical or verbal) with assessment (s) is necessary to enable patient to complete evaluation

## 2018-07-09 NOTE — PROGRESS NOTES
Al[address] 34. care of pt. Pt is incont of urine. Gave full head to toe bath with CHG wipes. Linen and gown changed. Pt repositioned in bed. Pt is alert to self, date and knows he came to the hospital for breathing problems. At times he knows he is in the hospital, but then other times he says things like he is going to the kitchen to make breakfast. He took of pulse ox. Replaced and changed electrodes. Pt laying in bed watching TV.   2035 - Pt frequently kicking legs off bed acting like he is getting up, when approach pt he gets frustrated because he is not allowed to do what he wants. Explained in the hospital and getting him better so he can go home. Removed pulse ox again, reapplied and pt told me the cord bothers him, so I taped cord per request. Medications given without difficulty. Gave PRN ativan since pt keeps trying to get out of bed and removing Pulse Ox. BP has been elevated since start of shift, tried readjusting cuff but still high. PRN Hydralazine given. Offered to urinated, assisted in holding urinal for awhile but pt never went. 2100 - Pt cleaned of incont urine  2200 - Pt sleeping soundly. 0000 - RT at bedside and placed pt on Bi-pap. PT sleeping soundly. No s/s of distress. 0100 - Incont care provided. 0500 - Pt slept well on Bi-pap, never tried to take it off. RT at beside completing ABG. Pt states has to use the restroom. Assisted in trying to use urinal, did not urinate. 0630 - X-ray at bedside. Pt refusing at first, eventually he allowed them to do a chest X-Ray. Pt is upset and wants to know why he is getting X-rays every day, says it is a way for the hospital to make money. Attempted to explain the reason why but pt not listening saying he needs to go home and see his primary care doctor. Wants to talk to his wife and waiting for the doctors to come in the morning so he can talk to them. Pt irritated this morning, which is probably his base line. Remains still A/O x3-4.

## 2018-07-09 NOTE — PROGRESS NOTES
[facility]                  Pulmonary, Critical Care, and Sleep Medicine     [facility] at [address] [facility]   [address] [place] [address], [address] [place] [address], [address] [place] [address] [address], [address] [place] [address], [place], [address]   Phone: [phone]. Fax: [phone] Phone: [phone]. Fax: [phone]          Name: Jung Allen. MRN: [id number]   : 1945 Hospital: [facility] [place]   Date: 2018        PCCM Note       IMPRESSION:   · AMS ? Due to CO2 narcosis but back to baseline, ? Due to HTN urgency and HTN encephalopathy, ? Delirium due to ETOH   · Advance dementia per notes  · Possible fluid overload due to elevated BP and right sided effusion   · COPD with FEV1 of 1.23 or 55% predicted but normal ratio follows with Dr. Marycruz Kelly on Spiriva and advair at home- Recent 6 min walk in may suggested requires home oxygen 2-4 liter  · RUPERTO unknown severity on BIPAP2 non compliant-- not able to tolerate but best response noted on BIPAP /  · Acute on chronic respiratory failure, hypoxic and hypercarbid, multifactorial.   · CKD, mild  · ETOH abuse, ?withdrawal.   · Leucocytosis   · CLL   PLAN:  -c/w bipap 24/ CWP qhs and prn  -c/w fio2 to keep spo2 >=92%  -c/w duoneb, brovana, pulmicort.   -may change duoneb to atrovent in am, if better.   -complete Provera. -AMS better, thought to be multifactorial. CT head unremarkable. NH2 <10 normal. EEG pending. On zyprexa. -BP better now, but still mildly elevated. -follow WBC. cx NGTD. Complete levaquin for 7 days as planned.   -placement: d/w , lives alone, will need placement.   -Other issues being managed by primary team and respective consultants. -DVT Px: heparin  -GI Px: low risk for stress ulcer.   -outpatient pulmonologist: Dr. Madeline Stanley. -CC time: 34 min    Transfer to [facility].         Jung Allen. is a 74 y.o. male who hx emphysema, HTN,memorry loss, CLL, alcohol abuse. Recurrent ICU transfer due to AMS.     7/9/18:  Used bipap last night. Now on NC o2  Wants to go home. Alert awake and oriented. No fever chills cough cp dyspnea wheezing  Was mildly agitated, responded well to benzo. No other overnight events. Prior to Admission medications    Medication Sig Start Date End Date Taking? Authorizing Provider   alfuzosin SR (UROXATRAL) 10 mg SR tablet Take 10 mg by mouth daily. Yes Ronni Hernandez MD   aspirin 81 mg chewable tablet Take 81 mg by mouth daily. Yes Ronni Hernandez MD   cholecalciferol (VITAMIN D3) 1,000 unit cap Take  by mouth daily. Yes Ronni Hernandez MD   cycloSPORINE (RESTASIS) 0.05 % ophthalmic emulsion Administer 1 Drop to both eyes two (2) times a day. Yes Ronni Hernandez MD   diclofenac (VOLTAREN) 1 % gel Apply  to affected area four (4) times daily. Yes Ronni Hernandez MD   famotidine (PEPCID) 20 mg tablet Take 20 mg by mouth two (2) times a day. Yes Ronni Hernandez MD   latanoprost (XALATAN) 0.005 % ophthalmic solution Administer 1 Drop to both eyes nightly. Yes Ronni Hernandez MD   allopurinol (ZYLOPRIM) 100 mg tablet Take  by mouth daily. Ronni Hernandez MD   carvedilol (COREG) 6.25 mg tablet Take  by mouth two (2) times daily (with meals). Ronni Hernandez MD   furosemide (LASIX) 40 mg tablet Take  by mouth daily. Ronni Hernandez MD   Oxygen     Ronni Hernandez MD   pravastatin sodium (PRAVASTATIN PO) Take  by mouth. Ronni Hernandez MD   tiotropium (SPIRIVA WITH HANDIHALER) 18 mcg inhalation capsule Take 1 Cap by inhalation daily. Ronni Hernandez MD   fluticasone/salmeterol (ADVAIR HFA IN) Take  by inhalation. Ronni Hernandez MD   ALBUTEROL IN Take  by inhalation.     Ronni Hernandez MD     Current Facility-Administered Medications   Medication Dose Route Frequency    methylPREDNISolone (PF) (SOLU-MEDROL) injection 40 mg  40 mg IntraVENous Q6H    OLANZapine (ZyPREXA) tablet 1.25 mg  1.25 mg Oral BID    hydrALAZINE (APRESOLINE) tablet 25 mg  25 mg Oral TID    heparin (porcine) injection 5,000 Units  5,000 Units SubCUTAneous Q8H    Thiamine Mononitrate (B-1) tablet 100 mg  100 mg Oral DAILY    folic acid (FOLVITE) tablet 1 mg  1 mg Oral DAILY    carvedilol (COREG) tablet 12.5 mg  12.5 mg Oral BID WITH MEALS    famotidine (PEPCID) tablet 20 mg  20 mg Oral Q12H    amLODIPine (NORVASC) tablet 10 mg  10 mg Oral DAILY    nitroglycerin (NITRODUR) 0.2 mg/hr patch 1 Patch  1 Patch TransDERmal Q24H    docusate sodium (COLACE) capsule 100 mg  100 mg Oral BID    arformoterol (BROVANA) neb solution 15 mcg  15 mcg Nebulization BID RT    budesonide (PULMICORT) 500 mcg/2 ml nebulizer suspension  500 mcg Nebulization BID RT    albuterol-ipratropium (DUO-NEB) 2.5 MG-0.5 MG/3 ML  3 mL Nebulization Q4H RT         Objective:   Vital Signs:    Visit Vitals    /67    Pulse (!) 105    Temp 98.4 °F (36.9 °C)    Resp 29    Ht 5' 6\" (1.676 m)    Wt 91.5 kg (201 lb 11.5 oz)    SpO2 97%    BMI 32.56 kg/m2       O2 Device: Nasal cannula   O2 Flow Rate (L/min): 1 l/min   Temp (24hrs), Av.1 °F (36.7 °C), Min:97.7 °F (36.5 °C), Max:98.6 °F (37 °C)       Intake/Output:   Last shift:      701 - 1900  In: 220 [P.O.:220]  Out: -   Last 3 shifts: 1901 -  07  In: 960 [P.O.:960]  Out: 653 [Urine:653]    Intake/Output Summary (Last 24 hours) at 18 1234  Last data filed at 18 0800   Gross per 24 hour   Intake              700 ml   Output              353 ml   Net              347 ml       Ventilator Settings:  Mode Rate Tidal Volume Pressure FiO2 PEEP            24 %       Peak airway pressure:      Minute ventilation: 13 l/min        Objective:  General: comfortable, no acute distress altert and oriented  HEENT: pupils reactive, sclera anicteric, EOM intact  Neck: No adenopathy or thyroid swelling, no lymphadenopathy or JVD, supple  CVS: S1S2 no murmurs  RS: Mod AE bilaterally, decrease at base. No wheezing.  No rhonchi. No accessory muscle use. Abd: soft, non tender, obese. Neuro: non focal, awake, alert  Extrm: no leg edema, clubbing or cyanosis  Lymph node: No obvious palpable lymph node appreciated in cervical region. Skin: no rash    CBC w/Diff Recent Labs      07/09/18   0400  07/08/18   0330  07/07/18   0401   WBC  16.4*  26.3*  11.3   RBC  5.09  4.91  4.30*   HGB  12.1*  11.7*  10.2*   HCT  41.4  40.1  35.9*   PLT  206  222  193        Chemistry Recent Labs      07/09/18   0400  07/08/18   1145  07/08/18   0330  07/07/18   0401   GLU  124*  112*  103*  139*   NA  143  145  141  141   K  4.7  4.9  4.1  4.6   CL  103  102  103  106   CO2  37*  42*  38*  35*   BUN  29*  26*  28*  38*   CREA  1.28  1.23  1.14  1.45*   CA  9.2  9.4  9.0  8.6   MG  2.1   --   2.2  2.4   AGAP  3  1*  0*  0*   BUCR  23*  21*  25*  26*   AP  84  87  82  72   TP  6.9  7.5  7.5  6.7   ALB  3.0*  3.3*  3.2*  2.8*   GLOB  3.9  4.2*  4.3*  3.9   AGRAT  0.8  0.8  0.7*  0.7*        Lactic Acid Lactic acid   Date Value Ref Range Status   06/16/2018 1.3 0.4 - 2.0 MMOL/L Final     No results for input(s): LAC in the last 72 hours. Micro  No results for input(s): SDES, CULT in the last 72 hours. No results for input(s): CULT in the last 72 hours. ABG Recent Labs      07/09/18   0524  07/08/18   0924  07/07/18   0552   PHI  7.375  7.393  7.316*   PCO2I  70.5*  69.4*  74.9*   PO2I  84  77*  79*   HCO3I  41.2*  42.3*  38.5*   FIO2I  0.24  24  28        Liver Enzymes Protein, total   Date Value Ref Range Status   07/09/2018 6.9 6.4 - 8.2 g/dL Final     Albumin   Date Value Ref Range Status   07/09/2018 3.0 (L) 3.4 - 5.0 g/dL Final     Globulin   Date Value Ref Range Status   07/09/2018 3.9 2.0 - 4.0 g/dL Final     A-G Ratio   Date Value Ref Range Status   07/09/2018 0.8 0.8 - 1.7   Final     AST (SGOT)   Date Value Ref Range Status   07/09/2018 17 15 - 37 U/L Final     Alk.  phosphatase   Date Value Ref Range Status   07/09/2018 84 45 - 117 U/L Final     Recent Labs      07/09/18   0400  07/08/18   1145  07/08/18   0330   TP  6.9  7.5  7.5   ALB  3.0*  3.3*  3.2*   GLOB  3.9  4.2*  4.3*   AGRAT  0.8  0.8  0.7*   SGOT  17  24  18   AP  84  87  82        Cardiac Enzymes No results found for: CPK, CK, CKMMB, CKMB, RCK3, CKMBT, CKNDX, CKND1, LOREN, TROPT, TROIQ, APRIL, TROPT, TNIPOC, BNP, BNPP     BNP No results found for: BNP, BNPP, XBNPT     Coagulation No results for input(s): PTP, INR, APTT in the last 72 hours. No lab exists for component: INREXT, INREXT      Thyroid  No results found for: T4, T3U, TSH, TSHEXT, TSHEXT       Lipid Panel No results found for: CHOL, CHOLPOCT, CHOLX, CHLST, CHOLV, 958848, HDL, LDL, LDLC, DLDLP, 602729, VLDLC, VLDL, TGLX, TRIGL, TRIGP, TGLPOCT, CHHD, CHHDX       Urinalysis Lab Results   Component Value Date/Time    Color YELLOW 07/03/2018 04:15 PM    Appearance CLEAR 07/03/2018 04:15 PM    Specific gravity 1.019 07/03/2018 04:15 PM    pH (UA) 7.0 07/03/2018 04:15 PM    Protein 300 (A) 07/03/2018 04:15 PM    Glucose NEGATIVE  07/03/2018 04:15 PM    Ketone NEGATIVE  07/03/2018 04:15 PM    Bilirubin NEGATIVE  07/03/2018 04:15 PM    Urobilinogen 0.2 07/03/2018 04:15 PM    Nitrites NEGATIVE  07/03/2018 04:15 PM    Leukocyte Esterase NEGATIVE  07/03/2018 04:15 PM    Epithelial cells FEW 07/03/2018 04:15 PM    Bacteria FEW (A) 07/03/2018 04:15 PM    WBC 0 to 3 07/03/2018 04:15 PM    RBC NEGATIVE  07/03/2018 04:15 PM        XR (Most Recent). CXR reviewed by me and compared with previous CXR   Results from Hospital Encounter encounter on 07/03/18   XR CHEST PORT   Narrative EXAM:Chest X-Ray      History: Hypoxia    Technique:  Portable Frontal View    Comparison: 07/08/2018, 07/07/2018    _______________    FINDINGS:    The trachea is midline. Stable midline cardiac silhouette and hilar vascular structures. Relative mild hypoinflation with minimal increased lateral right basilar  opacity.  Minimal increased lateral left basilar linear atelectasis/scarring. No  pneumothorax. Intact osseous structures. _______________         Impression IMPRESSION:    1. Mild hypoinflation with minimal increased bibasilar opacities, favoring  atelectasis with accentuation of the left basilar scar. CT (Most Recent)   Results from Hospital Encounter encounter on 07/03/18   CT HEAD WO CONT   Narrative EXAM: CT head    INDICATION: Altered mental status. Decreased alertness. COMPARISON: 6/16/2018    TECHNIQUE: Axial CT imaging of the head was performed without intravenous  contrast. One or more dose reduction techniques were used on this CT: automated  exposure control, adjustment of the mAs and/or kVp according to patient's size,  and iterative reconstruction techniques. The specific techniques utilized on  this CT exam have been documented in the patient's electronic medical record.    _______________    FINDINGS:    BRAIN:  Intraparenchymal hemorrhage: None. Mass effect/edema: None. Infarcts/encephalomalacia: None. White matter: Normal.  Brain volume: Normal for age. EXTRA-AXIAL SPACES:  Hemorrhage: None. Mass: None. Hydrocephalus: None. SINUSES: Clear. CALVARIUM: Unremarkable. OTHER: None.    _______________         Impression IMPRESSION:     No evidence of acute intracranial process. EKG No results found for this or any previous visit. ECHO  12/2017 [facility]    NORMAL LEFT VENTRICULAR CAVITY SIZE. MODERATE CONCENTRIC LEFT VENTRICULAR HYPERTROPHY. NORMAL LEFT VENTRICULAR SYSTOLIC FUNCTION WITH AN EJECTION FRACTION   ESTIMATED AT 60%. MILD  DIASTOLIC DYSFUNCTION. NORMAL LEFT ATRIAL SIZE. PRIOR ECHO REPORT FINDINGS ON 10-:     NORMAL LEFT VENTRICULAR CAVITY SIZE. MILD CONCENTRIC LEFT VENTRICULAR HYPERTROPHY. DYNAMIC  GLOBAL LEFT VENTRICULAR SYSTOLIC FUNCTION. EJECTION FRACTION IS 70%. MILD DIASTOLIC DYSFUNCTION. NORMAL CHAMBER SIZES.    NO HEMODYNAMICALLY SIGNIFICANT VALVULAR PATHOLOGY. NORMAL PULMONARY ARTERY PRESSURE OF 22 MMHG. PFT PFTs: 1/11/18    FVC  1.35 (44 %)  FEV1 1.23 (55 %)  FEV1/FVC  92 %  TLC 62 %   %  DL/VA 31 %       Other Interpretation: Ambulatory oximetry Does show significant desaturation;starting O2 sat 90 %; low O2 sat 85 %;   starting HR 78 bpm; high HR 99 bpm  Dyspnea scale: beginning 0; ending 0  Total distance : 330 ft   Total time: 3 minutes    Recommendation: Ambulatory O2 was recommended.          Clive Phillips MD 7/9/2018

## 2018-07-09 NOTE — PROGRESS NOTES
Problem: Pressure Injury - Risk of  Goal: *Prevention of pressure injury  Document Percy Scale and appropriate interventions in the flowsheet. Outcome: Progressing Towards Goal  Pressure Injury Interventions:  Sensory Interventions: Assess changes in LOC, Keep linens dry and wrinkle-free    Moisture Interventions: Absorbent underpads, Check for incontinence Q2 hours and as needed, Maintain skin hydration (lotion/cream)    Activity Interventions: Pressure redistribution bed/mattress(bed type), PT/OT evaluation    Mobility Interventions: Pressure redistribution bed/mattress (bed type), PT/OT evaluation    Nutrition Interventions: Document food/fluid/supplement intake, Discuss nutritional consult with provider, Offer support with meals,snacks and hydration    Friction and Shear Interventions: Minimize layers               Problem: Falls - Risk of  Goal: *Absence of Falls  Document Stan Fall Risk and appropriate interventions in the flowsheet.    Outcome: Progressing Towards Goal  Fall Risk Interventions:  Mobility Interventions: Bed/chair exit alarm, Patient to call before getting OOB, PT Consult for mobility concerns    Mentation Interventions: Bed/chair exit alarm, More frequent rounding, Reorient patient, Room close to nurse's station, Toileting rounds    Medication Interventions: Evaluate medications/consider consulting pharmacy, Patient to call before getting OOB, Teach patient to arise slowly    Elimination Interventions: Patient to call for help with toileting needs, Toilet paper/wipes in reach, Toileting schedule/hourly rounds, Urinal in reach    History of Falls Interventions: Bed/chair exit alarm, Investigate reason for fall, Room close to nurse's station        Problem: Patient Education: Go to Patient Education Activity  Goal: Patient/Family Education  Outcome: Progressing Towards Goal  Pt needs reminders, is impulsive

## 2018-07-09 NOTE — DIABETES MGMT
GLYCEMIC CONTROL PROGRESS NOTE:    -discussed in rounds, no known h/o DM  -BG in target range ICU: 140-180 mg/dL  -IV steroids on board 40 q 6 hours  Recent Glucose Results:   Lab Results   Component Value Date/Time     (H) 07/09/2018 04:00 AM     (H) 07/08/2018 11:45 AM     Jatin Fernández RN, MS  Glycemic Control Team  Pager [phone] (M-TH 8:30-5P)  *After Hours pager [phone]

## 2018-07-09 NOTE — PROGRESS NOTES
Problem: Mobility Impaired (Adult and Pediatric)  Goal: *Acute Goals and Plan of Care (Insert Text)  In 1-7 days pt will be able to perform:  ST.  Bed mobility:  Rolling L to R to L modified independent for positioning. 2.  Supine to sit to supine S with HR for meals. 3.  Sit to stand to sit S with RW in prep for ambulation. LT.  Gait:  Ambulate >150ft S with RW, WBAT, for home/community mobility. 2.  Activity tolerance: Tolerate up in chair 1-2 hours for ADLs. 3.  Patient/Family Education:  Patient/family to be independent with HEP for follow-up care and safe discharge. Outcome: Progressing Towards Goal  physical Therapy TREATMENT    Patient: Cayla Gupta. (69 y.o. male)  Date: 2018  Diagnosis: Hypercapnia Acute on chronic respiratory failure with hypoxia and hypercapnia (HCC)  Precautions: Fall, Skin, Seizure   Chart, physical therapy assessment, plan of care and goals were reviewed. ASSESSMENT:  Pt seen in ICU for PT session; found supine in bed asking to see \"person in charge\" so he can go home. Pt incontinent of bladder; nurse Saeid Herrera in to perform hygiene care and PT assist for pt education in bed mobility. Pt remains confused, but alert and cooperative. Able to transition to sit EOB with CGA/vc, demonstrate good sitting balance and transfer to stand with min/CGA/RW/vc. Balance during GT improved without path deviations today. Carie decreased, steps short and foot clearance decreased. Pt returned to sit EOB and to supine in normal manner with CGA. O2 remained in place and O2 sat during session in mid 90's throughout time. Pt left with all needs in reach, O2/monitors intact and bed alarm engaged. Pt unsafe to be without supervision at this time due to confusion, decreased awareness of need for safety and lack of insight into deficits.   Note also, pt with tendency to have fixed gaze forward during interactions (as if with visual deficit), however, turns to look at therapist accuratelyon command; nurse reports observing same - with pt then able to feed self without difficulty. Progression toward goals:  []      Improving appropriately and progressing toward goals  [x]      Improving slowly and progressing toward goals  []      Not making progress toward goals and plan of care will be adjusted     PLAN:  Patient continues to benefit from skilled intervention to address the above impairments. Continue treatment per established plan of care. Discharge Recommendations:  Home Health and [place]  Further Equipment Recommendations for Discharge:  rolling walker     SUBJECTIVE:   Patient stated I need the person in charge so I can go home. I don't need therapy anymore; I'm ready to go.     OBJECTIVE DATA SUMMARY:   Critical Behavior:  Neurologic State: Alert, Confused, Irritable  Orientation Level: Disoriented to time, Oriented to person, Oriented to place, Oriented to situation  Cognition: Decreased attention/concentration, Memory loss, Impaired decision making, Poor safety awareness, Decreased command following  Safety/Judgement: Awareness of environment, Decreased awareness of need for assistance, Decreased awareness of need for safety, Lack of insight into deficits  Functional Mobility Training:  Bed Mobility:  Rolling: Minimum assistance; Additional time; Other (comment) (vc/tc)  Supine to Sit: Contact guard assistance  Sit to Supine: Contact guard assistance  Scooting: Contact guard assistance;Minimum assistance  Transfers:  Sit to Stand: Contact guard assistance; Other (comment) (vc)  Stand to Sit: Contact guard assistance; Other (comment) (vc)  Balance:  Sitting: Intact  Standing: Intact; With support  Standing - Static: Good  Standing - Dynamic : Fair  Ambulation/Gait Training:  Distance (ft): 36 Feet (ft)  Assistive Device: Gait belt;Walker, rolling (O2 in place)  Ambulation - Level of Assistance: Contact guard assistance;Minimal assistance  Gait Abnormalities: Decreased step clearance  Base of Support: Narrowed  Speed/Carie: Slow;Pace decreased (<100 feet/min)  Step Length: Right shortened;Left shortened  Swing Pattern: Right asymmetrical;Left asymmetrical  Interventions: Safety awareness training;Verbal cues; Tactile cues  Pain:  Pain Scale 1: Numeric (0 - 10)  Pain Intensity 1: 0  Activity Tolerance:   Fair   Please refer to the flowsheet for vital signs taken during this treatment.   After treatment:   [] Patient left in no apparent distress sitting up in chair  [x] Patient left in no apparent distress in bed  [x] Call bell left within reach  [x] Nursing notified  [] Caregiver present  [x] Bed alarm activated      Seabron Silence, PT   Time Calculation: 26 mins

## 2018-07-09 NOTE — PROGRESS NOTES
0241  Report received from Chelsea Gandara at bedside using [employer]. All questions answered and clinicals reviewed. 1030  Interdisciplinary rounds completed with critical care team.  Plan of care reviewed and orders received. 1130  Up to bedside commode, however pt did not have BM. Incontinent of urine in bed. 1419  Pt up with physical therapy ambulating in room. Pt appears to be confused. He can tell staff that he is in the hospital but cannot name which one. He thinks he is a basement. He reorients well but has a very unsteady gait. 1725  Pt attempting to climb out of bed. Pulling of leads, pulse ox, BP cuff, and refusing to get back into bed. Attempts to calm patient unsuccessful. Pt states he wants to leave the hospital AMA. Pt stated that he has been in this hospital for 5 months, which is not accurate. Pts wife called and message left to call ICU. Pts son called and will be on the way to the hospital now. Nursing Supervisor, Lamar Lofton, notified, along with a page in to Dr. Williams Mendoza.    Patrick Mendoza at bedside. Pt sitting on edge of bed refusing to take po meds, specifically Ativan 0.5 mg po. Pt confused and unable to answer Dr. Carter Blair questions, stating he wants to go home and drink, \"Spencer Walker Black\". BP elevated. 56  Pts son at bedside attempting to convince patient to stay in the hospital and not discharge AMA. After much coaching, pt agrees to lie down in bed and is reconnected to monitor. BP normalized. Pt agrees to take po Ativan and it is given. 1815  Pt much more relaxed and less anxious. Pt resting in bed watching TV, no longer attempting to climb out of bed. 1930  CNA Sitter at bedside. Report given to Catherine Hudson RN, at bedside using SBAR format. All questions answered and clinicals reviewed. Pt much more calm. /69. O2 sats 97 % on 1LNC.

## 2018-07-10 LAB
BACTERIA SPEC CULT: NORMAL
BACTERIA SPEC CULT: NORMAL
GLUCOSE BLD STRIP.AUTO-MCNC: 120 MG/DL (ref 70–110)
GLUCOSE BLD STRIP.AUTO-MCNC: 180 MG/DL (ref 70–110)
MAGNESIUM SERPL-MCNC: 2.3 MG/DL (ref 1.6–2.6)
SERVICE CMNT-IMP: NORMAL
SERVICE CMNT-IMP: NORMAL

## 2018-07-10 PROCEDURE — 74011250636 HC RX REV CODE- 250/636: Performed by: INTERNAL MEDICINE

## 2018-07-10 PROCEDURE — 74011000250 HC RX REV CODE- 250: Performed by: HOSPITALIST

## 2018-07-10 PROCEDURE — 74011250637 HC RX REV CODE- 250/637: Performed by: INTERNAL MEDICINE

## 2018-07-10 PROCEDURE — 94640 AIRWAY INHALATION TREATMENT: CPT

## 2018-07-10 PROCEDURE — 74011250637 HC RX REV CODE- 250/637: Performed by: HOSPITALIST

## 2018-07-10 PROCEDURE — 94660 CPAP INITIATION&MGMT: CPT

## 2018-07-10 PROCEDURE — 77010033678 HC OXYGEN DAILY

## 2018-07-10 PROCEDURE — 83735 ASSAY OF MAGNESIUM: CPT | Performed by: HOSPITALIST

## 2018-07-10 PROCEDURE — 65660000000 HC RM CCU STEPDOWN

## 2018-07-10 PROCEDURE — 74011250636 HC RX REV CODE- 250/636: Performed by: HOSPITALIST

## 2018-07-10 PROCEDURE — 82962 GLUCOSE BLOOD TEST: CPT

## 2018-07-10 PROCEDURE — 36415 COLL VENOUS BLD VENIPUNCTURE: CPT | Performed by: HOSPITALIST

## 2018-07-10 PROCEDURE — 94760 N-INVAS EAR/PLS OXIMETRY 1: CPT

## 2018-07-10 RX ORDER — HALOPERIDOL 5 MG/ML
1 INJECTION INTRAMUSCULAR
Status: DISCONTINUED | OUTPATIENT
Start: 2018-07-10 | End: 2018-07-12 | Stop reason: HOSPADM

## 2018-07-10 RX ADMIN — AMLODIPINE BESYLATE 10 MG: 5 TABLET ORAL at 12:15

## 2018-07-10 RX ADMIN — METHYLPREDNISOLONE SODIUM SUCCINATE 40 MG: 40 INJECTION, POWDER, FOR SOLUTION INTRAMUSCULAR; INTRAVENOUS at 20:30

## 2018-07-10 RX ADMIN — IPRATROPIUM BROMIDE AND ALBUTEROL SULFATE 3 ML: .5; 3 SOLUTION RESPIRATORY (INHALATION) at 11:02

## 2018-07-10 RX ADMIN — HEPARIN SODIUM 5000 UNITS: 5000 INJECTION, SOLUTION INTRAVENOUS; SUBCUTANEOUS at 22:10

## 2018-07-10 RX ADMIN — FAMOTIDINE 20 MG: 20 TABLET ORAL at 12:14

## 2018-07-10 RX ADMIN — IPRATROPIUM BROMIDE AND ALBUTEROL SULFATE 3 ML: .5; 3 SOLUTION RESPIRATORY (INHALATION) at 07:19

## 2018-07-10 RX ADMIN — IPRATROPIUM BROMIDE AND ALBUTEROL SULFATE 3 ML: .5; 3 SOLUTION RESPIRATORY (INHALATION) at 19:53

## 2018-07-10 RX ADMIN — HYDRALAZINE HYDROCHLORIDE 25 MG: 25 TABLET, FILM COATED ORAL at 22:15

## 2018-07-10 RX ADMIN — CARVEDILOL 12.5 MG: 12.5 TABLET, FILM COATED ORAL at 07:12

## 2018-07-10 RX ADMIN — HYDRALAZINE HYDROCHLORIDE 25 MG: 25 TABLET, FILM COATED ORAL at 17:38

## 2018-07-10 RX ADMIN — ARFORMOTEROL TARTRATE 15 MCG: 15 SOLUTION RESPIRATORY (INHALATION) at 07:18

## 2018-07-10 RX ADMIN — CARVEDILOL 12.5 MG: 12.5 TABLET, FILM COATED ORAL at 20:30

## 2018-07-10 RX ADMIN — FAMOTIDINE 20 MG: 20 TABLET ORAL at 20:30

## 2018-07-10 RX ADMIN — HYDRALAZINE HYDROCHLORIDE 25 MG: 25 TABLET, FILM COATED ORAL at 12:14

## 2018-07-10 RX ADMIN — METHYLPREDNISOLONE SODIUM SUCCINATE 40 MG: 40 INJECTION, POWDER, FOR SOLUTION INTRAMUSCULAR; INTRAVENOUS at 03:43

## 2018-07-10 RX ADMIN — FOLIC ACID 1 MG: 1 TABLET ORAL at 12:14

## 2018-07-10 RX ADMIN — IPRATROPIUM BROMIDE AND ALBUTEROL SULFATE 3 ML: .5; 3 SOLUTION RESPIRATORY (INHALATION) at 23:33

## 2018-07-10 RX ADMIN — HALOPERIDOL LACTATE 1 MG: 5 INJECTION, SOLUTION INTRAMUSCULAR at 17:41

## 2018-07-10 RX ADMIN — DOCUSATE SODIUM 100 MG: 100 CAPSULE, LIQUID FILLED ORAL at 12:14

## 2018-07-10 RX ADMIN — OLANZAPINE 1.25 MG: 2.5 TABLET ORAL at 20:29

## 2018-07-10 RX ADMIN — HYDRALAZINE HYDROCHLORIDE 20 MG: 20 INJECTION INTRAMUSCULAR; INTRAVENOUS at 00:13

## 2018-07-10 RX ADMIN — LORAZEPAM 0.5 MG: 0.5 TABLET ORAL at 00:09

## 2018-07-10 RX ADMIN — BUDESONIDE 500 MCG: 0.5 INHALANT RESPIRATORY (INHALATION) at 07:19

## 2018-07-10 RX ADMIN — IPRATROPIUM BROMIDE AND ALBUTEROL SULFATE 3 ML: .5; 3 SOLUTION RESPIRATORY (INHALATION) at 15:21

## 2018-07-10 RX ADMIN — OLANZAPINE 1.25 MG: 2.5 TABLET ORAL at 12:15

## 2018-07-10 RX ADMIN — LORAZEPAM 0.5 MG: 0.5 TABLET ORAL at 20:30

## 2018-07-10 RX ADMIN — Medication 100 MG: at 12:14

## 2018-07-10 RX ADMIN — METHYLPREDNISOLONE SODIUM SUCCINATE 40 MG: 40 INJECTION, POWDER, FOR SOLUTION INTRAMUSCULAR; INTRAVENOUS at 12:15

## 2018-07-10 RX ADMIN — HEPARIN SODIUM 5000 UNITS: 5000 INJECTION, SOLUTION INTRAVENOUS; SUBCUTANEOUS at 06:58

## 2018-07-10 RX ADMIN — BUDESONIDE 500 MCG: 0.5 INHALANT RESPIRATORY (INHALATION) at 19:53

## 2018-07-10 RX ADMIN — HEPARIN SODIUM 5000 UNITS: 5000 INJECTION, SOLUTION INTRAVENOUS; SUBCUTANEOUS at 16:04

## 2018-07-10 RX ADMIN — DOCUSATE SODIUM 100 MG: 100 CAPSULE, LIQUID FILLED ORAL at 20:29

## 2018-07-10 NOTE — PROGRESS NOTES
D/c plan: TBD  pateint with sitter today and must be sitter free for 24 hours. Met with patient at bedside. Patient states he wants to go home when he is d/c however patient does live alone. Patient on bipap today. Patient has wife but she does not live with him and 3 children. Patient has accepting facilities. Will discuss this with patient and his wife.  Telephone left message for wifee

## 2018-07-10 NOTE — INTERDISCIPLINARY ROUNDS
Interdisciplinary team rounds were held 7/10/2018 with the following team members:Care Management, Nursing, Physical Therapy and Physician and the patient. Plan of care discussed. See clinical pathway and/or care plan for interventions and desired outcomes. Discontinue sitter, patient calm and cooperative, responding to questions appropriately. Discharge planing home with home health vs rehab?

## 2018-07-10 NOTE — PROGRESS NOTES
PATIENT AWAKE/ALERT/CONFUSED. RR 40. WHEN ASKED IF HE WAS SOB, PATIENT STATED THAT IF HE WAS, HE'D LET US KNOW. NURSING AWARE. SCHEDULED NEB TX GIVEN.

## 2018-07-10 NOTE — PROGRESS NOTES
PATIENT AWAKE/CONFUSED. REMOVED FROM BIPAP AND PLACED ON 2L NC.  VERBALIZED THAT HE IS BREATHING OKAY DESPITE RR 32-40. SPO2 95%. SCHEDULED NEB TX GIVEN. CONTINUING TO MONITOR CLOSELY. PER DR. CHELI WYNN, CONTINUE TO ATTEMPT TO PLACE PATIENT ON BIPAP QHS BUT MAY WEAR INTERMITTENTLY DURING DAY AS NEEDED.

## 2018-07-10 NOTE — PROGRESS NOTES
1930- Report and care received, assessment completed per flow sheet. Alert, confused, currently pleasant. Sitter at bedside. Telemetry status noted. 2156- Report given to receiving nurse on [facility]. 2222- Transferred to [address] [place] [address] via wheel chair, placed on telemetry monitor, receiving RN in to see.

## 2018-07-10 NOTE — PROGRESS NOTES
Problem: Mobility Impaired (Adult and Pediatric)  Goal: *Acute Goals and Plan of Care (Insert Text)  In 1-7 days pt will be able to perform:  ST.  Bed mobility:  Rolling L to R to L modified independent for positioning. 2.  Supine to sit to supine S with HR for meals. 3.  Sit to stand to sit S with RW in prep for ambulation. LT.  Gait:  Ambulate >150ft S with RW, WBAT, for home/community mobility. 2.  Activity tolerance: Tolerate up in chair 1-2 hours for ADLs. 3.  Patient/Family Education:  Patient/family to be independent with HEP for follow-up care and safe discharge. Outcome: Not Progressing Towards Goal  Pt refused PT due to:  [x]  \"I'm not moving\" (Pt is very angry about attempts to assess)  []  Eating  []  Pain  []  Pt lethargic  []  Off Unit  Will f/u tomorrow. Thank you.   Deuce Orourke, PTA

## 2018-07-10 NOTE — PROGRESS NOTES
Problem: Mobility Impaired (Adult and Pediatric)  Goal: *Acute Goals and Plan of Care (Insert Text)  In 1-7 days pt will be able to perform:  ST.  Bed mobility:  Rolling L to R to L modified independent for positioning. 2.  Supine to sit to supine S with HR for meals. 3.  Sit to stand to sit S with RW in prep for ambulation. LT.  Gait:  Ambulate >150ft S with RW, WBAT, for home/community mobility. 2.  Activity tolerance: Tolerate up in chair 1-2 hours for ADLs. 3.  Patient/Family Education:  Patient/family to be independent with HEP for follow-up care and safe discharge. Outcome: Not Progressing Towards Goal  Pt refused PT due to:  []  Nausea/vomiting  [x]  Eating  []  Pain  []  Pt lethargic  []  Off Unit  Will f/u later, as pt's schedule allows. Thank you.   Maura Sanchez, PTA

## 2018-07-10 NOTE — PROGRESS NOTES
Hospitalist Progress Note-critical care note Patient: Ninfa Fonseca MRN: [id number]  CSN: [id number] YOB: 1945  Age: 74 y.o. Sex: male DOA: 7/3/2018 LOS:  LOS: 7 days Chief complaint: acute on chronic respiratory failure , emphysema lung, copd exacerbation Assessment/Plan Hospital Problems  Never Reviewed Codes Class Noted POA Elevated serum creatinine ICD-10-CM: R79.89 ICD-9-CM: 790.99  7/5/2018 Unknown Hypercapnia ICD-10-CM: R06.89 
ICD-9-CM: 786.09  7/3/2018 Unknown * (Principal)Acute on chronic respiratory failure with hypoxia and hypercapnia (HCC) ICD-10-CM: J96.21, J96.22 
ICD-9-CM: 518.84, 786.09, 799.02  7/3/2018 Unknown CLL (chronic lymphocytic leukemia) (HCC) ICD-10-CM: C91.90 ICD-9-CM: 204.10  7/3/2018 Unknown Emphysema lung ([address] ICD-10-CM: J43.9 ICD-9-CM: 492.8  7/3/2018 Unknown Memory loss ICD-10-CM: R41.3 ICD-9-CM: 780.93  7/3/2018 Unknown Acute encephalopathy ICD-10-CM: G93.40 ICD-9-CM: 348.30  7/3/2018 Unknown Acute on chronic respiratory failure with hypercapnia and hypoxia Need bipap AM. Need bipap at night and prn at day time  
 
 
 emphysema lung, 
pulm on board and on nc o2  
breathing tx Hypertensive urgency Resolved, continue Lasix , norvasc and coreg ,  
 
  
 
cll , leukocytosis  
  
 
Acute encephalopathy due to hypercapnia Improving per bipap Ct head : no acute issue  
  
Cr elevated Will continue monitor renal function and  Urine out-put.  
  
 
 
Disposition : tbd Subjective: I feel ok, I need to go home Nurse: refused bipap Subjective: want to go home Nurse: ativan  And halodol given last night He needs snf placement, he lives by himself and not safe to d/c him home. Please call family if he wants to leave  AMA. Review of systems: 
 
General: No fevers or chills. Cardiovascular: No chest pain or pressure. No palpitations. Pulmonary: No shortness of breath. Gastrointestinal: No nausea, vomiting. Vital signs/Intake and Output: 
Visit Vitals  /88 (BP 1 Location: Right arm, BP Patient Position: At rest)  Pulse 93  Temp 98.1 °F (36.7 °C)  Resp 28  Ht 5' 6\" (1.676 m)  Wt 90.6 kg (199 lb 12.8 oz)  SpO2 91%  BMI 32.25 kg/m2 Current Shift:  07/10 0701 - 07/10 1900 In: 180 [P.O.:180] Out: - Last three shifts:  07/08 1901 - 07/10 0700 In: 26 [P.O.:460] Out: 390 [NLOSF:394] Physical Exam: 
General: WD, WN. Alert, cooperative, no acute distress   
HEENT: NC, Atraumatic. PERRLA, anicteric sclerae. Lungs: CTA Bilaterally. No Wheezing/Rhonchi/Rales. Heart:  Regular  rhythm,  +murmur, No Rubs, No Gallops Abdomen: Soft, Non distended, Non tender.  +Bowel sounds, Extremities: No c/c/e Psych:   Not anxious , mild agitated. Neurologic:  No acute neurological deficit. Labs: Results:  
   
Chemistry Recent Labs  
   07/09/18 
 0400  07/08/18 
 1145  07/08/18 
 0330 GLU  124*  112*  103* NA  143  145  141  
K  4.7  4.9  4.1 CL  103  102  103 CO2  37*  42*  38* BUN  29*  26*  28* CREA  1.28  1.23  1.14  
CA  9.2  9.4  9.0 AGAP  3  1*  0*  
BUCR  23*  21*  25* AP  84  87  82  
TP  6.9  7.5  7.5 ALB  3.0*  3.3*  3.2*  
GLOB  3.9  4.2*  4.3* AGRAT  0.8  0.8  0.7* CBC w/Diff Recent Labs  
   07/09/18 
 0400  07/08/18 
 0330 WBC  16.4*  26.3*  
RBC  5.09  4.91  
HGB  12.1*  11.7* HCT  41.4  40.1 PLT  206  222 Cardiac Enzymes No results for input(s): CPK, CKND1, LOREN in the last 72 hours. No lab exists for component: Ryan Vikas Coagulation No results for input(s): PTP, INR, APTT in the last 72 hours. No lab exists for component: INREXT, INREXT Lipid Panel No results found for: CHOL, CHOLPOCT, CHOLX, CHLST, CHOLV, 446111, HDL, LDL, LDLC, DLDLP, 770693, VLDLC, VLDL, TGLX, TRIGL, TRIGP, TGLPOCT, CHHD, CHHDX  
BNP No results for input(s): BNPP in the last 72 hours. Liver Enzymes Recent Labs  
   07/09/18 
 0400 TP  6.9 ALB  3.0* AP  84 SGOT  17 Thyroid Studies No results found for: T4, T3U, TSH, TSHEXT, TSHEXT Procedures/imaging: see electronic medical records for all procedures/Xrays and details which were not copied into this note but were reviewed prior to creation of Plan Dacia Cuevas MD

## 2018-07-10 NOTE — ROUTINE PROCESS
Bedside and Verbal shift change report given to KEYLA Aguila (oncoming nurse) by Roman (offgoing nurse). Report included the following information SBAR, Kardex, Intake/Output, MAR, Recent Results and Cardiac Rhythm SR/ST.

## 2018-07-10 NOTE — PROGRESS NOTES
UPDATED DR. Lugo Pae OF EVENTS. PATIENT WILL NOW OPEN HIS EYES WHEN ASKED, BUT STILL LETHARGIC. RR 18 ON BIPAP @ 24/12 WITH FIO2 24%. SPO2 95%.

## 2018-07-10 NOTE — PROGRESS NOTES
PATIENT FOUND ON 4L NC WITH SPO2 97%. DECREASED TO 2L. HAD RECEIVED HALDOL @ 2330 AS WELL AS ATIVAN DURING THE NIGHT. DECREASED LOC NOTED THIS AM.  BS DECREASED. RR 40 AND SHALLOW. PLACED PATIENT ON BIPAP @ 24/12 WITH FIO2 24%. SITTER AT Garth Davidson, RN, NOTIFIED. MONITORING CLOSELY.   PAGING DR. Darleen Nieto.

## 2018-07-10 NOTE — PROGRESS NOTES
[facility]                  Pulmonary, Critical Care, and Sleep Medicine     Name: Viry Almodovar. MRN: [id number]   : 1945 Hospital: [facility] [place]   Date: 7/10/2018        [facility] Note       IMPRESSION:   · AMS ? Due to CO2 narcosis but back to baseline, ? Due to HTN urgency and HTN encephalopathy, ? Delirium due to ETOH   · Advance dementia per notes  · Possible fluid overload due to elevated BP and right sided effusion   · COPD with FEV1 of 1.23 or 55% predicted but normal ratio follows with Dr. Davy Denney on Spiriva and advair at home- Recent 6 min walk in may suggested requires home oxygen 2-4 liter  · RUPERTO unknown severity on BIPAP2 non compliant-- not able to tolerate but best response noted on BIPAP /  · Acute on chronic respiratory failure, hypoxic and hypercarbid, multifactorial.   · CKD, mild  · ETOH abuse, ?withdrawal.   · Leucocytosis   · CLL   PLAN:  -c/w bipap / CWP qhs and prn. Used for 3.5 hrs today am for respiratory distress after haldol and ativan last night for agitation. Respiratory status normal now. -c/w fio2 to keep spo2 >=92%  -c/w duoneb, pulmicort. D/c brovana. -Reduce steroids to 40 bid. And taper as tolerated. -completed Provera on 18  -AMS better, thought to be multifactorial. CT head unremarkable. NH2 <10 normal. EEG pending. On zyprexa.   -follow WBC, improving (CLL history). cx NGTD. Complete levaquin for 7 days as planned.   -placement: d/w , lives alone, will need placement.   -Other issues being managed by primary team and respective consultants. -DVT Px: heparin  -GI Px: low risk for stress ulcer.   -outpatient pulmonologist: Dr. Florida Nageotte. Viry Kasper is a 74 y.o. male who hx emphysema, HTN,memorry loss, CLL, alcohol abuse. Recurrent ICU transfer due to AMS. 7/10/18:  Transferred out of icu. Was mildly agitated, responded well to benzo and haldol.  After that he had mild tachypnea and mild confusion this am, used bipap for 3.5 hrs with resolved issues. D/w RT. Now on NC o2   Wants to go home. Waiting for placement   Alert awake and oriented. No fever chills cough cp dyspnea wheezing  No other overnight events. Prior to Admission medications    Medication Sig Start Date End Date Taking? Authorizing Provider   alfuzosin SR (UROXATRAL) 10 mg SR tablet Take 10 mg by mouth daily. Yes Ronni Hernandez MD   aspirin 81 mg chewable tablet Take 81 mg by mouth daily. Yes Ronni Hernandez MD   cholecalciferol (VITAMIN D3) 1,000 unit cap Take  by mouth daily. Yes Ronni Hernandez MD   cycloSPORINE (RESTASIS) 0.05 % ophthalmic emulsion Administer 1 Drop to both eyes two (2) times a day. Yes Ronni Hernandez MD   diclofenac (VOLTAREN) 1 % gel Apply  to affected area four (4) times daily. Yes Ronni Hernandez MD   famotidine (PEPCID) 20 mg tablet Take 20 mg by mouth two (2) times a day. Yes Ronni Hernandez MD   latanoprost (XALATAN) 0.005 % ophthalmic solution Administer 1 Drop to both eyes nightly. Yes Ronni Hernandez MD   allopurinol (ZYLOPRIM) 100 mg tablet Take  by mouth daily. Rnoni Hernandez MD   carvedilol (COREG) 6.25 mg tablet Take  by mouth two (2) times daily (with meals). Ronni Hernandez MD   furosemide (LASIX) 40 mg tablet Take  by mouth daily. Ronni Hernandez MD   Oxygen     Ronni Hernandez MD   pravastatin sodium (PRAVASTATIN PO) Take  by mouth. Ronni Hernandez MD   tiotropium (SPIRIVA WITH HANDIHALER) 18 mcg inhalation capsule Take 1 Cap by inhalation daily. Ronni Hernandez MD   fluticasone/salmeterol (ADVAIR HFA IN) Take  by inhalation. Ronni Hernandez MD   ALBUTEROL IN Take  by inhalation.     Ronni Hernandez MD     Current Facility-Administered Medications   Medication Dose Route Frequency    methylPREDNISolone (PF) (SOLU-MEDROL) injection 40 mg  40 mg IntraVENous Q6H    OLANZapine (ZyPREXA) tablet 1.25 mg  1.25 mg Oral BID    hydrALAZINE (APRESOLINE) tablet 25 mg  25 mg Oral TID    heparin (porcine) injection 5,000 Units  5,000 Units SubCUTAneous Q8H    Thiamine Mononitrate (B-1) tablet 100 mg  100 mg Oral DAILY    folic acid (FOLVITE) tablet 1 mg  1 mg Oral DAILY    carvedilol (COREG) tablet 12.5 mg  12.5 mg Oral BID WITH MEALS    famotidine (PEPCID) tablet 20 mg  20 mg Oral Q12H    amLODIPine (NORVASC) tablet 10 mg  10 mg Oral DAILY    nitroglycerin (NITRODUR) 0.2 mg/hr patch 1 Patch  1 Patch TransDERmal Q24H    docusate sodium (COLACE) capsule 100 mg  100 mg Oral BID    arformoterol (BROVANA) neb solution 15 mcg  15 mcg Nebulization BID RT    budesonide (PULMICORT) 500 mcg/2 ml nebulizer suspension  500 mcg Nebulization BID RT    albuterol-ipratropium (DUO-NEB) 2.5 MG-0.5 MG/3 ML  3 mL Nebulization Q4H RT         Objective:   Vital Signs:    Visit Vitals    /88 (BP 1 Location: Right arm, BP Patient Position: At rest)    Pulse 93    Temp 98.1 °F (36.7 °C)    Resp 28    Ht 5' 6\" (1.676 m)    Wt 90.6 kg (199 lb 12.8 oz)    SpO2 91%    BMI 32.25 kg/m2       O2 Device: Nasal cannula   O2 Flow Rate (L/min): 2 l/min   Temp (24hrs), Av.2 °F (36.8 °C), Min:97.1 °F (36.2 °C), Max:99.4 °F (37.4 °C)       Intake/Output:   Last shift:      07/10 0701 - 07/10 1900  In: 180 [P.O.:180]  Out: -   Last 3 shifts: 1901 - 07/10 0700  In: 460 [P.O.:460]  Out: 353 [Urine:353]    Intake/Output Summary (Last 24 hours) at 07/10/18 1403  Last data filed at 07/10/18 1144   Gross per 24 hour   Intake              180 ml   Output              350 ml   Net             -170 ml       Ventilator Settings:  Mode Rate Tidal Volume Pressure FiO2 PEEP            24 %       Peak airway pressure:      Minute ventilation: 11.1 l/min        Objective:  General: comfortable, no acute distress altert and oriented  HEENT: pupils reactive, sclera anicteric, EOM intact  Neck: No adenopathy or thyroid swelling, no lymphadenopathy or JVD, supple  CVS: S1S2 no murmurs  RS: Mod AE bilaterally, decrease at base.  No wheezing. No rhonchi. No accessory muscle use. Abd: soft, non tender, obese. Neuro: non focal, awake, alert  Extrm: no leg edema, clubbing or cyanosis  Lymph node: No obvious palpable lymph node appreciated in cervical region. Skin: no rash    CBC w/Diff Recent Labs      07/09/18   0400  07/08/18   0330   WBC  16.4*  26.3*   RBC  5.09  4.91   HGB  12.1*  11.7*   HCT  41.4  40.1   PLT  206  222        Chemistry Recent Labs      07/10/18   0421  07/09/18   0400  07/08/18   1145  07/08/18   0330   GLU   --   124*  112*  103*   NA   --   143  145  141   K   --   4.7  4.9  4.1   CL   --   103  102  103   CO2   --   37*  42*  38*   BUN   --   29*  26*  28*   CREA   --   1.28  1.23  1.14   CA   --   9.2  9.4  9.0   MG  2.3  2.1   --   2.2   AGAP   --   3  1*  0*   BUCR   --   23*  21*  25*   AP   --   84  87  82   TP   --   6.9  7.5  7.5   ALB   --   3.0*  3.3*  3.2*   GLOB   --   3.9  4.2*  4.3*   AGRAT   --   0.8  0.8  0.7*        Lactic Acid Lactic acid   Date Value Ref Range Status   06/16/2018 1.3 0.4 - 2.0 MMOL/L Final     No results for input(s): LAC in the last 72 hours. Micro  No results for input(s): SDES, CULT in the last 72 hours. No results for input(s): CULT in the last 72 hours. ABG Recent Labs      07/09/18   0524  07/08/18   0924   PHI  7.375  7.393   PCO2I  70.5*  69.4*   PO2I  84  77*   HCO3I  41.2*  42.3*   FIO2I  0.24  24        Liver Enzymes Protein, total   Date Value Ref Range Status   07/09/2018 6.9 6.4 - 8.2 g/dL Final     Albumin   Date Value Ref Range Status   07/09/2018 3.0 (L) 3.4 - 5.0 g/dL Final     Globulin   Date Value Ref Range Status   07/09/2018 3.9 2.0 - 4.0 g/dL Final     A-G Ratio   Date Value Ref Range Status   07/09/2018 0.8 0.8 - 1.7   Final     AST (SGOT)   Date Value Ref Range Status   07/09/2018 17 15 - 37 U/L Final     Alk.  phosphatase   Date Value Ref Range Status   07/09/2018 84 45 - 117 U/L Final     Recent Labs      07/09/18   0400  07/08/18   1145 07/08/18   0330   TP  6.9  7.5  7.5   ALB  3.0*  3.3*  3.2*   GLOB  3.9  4.2*  4.3*   AGRAT  0.8  0.8  0.7*   SGOT  17  24  18   AP  84  87  82        Cardiac Enzymes No results found for: CPK, CK, CKMMB, CKMB, RCK3, CKMBT, CKNDX, CKND1, LOREN, TROPT, TROIQ, APRIL, TROPT, TNIPOC, BNP, BNPP     BNP No results found for: BNP, BNPP, XBNPT     Coagulation No results for input(s): PTP, INR, APTT in the last 72 hours. No lab exists for component: INREXT, INREXT      Thyroid  No results found for: T4, T3U, TSH, TSHEXT, TSHEXT       Lipid Panel No results found for: CHOL, CHOLPOCT, CHOLX, CHLST, CHOLV, 361143, HDL, LDL, LDLC, DLDLP, 412704, VLDLC, VLDL, TGLX, TRIGL, TRIGP, TGLPOCT, CHHD, CHHDX       Urinalysis Lab Results   Component Value Date/Time    Color YELLOW 07/03/2018 04:15 PM    Appearance CLEAR 07/03/2018 04:15 PM    Specific gravity 1.019 07/03/2018 04:15 PM    pH (UA) 7.0 07/03/2018 04:15 PM    Protein 300 (A) 07/03/2018 04:15 PM    Glucose NEGATIVE  07/03/2018 04:15 PM    Ketone NEGATIVE  07/03/2018 04:15 PM    Bilirubin NEGATIVE  07/03/2018 04:15 PM    Urobilinogen 0.2 07/03/2018 04:15 PM    Nitrites NEGATIVE  07/03/2018 04:15 PM    Leukocyte Esterase NEGATIVE  07/03/2018 04:15 PM    Epithelial cells FEW 07/03/2018 04:15 PM    Bacteria FEW (A) 07/03/2018 04:15 PM    WBC 0 to 3 07/03/2018 04:15 PM    RBC NEGATIVE  07/03/2018 04:15 PM        XR (Most Recent). CXR reviewed by me and compared with previous CXR   Results from Hospital Encounter encounter on 07/03/18   XR CHEST PORT   Narrative EXAM:Chest X-Ray      History: Hypoxia    Technique:  Portable Frontal View    Comparison: 07/08/2018, 07/07/2018    _______________    FINDINGS:    The trachea is midline. Stable midline cardiac silhouette and hilar vascular structures. Relative mild hypoinflation with minimal increased lateral right basilar  opacity. Minimal increased lateral left basilar linear atelectasis/scarring. No  pneumothorax.   Intact osseous structures. _______________         Impression IMPRESSION:    1. Mild hypoinflation with minimal increased bibasilar opacities, favoring  atelectasis with accentuation of the left basilar scar. CT (Most Recent)   Results from Hospital Encounter encounter on 07/03/18   CT HEAD WO CONT   Narrative EXAM: CT head    INDICATION: Altered mental status. Decreased alertness. COMPARISON: 6/16/2018    TECHNIQUE: Axial CT imaging of the head was performed without intravenous  contrast. One or more dose reduction techniques were used on this CT: automated  exposure control, adjustment of the mAs and/or kVp according to patient's size,  and iterative reconstruction techniques. The specific techniques utilized on  this CT exam have been documented in the patient's electronic medical record.    _______________    FINDINGS:    BRAIN:  Intraparenchymal hemorrhage: None. Mass effect/edema: None. Infarcts/encephalomalacia: None. White matter: Normal.  Brain volume: Normal for age. EXTRA-AXIAL SPACES:  Hemorrhage: None. Mass: None. Hydrocephalus: None. SINUSES: Clear. CALVARIUM: Unremarkable. OTHER: None.    _______________         Impression IMPRESSION:     No evidence of acute intracranial process. EKG No results found for this or any previous visit. ECHO  12/2017 [facility]    NORMAL LEFT VENTRICULAR CAVITY SIZE. MODERATE CONCENTRIC LEFT VENTRICULAR HYPERTROPHY. NORMAL LEFT VENTRICULAR SYSTOLIC FUNCTION WITH AN EJECTION FRACTION   ESTIMATED AT 60%. MILD  DIASTOLIC DYSFUNCTION. NORMAL LEFT ATRIAL SIZE. PRIOR ECHO REPORT FINDINGS ON 10-:     NORMAL LEFT VENTRICULAR CAVITY SIZE. MILD CONCENTRIC LEFT VENTRICULAR HYPERTROPHY. DYNAMIC  GLOBAL LEFT VENTRICULAR SYSTOLIC FUNCTION. EJECTION FRACTION IS 70%. MILD DIASTOLIC DYSFUNCTION. NORMAL CHAMBER SIZES. NO HEMODYNAMICALLY SIGNIFICANT VALVULAR PATHOLOGY. NORMAL PULMONARY ARTERY PRESSURE OF 22 MMHG.      PFT PFTs: 1/11/18    FVC  1.35 (44 %)  FEV1 1.23 (55 %)  FEV1/FVC  92 %  TLC 62 %   %  DL/VA 31 %       Other Interpretation: Ambulatory oximetry Does show significant desaturation;starting O2 sat 90 %; low O2 sat 85 %;   starting HR 78 bpm; high HR 99 bpm  Dyspnea scale: beginning 0; ending 0  Total distance : 330 ft   Total time: 3 minutes    Recommendation: Ambulatory O2 was recommended.          Ashlee Hooks MD 7/10/2018

## 2018-07-10 NOTE — PROGRESS NOTES
0778 Assumed care of patient from [place] [id number]. Desmond Zazueta. Patient resting with eyes closed, no signs of distress, on continuous Bipap, sitter at bedside, call bell within reach. MEWS score 3 related increased respirations, respiratory therapist aware and spoke with Dr Chon Escobar, will continue to monitor. Patient received Haldol and Ativan by [facility] nurse related to agitation and restlessness. 8886 MEWS score 2, patient resting quietly on Bipap, will continue to monitor. Holding all po medications until patient is alert. 1059 Patient removed from Bipap by Shirley TERAN, placed on 2 L/min NC. Patient opening eyes and speaking a few words, however remains drowsy, sitter at the bedside, will continue to monitor and maintain O2 sats between 88-92%. 1145 IDR round completed (see note). Sitter discontinued, patient alert and oriented x 2, eating breakfast, O2 sats 93% on 2 L/min NC, RR 22, no signs of respiratory distress. Call bell within reach, will administer morning medications and continue to monitor. 1550 Patient alert calm and coorpative, in bed watching T.V., bedalarm activated, alert to self only, reoriented to time, place and situation. O2 sats 94% on 2 L/min, will continue to monitor. 1741 Patient in bed, becoming agitated, refusing to cooperate with physical therapist. Haldol 1 mg IM given prn. No signs of distress, bed alarm activated, call bell within reach.

## 2018-07-11 LAB
ALBUMIN SERPL-MCNC: 3.1 G/DL (ref 3.4–5)
ALBUMIN/GLOB SERPL: 0.8 {RATIO} (ref 0.8–1.7)
ALP SERPL-CCNC: 77 U/L (ref 45–117)
ALT SERPL-CCNC: 28 U/L (ref 16–61)
ANION GAP SERPL CALC-SCNC: 1 MMOL/L (ref 3–18)
AST SERPL-CCNC: 21 U/L (ref 15–37)
BILIRUB SERPL-MCNC: 0.2 MG/DL (ref 0.2–1)
BUN SERPL-MCNC: 52 MG/DL (ref 7–18)
BUN/CREAT SERPL: 34 (ref 12–20)
CALCIUM SERPL-MCNC: 9.4 MG/DL (ref 8.5–10.1)
CHLORIDE SERPL-SCNC: 103 MMOL/L (ref 100–108)
CO2 SERPL-SCNC: 37 MMOL/L (ref 21–32)
CREAT SERPL-MCNC: 1.53 MG/DL (ref 0.6–1.3)
GLOBULIN SER CALC-MCNC: 3.9 G/DL (ref 2–4)
GLUCOSE BLD STRIP.AUTO-MCNC: 112 MG/DL (ref 70–110)
GLUCOSE BLD STRIP.AUTO-MCNC: 139 MG/DL (ref 70–110)
GLUCOSE BLD STRIP.AUTO-MCNC: 218 MG/DL (ref 70–110)
GLUCOSE SERPL-MCNC: 130 MG/DL (ref 74–99)
MAGNESIUM SERPL-MCNC: 2.6 MG/DL (ref 1.6–2.6)
POTASSIUM SERPL-SCNC: 5.8 MMOL/L (ref 3.5–5.5)
PROT SERPL-MCNC: 7 G/DL (ref 6.4–8.2)
SODIUM SERPL-SCNC: 141 MMOL/L (ref 136–145)

## 2018-07-11 PROCEDURE — 74011250637 HC RX REV CODE- 250/637: Performed by: INTERNAL MEDICINE

## 2018-07-11 PROCEDURE — 74011000250 HC RX REV CODE- 250: Performed by: HOSPITALIST

## 2018-07-11 PROCEDURE — 94640 AIRWAY INHALATION TREATMENT: CPT

## 2018-07-11 PROCEDURE — 97530 THERAPEUTIC ACTIVITIES: CPT

## 2018-07-11 PROCEDURE — 74011250636 HC RX REV CODE- 250/636: Performed by: INTERNAL MEDICINE

## 2018-07-11 PROCEDURE — 94760 N-INVAS EAR/PLS OXIMETRY 1: CPT

## 2018-07-11 PROCEDURE — 82962 GLUCOSE BLOOD TEST: CPT

## 2018-07-11 PROCEDURE — 77010033678 HC OXYGEN DAILY

## 2018-07-11 PROCEDURE — 36415 COLL VENOUS BLD VENIPUNCTURE: CPT | Performed by: HOSPITALIST

## 2018-07-11 PROCEDURE — 74011250637 HC RX REV CODE- 250/637: Performed by: HOSPITALIST

## 2018-07-11 PROCEDURE — 83735 ASSAY OF MAGNESIUM: CPT | Performed by: HOSPITALIST

## 2018-07-11 PROCEDURE — 97116 GAIT TRAINING THERAPY: CPT

## 2018-07-11 PROCEDURE — 80053 COMPREHEN METABOLIC PANEL: CPT | Performed by: HOSPITALIST

## 2018-07-11 PROCEDURE — 94660 CPAP INITIATION&MGMT: CPT

## 2018-07-11 PROCEDURE — 65660000000 HC RM CCU STEPDOWN

## 2018-07-11 RX ORDER — FUROSEMIDE 10 MG/ML
20 INJECTION INTRAMUSCULAR; INTRAVENOUS ONCE
Status: COMPLETED | OUTPATIENT
Start: 2018-07-11 | End: 2018-07-11

## 2018-07-11 RX ORDER — SODIUM POLYSTYRENE SULFONATE 15 G/60ML
30 SUSPENSION ORAL; RECTAL
Status: COMPLETED | OUTPATIENT
Start: 2018-07-11 | End: 2018-07-11

## 2018-07-11 RX ORDER — LORAZEPAM 2 MG/ML
0.5 INJECTION INTRAMUSCULAR ONCE
Status: COMPLETED | OUTPATIENT
Start: 2018-07-11 | End: 2018-07-11

## 2018-07-11 RX ADMIN — HEPARIN SODIUM 5000 UNITS: 5000 INJECTION, SOLUTION INTRAVENOUS; SUBCUTANEOUS at 14:01

## 2018-07-11 RX ADMIN — DOCUSATE SODIUM 100 MG: 100 CAPSULE, LIQUID FILLED ORAL at 10:49

## 2018-07-11 RX ADMIN — CARVEDILOL 12.5 MG: 12.5 TABLET, FILM COATED ORAL at 21:54

## 2018-07-11 RX ADMIN — IPRATROPIUM BROMIDE AND ALBUTEROL SULFATE 3 ML: .5; 3 SOLUTION RESPIRATORY (INHALATION) at 07:07

## 2018-07-11 RX ADMIN — HYDRALAZINE HYDROCHLORIDE 25 MG: 25 TABLET, FILM COATED ORAL at 15:59

## 2018-07-11 RX ADMIN — OLANZAPINE 1.25 MG: 2.5 TABLET ORAL at 10:49

## 2018-07-11 RX ADMIN — FUROSEMIDE 20 MG: 10 INJECTION, SOLUTION INTRAMUSCULAR; INTRAVENOUS at 14:10

## 2018-07-11 RX ADMIN — METHYLPREDNISOLONE SODIUM SUCCINATE 40 MG: 40 INJECTION, POWDER, FOR SOLUTION INTRAMUSCULAR; INTRAVENOUS at 10:49

## 2018-07-11 RX ADMIN — IPRATROPIUM BROMIDE AND ALBUTEROL SULFATE 3 ML: .5; 3 SOLUTION RESPIRATORY (INHALATION) at 11:25

## 2018-07-11 RX ADMIN — BUDESONIDE 500 MCG: 0.5 INHALANT RESPIRATORY (INHALATION) at 20:06

## 2018-07-11 RX ADMIN — BUDESONIDE 500 MCG: 0.5 INHALANT RESPIRATORY (INHALATION) at 07:07

## 2018-07-11 RX ADMIN — FAMOTIDINE 20 MG: 20 TABLET ORAL at 21:53

## 2018-07-11 RX ADMIN — HYDRALAZINE HYDROCHLORIDE 25 MG: 25 TABLET, FILM COATED ORAL at 10:49

## 2018-07-11 RX ADMIN — IPRATROPIUM BROMIDE AND ALBUTEROL SULFATE 3 ML: .5; 3 SOLUTION RESPIRATORY (INHALATION) at 05:03

## 2018-07-11 RX ADMIN — Medication 100 MG: at 10:49

## 2018-07-11 RX ADMIN — CARVEDILOL 12.5 MG: 12.5 TABLET, FILM COATED ORAL at 10:49

## 2018-07-11 RX ADMIN — OLANZAPINE 1.25 MG: 2.5 TABLET ORAL at 21:52

## 2018-07-11 RX ADMIN — IPRATROPIUM BROMIDE AND ALBUTEROL SULFATE 3 ML: .5; 3 SOLUTION RESPIRATORY (INHALATION) at 20:06

## 2018-07-11 RX ADMIN — HYDRALAZINE HYDROCHLORIDE 25 MG: 25 TABLET, FILM COATED ORAL at 21:54

## 2018-07-11 RX ADMIN — AMLODIPINE BESYLATE 10 MG: 5 TABLET ORAL at 10:48

## 2018-07-11 RX ADMIN — HEPARIN SODIUM 5000 UNITS: 5000 INJECTION, SOLUTION INTRAVENOUS; SUBCUTANEOUS at 22:10

## 2018-07-11 RX ADMIN — LORAZEPAM 0.5 MG: 2 INJECTION INTRAMUSCULAR; INTRAVENOUS at 21:55

## 2018-07-11 RX ADMIN — SODIUM POLYSTYRENE SULFONATE 30 G: 15 SUSPENSION ORAL; RECTAL at 14:10

## 2018-07-11 RX ADMIN — FOLIC ACID 1 MG: 1 TABLET ORAL at 10:49

## 2018-07-11 RX ADMIN — HEPARIN SODIUM 5000 UNITS: 5000 INJECTION, SOLUTION INTRAVENOUS; SUBCUTANEOUS at 06:52

## 2018-07-11 RX ADMIN — FAMOTIDINE 20 MG: 20 TABLET ORAL at 10:48

## 2018-07-11 RX ADMIN — DOCUSATE SODIUM 100 MG: 100 CAPSULE, LIQUID FILLED ORAL at 21:54

## 2018-07-11 RX ADMIN — IPRATROPIUM BROMIDE AND ALBUTEROL SULFATE 3 ML: .5; 3 SOLUTION RESPIRATORY (INHALATION) at 15:36

## 2018-07-11 NOTE — PROGRESS NOTES
Hospitalist Progress Note Patient: Yonathan Wren. MRN: [id number]  CSN: [id number] YOB: 1945  Age: 74 y.o. Sex: male DOA: 7/3/2018 LOS:  LOS: 8 days Assessment/Plan Patient Active Problem List  
Diagnosis Code  Hypercapnia R06.89  
 Acute on chronic respiratory failure with hypoxia and hypercapnia (HCC) J96.21, J96.22  
 CLL (chronic lymphocytic leukemia) ([facility]) C91.90  Emphysema lung ([facility] [address] J43.9  Memory loss R41.3  Acute encephalopathy G93.40  Elevated serum creatinine R79.89  
  
 
 
 
73 yo male admitted for AMS, SOB Encephalopathy - secondary to hypercapnia, improved. Acute on chronic hypoxic hypercapnic respiratory failure - secondary to RUPERTO, COPD. RUPERTO - Continue with BiPAP at night and as needed during day. Need to be compliant with BiPAP after discharge COPD - continue with solumedrol, pulmicort, neb treatment as needed. Hypertensive urgency - improved with lasix, norvasc and coreg. Volume overload - on lasix, improving. Hyperkalemia - one dose of kayexalate. CLL - Discussed with Dr. Selina Dominguez. Long discussion with family in the presence of patient re medicines, hospital course. We reviewed labs and discussed assessment of condition and went over plans of care. Questions answered. Total time 45  min's, including more than 50% on discussion with family and coordinating care. Plan to discharge to rehab tomorrow Disposition : likely Review of systems General: No fevers or chills. Cardiovascular: No chest pain or pressure. No palpitations. Pulmonary: No shortness of breath. Gastrointestinal: No nausea, vomiting. Physical Exam: 
General: Awake, cooperative, no acute distress   
HEENT: NC, Atraumatic. PERRLA, anicteric sclerae. Lungs: CTA Bilaterally. No Wheezing/Rhonchi/Rales. Heart:  Regular  rhythm,  No murmur, No Rubs, No Gallops Abdomen: Soft, Non distended, Non tender.  +Bowel sounds, Extremities: No c/c/e Psych:   Not anxious or agitated. Neurologic:  No acute neurological deficit. Vital signs/Intake and Output: 
Visit Vitals  /74 (BP 1 Location: Right arm, BP Patient Position: At rest)  Pulse 84  Temp 97.8 °F (36.6 °C)  Resp 18  Ht 5' 6\" (1.676 m)  Wt 90.6 kg (199 lb 12.8 oz)  SpO2 95%  BMI 32.25 kg/m2 Current Shift:    
Last three shifts:  07/10 0701 - 07/11 1900 In: 660 [P.O.:660] Out: -  
 
 
 
 
 
Labs: Results:  
   
Chemistry Recent Labs  
   07/11/18 
 0330  07/09/18 
 0400 GLU  130*  124* NA  141  143  
K  5.8*  4.7 CL  103  103 CO2  37*  37* BUN  52*  29* CREA  1.53*  1.28  
CA  9.4  9.2 AGAP  1*  3 BUCR  34*  23* AP  77  84  
TP  7.0  6.9 ALB  3.1*  3.0*  
GLOB  3.9  3.9 AGRAT  0.8  0.8 CBC w/Diff Recent Labs  
   07/09/18 
 0400 WBC  16.4*  
RBC  5.09  
HGB  12.1*  
HCT  41.4 PLT  206 Cardiac Enzymes No results for input(s): CPK, CKND1, LOREN in the last 72 hours. No lab exists for component: Jarret Caldera Coagulation No results for input(s): PTP, INR, APTT in the last 72 hours. No lab exists for component: INREXT Lipid Panel No results found for: CHOL, CHOLPOCT, CHOLX, CHLST, CHOLV, 182305, HDL, LDL, LDLC, DLDLP, 911538, VLDLC, VLDL, TGLX, TRIGL, TRIGP, TGLPOCT, CHHD, CHHDX  
BNP No results for input(s): BNPP in the last 72 hours. Liver Enzymes Recent Labs  
   07/11/18 
 0330 TP  7.0 ALB  3.1* AP  77 SGOT  21 Thyroid Studies No results found for: T4, T3U, TSH, TSHEXT Procedures/imaging: see electronic medical records for all procedures/Xrays and details which were not copied into this note but were reviewed prior to creation of Plan

## 2018-07-11 NOTE — PROGRESS NOTES
[facility]                  Pulmonary, Critical Care, and Sleep Medicine     Name: Ninfa Abbasi. MRN: [id number]   : 1945 Hospital: [facility] [place]   Date: 2018        [facility] Note       IMPRESSION:   · AMS ? Due to CO2 narcosis but back to baseline, ? Due to HTN urgency and HTN encephalopathy, ? Delirium due to ETOH   · Advance dementia per notes  · Possible fluid overload due to elevated BP and right sided effusion   · COPD with FEV1 of 1.23 or 55% predicted but normal ratio follows with Dr. Tim Lewis on Spiriva and advair at home- Recent 6 min walk in may suggested requires home oxygen 2-4 liter  · RUPERTO unknown severity on BIPAP2 non compliant-- not able to tolerate but best response noted on BIPAP   · Acute on chronic respiratory failure, hypoxic and hypercarbid, multifactorial.   · CKD, mild  · ETOH abuse, ?withdrawal.   · Leucocytosis   · CLL   PLAN:  -c/w bipap  CWP qhs and prn. He did not use it last night. Limitation of using or cooperation to bipap use is his mental status. On zyprexa. -c/w fio2 NC to keep spo2 >=92%  -c/w duoneb, pulmicort.   -taper steroids as tolerated. Reduced to 40 mg daily from today.    -completed Provera on 18  -AMS better, thought to be multifactorial. CT head unremarkable. NH3 <10 normal. EEG pending. On zyprexa.   -follow WBC, improving (CLL history). cx NGTD. Completed levaquin  -placement: lives alone, waiting for placement. -hypoerkalemia noted. Give kayexalate and lasix. D/with Dr. Frank Graham.   -Other issues being managed by primary team and respective consultants. -DVT Px: heparin  -GI Px: low risk for stress ulcer.   -outpatient pulmonologist: Dr. Paloma García. Ninfa Abbasi. is a 74 y.o. male who hx emphysema, HTN,memorry loss, CLL, alcohol abuse. Recurrent ICU transfer due to AMS. 18:  Some agitation required haldol last night. Did not use bipap last night. On NC o2 now.    No fever chills cough dyspnea wheezing. Prior to Admission medications    Medication Sig Start Date End Date Taking? Authorizing Provider   alfuzosin SR (UROXATRAL) 10 mg SR tablet Take 10 mg by mouth daily. Yes Ronni Hernandez MD   aspirin 81 mg chewable tablet Take 81 mg by mouth daily. Yes Ronni Hernandez MD   cholecalciferol (VITAMIN D3) 1,000 unit cap Take  by mouth daily. Yes Ronni Hernandez MD   cycloSPORINE (RESTASIS) 0.05 % ophthalmic emulsion Administer 1 Drop to both eyes two (2) times a day. Yes Ronni Hernandez MD   diclofenac (VOLTAREN) 1 % gel Apply  to affected area four (4) times daily. Yes Ronni Hernandez MD   famotidine (PEPCID) 20 mg tablet Take 20 mg by mouth two (2) times a day. Yes Ronni Hernandez MD   latanoprost (XALATAN) 0.005 % ophthalmic solution Administer 1 Drop to both eyes nightly. Yes Ronni Hernandez MD   allopurinol (ZYLOPRIM) 100 mg tablet Take  by mouth daily. Ronni Hernandez MD   carvedilol (COREG) 6.25 mg tablet Take  by mouth two (2) times daily (with meals). Ronni Hernandez MD   furosemide (LASIX) 40 mg tablet Take  by mouth daily. Ronni Hernandez MD   Oxygen     Ronni Hernandez MD   pravastatin sodium (PRAVASTATIN PO) Take  by mouth. Ronni Hernandez MD   tiotropium (SPIRIVA WITH HANDIHALER) 18 mcg inhalation capsule Take 1 Cap by inhalation daily. Ronni Hernandez MD   fluticasone/salmeterol (ADVAIR HFA IN) Take  by inhalation. Ronni Hernandez MD   ALBUTEROL IN Take  by inhalation.     Ronni Hernandez MD     Current Facility-Administered Medications   Medication Dose Route Frequency    methylPREDNISolone (PF) (SOLU-MEDROL) injection 40 mg  40 mg IntraVENous Q12H    OLANZapine (ZyPREXA) tablet 1.25 mg  1.25 mg Oral BID    hydrALAZINE (APRESOLINE) tablet 25 mg  25 mg Oral TID    heparin (porcine) injection 5,000 Units  5,000 Units SubCUTAneous Q8H    Thiamine Mononitrate (B-1) tablet 100 mg  100 mg Oral DAILY    folic acid (FOLVITE) tablet 1 mg  1 mg Oral DAILY    carvedilol (COREG) tablet 12.5 mg 12.5 mg Oral BID WITH MEALS    famotidine (PEPCID) tablet 20 mg  20 mg Oral Q12H    amLODIPine (NORVASC) tablet 10 mg  10 mg Oral DAILY    nitroglycerin (NITRODUR) 0.2 mg/hr patch 1 Patch  1 Patch TransDERmal Q24H    docusate sodium (COLACE) capsule 100 mg  100 mg Oral BID    budesonide (PULMICORT) 500 mcg/2 ml nebulizer suspension  500 mcg Nebulization BID RT    albuterol-ipratropium (DUO-NEB) 2.5 MG-0.5 MG/3 ML  3 mL Nebulization Q4H RT         Objective:   Vital Signs:    Visit Vitals    /69 (BP 1 Location: Right arm, BP Patient Position: At rest;Sitting)    Pulse 81    Temp 97.7 °F (36.5 °C)    Resp 18    Ht 5' 6\" (1.676 m)    Wt 90.6 kg (199 lb 12.8 oz)    SpO2 95%    BMI 32.25 kg/m2       O2 Device: Nasal cannula   O2 Flow Rate (L/min): 1 l/min   Temp (24hrs), Av.8 °F (36.6 °C), Min:97.7 °F (36.5 °C), Max:97.9 °F (36.6 °C)       Intake/Output:   Last shift:      701 - 1900  In: 360 [P.O.:360]  Out: -   Last 3 shifts: 1901 - 700  In: 300 [P.O.:300]  Out: -     Intake/Output Summary (Last 24 hours) at 18 1313  Last data filed at 18 0900   Gross per 24 hour   Intake              480 ml   Output                0 ml   Net              480 ml       Ventilator Settings:  Mode Rate Tidal Volume Pressure FiO2 PEEP            24 %       Peak airway pressure:      Minute ventilation: 10.2 l/min        Objective:  General: comfortable, no acute distress altert. Mildly confused. HEENT: pupils reactive, sclera anicteric, EOM intact  Neck: No adenopathy or thyroid swelling, no lymphadenopathy or JVD, supple  CVS: S1S2 no murmurs  RS: Mod AE bilaterally, decrease at base. No wheezing. No rhonchi. No accessory muscle use. Abd: soft, non tender, obese. Neuro: non focal, awake, alert  Extrm: no leg edema, clubbing or cyanosis  Lymph node: No obvious palpable lymph node appreciated in cervical region.    Skin: no rash    CBC w/Diff Recent Labs      18   0400 WBC  16.4*   RBC  5.09   HGB  12.1*   HCT  41.4   PLT  206        Chemistry Recent Labs      07/11/18   0330  07/10/18   0421  07/09/18   0400   GLU  130*   --   124*   NA  141   --   143   K  5.8*   --   4.7   CL  103   --   103   CO2  37*   --   37*   BUN  52*   --   29*   CREA  1.53*   --   1.28   CA  9.4   --   9.2   MG  2.6  2.3  2.1   AGAP  1*   --   3   BUCR  34*   --   23*   AP  77   --   84   TP  7.0   --   6.9   ALB  3.1*   --   3.0*   GLOB  3.9   --   3.9   AGRAT  0.8   --   0.8        Lactic Acid Lactic acid   Date Value Ref Range Status   06/16/2018 1.3 0.4 - 2.0 MMOL/L Final     No results for input(s): LAC in the last 72 hours. Micro  No results for input(s): SDES, CULT in the last 72 hours. No results for input(s): CULT in the last 72 hours. ABG Recent Labs      07/09/18   0524   PHI  7.375   PCO2I  70.5*   PO2I  84   HCO3I  41.2*   FIO2I  0.24        Liver Enzymes Protein, total   Date Value Ref Range Status   07/11/2018 7.0 6.4 - 8.2 g/dL Final     Albumin   Date Value Ref Range Status   07/11/2018 3.1 (L) 3.4 - 5.0 g/dL Final     Globulin   Date Value Ref Range Status   07/11/2018 3.9 2.0 - 4.0 g/dL Final     A-G Ratio   Date Value Ref Range Status   07/11/2018 0.8 0.8 - 1.7   Final     AST (SGOT)   Date Value Ref Range Status   07/11/2018 21 15 - 37 U/L Final     Alk. phosphatase   Date Value Ref Range Status   07/11/2018 77 45 - 117 U/L Final     Recent Labs      07/11/18   0330  07/09/18   0400   TP  7.0  6.9   ALB  3.1*  3.0*   GLOB  3.9  3.9   AGRAT  0.8  0.8   SGOT  21  17   AP  77  84        Cardiac Enzymes No results found for: CPK, CK, CKMMB, CKMB, RCK3, CKMBT, CKNDX, CKND1, LOREN, TROPT, TROIQ, APRIL, TROPT, TNIPOC, BNP, BNPP     BNP No results found for: BNP, BNPP, XBNPT     Coagulation No results for input(s): PTP, INR, APTT in the last 72 hours.     No lab exists for component: INREXT, INREXT      Thyroid  No results found for: T4, T3U, TSH, TSHEXT, TSHEXT       Lipid Panel No results found for: CHOL, CHOLPOCT, CHOLX, CHLST, CHOLV, 899517, HDL, LDL, LDLC, DLDLP, 340119, VLDLC, VLDL, TGLX, TRIGL, TRIGP, TGLPOCT, CHHD, CHHDX       Urinalysis Lab Results   Component Value Date/Time    Color YELLOW 07/03/2018 04:15 PM    Appearance CLEAR 07/03/2018 04:15 PM    Specific gravity 1.019 07/03/2018 04:15 PM    pH (UA) 7.0 07/03/2018 04:15 PM    Protein 300 (A) 07/03/2018 04:15 PM    Glucose NEGATIVE  07/03/2018 04:15 PM    Ketone NEGATIVE  07/03/2018 04:15 PM    Bilirubin NEGATIVE  07/03/2018 04:15 PM    Urobilinogen 0.2 07/03/2018 04:15 PM    Nitrites NEGATIVE  07/03/2018 04:15 PM    Leukocyte Esterase NEGATIVE  07/03/2018 04:15 PM    Epithelial cells FEW 07/03/2018 04:15 PM    Bacteria FEW (A) 07/03/2018 04:15 PM    WBC 0 to 3 07/03/2018 04:15 PM    RBC NEGATIVE  07/03/2018 04:15 PM        XR (Most Recent). CXR reviewed by me and compared with previous CXR   Results from Hospital Encounter encounter on 07/03/18   XR CHEST PORT   Narrative EXAM:Chest X-Ray      History: Hypoxia    Technique:  Portable Frontal View    Comparison: 07/08/2018, 07/07/2018    _______________    FINDINGS:    The trachea is midline. Stable midline cardiac silhouette and hilar vascular structures. Relative mild hypoinflation with minimal increased lateral right basilar  opacity. Minimal increased lateral left basilar linear atelectasis/scarring. No  pneumothorax. Intact osseous structures. _______________         Impression IMPRESSION:    1. Mild hypoinflation with minimal increased bibasilar opacities, favoring  atelectasis with accentuation of the left basilar scar. CT (Most Recent)   Results from Hospital Encounter encounter on 07/03/18   CT HEAD WO CONT   Narrative EXAM: CT head    INDICATION: Altered mental status. Decreased alertness.     COMPARISON: 6/16/2018    TECHNIQUE: Axial CT imaging of the head was performed without intravenous  contrast. One or more dose reduction techniques were used on this CT: automated  exposure control, adjustment of the mAs and/or kVp according to patient's size,  and iterative reconstruction techniques. The specific techniques utilized on  this CT exam have been documented in the patient's electronic medical record.    _______________    FINDINGS:    BRAIN:  Intraparenchymal hemorrhage: None. Mass effect/edema: None. Infarcts/encephalomalacia: None. White matter: Normal.  Brain volume: Normal for age. EXTRA-AXIAL SPACES:  Hemorrhage: None. Mass: None. Hydrocephalus: None. SINUSES: Clear. CALVARIUM: Unremarkable. OTHER: None.    _______________         Impression IMPRESSION:     No evidence of acute intracranial process. EKG No results found for this or any previous visit. ECHO  12/2017 [facility]    NORMAL LEFT VENTRICULAR CAVITY SIZE. MODERATE CONCENTRIC LEFT VENTRICULAR HYPERTROPHY. NORMAL LEFT VENTRICULAR SYSTOLIC FUNCTION WITH AN EJECTION FRACTION   ESTIMATED AT 60%. MILD  DIASTOLIC DYSFUNCTION. NORMAL LEFT ATRIAL SIZE. PRIOR ECHO REPORT FINDINGS ON 10-:     NORMAL LEFT VENTRICULAR CAVITY SIZE. MILD CONCENTRIC LEFT VENTRICULAR HYPERTROPHY. DYNAMIC  GLOBAL LEFT VENTRICULAR SYSTOLIC FUNCTION. EJECTION FRACTION IS 70%. MILD DIASTOLIC DYSFUNCTION. NORMAL CHAMBER SIZES. NO HEMODYNAMICALLY SIGNIFICANT VALVULAR PATHOLOGY. NORMAL PULMONARY ARTERY PRESSURE OF 22 MMHG. PFT PFTs: 1/11/18    FVC  1.35 (44 %)  FEV1 1.23 (55 %)  FEV1/FVC  92 %  TLC 62 %   %  DL/VA 31 %       Other Interpretation: Ambulatory oximetry Does show significant desaturation;starting O2 sat 90 %; low O2 sat 85 %;   starting HR 78 bpm; high HR 99 bpm  Dyspnea scale: beginning 0; ending 0  Total distance : 330 ft   Total time: 3 minutes    Recommendation: Ambulatory O2 was recommended.          kSye Soliman MD 7/11/2018

## 2018-07-11 NOTE — PROGRESS NOTES
Problem: Mobility Impaired (Adult and Pediatric)  Goal: *Acute Goals and Plan of Care (Insert Text)  In 1-7 days pt will be able to perform:  ST.  Bed mobility:  Rolling L to R to L modified independent for positioning. 2.  Supine to sit to supine S with HR for meals. 3.  Sit to stand to sit S with RW in prep for ambulation. LT.  Gait:  Ambulate >150ft S with RW, WBAT, for home/community mobility. 2.  Activity tolerance: Tolerate up in chair 1-2 hours for ADLs. 3.  Patient/Family Education:  Patient/family to be independent with HEP for follow-up care and safe discharge. Outcome: Progressing Towards Goal  physical Therapy TREATMENT    Patient: Jenny Baltazar (69 y.o. male)  Date: 2018  Diagnosis: Hypercapnia Acute on chronic respiratory failure with hypoxia and hypercapnia (HCC)       Precautions: Fall, Skin, Seizure   Chart, physical therapy assessment, plan of care and goals were reviewed. ASSESSMENT:  Pt is very mobile, when he is motivated to move. Pt demonstrates very little deviation with gait and most needs cuing for directional assist. Family present and noting pt to be unable to return to home, due to mental condition. Progression toward goals:  []      Improving appropriately and progressing toward goals  [x]      Improving slowly and progressing toward goals  []      Not making progress toward goals and plan of care will be adjusted     PLAN:  Patient continues to benefit from skilled intervention to address the above impairments. Continue treatment per established plan of care. Discharge Recommendations:  Home Health w/ supervision or To Be Determined  Further Equipment Recommendations for Discharge:  bedside commode and rolling walker     SUBJECTIVE:   Patient stated I can go wherever I want.     OBJECTIVE DATA SUMMARY:   Critical Behavior:  Neurologic State: Alert  Orientation Level: Oriented X4  Cognition: Follows commands  Safety/Judgement: Awareness of environment, Decreased awareness of need for assistance, Decreased awareness of need for safety, Lack of insight into deficits  Functional Mobility Training:  Bed Mobility:  Rolling: Stand-by assistance  Supine to Sit: Stand-by assistance  Sit to Supine: Stand-by assistance  Scooting: Stand-by assistance  Transfers:  Sit to Stand: Stand-by assistance  Stand to Sit: Stand-by assistance  Balance:  Sitting: Intact  Standing: Intact; With support  Standing - Static: Good  Standing - Dynamic : Fair  Ambulation/Gait Training:  Distance (ft): 350 Feet (ft)  Assistive Device: Gait belt;Walker, rolling  Ambulation - Level of Assistance: Contact guard assistance  Gait Abnormalities: Decreased step clearance  Base of Support: Narrowed  Speed/Carie: Slow  Step Length: Right shortened;Left shortened  Swing Pattern: Right asymmetrical;Left asymmetrical  Interventions: Safety awareness training;Verbal cues; Tactile cues  Pain:  Pain Scale 1: Numeric (0 - 10)  Pain Intensity 1: 0   Pain out  Activity Tolerance:   Fair  Please refer to the flowsheet for vital signs taken during this treatment.   After treatment:   [] Patient left in no apparent distress sitting up in chair  [x] Patient left in no apparent distress in bed  [x] Call bell left within reach  [x] Nursing notified  [] Caregiver present  [] Bed alarm activated      Jana Deluna PTA   Time Calculation: 47 mins

## 2018-07-11 NOTE — PROGRESS NOTES
D/c plan: anticipate rehab  Met with  Patient and wife and son at bedside. Son had many questions for MD all questions answered. Patients wife and son at bedside they asked cm to submit to Ryann Floyd. [facility] at [address] [facility], [address] [facility]. [phone] declined, [facility]on declined the only ones accepting cm had sent out to other facilities due to continuity of care explained this to family. Did inform family as to what ones could accept. They informed cm they would have to look at the facilities and let cm know. Dr. Sergio Schwab informed family patient is ready for d/c. Family wants to know if the facility will supply the bipap explained if they accept the patient that will have to be the understanding. 1350  telephone call to [facility] patient needs today's BiPAP settings and mask size documented  1355 Telephone call to son informed him [address] can accept and they are willing to provide the bipap for patient. Patient is ready for d/c. Son asked cm to give him a few minutes to talk with his mother and he will call back with in 30 minutes. [phone] telephone call from wife informing her that [address] can accomodate patient and they will order the bipap. Wife wants to call them and talk with them provided her with the number and Nani Forde is person of contact [phone]. Informed wife that Dr. Sergio Schwab informed them during rounds patient is ready for d/c. Does she want to proceed with d/c to rehab. States she wants him to go to long term she is going to call [facility] and call cm back. 1430 telephone call from wife all of her questions have been answered and wife would like to proceed with d/c to [facility] rehab.  [phone] telephone call with Nani Forde at [facility]. Delay due to late order of BiPAP. She can accept patient in am. Nani Forde will call wife and inform her of delay for tomorrow. Care Management Interventions  PCP Verified by CM:  Yes  Mode of Transport at Discharge: BLS  Transition of Care Consult (CM Consult): SNF  Partner SNF: Yes  Physical Therapy Consult: Yes  Occupational Therapy Consult: Yes  Current Support Network: Lives Alone (wife does not live with him but she and her son are his poa)  Confirm Follow Up Transport: Family  Plan discussed with Pt/Family/Caregiver: Yes  Freedom of Choice Offered: Yes  Discharge Location  Discharge Placement: Skilled nursing facility

## 2018-07-11 NOTE — PROGRESS NOTES
NUTRITION SCREENING    Recommendations: Continue w/ POC    RD ASSESSMENT/PLAN:     Diet:  Reg Height: 5' 6\" (167.6 cm)     Food Allergies: NKFA  Weight: 90.6 kg (199 lb 12.8 oz)    PO Intake:  Patient Vitals for the past 100 hrs:   % Diet Eaten   07/10/18 1830 40 %   07/10/18 1144 40 %   07/09/18 0800 100 %   07/08/18 1427 100 %   07/08/18 0819 100 %   07/07/18 1729 100 %   07/07/18 1255 100 %   07/07/18 0839 100 %      BMI: 32.3 kg/m^2 is  obese (30%-39.9% BMI)      PMH: CKD stage III, COPD, CLL, delrium, emphysema lung, fall, frequent hospital admissions, gout, hyperlipidemia, hypoxemia, memory loss, noncompliance, oxygen dependent, PNA, pulmonary nodules, respiratory failure, septic shock, sleep apnea, tobacco abuse, vit d def     Current Hospital Problems: Pt is admitted for acute on chronic respiratory failure , emphysema lung, copd exacerbation. No significant wt loss noted in chart hx at this time. Nutrition intervention not currently indicated. Pt is not at nutritional risk at this time. Will rescreen per policy.      REASON FOR ASSESSMENT:     [x] KOSTAS Ansari RD  Pager: [phone]

## 2018-07-11 NOTE — ROUTINE PROCESS
Bedside shift change report given to BRYN Falk (oncoming nurse) by Jeovanny Holguin (offgoing nurse). Report included the following information SBAR, MAR and Recent Results.

## 2018-07-11 NOTE — PROGRESS NOTES
800 Assumed care of patient from [place], [place] Assessment completed, patient is alert and oriented x's 4 with periods of confusion. Patient is easily reoriented and does follow simple commands. NSR on the monitor with a HR in the 80's. Lung fields are clear throughout on 1L NC, 02 sat sustained in the upper 90's with no cough noted. Patient is experiencing periods of incont and does sometime utilize urinal. Patient is tolerating a regular diet without any N/V or gastric distention. Scheduled medications administered as ordered without any complications. Patient is currently sitting up on side of bed with spouse and son at bedside, no s/s of any distress, VSS, call bell and personal belongings within reach, will continue to monitor    1200   IDR/SLIDR Summary          Patient: Colby Benitez. MRN: [id number]    Age: 74 y.o. YOB: 1945 Room/Bed: [facility]/   Admit Diagnosis: Hypercapnia  Principal Diagnosis: Acute on chronic respiratory failure with hypoxia and hypercapnia (HCC)   Goals: maintain 02 sat  Readmission: NO  Quality Measure: COPD  VTE Prophylaxis: Chemical  Influenza Vaccine screening completed? YES  Pneumococcal Vaccine screening completed? YES  Mobility needs: Yes   Nutrition plan:Yes  Consults:P.T, O.T., Respiratory and Case Management    Financial concerns:No  Escalated to CM? YES  RRAT Score:    Interventions:rehab  Testing due for pt today? NO  LOS: 8 days Expected length of stay 1 days  Discharge plan: rehab   PCP: Zorita Crigler, MD  Transportation needs: Yes    Days before discharge:one day until discharge   Discharge disposition: Rehab    Signed:     Yomaira Sultana RN  7/11/2018  12:57 PM    1410 Scheduled medications administered as ordered without any complications. Patient is currently resting quietly in bed, no s/s of any pain or distress, will continue to monitor    1601 Scheduled medications administered as ordered without any complications.  Patient continue to relax comfortably in bed, no s/s of any pain or distress, will continue to monitor    1800 NAD, will continue to monitor. 2021 Verbal shift change report given to gisella martin rn (oncoming nurse) by Chan Bean rn (offgoing nurse). Report included the following information SBAR.

## 2018-07-11 NOTE — PROGRESS NOTES
Pt's Bipap settings on hospital machine-24/12 24% and backup rate of 10. Pt's mask size-large full face mask.

## 2018-07-12 VITALS
TEMPERATURE: 98.4 F | BODY MASS INDEX: 32.11 KG/M2 | WEIGHT: 199.8 LBS | HEIGHT: 66 IN | HEART RATE: 83 BPM | DIASTOLIC BLOOD PRESSURE: 74 MMHG | OXYGEN SATURATION: 98 % | SYSTOLIC BLOOD PRESSURE: 163 MMHG | RESPIRATION RATE: 18 BRPM

## 2018-07-12 PROBLEM — J44.9 COPD (CHRONIC OBSTRUCTIVE PULMONARY DISEASE) (HCC): Status: ACTIVE | Noted: 2018-07-12

## 2018-07-12 PROBLEM — G47.33 OSA (OBSTRUCTIVE SLEEP APNEA): Status: ACTIVE | Noted: 2018-07-12

## 2018-07-12 PROBLEM — N18.9 CKD (CHRONIC KIDNEY DISEASE): Status: ACTIVE | Noted: 2018-07-12

## 2018-07-12 PROBLEM — I10 HTN (HYPERTENSION): Status: ACTIVE | Noted: 2018-07-12

## 2018-07-12 PROBLEM — F03.90 DEMENTIA (HCC): Status: ACTIVE | Noted: 2018-07-12

## 2018-07-12 LAB
GLUCOSE BLD STRIP.AUTO-MCNC: 96 MG/DL (ref 70–110)
MAGNESIUM SERPL-MCNC: 2.6 MG/DL (ref 1.6–2.6)

## 2018-07-12 PROCEDURE — 94760 N-INVAS EAR/PLS OXIMETRY 1: CPT

## 2018-07-12 PROCEDURE — 36415 COLL VENOUS BLD VENIPUNCTURE: CPT | Performed by: HOSPITALIST

## 2018-07-12 PROCEDURE — 77010033678 HC OXYGEN DAILY

## 2018-07-12 PROCEDURE — 83735 ASSAY OF MAGNESIUM: CPT | Performed by: HOSPITALIST

## 2018-07-12 PROCEDURE — 74011636637 HC RX REV CODE- 636/637: Performed by: INTERNAL MEDICINE

## 2018-07-12 PROCEDURE — 74011250637 HC RX REV CODE- 250/637: Performed by: INTERNAL MEDICINE

## 2018-07-12 PROCEDURE — 74011000250 HC RX REV CODE- 250: Performed by: HOSPITALIST

## 2018-07-12 PROCEDURE — 74011250636 HC RX REV CODE- 250/636: Performed by: INTERNAL MEDICINE

## 2018-07-12 PROCEDURE — 94640 AIRWAY INHALATION TREATMENT: CPT

## 2018-07-12 PROCEDURE — 74011250636 HC RX REV CODE- 250/636: Performed by: HOSPITALIST

## 2018-07-12 PROCEDURE — 94660 CPAP INITIATION&MGMT: CPT

## 2018-07-12 PROCEDURE — 74011250637 HC RX REV CODE- 250/637: Performed by: HOSPITALIST

## 2018-07-12 PROCEDURE — 82962 GLUCOSE BLOOD TEST: CPT

## 2018-07-12 RX ORDER — FUROSEMIDE 10 MG/ML
40 INJECTION INTRAMUSCULAR; INTRAVENOUS ONCE
Status: COMPLETED | OUTPATIENT
Start: 2018-07-12 | End: 2018-07-12

## 2018-07-12 RX ORDER — OLANZAPINE 2.5 MG/1
1.25 TABLET ORAL 2 TIMES DAILY
Qty: 60 TAB | Refills: 0 | Status: SHIPPED | OUTPATIENT
Start: 2018-07-12 | End: 2018-07-25

## 2018-07-12 RX ORDER — HYDRALAZINE HYDROCHLORIDE 25 MG/1
25 TABLET, FILM COATED ORAL 3 TIMES DAILY
Qty: 90 TAB | Refills: 0 | Status: ON HOLD | OUTPATIENT
Start: 2018-07-12 | End: 2018-07-25

## 2018-07-12 RX ORDER — AMLODIPINE BESYLATE 10 MG/1
10 TABLET ORAL DAILY
Qty: 30 TAB | Refills: 0 | Status: ON HOLD | OUTPATIENT
Start: 2018-07-13 | End: 2018-07-25

## 2018-07-12 RX ORDER — DOCUSATE SODIUM 100 MG/1
100 CAPSULE, LIQUID FILLED ORAL 2 TIMES DAILY
Qty: 60 CAP | Refills: 2 | Status: SHIPPED | OUTPATIENT
Start: 2018-07-12 | End: 2018-09-11

## 2018-07-12 RX ORDER — CARVEDILOL 12.5 MG/1
12.5 TABLET ORAL 2 TIMES DAILY WITH MEALS
Qty: 60 TAB | Refills: 0 | Status: SHIPPED | OUTPATIENT
Start: 2018-07-12

## 2018-07-12 RX ADMIN — FOLIC ACID 1 MG: 1 TABLET ORAL at 08:32

## 2018-07-12 RX ADMIN — AMLODIPINE BESYLATE 10 MG: 5 TABLET ORAL at 08:33

## 2018-07-12 RX ADMIN — CARVEDILOL 12.5 MG: 12.5 TABLET, FILM COATED ORAL at 08:32

## 2018-07-12 RX ADMIN — DOCUSATE SODIUM 100 MG: 100 CAPSULE, LIQUID FILLED ORAL at 08:33

## 2018-07-12 RX ADMIN — IPRATROPIUM BROMIDE AND ALBUTEROL SULFATE 3 ML: .5; 3 SOLUTION RESPIRATORY (INHALATION) at 04:15

## 2018-07-12 RX ADMIN — FAMOTIDINE 20 MG: 20 TABLET ORAL at 08:32

## 2018-07-12 RX ADMIN — PREDNISONE 30 MG: 20 TABLET ORAL at 11:32

## 2018-07-12 RX ADMIN — HYDRALAZINE HYDROCHLORIDE 25 MG: 25 TABLET, FILM COATED ORAL at 08:32

## 2018-07-12 RX ADMIN — HEPARIN SODIUM 5000 UNITS: 5000 INJECTION, SOLUTION INTRAVENOUS; SUBCUTANEOUS at 07:50

## 2018-07-12 RX ADMIN — OLANZAPINE 1.25 MG: 2.5 TABLET ORAL at 08:32

## 2018-07-12 RX ADMIN — IPRATROPIUM BROMIDE AND ALBUTEROL SULFATE 3 ML: .5; 3 SOLUTION RESPIRATORY (INHALATION) at 11:23

## 2018-07-12 RX ADMIN — Medication 100 MG: at 08:32

## 2018-07-12 RX ADMIN — FUROSEMIDE 40 MG: 10 INJECTION, SOLUTION INTRAMUSCULAR; INTRAVENOUS at 11:32

## 2018-07-12 RX ADMIN — IPRATROPIUM BROMIDE AND ALBUTEROL SULFATE 3 ML: .5; 3 SOLUTION RESPIRATORY (INHALATION) at 07:07

## 2018-07-12 RX ADMIN — BUDESONIDE 500 MCG: 0.5 INHALANT RESPIRATORY (INHALATION) at 07:07

## 2018-07-12 RX ADMIN — IPRATROPIUM BROMIDE AND ALBUTEROL SULFATE 3 ML: .5; 3 SOLUTION RESPIRATORY (INHALATION) at 00:32

## 2018-07-12 NOTE — WOUND CARE
Patient seen during hospital wide pressure injury prevalence. Refused skin assessment. Patient states skin intact; patient repositions self in bed with assistance. Spoke with patient concerning pressure relieving strategies. Wound care will continue to monitor during admission.

## 2018-07-12 NOTE — PROGRESS NOTES
Shift Summary:  Patient slept through the night with BIPAP in place. Patient denied pain and discomfort overnight. Incontinent care provided and assisted patient to chair this a.m with chair alarm in place. Patient insisted on leaving for doctor's appointment. Provided redirection and distracted patient with ADLs.

## 2018-07-12 NOTE — ROUTINE PROCESS
Bedside and Verbal shift change report given to MARY BETH Martines RN (oncoming nurse) by Lang Reno (offgoing nurse). Report included the following information SBAR, Kardex, Intake/Output and MAR.

## 2018-07-12 NOTE — PROGRESS NOTES
D/c plan rehab today  Dr. Keisha Colin informed cm patient could be d/c to rehab. Telephone call to Virgen Adán at [facility] and rehab and she informed cm the bipap is in patients room and she can accomodate patient today. She is waiting on d/c summary and clinicals to be up loaded. Pt will need transportation per wife and son   RN please call report to [address], [phone] for report. Please include all hard scripts for controlled substances, med rec and dc summary in packet. Please medicate for pain prior to dc if possible and needed to help offset delay when patient first arrives to facility. Telephone call to patients wife due to patient states he is going home. She will come up to [facility] and talk with him  Transportation was arranged per family at bedside. [facility] and rehab aware and patient enroute  Care Management Interventions  PCP Verified by CM:  Yes  Mode of Transport at Discharge: BLS  Transition of Care Consult (CM Consult): SNF  Partner SNF: Yes  Physical Therapy Consult: Yes  Occupational Therapy Consult: Yes  Current Support Network: Lives Alone (has wife and children that make are poa)  Confirm Follow Up Transport: Family  Plan discussed with Pt/Family/Caregiver: Yes  Freedom of Choice Offered: Yes  Discharge Location  Discharge Placement: Skilled nursing facility

## 2018-07-12 NOTE — PROGRESS NOTES
Problem: Falls - Risk of  Goal: *Absence of Falls  Document Stan Fall Risk and appropriate interventions in the flowsheet.    Outcome: Progressing Towards Goal  Fall Risk Interventions:  Mobility Interventions: Assess mobility with egress test, Patient to call before getting OOB, PT Consult for mobility concerns, PT Consult for assist device competence, Strengthening exercises (ROM-active/passive), Utilize walker, cane, or other assistive device    Mentation Interventions: Adequate sleep, hydration, pain control, Bed/chair exit alarm, Door open when patient unattended, Familiar objects from home, HELP ([address]) if available, Increase mobility, More frequent rounding, Reorient patient, Room close to nurse's station, Toileting rounds, Update white board    Medication Interventions: Bed/chair exit alarm, Patient to call before getting OOB, Teach patient to arise slowly    Elimination Interventions: Bed/chair exit alarm, Call light in reach, Patient to call for help with toileting needs, Toileting schedule/hourly rounds, Urinal in reach    History of Falls Interventions: Bed/chair exit alarm, Door open when patient unattended, Room close to nurse's station

## 2018-07-12 NOTE — PROGRESS NOTES
1925 Pt received from offgoing nurse without any signs or symptoms of distress. Pt vitals are stable and within normal limits. Pt bed in low position with wheels locked and call bell within reach. 1938 Assessment completed and documented in flow sheet. Pt denies any further needs at this time. Pt in NAD with bed in low position, wheels locked and call bell within reach. Pt noted to have increased anxiety. Pt not easily redirected. Pt stating he has to go home because he has an appointment in the am. Pt incontinent and refusing to have his brief or bed linen changed. Pt becoming combative with writer. 2000 Dr Ryland Hearn made aware of pt behavior. One time order for ativan IV received. 2154 Scheduled medications administered as ordered. Ativan administered as ordered with effective results. Incontinent care provided to pt at this time. 2315 Bedside and Verbal shift change report given to Jaclyn Mireles RN (oncoming nurse) by Jean-Pierre Boo RN (offgoing nurse). Report included the following information SBAR, Intake/Output, MAR and Recent Results.

## 2018-07-12 NOTE — DISCHARGE SUMMARY
Discharge Summary    Patient: Nitin Eagle. MRN: [id number]  CSN: [id number]    YOB: 1945  Age: 74 y.o. Sex: male    DOA: 7/3/2018 LOS:  LOS: 9 days   Discharge Date:      Primary Care Provider:  Anita Martinez MD    Admission Diagnoses: Hypercapnia    Discharge Diagnoses:    Problem List as of 7/12/2018  Never Reviewed          Codes Class Noted - Resolved    COPD (chronic obstructive pulmonary disease) ([facility] ICD-10-CM: J44.9  ICD-9-CM: 942  7/12/2018 - Present        RUPERTO (obstructive sleep apnea) ICD-10-CM: G47.33  ICD-9-CM: 327.23  7/12/2018 - Present        CKD (chronic kidney disease) ICD-10-CM: N18.9  ICD-9-CM: 585.9  7/12/2018 - Present        Dementia ICD-10-CM: F03.90  ICD-9-CM: 294.20  7/12/2018 - Present        HTN (hypertension) ICD-10-CM: I10  ICD-9-CM: 401.9  7/12/2018 - Present        Elevated serum creatinine ICD-10-CM: R79.89  ICD-9-CM: 790.99  7/5/2018 - Present        Hypercapnia ICD-10-CM: R06.89  ICD-9-CM: 786.09  7/3/2018 - Present        * (Principal)Acute on chronic respiratory failure with hypoxia and hypercapnia ([facility] ICD-10-CM: J96.21, J96.22  ICD-9-CM: 518.84, 786.09, 799.02  7/3/2018 - Present        CLL (chronic lymphocytic leukemia) ([facility] ICD-10-CM: C91.90  ICD-9-CM: 204.10  7/3/2018 - Present        Emphysema lung ([facility] ICD-10-CM: J43.9  ICD-9-CM: 492.8  7/3/2018 - Present        Memory loss ICD-10-CM: R41.3  ICD-9-CM: 780.93  7/3/2018 - Present        Acute encephalopathy ICD-10-CM: G93.40  ICD-9-CM: 348.30  7/3/2018 - Present              Discharge Medications:     Current Discharge Medication List      START taking these medications    Details   amLODIPine (NORVASC) 10 mg tablet Take 1 Tab by mouth daily. Qty: 30 Tab, Refills: 0      docusate sodium (COLACE) 100 mg capsule Take 1 Cap by mouth two (2) times a day for 90 days. Qty: 60 Cap, Refills: 2      hydrALAZINE (APRESOLINE) 25 mg tablet Take 1 Tab by mouth three (3) times daily.   Qty: 90 Tab, Refills: 0      OLANZapine (ZYPREXA) 2.5 mg tablet Take 0.5 Tabs by mouth two (2) times a day. Qty: 60 Tab, Refills: 0         CONTINUE these medications which have CHANGED    Details   carvedilol (COREG) 12.5 mg tablet Take 1 Tab by mouth two (2) times daily (with meals). Qty: 60 Tab, Refills: 0         CONTINUE these medications which have NOT CHANGED    Details   alfuzosin SR (UROXATRAL) 10 mg SR tablet Take 10 mg by mouth daily. aspirin 81 mg chewable tablet Take 81 mg by mouth daily. cycloSPORINE (RESTASIS) 0.05 % ophthalmic emulsion Administer 1 Drop to both eyes two (2) times a day. famotidine (PEPCID) 20 mg tablet Take 20 mg by mouth two (2) times a day. latanoprost (XALATAN) 0.005 % ophthalmic solution Administer 1 Drop to both eyes nightly. furosemide (LASIX) 40 mg tablet Take  by mouth daily. Oxygen       pravastatin sodium (PRAVASTATIN PO) Take  by mouth. tiotropium (SPIRIVA WITH HANDIHALER) 18 mcg inhalation capsule Take 1 Cap by inhalation daily. fluticasone/salmeterol (ADVAIR HFA IN) Take  by inhalation. ALBUTEROL IN Take  by inhalation. STOP taking these medications       cholecalciferol (VITAMIN D3) 1,000 unit cap Comments:   Reason for Stopping:         diclofenac (VOLTAREN) 1 % gel Comments:   Reason for Stopping:         allopurinol (ZYLOPRIM) 100 mg tablet Comments:   Reason for Stopping:               Discharge Condition: Good        Consults: Pulmonary/Critical Care      PHYSICAL EXAM   Visit Vitals    /74 (BP 1 Location: Right arm, BP Patient Position: At rest)    Pulse 83    Temp 98.4 °F (36.9 °C)    Resp 18    Ht 5' 6\" (1.676 m)    Wt 90.6 kg (199 lb 12.8 oz)    SpO2 98%    BMI 32.25 kg/m2     General: Awake, cooperative, no acute distress, somewhat somnolent    HEENT: NC, Atraumatic. PERRLA, EOMI. Anicteric sclerae. Lungs:  CTA Bilaterally. No Wheezing/Rhonchi/Rales.   Heart:  Regular  rhythm,  No murmur, No Rubs, No Gallops  Abdomen: Soft, Non distended, Non tender. +Bowel sounds,   Extremities: No c/c/e  Psych:   Not anxious or agitated. Neurologic:  No acute neurological deficits. Admission HPI :   Cayla Gaming is a 74 y.o. male who hx emphysema, htn,memorry loss, CLL was sent to ER due to ams. Per er reported he acting abnormal since morning, refused to eat/drinking/taking medication. He responded slowly and lethargic. In ER, abg indicated pH 7.294. PCO2 85, pO2 69, HCO3 41.3.  bipap was put on. Ct head was pending. cxr: Linear opacity at the left lung base appears similar, could be scarring or partial atelectasis. There is mild crowding of basilar pulmonary  markings related to hypoventilation. The costophrenic angles are indistinct, right greater than left, suggest tiny effusions. UA was clear, ce wnl. His bp was elevated and labetalol was given in er. Hospital Course :     He was initially admitted to ICU, he was seen and followed by pulmonary critical care    Encephalopathy - secondary to hypercapnia, improved. It is also very likely that his encephalopathy is multifactorial. Contributed by hypertensive urgency and his ETOH use. He also has advanced dementia.      Acute on chronic hypoxic hypercapnic respiratory failure - multifactorial secondary to RUPERTO, COPD. He is now back at his baseline.      RUPERTO - he was started on BiPAP at night and as needed during day. Need to be compliant with BiPAP after discharge. His RUPERTO is of unknown severity. Here in the hospital he had best response with the BiPAP setting of 24/12 at 24% with a back up rate of 10, his mask is size large with full face mask.      COPD - initially he was started on solumedrol, pulmicort, neb treatment as needed. He was later weaned to oral prednisone.  At this time will stop prednisone and he will continue with his advair and spiriva.      Hypertensive urgency - improved with hydralazine, norvasc and coreg.     Volume overload - likely secondary to hypertensive urgency. He had echo done 12/2017 which showed normal LVF, EF of 00%, mild diastolic dysfunction, normal pulmonary Artery pressure. He is on lasix PTA, will continue, recommend follow up BMP in 3-5 days.      Hyperkalemia - given one dose of kayexalate. Patient refused follow up labs today and wants to go home.      Leukocytosis - chronic secondary to CLL. No evidence of infection, cultures negative, completed course of levaquin. 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 He should use BiPAP at above settings and he should also use when is taking nap during day. He need regular follow up with his pulmonary. He has multiple medical problems and he is high risk for readmission      Activity: Activity as tolerated    Diet: Cardiac Diet    Follow-up: PCP, pulmonary    Disposition: [facility]    Minutes spent on discharge: 65       Labs: Results:       Chemistry Recent Labs      07/11/18   0330   GLU  130*   NA  141   K  5.8*   CL  103   CO2  37*   BUN  52*   CREA  1.53*   CA  9.4   AGAP  1*   BUCR  34*   AP  77   TP  7.0   ALB  3.1*   GLOB  3.9   AGRAT  0.8      CBC w/Diff No results for input(s): WBC, RBC, HGB, HCT, PLT, GRANS, LYMPH, EOS, HGBEXT, HCTEXT, PLTEXT, HGBEXT, HCTEXT, PLTEXT in the last 72 hours. Cardiac Enzymes No results for input(s): CPK, CKND1, LOREN in the last 72 hours. No lab exists for component: CKRMB, TROIP   Coagulation No results for input(s): PTP, INR, APTT in the last 72 hours. No lab exists for component: INREXT, INREXT    Lipid Panel No results found for: CHOL, CHOLPOCT, CHOLX, CHLST, CHOLV, 473890, HDL, LDL, LDLC, DLDLP, 254884, VLDLC, VLDL, TGLX, TRIGL, TRIGP, TGLPOCT, CHHD, CHHDX   BNP No results for input(s): BNPP in the last 72 hours.    Liver Enzymes Recent Labs      07/11/18   0330   TP  7.0   ALB  3.1*   AP  77   SGOT  21 Thyroid Studies No results found for: T4, T3U, TSH, TSHEXT, TSHEXT         Significant Diagnostic Studies: Ct Head Wo Cont    Result Date: 7/3/2018  EXAM: CT head INDICATION: Altered mental status. Decreased alertness. COMPARISON: 6/16/2018 TECHNIQUE: Axial CT imaging of the head was performed without intravenous contrast. One or more dose reduction techniques were used on this CT: automated exposure control, adjustment of the mAs and/or kVp according to patient's size, and iterative reconstruction techniques. The specific techniques utilized on this CT exam have been documented in the patient's electronic medical record. _______________ FINDINGS: BRAIN: Intraparenchymal hemorrhage: None. Mass effect/edema: None. Infarcts/encephalomalacia: None. White matter: Normal. Brain volume: Normal for age. EXTRA-AXIAL SPACES: Hemorrhage: None. Mass: None. Hydrocephalus: None. SINUSES: Clear. CALVARIUM: Unremarkable. OTHER: None. _______________     IMPRESSION: No evidence of acute intracranial process. Ct Head Wo Cont    Result Date: 6/16/2018  EXAM: CT head INDICATION: Trauma, facial and forehead lacerations, confusion COMPARISON: No prior comparison study. TECHNIQUE: Axial CT imaging of the head was performed without intravenous contrast.  Additional coronal and sagittal reconstructions were performed. One or more dose reduction techniques were used on this CT: automated exposure control, adjustment of the mAs and/or kVp according to patient's size, and iterative reconstruction techniques. The specific techniques utilized on this CT exam have been documented in the patient's electronic medical record. _______________ FINDINGS: Motion artifact is present which limits evaluation. VENTRICLES/EXTRA-AXIAL SPACES: The ventricles and sulci are mildly enlarged consistent with diffuse volume loss. BRAIN PARENCHYMA: There is no evidence of acute intracranial hemorrhage, mass effect, midline shift, or herniation.  No definite CT evidence of acute cortical infarct is seen. A minimal amount of hypodense white matter lesions are seen within the periventricular and subcortical white matter which are nonspecific, but likely represent chronic small vessel changes. ORBITS: Bilateral left periorbital hyperdensity and soft tissue swelling is present overlying the zygoma consistent with contusion. Left ocular globe is intact. Orbits otherwise unremarkable. PARANASAL SINUSES/MASTOIDS: Visualized paranasal sinuses are clear. Visualized mastoid air cells are clear. OSSEOUS STRUCTURES: No fracture is seen. OTHER: None.  _______________     IMPRESSION: 1. No acute intracranial hemorrhage, mass effect, midline shift, or herniation. No definite CT evidence of acute cortical infarct is seen. Please note that noncontrast head CT may be normal in early acute infarct. 2. Minimal nonspecific white matter disease likely representing chronic small vessel changes. 3. Diffuse volume loss. 4. Lateral left periorbital soft tissue contusion. No underlying fracture is seen. Ct Spine Cerv Wo Cont    Result Date: 6/16/2018  EXAM: CT Cervical spine INDICATION: Cervical Pain, trauma COMPARISON: No prior comparison study TECHNIQUE: Axial CT imaging of the cervical spine was performed from the skull base to the upper thoracic spine without intravenous contrast. Multiplanar reformats were generated. One or more dose reduction techniques were used on this CT: automated exposure control, adjustment of the mAs and/or kVp according to patient's size, and iterative reconstruction techniques. The specific techniques utilized on this CT exam have been documented in the patient's electronic medical record. _______________ FINDINGS: VERTEBRAE AND DISCS: Straightening of the normal lordosis is present which is nonspecific but may be due to muscle spasm or positioning. No fracture or subluxation is seen. Multilevel mild facet arthropathy is present.  Multilevel disc height loss with endplate osteophyte formation is seen. SPINAL CANAL AND FORAMINA: Elongated calcification is seen along the posterior aspects of the mid cervical vertebral bodies, C3 through the C6 levels representing mild ossification of the posterior longitudinal ligament. There is associated at least moderate central stenosis at the C3-4 level asymmetric towards the left. Additional multilevel foraminal stenosis is present due to hypertrophic degenerative changes. PREVERTEBRAL SOFT TISSUES: Unremarkable VISIBLE INTRACRANIAL CONTENTS: Unremarkable. LUNG APICES: Clear. OTHER: None. _______________     IMPRESSION: No cervical spine fracture or subluxation. Multilevel degenerative changes involving the endplates and facet joints with additional OPLL contributing to spinal canal, narrowing including at least moderate central stenosis at C3-C4, asymmetric to the left. No prior comparison study. Please note this cervical spine CT was performed with patient supine. This supine study does not evaluate for ligamentous injury or exclude instability. If the patient has persistent symptoms or if otherwise clinically indicated, erect cervical spine plain films are recommended. Xr Chest Port    Result Date: 7/9/2018  EXAM:Chest X-Ray  History: Hypoxia Technique:  Portable Frontal View Comparison: 07/08/2018, 07/07/2018 _______________ FINDINGS: The trachea is midline. Stable midline cardiac silhouette and hilar vascular structures. Relative mild hypoinflation with minimal increased lateral right basilar opacity. Minimal increased lateral left basilar linear atelectasis/scarring. No pneumothorax. Intact osseous structures. _______________     IMPRESSION: 1. Mild hypoinflation with minimal increased bibasilar opacities, favoring atelectasis with accentuation of the left basilar scar.      Xr Chest Port    Result Date: 7/8/2018  Chest, single view Indication: Shortness of breath Comparison: July 7, 2018 Findings:  Portable upright AP view of the chest was obtained. When he or atelectasis at the left lung base noted, without focal pneumonic consolidation, pneumothorax, or pleural effusion. Normal cardiac size and stable mediastinal contours. No acute osseous abnormality. Impression: Linear atelectasis at the left lung base without superimposed acute radiographic abnormality. Xr Chest Port    Result Date: 7/7/2018  Chest, single view Indication: Hypoxia Comparison: July 6, 2018 Findings:  Portable upright AP view of the chest was obtained. The lungs are progressively underexpanded by comparison to the prior days exam, with increased linear opacities at each lung base. No pneumothorax or pleural effusion. Stable cardiac size and mediastinal contours. No acute osseous abnormality. Impression: Mild progressive pulmonary hypoinflation with parallel increased in bibasilar atelectasis. Xr Chest Port    Result Date: 7/6/2018  Chest, single view Indication: Hypoxia Comparison: Several prior exams, most recently July 5, 2018 Findings:  Portable upright AP view of the chest was obtained. Linear opacity at the left lung base is demonstrated, similar to prior exams. No focal pneumonic opacity. No pneumothorax or pleural effusion. Stable cardiac size and mediastinal contours. No acute osseous abnormality. Impression: Left basilar atelectasis or scarring, similar to prior, without superimposed acute radiographic abnormality. Xr Chest Port    Result Date: 7/5/2018  Chest, single view Indication: Hypoxia. Altered mental status with decreased level of alertness Comparison: July 3, 2018 Findings:  Portable upright AP view of the chest was obtained. No focal pneumonic opacity, pneumothorax, or pleural effusion. Linear scarring and/or atelectasis at the left lung is noted, unchanged. Cardiac size and mediastinal contours are normal. No acute osseous abnormality. IMPRESSION: 1.  Minimal linear scarring or atelectasis at the left lung base unchanged from prior available exams without superimposed acute radiographic abnormality. Xr Chest Port    Result Date: 7/3/2018  INDICATION:  altered mental status COMPARISON:  6/16/2018 FINDINGS: A portable AP radiograph of the chest was obtained. The patient is on a cardiac monitor. Slightly low lung volumes. Linear opacity at the left lung base appears similar, could be scarring or partial atelectasis. There is mild crowding of basilar pulmonary markings related to hypoventilation. The costophrenic angles are indistinct, right greater than left, suggest tiny effusions. Upper lungs clear. No vascular congestion. No pneumothorax. Mediastinal contour stable. No acute bony finding. IMPRESSION: Linear opacity at the left lung base appears similar, could be scarring or partial atelectasis. There is mild crowding of basilar pulmonary markings related to hypoventilation. The costophrenic angles are indistinct, right greater than left, suggest tiny effusions. Xr Chest Port    Result Date: 6/16/2018  Portable Chest  History: Shortness of breath Comparison: No prior studies Portable view of the chest demonstrates the cardiomediastinal silhouette is within normal limits. Linear opacity is present laterally at the left lung base. Mild hypoinflation is present. Cardiac monitoring leads are present. No pleural effusion or pneumothorax is seen. Degenerative changes are in the spine. IMPRESSION: Probable subsegmental atelectasis versus scarring lateral left lung base. Hypoinflation. No results found for this or any previous visit.         CC: Bushra Moralez MD

## 2018-07-12 NOTE — PROGRESS NOTES
[facility]                  Pulmonary, Critical Care, and Sleep Medicine     Name: Colby Benitez. MRN: [id number]   : 1945 Hospital: [facility] [place]   Date: 2018        [facility] Note       IMPRESSION:   · AMS ? Due to CO2 narcosis but back to baseline, ? Due to HTN urgency and HTN encephalopathy, ? Delirium due to ETOH   · Advance dementia per notes  · Possible fluid overload due to elevated BP and right sided effusion   · COPD with FEV1 of 1.23 or 55% predicted but normal ratio follows with Dr. Sharath Bentley on Spiriva and advair at home- Recent 6 min walk in may suggested requires home oxygen 2-4 liter  · RUPERTO unknown severity on BIPAP2 non compliant-- not able to tolerate but best response noted on BIPAP 24/  · Acute on chronic respiratory failure, hypoxic and hypercarbid, multifactorial.   · CKD, mild  · ETOH abuse, ?withdrawal.   · Leucocytosis   · CLL   PLAN:  -c/w bipap 24/12 CWP qhs and prn. Limitation of using or cooperation to bipap use is his mental status. On zyprexa.   -Pt's Bipap settings on hospital machine-24/12 24% and backup rate of 10. Pt's mask size-large full face mask. -c/w fio2 NC to keep spo2 >=92%  -c/w duoneb, pulmicort.   -taper steroids as tolerated. Change to prednisone 30 mg daily.   -completed Provera on 18  -AMS better, thought to be multifactorial. CT head unremarkable. NH3 <10 normal. On zyprexa. Haldol prn.   -follow WBC, improving (CLL history). cx NGTD. Completed levaquin  -hyperkalemia was treated on 18, repeat labs ordered.   -placement: lives alone, waiting for placement.   -Other issues being managed by primary team and respective consultants. -DVT Px: heparin  -GI Px: low risk for stress ulcer.   -outpatient pulmonologist: Dr. Chencho Tolliver.   -ok to d/c from pulmonary standpoint. D/w pt, RN, . Colby Benitez. is a 74 y.o. male who hx emphysema, HTN,memorry loss, CLL, alcohol abuse. Recurrent ICU transfer due to AMS. 7/12/18:  Sitting in chair. Mildly confused. Wants to go home. Did used bipap last night   On NC o2 now. No fever chills cough dyspnea wheezing. Prior to Admission medications    Medication Sig Start Date End Date Taking? Authorizing Provider   alfuzosin SR (UROXATRAL) 10 mg SR tablet Take 10 mg by mouth daily. Yes Ronni Hernandez MD   aspirin 81 mg chewable tablet Take 81 mg by mouth daily. Yes Ronni Hernandez MD   cholecalciferol (VITAMIN D3) 1,000 unit cap Take  by mouth daily. Yes Ronni Hernandez MD   cycloSPORINE (RESTASIS) 0.05 % ophthalmic emulsion Administer 1 Drop to both eyes two (2) times a day. Yes Ronni Hernandez MD   diclofenac (VOLTAREN) 1 % gel Apply  to affected area four (4) times daily. Yes Ronni Hernandez MD   famotidine (PEPCID) 20 mg tablet Take 20 mg by mouth two (2) times a day. Yes Ronni Hernandez MD   latanoprost (XALATAN) 0.005 % ophthalmic solution Administer 1 Drop to both eyes nightly. Yes Ronni Hernandez MD   allopurinol (ZYLOPRIM) 100 mg tablet Take  by mouth daily. Ronni Hernandez MD   carvedilol (COREG) 6.25 mg tablet Take  by mouth two (2) times daily (with meals). Ronni Hernandez MD   furosemide (LASIX) 40 mg tablet Take  by mouth daily. Ronni Hernandez MD   Oxygen     Ronni Hernandez MD   pravastatin sodium (PRAVASTATIN PO) Take  by mouth. Ronni Hernandez MD   tiotropium (SPIRIVA WITH HANDIHALER) 18 mcg inhalation capsule Take 1 Cap by inhalation daily. Ronni Hernandez MD   fluticasone/salmeterol (ADVAIR HFA IN) Take  by inhalation. Ronni Hernandez MD   ALBUTEROL IN Take  by inhalation.     Ronni Hernandez MD     Current Facility-Administered Medications   Medication Dose Route Frequency    methylPREDNISolone (PF) (SOLU-MEDROL) injection 40 mg  40 mg IntraVENous Q24H    OLANZapine (ZyPREXA) tablet 1.25 mg  1.25 mg Oral BID    hydrALAZINE (APRESOLINE) tablet 25 mg  25 mg Oral TID    heparin (porcine) injection 5,000 Units  5,000 Units SubCUTAneous Q8H  Thiamine Mononitrate (B-1) tablet 100 mg  100 mg Oral DAILY    folic acid (FOLVITE) tablet 1 mg  1 mg Oral DAILY    carvedilol (COREG) tablet 12.5 mg  12.5 mg Oral BID WITH MEALS    famotidine (PEPCID) tablet 20 mg  20 mg Oral Q12H    amLODIPine (NORVASC) tablet 10 mg  10 mg Oral DAILY    nitroglycerin (NITRODUR) 0.2 mg/hr patch 1 Patch  1 Patch TransDERmal Q24H    docusate sodium (COLACE) capsule 100 mg  100 mg Oral BID    budesonide (PULMICORT) 500 mcg/2 ml nebulizer suspension  500 mcg Nebulization BID RT    albuterol-ipratropium (DUO-NEB) 2.5 MG-0.5 MG/3 ML  3 mL Nebulization Q4H RT         Objective:   Vital Signs:    Visit Vitals    /74 (BP 1 Location: Right arm, BP Patient Position: At rest)    Pulse 83    Temp 98.4 °F (36.9 °C)    Resp 18    Ht 5' 6\" (1.676 m)    Wt 90.6 kg (199 lb 12.8 oz)    SpO2 98%    BMI 32.25 kg/m2       O2 Device: Nasal cannula   O2 Flow Rate (L/min): 1.5 l/min   Temp (24hrs), Av.9 °F (36.6 °C), Min:97.7 °F (36.5 °C), Max:98.4 °F (36.9 °C)       Intake/Output:   Last shift:         Last 3 shifts: 07/10 1901 -  0700  In: 480 [P.O.:480]  Out: 0     Intake/Output Summary (Last 24 hours) at 18 0829  Last data filed at 18 1938   Gross per 24 hour   Intake              480 ml   Output                0 ml   Net              480 ml       Ventilator Settings:  Mode Rate Tidal Volume Pressure FiO2 PEEP            24 %       Peak airway pressure:      Minute ventilation: 9.9 l/min        Objective:  General/neuro: comfortable, no acute distress altert. Mildly confused. Sitting in chair. HEENT: sclera anicteric, EOM intact  Neck: No adenopathy or thyroid swelling, no lymphadenopathy or JVD, supple  CVS: S1S2 no murmurs  RS: Mod AE bilaterally, decrease at base. No wheezing. No rhonchi. No accessory muscle use. Abd: soft, non tender, obese.    Extrm: no leg edema, clubbing or cyanosis  Lymph node: No obvious palpable lymph node appreciated in cervical region. Skin: no rash    CBC w/Diff No results for input(s): WBC, RBC, HGB, HCT, PLT, GRANS, LYMPH, EOS, HGBEXT, HCTEXT, PLTEXT, HGBEXT, HCTEXT, PLTEXT in the last 72 hours. Chemistry Recent Labs      07/12/18   0411  07/11/18   0330  07/10/18   0421   GLU   --   130*   --    NA   --   141   --    K   --   5.8*   --    CL   --   103   --    CO2   --   37*   --    BUN   --   52*   --    CREA   --   1.53*   --    CA   --   9.4   --    MG  2.6  2.6  2.3   AGAP   --   1*   --    BUCR   --   34*   --    AP   --   77   --    TP   --   7.0   --    ALB   --   3.1*   --    GLOB   --   3.9   --    AGRAT   --   0.8   --         Lactic Acid Lactic acid   Date Value Ref Range Status   06/16/2018 1.3 0.4 - 2.0 MMOL/L Final     No results for input(s): LAC in the last 72 hours. Micro  No results for input(s): SDES, CULT in the last 72 hours. No results for input(s): CULT in the last 72 hours. ABG No results for input(s): PHI, PHI, POC2, PCO2I, PO2, PO2I, HCO3, HCO3I, FIO2, FIO2I in the last 72 hours. Liver Enzymes Protein, total   Date Value Ref Range Status   07/11/2018 7.0 6.4 - 8.2 g/dL Final     Albumin   Date Value Ref Range Status   07/11/2018 3.1 (L) 3.4 - 5.0 g/dL Final     Globulin   Date Value Ref Range Status   07/11/2018 3.9 2.0 - 4.0 g/dL Final     A-G Ratio   Date Value Ref Range Status   07/11/2018 0.8 0.8 - 1.7   Final     AST (SGOT)   Date Value Ref Range Status   07/11/2018 21 15 - 37 U/L Final     Alk. phosphatase   Date Value Ref Range Status   07/11/2018 77 45 - 117 U/L Final     Recent Labs      07/11/18   0330   TP  7.0   ALB  3.1*   GLOB  3.9   AGRAT  0.8   SGOT  21   AP  77        Cardiac Enzymes No results found for: CPK, CK, CKMMB, CKMB, RCK3, CKMBT, CKNDX, CKND1, LOREN, TROPT, TROIQ, APRIL, TROPT, TNIPOC, BNP, BNPP     BNP No results found for: BNP, BNPP, XBNPT     Coagulation No results for input(s): PTP, INR, APTT in the last 72 hours.     No lab exists for component: INREXT, INREXT      Thyroid  No results found for: T4, T3U, TSH, TSHEXT, TSHEXT       Lipid Panel No results found for: CHOL, CHOLPOCT, CHOLX, CHLST, CHOLV, 511848, HDL, LDL, LDLC, DLDLP, 176919, VLDLC, VLDL, TGLX, TRIGL, TRIGP, TGLPOCT, CHHD, CHHDX       Urinalysis Lab Results   Component Value Date/Time    Color YELLOW 07/03/2018 04:15 PM    Appearance CLEAR 07/03/2018 04:15 PM    Specific gravity 1.019 07/03/2018 04:15 PM    pH (UA) 7.0 07/03/2018 04:15 PM    Protein 300 (A) 07/03/2018 04:15 PM    Glucose NEGATIVE  07/03/2018 04:15 PM    Ketone NEGATIVE  07/03/2018 04:15 PM    Bilirubin NEGATIVE  07/03/2018 04:15 PM    Urobilinogen 0.2 07/03/2018 04:15 PM    Nitrites NEGATIVE  07/03/2018 04:15 PM    Leukocyte Esterase NEGATIVE  07/03/2018 04:15 PM    Epithelial cells FEW 07/03/2018 04:15 PM    Bacteria FEW (A) 07/03/2018 04:15 PM    WBC 0 to 3 07/03/2018 04:15 PM    RBC NEGATIVE  07/03/2018 04:15 PM        XR (Most Recent). CXR reviewed by me and compared with previous CXR   Results from Hospital Encounter encounter on 07/03/18   XR CHEST PORT   Narrative EXAM:Chest X-Ray      History: Hypoxia    Technique:  Portable Frontal View    Comparison: 07/08/2018, 07/07/2018    _______________    FINDINGS:    The trachea is midline. Stable midline cardiac silhouette and hilar vascular structures. Relative mild hypoinflation with minimal increased lateral right basilar  opacity. Minimal increased lateral left basilar linear atelectasis/scarring. No  pneumothorax. Intact osseous structures. _______________         Impression IMPRESSION:    1. Mild hypoinflation with minimal increased bibasilar opacities, favoring  atelectasis with accentuation of the left basilar scar. CT (Most Recent)   Results from Hospital Encounter encounter on 07/03/18   CT HEAD WO CONT   Narrative EXAM: CT head    INDICATION: Altered mental status. Decreased alertness.     COMPARISON: 6/16/2018    TECHNIQUE: Axial CT imaging of the head was performed without intravenous  contrast. One or more dose reduction techniques were used on this CT: automated  exposure control, adjustment of the mAs and/or kVp according to patient's size,  and iterative reconstruction techniques. The specific techniques utilized on  this CT exam have been documented in the patient's electronic medical record.    _______________    FINDINGS:    BRAIN:  Intraparenchymal hemorrhage: None. Mass effect/edema: None. Infarcts/encephalomalacia: None. White matter: Normal.  Brain volume: Normal for age. EXTRA-AXIAL SPACES:  Hemorrhage: None. Mass: None. Hydrocephalus: None. SINUSES: Clear. CALVARIUM: Unremarkable. OTHER: None.    _______________         Impression IMPRESSION:     No evidence of acute intracranial process. EKG No results found for this or any previous visit. ECHO  12/2017 [facility]    NORMAL LEFT VENTRICULAR CAVITY SIZE. MODERATE CONCENTRIC LEFT VENTRICULAR HYPERTROPHY. NORMAL LEFT VENTRICULAR SYSTOLIC FUNCTION WITH AN EJECTION FRACTION   ESTIMATED AT 60%. MILD  DIASTOLIC DYSFUNCTION. NORMAL LEFT ATRIAL SIZE. PRIOR ECHO REPORT FINDINGS ON 10-:     NORMAL LEFT VENTRICULAR CAVITY SIZE. MILD CONCENTRIC LEFT VENTRICULAR HYPERTROPHY. DYNAMIC  GLOBAL LEFT VENTRICULAR SYSTOLIC FUNCTION. EJECTION FRACTION IS 70%. MILD DIASTOLIC DYSFUNCTION. NORMAL CHAMBER SIZES. NO HEMODYNAMICALLY SIGNIFICANT VALVULAR PATHOLOGY. NORMAL PULMONARY ARTERY PRESSURE OF 22 MMHG. PFT PFTs: 1/11/18    FVC  1.35 (44 %)  FEV1 1.23 (55 %)  FEV1/FVC  92 %  TLC 62 %   %  DL/VA 31 %       Other Interpretation: Ambulatory oximetry Does show significant desaturation;starting O2 sat 90 %; low O2 sat 85 %;   starting HR 78 bpm; high HR 99 bpm  Dyspnea scale: beginning 0; ending 0  Total distance : 330 ft   Total time: 3 minutes    Recommendation: Ambulatory O2 was recommended.          Stevo Menard MD 7/12/2018

## 2018-07-16 ENCOUNTER — APPOINTMENT (OUTPATIENT)
Dept: CT IMAGING | Age: 73
DRG: 189 | End: 2018-07-16
Attending: INTERNAL MEDICINE
Payer: MEDICARE

## 2018-07-16 ENCOUNTER — APPOINTMENT (OUTPATIENT)
Dept: GENERAL RADIOLOGY | Age: 73
DRG: 189 | End: 2018-07-16
Attending: INTERNAL MEDICINE
Payer: MEDICARE

## 2018-07-16 ENCOUNTER — HOSPITAL ENCOUNTER (INPATIENT)
Age: 73
LOS: 9 days | Discharge: HOME HEALTH | DRG: 189 | End: 2018-07-25
Attending: INTERNAL MEDICINE | Admitting: HOSPITALIST
Payer: MEDICARE

## 2018-07-16 DIAGNOSIS — F03.91 DEMENTIA WITH BEHAVIORAL DISTURBANCE, UNSPECIFIED DEMENTIA TYPE: ICD-10-CM

## 2018-07-16 DIAGNOSIS — F41.9 ANXIETY: ICD-10-CM

## 2018-07-16 DIAGNOSIS — R06.89 RESPIRATORY INSUFFICIENCY: Primary | ICD-10-CM

## 2018-07-16 DIAGNOSIS — R06.89 CO2 NARCOSIS: ICD-10-CM

## 2018-07-16 DIAGNOSIS — R93.89 INFILTRATE NOTED ON IMAGING STUDY: ICD-10-CM

## 2018-07-16 DIAGNOSIS — N28.9 RENAL INSUFFICIENCY: ICD-10-CM

## 2018-07-16 DIAGNOSIS — J44.9 CHRONIC OBSTRUCTIVE PULMONARY DISEASE, UNSPECIFIED COPD TYPE (HCC): ICD-10-CM

## 2018-07-16 PROBLEM — N18.30 STAGE 3 CHRONIC KIDNEY DISEASE (HCC): Status: ACTIVE | Noted: 2018-07-12

## 2018-07-16 PROBLEM — R09.02 HYPOXIA: Status: ACTIVE | Noted: 2018-07-16

## 2018-07-16 PROBLEM — R06.03 RESPIRATORY DISTRESS: Status: ACTIVE | Noted: 2018-07-16

## 2018-07-16 PROBLEM — R41.82 ALTERED MENTAL STATUS: Status: ACTIVE | Noted: 2018-07-16

## 2018-07-16 PROBLEM — E87.0 HYPERNATREMIA: Status: ACTIVE | Noted: 2018-07-16

## 2018-07-16 LAB
ALBUMIN SERPL-MCNC: 3.2 G/DL (ref 3.4–5)
ALBUMIN/GLOB SERPL: 0.8 {RATIO} (ref 0.8–1.7)
ALP SERPL-CCNC: 74 U/L (ref 45–117)
ALT SERPL-CCNC: 35 U/L (ref 16–61)
ANION GAP SERPL CALC-SCNC: ABNORMAL MMOL/L (ref 3–18)
APPEARANCE UR: CLEAR
ARTERIAL PATENCY WRIST A: YES
ARTERIAL PATENCY WRIST A: YES
AST SERPL-CCNC: 22 U/L (ref 15–37)
BACTERIA URNS QL MICRO: ABNORMAL /HPF
BASE EXCESS BLD CALC-SCNC: 15 MMOL/L
BASE EXCESS BLD CALC-SCNC: 18 MMOL/L
BASOPHILS # BLD: 0 K/UL (ref 0–0.1)
BASOPHILS NFR BLD: 0 % (ref 0–3)
BDY SITE: ABNORMAL
BDY SITE: ABNORMAL
BILIRUB SERPL-MCNC: 0.2 MG/DL (ref 0.2–1)
BILIRUB UR QL: NEGATIVE
BUN SERPL-MCNC: 37 MG/DL (ref 7–18)
BUN/CREAT SERPL: 19 (ref 12–20)
CALCIUM SERPL-MCNC: 8.8 MG/DL (ref 8.5–10.1)
CHLORIDE SERPL-SCNC: 105 MMOL/L (ref 100–108)
CK MB CFR SERPL CALC: 2 % (ref 0–4)
CK MB SERPL-MCNC: 2.3 NG/ML (ref 5–25)
CK SERPL-CCNC: 114 U/L (ref 39–308)
CO2 SERPL-SCNC: >45 MMOL/L (ref 21–32)
COLOR UR: YELLOW
CREAT SERPL-MCNC: 1.9 MG/DL (ref 0.6–1.3)
DIFFERENTIAL METHOD BLD: ABNORMAL
EOSINOPHIL # BLD: 0.2 K/UL (ref 0–0.4)
EOSINOPHIL NFR BLD: 1 % (ref 0–5)
EPITH CASTS URNS QL MICRO: ABNORMAL /LPF (ref 0–5)
ERYTHROCYTE [DISTWIDTH] IN BLOOD BY AUTOMATED COUNT: 16.7 % (ref 11.6–14.5)
EST. AVERAGE GLUCOSE BLD GHB EST-MCNC: 123 MG/DL
GAS FLOW.O2 O2 DELIVERY SYS: ABNORMAL L/MIN
GAS FLOW.O2 O2 DELIVERY SYS: ABNORMAL L/MIN
GAS FLOW.O2 SETTING OXYMISER: 2 L/M
GLOBULIN SER CALC-MCNC: 3.8 G/DL (ref 2–4)
GLUCOSE BLD STRIP.AUTO-MCNC: 125 MG/DL (ref 70–110)
GLUCOSE SERPL-MCNC: 113 MG/DL (ref 74–99)
GLUCOSE UR STRIP.AUTO-MCNC: NEGATIVE MG/DL
HBA1C MFR BLD: 5.9 % (ref 4.5–5.6)
HCO3 BLD-SCNC: 41 MMOL/L (ref 22–26)
HCO3 BLD-SCNC: 44.5 MMOL/L (ref 22–26)
HCT VFR BLD AUTO: 40.1 % (ref 36–48)
HGB BLD-MCNC: 11.7 G/DL (ref 13–16)
HGB UR QL STRIP: NEGATIVE
KETONES UR QL STRIP.AUTO: NEGATIVE MG/DL
LEUKOCYTE ESTERASE UR QL STRIP.AUTO: NEGATIVE
LYMPHOCYTES # BLD: 9 K/UL (ref 0.8–3.5)
LYMPHOCYTES NFR BLD: 50 % (ref 20–51)
MAGNESIUM SERPL-MCNC: 2.4 MG/DL (ref 1.6–2.6)
MCH RBC QN AUTO: 24.3 PG (ref 24–34)
MCHC RBC AUTO-ENTMCNC: 29.2 G/DL (ref 31–37)
MCV RBC AUTO: 83.2 FL (ref 74–97)
MONOCYTES # BLD: 1.4 K/UL (ref 0–1)
MONOCYTES NFR BLD: 8 % (ref 2–9)
NEUTS BAND NFR BLD MANUAL: 1 % (ref 0–5)
NEUTS SEG # BLD: 7.2 K/UL (ref 1.8–8)
NEUTS SEG NFR BLD: 40 % (ref 42–75)
NITRITE UR QL STRIP.AUTO: NEGATIVE
O2/TOTAL GAS SETTING VFR VENT: 24 %
O2/TOTAL GAS SETTING VFR VENT: 24 %
PCO2 BLD: 81.6 MMHG (ref 35–45)
PCO2 BLD: 94.9 MMHG (ref 35–45)
PH BLD: 7.28 [PH] (ref 7.35–7.45)
PH BLD: 7.31 [PH] (ref 7.35–7.45)
PH UR STRIP: 5 [PH] (ref 5–8)
PLATELET # BLD AUTO: 143 K/UL (ref 135–420)
PLATELET COMMENTS,PCOM: ABNORMAL
PMV BLD AUTO: 11.4 FL (ref 9.2–11.8)
PO2 BLD: 69 MMHG (ref 80–100)
PO2 BLD: 82 MMHG (ref 80–100)
POTASSIUM SERPL-SCNC: 4 MMOL/L (ref 3.5–5.5)
PROT SERPL-MCNC: 7 G/DL (ref 6.4–8.2)
PROT UR STRIP-MCNC: 300 MG/DL
RBC # BLD AUTO: 4.82 M/UL (ref 4.7–5.5)
RBC #/AREA URNS HPF: ABNORMAL /HPF (ref 0–5)
RBC MORPH BLD: ABNORMAL
RBC MORPH BLD: ABNORMAL
SAO2 % BLD: 90 % (ref 92–97)
SAO2 % BLD: 93 % (ref 92–97)
SERVICE CMNT-IMP: ABNORMAL
SERVICE CMNT-IMP: ABNORMAL
SODIUM SERPL-SCNC: 149 MMOL/L (ref 136–145)
SP GR UR REFRACTOMETRY: 1.02 (ref 1–1.03)
SPECIMEN TYPE: ABNORMAL
SPECIMEN TYPE: ABNORMAL
TOTAL RESP. RATE, ITRR: 31
TROPONIN I SERPL-MCNC: <0.02 NG/ML (ref 0–0.06)
UROBILINOGEN UR QL STRIP.AUTO: 0.2 EU/DL (ref 0.2–1)
WBC # BLD AUTO: 17.9 K/UL (ref 4.6–13.2)
WBC MORPH BLD: ABNORMAL
WBC URNS QL MICRO: ABNORMAL /HPF (ref 0–5)

## 2018-07-16 PROCEDURE — 82962 GLUCOSE BLOOD TEST: CPT

## 2018-07-16 PROCEDURE — 74011250636 HC RX REV CODE- 250/636: Performed by: INTERNAL MEDICINE

## 2018-07-16 PROCEDURE — 51702 INSERT TEMP BLADDER CATH: CPT

## 2018-07-16 PROCEDURE — 71045 X-RAY EXAM CHEST 1 VIEW: CPT

## 2018-07-16 PROCEDURE — 87040 BLOOD CULTURE FOR BACTERIA: CPT | Performed by: INTERNAL MEDICINE

## 2018-07-16 PROCEDURE — 74011000250 HC RX REV CODE- 250: Performed by: INTERNAL MEDICINE

## 2018-07-16 PROCEDURE — 77010033678 HC OXYGEN DAILY

## 2018-07-16 PROCEDURE — 85025 COMPLETE CBC W/AUTO DIFF WBC: CPT | Performed by: INTERNAL MEDICINE

## 2018-07-16 PROCEDURE — 74011250636 HC RX REV CODE- 250/636: Performed by: HOSPITALIST

## 2018-07-16 PROCEDURE — 82553 CREATINE MB FRACTION: CPT | Performed by: INTERNAL MEDICINE

## 2018-07-16 PROCEDURE — 65610000006 HC RM INTENSIVE CARE

## 2018-07-16 PROCEDURE — 5A09557 ASSISTANCE WITH RESPIRATORY VENTILATION, GREATER THAN 96 CONSECUTIVE HOURS, CONTINUOUS POSITIVE AIRWAY PRESSURE: ICD-10-PCS | Performed by: HOSPITALIST

## 2018-07-16 PROCEDURE — 94640 AIRWAY INHALATION TREATMENT: CPT

## 2018-07-16 PROCEDURE — 99285 EMERGENCY DEPT VISIT HI MDM: CPT

## 2018-07-16 PROCEDURE — 94660 CPAP INITIATION&MGMT: CPT

## 2018-07-16 PROCEDURE — 74011250637 HC RX REV CODE- 250/637: Performed by: INTERNAL MEDICINE

## 2018-07-16 PROCEDURE — 83036 HEMOGLOBIN GLYCOSYLATED A1C: CPT | Performed by: HOSPITALIST

## 2018-07-16 PROCEDURE — 74011000258 HC RX REV CODE- 258: Performed by: INTERNAL MEDICINE

## 2018-07-16 PROCEDURE — 77030034850

## 2018-07-16 PROCEDURE — 70450 CT HEAD/BRAIN W/O DYE: CPT

## 2018-07-16 PROCEDURE — 87086 URINE CULTURE/COLONY COUNT: CPT | Performed by: INTERNAL MEDICINE

## 2018-07-16 PROCEDURE — 93005 ELECTROCARDIOGRAM TRACING: CPT

## 2018-07-16 PROCEDURE — 77030035694 HC MSK BIPAP FLL FAC PERFMAX PHIL -B

## 2018-07-16 PROCEDURE — 82803 BLOOD GASES ANY COMBINATION: CPT

## 2018-07-16 PROCEDURE — 80053 COMPREHEN METABOLIC PANEL: CPT | Performed by: INTERNAL MEDICINE

## 2018-07-16 PROCEDURE — 83735 ASSAY OF MAGNESIUM: CPT | Performed by: INTERNAL MEDICINE

## 2018-07-16 PROCEDURE — 81001 URINALYSIS AUTO W/SCOPE: CPT | Performed by: INTERNAL MEDICINE

## 2018-07-16 PROCEDURE — 36600 WITHDRAWAL OF ARTERIAL BLOOD: CPT

## 2018-07-16 RX ORDER — LEVOFLOXACIN 5 MG/ML
750 INJECTION, SOLUTION INTRAVENOUS ONCE
Status: COMPLETED | OUTPATIENT
Start: 2018-07-16 | End: 2018-07-16

## 2018-07-16 RX ORDER — ALPRAZOLAM 0.5 MG/1
0.5 TABLET ORAL
Status: DISCONTINUED | OUTPATIENT
Start: 2018-07-16 | End: 2018-07-25 | Stop reason: HOSPADM

## 2018-07-16 RX ORDER — DIPHENHYDRAMINE HYDROCHLORIDE 50 MG/ML
12.5 INJECTION, SOLUTION INTRAMUSCULAR; INTRAVENOUS
Status: DISCONTINUED | OUTPATIENT
Start: 2018-07-16 | End: 2018-07-25 | Stop reason: HOSPADM

## 2018-07-16 RX ORDER — ONDANSETRON 2 MG/ML
4 INJECTION INTRAMUSCULAR; INTRAVENOUS
Status: DISCONTINUED | OUTPATIENT
Start: 2018-07-16 | End: 2018-07-25 | Stop reason: HOSPADM

## 2018-07-16 RX ORDER — LORAZEPAM 2 MG/ML
0.5 INJECTION INTRAMUSCULAR
Status: DISCONTINUED | OUTPATIENT
Start: 2018-07-16 | End: 2018-07-19

## 2018-07-16 RX ORDER — IPRATROPIUM BROMIDE AND ALBUTEROL SULFATE 2.5; .5 MG/3ML; MG/3ML
3 SOLUTION RESPIRATORY (INHALATION) ONCE
Status: COMPLETED | OUTPATIENT
Start: 2018-07-16 | End: 2018-07-16

## 2018-07-16 RX ORDER — INSULIN LISPRO 100 [IU]/ML
INJECTION, SOLUTION INTRAVENOUS; SUBCUTANEOUS EVERY 6 HOURS
Status: DISCONTINUED | OUTPATIENT
Start: 2018-07-16 | End: 2018-07-16

## 2018-07-16 RX ORDER — ACETAMINOPHEN 325 MG/1
650 TABLET ORAL
Status: DISCONTINUED | OUTPATIENT
Start: 2018-07-16 | End: 2018-07-25 | Stop reason: HOSPADM

## 2018-07-16 RX ORDER — HEPARIN SODIUM 5000 [USP'U]/ML
5000 INJECTION, SOLUTION INTRAVENOUS; SUBCUTANEOUS EVERY 8 HOURS
Status: DISCONTINUED | OUTPATIENT
Start: 2018-07-16 | End: 2018-07-19

## 2018-07-16 RX ORDER — MAGNESIUM SULFATE 100 %
4 CRYSTALS MISCELLANEOUS AS NEEDED
Status: DISCONTINUED | OUTPATIENT
Start: 2018-07-16 | End: 2018-07-25 | Stop reason: HOSPADM

## 2018-07-16 RX ORDER — BUDESONIDE 0.5 MG/2ML
500 INHALANT ORAL
Status: DISCONTINUED | OUTPATIENT
Start: 2018-07-16 | End: 2018-07-25 | Stop reason: HOSPADM

## 2018-07-16 RX ORDER — NALOXONE HYDROCHLORIDE 0.4 MG/ML
0.4 INJECTION, SOLUTION INTRAMUSCULAR; INTRAVENOUS; SUBCUTANEOUS AS NEEDED
Status: DISCONTINUED | OUTPATIENT
Start: 2018-07-16 | End: 2018-07-25 | Stop reason: HOSPADM

## 2018-07-16 RX ORDER — DEXTROSE 50 % IN WATER (D50W) INTRAVENOUS SYRINGE
25-50 AS NEEDED
Status: DISCONTINUED | OUTPATIENT
Start: 2018-07-16 | End: 2018-07-25 | Stop reason: HOSPADM

## 2018-07-16 RX ORDER — IPRATROPIUM BROMIDE AND ALBUTEROL SULFATE 2.5; .5 MG/3ML; MG/3ML
3 SOLUTION RESPIRATORY (INHALATION)
Status: COMPLETED | OUTPATIENT
Start: 2018-07-16 | End: 2018-07-16

## 2018-07-16 RX ORDER — VANCOMYCIN 2 GRAM/500 ML IN 0.9 % SODIUM CHLORIDE INTRAVENOUS
2000 ONCE
Status: COMPLETED | OUTPATIENT
Start: 2018-07-16 | End: 2018-07-17

## 2018-07-16 RX ORDER — DOCUSATE SODIUM 100 MG/1
100 CAPSULE, LIQUID FILLED ORAL 2 TIMES DAILY
Status: DISCONTINUED | OUTPATIENT
Start: 2018-07-16 | End: 2018-07-16

## 2018-07-16 RX ORDER — INSULIN LISPRO 100 [IU]/ML
INJECTION, SOLUTION INTRAVENOUS; SUBCUTANEOUS EVERY 6 HOURS
Status: DISCONTINUED | OUTPATIENT
Start: 2018-07-16 | End: 2018-07-22

## 2018-07-16 RX ORDER — DEXTROSE 50 % IN WATER (D50W) INTRAVENOUS SYRINGE
25-50 AS NEEDED
Status: DISCONTINUED | OUTPATIENT
Start: 2018-07-16 | End: 2018-07-17 | Stop reason: SDUPTHER

## 2018-07-16 RX ORDER — MAGNESIUM SULFATE 100 %
4 CRYSTALS MISCELLANEOUS AS NEEDED
Status: DISCONTINUED | OUTPATIENT
Start: 2018-07-16 | End: 2018-07-16 | Stop reason: SDUPTHER

## 2018-07-16 RX ORDER — DEXTROSE MONOHYDRATE AND SODIUM CHLORIDE 5; .45 G/100ML; G/100ML
30 INJECTION, SOLUTION INTRAVENOUS CONTINUOUS
Status: DISCONTINUED | OUTPATIENT
Start: 2018-07-16 | End: 2018-07-17

## 2018-07-16 RX ORDER — HALOPERIDOL 5 MG/ML
1 INJECTION INTRAMUSCULAR
Status: DISCONTINUED | OUTPATIENT
Start: 2018-07-16 | End: 2018-07-17

## 2018-07-16 RX ORDER — GUAIFENESIN 100 MG/5ML
324 LIQUID (ML) ORAL
Status: ACTIVE | OUTPATIENT
Start: 2018-07-16 | End: 2018-07-17

## 2018-07-16 RX ORDER — VANCOMYCIN HYDROCHLORIDE
1250 EVERY 24 HOURS
Status: DISCONTINUED | OUTPATIENT
Start: 2018-07-17 | End: 2018-07-17 | Stop reason: DRUGHIGH

## 2018-07-16 RX ORDER — IPRATROPIUM BROMIDE AND ALBUTEROL SULFATE 2.5; .5 MG/3ML; MG/3ML
3 SOLUTION RESPIRATORY (INHALATION)
Status: DISCONTINUED | OUTPATIENT
Start: 2018-07-16 | End: 2018-07-25 | Stop reason: HOSPADM

## 2018-07-16 RX ORDER — FUROSEMIDE 10 MG/ML
40 INJECTION INTRAMUSCULAR; INTRAVENOUS DAILY
Status: DISCONTINUED | OUTPATIENT
Start: 2018-07-16 | End: 2018-07-19

## 2018-07-16 RX ADMIN — HEPARIN SODIUM 5000 UNITS: 5000 INJECTION, SOLUTION INTRAVENOUS; SUBCUTANEOUS at 17:13

## 2018-07-16 RX ADMIN — SODIUM CHLORIDE 1000 ML: 900 INJECTION, SOLUTION INTRAVENOUS at 16:05

## 2018-07-16 RX ADMIN — PIPERACILLIN SODIUM,TAZOBACTAM SODIUM 3.38 G: 3; .375 INJECTION, POWDER, FOR SOLUTION INTRAVENOUS at 18:25

## 2018-07-16 RX ADMIN — IPRATROPIUM BROMIDE AND ALBUTEROL SULFATE 3 ML: .5; 3 SOLUTION RESPIRATORY (INHALATION) at 15:03

## 2018-07-16 RX ADMIN — IPRATROPIUM BROMIDE AND ALBUTEROL SULFATE 3 ML: .5; 3 SOLUTION RESPIRATORY (INHALATION) at 23:56

## 2018-07-16 RX ADMIN — FUROSEMIDE 40 MG: 10 INJECTION, SOLUTION INTRAMUSCULAR; INTRAVENOUS at 18:26

## 2018-07-16 RX ADMIN — VANCOMYCIN HYDROCHLORIDE 2000 MG: 10 INJECTION, POWDER, LYOPHILIZED, FOR SOLUTION INTRAVENOUS at 19:44

## 2018-07-16 RX ADMIN — FAMOTIDINE 20 MG: 10 INJECTION INTRAVENOUS at 18:26

## 2018-07-16 RX ADMIN — METHYLPREDNISOLONE SODIUM SUCCINATE 80 MG: 125 INJECTION, POWDER, FOR SOLUTION INTRAMUSCULAR; INTRAVENOUS at 16:14

## 2018-07-16 RX ADMIN — DEXTROSE MONOHYDRATE AND SODIUM CHLORIDE 30 ML/HR: 5; .45 INJECTION, SOLUTION INTRAVENOUS at 18:30

## 2018-07-16 RX ADMIN — IPRATROPIUM BROMIDE AND ALBUTEROL SULFATE 3 ML: .5; 3 SOLUTION RESPIRATORY (INHALATION) at 20:18

## 2018-07-16 RX ADMIN — IPRATROPIUM BROMIDE AND ALBUTEROL SULFATE 3 ML: .5; 3 SOLUTION RESPIRATORY (INHALATION) at 16:06

## 2018-07-16 RX ADMIN — LEVOFLOXACIN 750 MG: 5 INJECTION, SOLUTION INTRAVENOUS at 16:17

## 2018-07-16 RX ADMIN — BUDESONIDE 500 MCG: 0.5 INHALANT RESPIRATORY (INHALATION) at 20:18

## 2018-07-16 RX ADMIN — SODIUM CHLORIDE 1000 ML: 900 INJECTION, SOLUTION INTRAVENOUS at 18:31

## 2018-07-16 RX ADMIN — METHYLPREDNISOLONE SODIUM SUCCINATE 60 MG: 40 INJECTION, POWDER, FOR SOLUTION INTRAMUSCULAR; INTRAVENOUS at 22:37

## 2018-07-16 NOTE — PROGRESS NOTES
Pharmacy Dosing Services: Zosyn     The following medication: Zosyn 2.25 gm IV q8h was automatically dose-adjusted to Zosyn 3.375 gm IV q6h per THE [facility] P&T Committee Protocol, with respect to renal function. Indication:  HAP    Pt Weight:   Wt Readings from Last 1 Encounters:   07/16/18 90.6 kg (199 lb 11.8 oz)     Serum Creatinine Lab Results   Component Value Date/Time    Creatinine 1.90 (H) 07/16/2018 02:01 PM       Creatinine Clearance Estimated Creatinine Clearance: 35.9 mL/min (based on Cr of 1.9). BUN Lab Results   Component Value Date/Time    BUN 37 (H) 07/16/2018 02:01 PM         Pharmacy to continue to monitor patient daily. Will make dosage adjustments based upon changing renal function. Signed Tanesha Sanchez.  Contact information: [phone]

## 2018-07-16 NOTE — IP AVS SNAPSHOT
[address] [facility] 
 
 
 [address] [address] 
[phone] Patient: Mich Banks. MRN: [id number] PBD:[id number][birth year]92 About your hospitalization You were admitted on:  July 16, 2018 You last received care in the:  THE [facility] [address] You were discharged on:  July 25, 2018 Why you were hospitalized Your primary diagnosis was:  Acute On Chronic Respiratory Failure With Hypoxia And Hypercapnia (Hcc) Your diagnoses also included: Altered Mental Status, Respiratory Distress, Hypoxia, Co2 Narcosis, Hypernatremia, Acute Encephalopathy, Cll (Chronic Lymphocytic Leukemia) (Hcc), Dementia, Copd (Chronic Obstructive Pulmonary Disease) (Hcc), Htn (Hypertension), Aryan (Obstructive Sleep Apnea) Follow-up Information Follow up With Details Comments Contact Info Robert Sofia MD In 3 days [de-identified] office will call patient at home to schedule a follow-up appointment. [address] [facility] 300 1230 [facility] 
[phone] Deb Mcqueen MD On 8/27/2018  Follow-up appointment @ 4:00 p.m. This is a previously scheduled appointment. [de-identified] nurse will call patient at home to schedule an earlier appointment. [address] [place] [address] 350 1230 [facility] 
[phone] [address] [place] [address] to continue managing your healthcare needs. [phone] Discharge Orders None A check darleen indicates which time of day the medication should be taken. My Medications START taking these medications Instructions Each Dose to Equal  
 Morning Noon Evening Bedtime ALPRAZolam 0.5 mg tablet Commonly known as:  Virginia Ramos Your last dose was: Your next dose is: Take 1 Tab by mouth three (3) times daily as needed for Anxiety. Max Daily Amount: 1.5 mg. Indications: anxiety 0.5 mg  
    
   
   
   
  
 predniSONE 5 mg dose pack Commonly known as:  STERAPRED Your last dose was: Your next dose is:    
   
   
 See administration instruction per 5mg dose pack CHANGE how you take these medications Instructions Each Dose to Equal  
 Morning Noon Evening Bedtime OLANZapine 2.5 mg tablet Commonly known as:  ZyPREXA What changed:  when to take this Your last dose was: Your next dose is: Take 0.5 Tabs by mouth two (2) times a day. 1.25 mg CONTINUE taking these medications Instructions Each Dose to Equal  
 Morning Noon Evening Bedtime ADVAIR HFA IN Your last dose was: Your next dose is: Take  by inhalation. ALBUTEROL IN Your last dose was: Your next dose is: Take  by inhalation. alfuzosin SR 10 mg SR tablet Commonly known as:  Javad Bonds Your last dose was: Your next dose is: Take 10 mg by mouth daily. 10 mg  
    
   
   
   
  
 amLODIPine 10 mg tablet Commonly known as:  Verónica Page Your last dose was: Your next dose is: Take 1 Tab by mouth daily. 10 mg  
    
   
   
   
  
 aspirin 81 mg chewable tablet Your last dose was: Your next dose is: Take 81 mg by mouth daily. 81 mg  
    
   
   
   
  
 carvedilol 12.5 mg tablet Commonly known as:  Carlota Casianoh Your last dose was: Your next dose is: Take 1 Tab by mouth two (2) times daily (with meals). 12.5 mg  
    
   
   
   
  
 docusate sodium 100 mg capsule Commonly known as:  Juliana Borjas Your last dose was: Your next dose is: Take 1 Cap by mouth two (2) times a day for 90 days. 100 mg  
    
   
   
   
  
 hydrALAZINE 25 mg tablet Commonly known as:  APRESOLINE Your last dose was: Your next dose is: Take 1 Tab by mouth three (3) times daily.   
 25 mg  
    
   
   
   
  
 LASIX 40 mg tablet Generic drug:  furosemide Your last dose was: Your next dose is: Take  by mouth daily. Oxygen Your last dose was: Your next dose is: PEPCID 20 mg tablet Generic drug:  famotidine Your last dose was: Your next dose is: Take 20 mg by mouth two (2) times a day. 20 mg  
    
   
   
   
  
 PRAVASTATIN PO Your last dose was: Your next dose is: Take  by mouth. RESTASIS 0.05 % ophthalmic emulsion Generic drug:  cycloSPORINE Your last dose was: Your next dose is:    
   
   
 Administer 1 Drop to both eyes two (2) times a day. 1 Drop SPIRIVA WITH HANDIHALER 18 mcg inhalation capsule Generic drug:  tiotropium Your last dose was: Your next dose is: Take 1 Cap by inhalation daily. 1 Cap XALATAN 0.005 % ophthalmic solution Generic drug:  latanoprost  
   
Your last dose was: Your next dose is:    
   
   
 Administer 1 Drop to both eyes nightly. 1 Drop Where to Get Your Medications Information on where to get these meds will be given to you by the nurse or doctor. ! Ask your nurse or doctor about these medications ALPRAZolam 0.5 mg tablet OLANZapine 2.5 mg tablet  
 predniSONE 5 mg dose pack Discharge Instructions Sleep Altered Mental Status: Care Instructions Your Care Instructions Altered mental status is a change in how well your brain is working. As a result, you may be confused, be less alert than usual, or act in odd ways. This may include seeing or hearing things that aren't really there (hallucinations). A mental status change has many possible causes.  For example, it may be the result of an infection, an imbalance of chemicals in the body, or a chronic disease such as diabetes or COPD. It can also be caused by things such as a head injury, taking certain medicines, or using alcohol or drugs. The doctor may do tests to look for the cause. These tests may include urine tests, blood tests, and imaging tests such as a CT scan. Sometimes a clear cause isn't found. But tests can help the doctor rule out a serious cause of your symptoms. A change in mental status can be scary. But mental status will often return to normal when the cause is treated. So it is important to get any follow-up testing or treatment the doctor has suggested. The doctor has checked you carefully, but problems can develop later. If you notice any problems or new symptoms, get medical treatment right away. Follow-up care is a key part of your treatment and safety. Be sure to make and go to all appointments, and call your doctor if you are having problems. It's also a good idea to know your test results and keep a list of the medicines you take. How can you care for yourself at home? · Be safe with medicines. Take your medicines exactly as prescribed. Call your doctor if you think you are having a problem with your medicine. · Have another adult stay with you until you are better. This can help keep you safe. Ask that person to watch for signs that your mental status is getting worse. When should you call for help? Call 911 anytime you think you may need emergency care. For example, call if: 
  · You passed out (lost consciousness).  
 Call your doctor now or seek immediate medical care if: 
  · Your mental status is getting worse.  
  · You have new symptoms, such as a fever, chills, or shortness of breath.  
  · You do not feel safe.  
 Watch closely for changes in your health, and be sure to contact your doctor if: 
  · You do not get better as expected. Where can you learn more? Go to http://dee-marisel.info/. Enter B415 in the search box to learn more about \"Altered Mental Status: Care Instructions. \" Current as of: October 9, 2017 Content Version: 11.7 © 1108-4713 [employer]. Care instructions adapted under license by [employer] (which disclaims liability or warranty for this information). If you have questions about a medical condition or this instruction, always ask your healthcare professional. [address] any warranty or liability for your use of this information. Key COPD Medications   
    
  
 predniSONE (STERAPRED) 5 mg dose pack  (Taking) See administration instruction per 5mg dose pack  
 tiotropium (SPIRIVA WITH HANDIHALER) 18 mcg inhalation capsule Take 1 Cap by inhalation daily. fluticasone/salmeterol (ADVAIR HFA IN) Take  by inhalation. ALBUTEROL IN Take  by inhalation. [employer] Announcement We are excited to announce that we are making your provider's discharge notes available to you in [employer]. You will see these notes when they are completed and signed by the physician that discharged you from your recent hospital stay. If you have any questions or concerns about any information you see in [employer], please call the Health Information Department where you were seen or reach out to your Primary Care Provider for more information about your plan of care. Introducing hospitals & HEALTH SERVICES! [facility] introduces [employer] patient portal. Now you can access parts of your medical record, email your doctor's office, and request medication refills online. 1. In your internet browser, go to [email or web address] 2. Click on the First Time User? Click Here link in the Sign In box. You will see the New Member Sign Up page. 3. Enter your [employer] Access Code exactly as it appears below.  You will not need to use this code after youve completed the sign-up process. If you do not sign up before the expiration date, you must request a new code. · [employer] Access Code: [email or web address] Expires: 9/14/2018  9:18 AM 
 
4. Enter the last four digits of your Social Security Number (xxxx) and Date of Birth (mm/dd/yyyy) as indicated and click Submit. You will be taken to the next sign-up page. 5. Create a [employer] ID. This will be your [employer] login ID and cannot be changed, so think of one that is secure and easy to remember. 6. Create a [employer] password. You can change your password at any time. 7. Enter your Password Reset Question and Answer. This can be used at a later time if you forget your password. 8. Enter your e-mail address. You will receive e-mail notification when new information is available in [address] 9. Click Sign Up. You can now view and download portions of your medical record. 10. Click the Download Summary menu link to download a portable copy of your medical information. If you have questions, please visit the Frequently Asked Questions section of the [employer] website. Remember, [employer] is NOT to be used for urgent needs. For medical emergencies, dial 911. Now available from your iPhone and Android! Introducing Darrel Lowery As a Andrew Dedra patient, I wanted to make you aware of our electronic visit tool called Darrel Saúltomimary. Andrew Colmenares 24/7 allows you to connect within minutes with a medical provider 24 hours a day, seven days a week via a mobile device or tablet or logging into a secure website from your computer. You can access Darrel Saúltomifin from anywhere in the [place].  
 
A virtual visit might be right for you when you have a simple condition and feel like you just dont want to get out of bed, or cant get away from work for an appointment, when your regular [facility] provider is not available (evenings, weekends or holidays), or when youre out of town and need minor care. Electronic visits cost only $49 and if the Kaelyn Bryant 24/7 provider determines a prescription is needed to treat your condition, one can be electronically transmitted to a nearby pharmacy*. Please take a moment to enroll today if you have not already done so. The enrollment process is free and takes just a few minutes. To enroll, please download the Kaelyn Annette 24/7 florencia to your tablet or phone, or visit [email or web address] to enroll on your computer. And, as an [address] [place] [address] patient with a InnoPad account, the results of your visits will be scanned into your electronic medical record and your primary care provider will be able to view the scanned results. We urge you to continue to see your regular Kaelyn Bryant provider for your ongoing medical care. And while your primary care provider may not be the one available when you seek a Darrel SendTasktomifin virtual visit, the peace of mind you get from getting a real diagnosis real time can be priceless. For more information on [employer], view our Frequently Asked Questions (FAQs) at [email or web address]. Sincerely, 
 
Nish Beltrán MD 
Chief Medical Officer [facility] Una Stanton *:  certain medications cannot be prescribed via [employer] Unresulted Labs-Please follow up with your PCP about these lab tests Order Current Status EKG, 12 LEAD, INITIAL Preliminary result Providers Seen During Your Hospitalization Provider Specialty Primary office phone Vitor Cobos MD Emergency Medicine [phone] John Cali MD Internal Medicine [phone] Your Primary Care Physician (PCP) Primary Care Physician Office Phone Office Fax Flip Grey [phone] You are allergic to the following No active allergies Recent Documentation Weight BMI Smoking Status 84.3 kg 30 kg/m2 Former Smoker Emergency Contacts Name Discharge Info Relation Home Work Mobile Maryanne Gomes DISCHARGE CAREGIVER [3] Spouse [3] [phone] Al Gomes DISCHARGE CAREGIVER [3] Child [2] [phone] Patient Belongings The following personal items are in your possession at time of discharge: 
     Visual Aid: None             Clothing: With patient Please provide this summary of care documentation to your next provider. Signatures-by signing, you are acknowledging that this After Visit Summary has been reviewed with you and you have received a copy. Patient Signature:  ____________________________________________________________ Date:  ____________________________________________________________  
  
[facility] Provider Signature:  ____________________________________________________________ Date:  ____________________________________________________________

## 2018-07-16 NOTE — ROUTINE PROCESS
TRANSFER - OUT REPORT:    Verbal report given to Mai(name) on Sherin Khan.  being transferred to ICU(unit) for routine progression of care       Report consisted of patients Situation, Background, Assessment and   Recommendations(SBAR). Information from the following report(s) Cardiac Rhythm SR- ST was reviewed with the receiving nurse. Lines:   Peripheral IV 07/16/18 Right Antecubital (Active)   Site Assessment Clean, dry, & intact 7/16/2018  2:00 PM   Phlebitis Assessment 0 7/16/2018  2:00 PM   Infiltration Assessment 0 7/16/2018  2:00 PM   Dressing Status Clean, dry, & intact 7/16/2018  2:00 PM   Dressing Type Transparent 7/16/2018  2:00 PM   Hub Color/Line Status Green;Flushed 7/16/2018  2:00 PM   Action Taken Blood drawn 7/16/2018  2:00 PM   Alcohol Cap Used Yes 7/16/2018  2:00 PM       Peripheral IV 07/16/18 Right Hand (Active)   Site Assessment Clean, dry, & intact 7/16/2018  4:49 PM   Dressing Type Transparent 7/16/2018  4:49 PM        Opportunity for questions and clarification was provided.       Patient transported with:   Registered Nurse Bi PAP

## 2018-07-16 NOTE — PROGRESS NOTES
1700 - Verbal shift change report given to Margarito Tinsley RN (ICU nurse) by Priti Gallegos  (ED nurse). Report included the following information SBAR, Kardex, ED Summary, Intake/Output, MAR, Accordion, Med Rec Status and Cardiac Rhythm SR/ST. 1st of 2L boluses infusing. 1800 - Pt transferred to ICU. 2 RN skin assessment completed, heels are dry flaky but intact, sacrum is intact, scrotum is flaky and dry but intact. 2 RN muller insertion with Chrissie Enter RN. 2 RN EKG shows NSR. Pt bathed, given overdue medications. Pt is alert, oriented to year, states he is in the hospital but does not know why or which hospital. Pt denies shortness of breath, chest pain, nausea, pain or discomfort. Lungs clear to diminished, on 4L NC - which pt removes frequently. Although pt had grabbed both IVs upon trasnfer to ICu, both appear to be infusing without pain or difficulty. Will continue to monitor. 1805 - Left pt in bed with bed alarm on to gather supplies. Bed alarm alarming. Pt naked walking towards ICU doors to attempt to leave. Dr Ezekiel Prabhakar at the bedside to see pt. Orders low dose PO Xanax and sitter. 1825 - Pt refusing to take PO Xanax, states he doesn't want anything the doctor didn't prescribe. Uzma Lock Dr Odell muller, order received for 0.5mg ativan IV Q4 due to pt refusing PO medications. [address] at the bedside to help pt relax, remain seated in ICU, continue to allow muller and iv and EKG leads to stay in place. Continuing to refuse PO Xanax at this time. But agreeable to sitting in chair. 1930 - Bedside and Verbal shift change report given to Lisa Shaver RN (oncoming nurse) by Margarito Tinsley RN (offgoing nurse). Report included the following information SBAR, Kardex, ED Summary, Intake/Output, MAR, Accordion and Cardiac Rhythm NSR.

## 2018-07-16 NOTE — ED PROVIDER NOTES
EMERGENCY DEPARTMENT HISTORY AND PHYSICAL EXAM    Date: 7/16/2018  Patient Name: Rita Haywood. History of Presenting Illness     Chief Complaint   Patient presents with    Altered mental status         History Provided By: EMS    Chief Complaint: AMS  Duration: 1.5 hours  Timing:  Acute    Additional History (Context):   1:58 PM   Rita Morton is a 74 y.o. male with PMHX of COPD, CKD who presents to the emergency department via EMS from [facility] and [address] [place] [address] starting 1.5 hours ago. Pt appears very drowsy. FSBG 102 mg/dL. Pt was hypoxic on room air upon arrival. Pt seen at this facility 2 weeks ago for similar sxs. Similar to current visit, he had no focal abnormalities, though sleepy and slow to respond. Family reports noncompliance with c-pap at night. During his last visit, his labs were consistent w/ hypercapnic respiratory failure and required bi-pap. He was admitted to ICU and discharged 4 days ago. Hx limited due to AMS    PCP: Cristian Escalante MD    Current Facility-Administered Medications   Medication Dose Route Frequency Provider Last Rate Last Dose    sodium chloride 0.9 % bolus infusion 1,000 mL  1,000 mL IntraVENous ONCE Berlin Levy MD        methylPREDNISolone (PF) (SOLU-MEDROL) injection 80 mg  80 mg IntraVENous NOW Berlin Levy MD        levoFLOXacin (LEVAQUIN) 750 mg in D5W IVPB  750 mg IntraVENous ONCE Berlin Levy MD         Current Outpatient Prescriptions   Medication Sig Dispense Refill    carvedilol (COREG) 12.5 mg tablet Take 1 Tab by mouth two (2) times daily (with meals). 60 Tab 0    amLODIPine (NORVASC) 10 mg tablet Take 1 Tab by mouth daily. 30 Tab 0    docusate sodium (COLACE) 100 mg capsule Take 1 Cap by mouth two (2) times a day for 90 days. 60 Cap 2    hydrALAZINE (APRESOLINE) 25 mg tablet Take 1 Tab by mouth three (3) times daily. 90 Tab 0    OLANZapine (ZYPREXA) 2.5 mg tablet Take 0.5 Tabs by mouth two (2) times a day.  60 Tab 0    alfuzosin SR (UROXATRAL) 10 mg SR tablet Take 10 mg by mouth daily.  aspirin 81 mg chewable tablet Take 81 mg by mouth daily.  cycloSPORINE (RESTASIS) 0.05 % ophthalmic emulsion Administer 1 Drop to both eyes two (2) times a day.  famotidine (PEPCID) 20 mg tablet Take 20 mg by mouth two (2) times a day.  latanoprost (XALATAN) 0.005 % ophthalmic solution Administer 1 Drop to both eyes nightly.  furosemide (LASIX) 40 mg tablet Take  by mouth daily.  Oxygen       pravastatin sodium (PRAVASTATIN PO) Take  by mouth.  tiotropium (SPIRIVA WITH HANDIHALER) 18 mcg inhalation capsule Take 1 Cap by inhalation daily.  fluticasone/salmeterol (ADVAIR HFA IN) Take  by inhalation.  ALBUTEROL IN Take  by inhalation. Past History     Past Medical History:  Past Medical History:   Diagnosis Date    Chronic kidney disease     stage 3 kidney disease    Chronic obstructive pulmonary disease (HCC)     CLL (chronic lymphocytic leukemia) (HCC)     Delirium     Emphysema lung ([facility] [address]     Fall     Frequent hospital admissions     Gout     Hyperlipidemia     Hypoxemia     Memory loss     Noncompliance     Oxygen dependent     Pneumonia     Pulmonary nodules     Respiratory failure (HCC)     Risk for falls     Septic shock (HCC)     Sleep apnea     Tobacco abuse     Vitamin D deficiency        Past Surgical History:  Past Surgical History:   Procedure Laterality Date    HX CATARACT REMOVAL      HX HERNIA REPAIR      HX NEPHROSTOMY         Family History:  History reviewed. No pertinent family history.     Social History:  Social History   Substance Use Topics    Smoking status: Former Smoker    Smokeless tobacco: Never Used    Alcohol use No      Comment: former ETOH user; stopped December 2017       Allergies:  No Known Allergies      Review of Systems   Review of Systems   Unable to perform ROS: Mental status change   Constitutional: Positive for activity change (appears very drowsy). Physical Exam     Vitals:    07/16/18 1407 07/16/18 1438 07/16/18 1504   BP: 130/69     Pulse: 91     Resp: 28     Temp: 97.4 °F (36.3 °C)     SpO2: (!) 84% 98% 96%     Physical Exam   Constitutional: He appears well-developed. Obese   HENT:   Head: Normocephalic and atraumatic. Right Ear: External ear normal.   Left Ear: External ear normal.   Nose: Nose normal.   Mouth/Throat: Oropharynx is clear and moist.   Eyes: Conjunctivae and EOM are normal. Pupils are equal, round, and reactive to light. Neck: Normal range of motion. Neck supple. No JVD present. No tracheal deviation present. No thyromegaly present. Cardiovascular: Normal rate, regular rhythm, normal heart sounds and intact distal pulses. Pulmonary/Chest: He is in respiratory distress. He has decreased breath sounds in the left lower field. Otherwise clear breath sounds. Abdominal: Soft. Bowel sounds are normal. He exhibits no distension and no mass. There is no tenderness. No HSM   Musculoskeletal: Normal range of motion. He exhibits no edema or tenderness. Lymphadenopathy:     He has no cervical adenopathy. Neurological: He has normal reflexes. No cranial nerve deficit. He exhibits normal muscle tone. Coordination normal.   Somnolent. Arousable to verbal stimuli, slightly confused. Unclear if different from baseline. Questionable left sided facial droop. 4/5 strength to bilateral lower extremities. Skin: Skin is warm and dry. Psychiatric: He has a normal mood and affect. His behavior is normal. Thought content normal.   Nursing note and vitals reviewed.         Diagnostic Study Results     Labs -     Recent Results (from the past 12 hour(s))   EKG, 12 LEAD, INITIAL    Collection Time: 07/16/18  1:59 PM   Result Value Ref Range    Ventricular Rate 90 BPM    Atrial Rate 90 BPM    P-R Interval 156 ms    QRS Duration 74 ms    Q-T Interval 334 ms    QTC Calculation (Bezet) 408 ms    Calculated P Axis 63 degrees    Calculated R Axis 16 degrees    Calculated T Axis 69 degrees    Diagnosis       Normal sinus rhythm  Possible Left atrial enlargement  Left ventricular hypertrophy  Abnormal ECG  When compared with ECG of 03-JUL-2018 16:19,  No significant change was found     CBC WITH AUTOMATED DIFF    Collection Time: 07/16/18  2:01 PM   Result Value Ref Range    WBC 17.9 (H) 4.6 - 13.2 K/uL    RBC 4.82 4.70 - 5.50 M/uL    HGB 11.7 (L) 13.0 - 16.0 g/dL    HCT 40.1 36.0 - 48.0 %    MCV 83.2 74.0 - 97.0 FL    MCH 24.3 24.0 - 34.0 PG    MCHC 29.2 (L) 31.0 - 37.0 g/dL    RDW 16.7 (H) 11.6 - 14.5 %    PLATELET 610 817 - 301 K/uL    MPV 11.4 9.2 - 11.8 FL    NEUTROPHILS 40 (L) 42 - 75 %    BAND NEUTROPHILS 1 0 - 5 %    LYMPHOCYTES 50 20 - 51 %    MONOCYTES 8 2 - 9 %    EOSINOPHILS 1 0 - 5 %    BASOPHILS 0 0 - 3 %    ABS. NEUTROPHILS 7.2 1.8 - 8.0 K/UL    ABS. LYMPHOCYTES 9.0 (H) 0.8 - 3.5 K/UL    ABS. MONOCYTES 1.4 (H) 0 - 1.0 K/UL    ABS. EOSINOPHILS 0.2 0.0 - 0.4 K/UL    ABS. BASOPHILS 0.0 0.0 - 0.1 K/UL    PLATELET COMMENTS LARGE PLATELETS      RBC COMMENTS ANISOCYTOSIS  1+        RBC COMMENTS HYPOCHROMIA  SLIGHT        WBC COMMENTS REACTIVE LYMPHS      DF MANUAL     METABOLIC PANEL, COMPREHENSIVE    Collection Time: 07/16/18  2:01 PM   Result Value Ref Range    Sodium 149 (H) 136 - 145 mmol/L    Potassium 4.0 3.5 - 5.5 mmol/L    Chloride 105 100 - 108 mmol/L    CO2 PENDING mmol/L    Anion gap PENDING mmol/L    Glucose 113 (H) 74 - 99 mg/dL    BUN 37 (H) 7.0 - 18 MG/DL    Creatinine 1.90 (H) 0.6 - 1.3 MG/DL    BUN/Creatinine ratio 19 12 - 20      GFR est AA 42 (L) >60 ml/min/1.73m2    GFR est non-AA 35 (L) >60 ml/min/1.73m2    Calcium 8.8 8.5 - 10.1 MG/DL    Bilirubin, total 0.2 0.2 - 1.0 MG/DL    ALT (SGPT) 35 16 - 61 U/L    AST (SGOT) 22 15 - 37 U/L    Alk.  phosphatase 74 45 - 117 U/L    Protein, total 7.0 6.4 - 8.2 g/dL    Albumin 3.2 (L) 3.4 - 5.0 g/dL    Globulin 3.8 2.0 - 4.0 g/dL    A-G Ratio 0.8 0.8 - 1.7 MAGNESIUM    Collection Time: 07/16/18  2:01 PM   Result Value Ref Range    Magnesium 2.4 1.6 - 2.6 mg/dL   CARDIAC PANEL,(CK, CKMB & TROPONIN)    Collection Time: 07/16/18  2:01 PM   Result Value Ref Range     39 - 308 U/L    CK - MB 2.3 <3.6 ng/ml    CK-MB Index 2.0 0.0 - 4.0 %    Troponin-I, Qt. <0.02 0.00 - 0.06 NG/ML   POC G3    Collection Time: 07/16/18  2:36 PM   Result Value Ref Range    Device: NASAL CANNULA      Flow rate (POC) 2.0 L/M    FIO2 (POC) 24 %    pH (POC) 7.279 (L) 7.35 - 7.45      pCO2 (POC) 94.9 (H) 35.0 - 45.0 MMHG    pO2 (POC) 82 80 - 100 MMHG    HCO3 (POC) 44.5 (H) 22 - 26 MMOL/L    sO2 (POC) 93 92 - 97 %    Base excess (POC) 18 mmol/L    Allens test (POC) YES      Total resp. rate 31      Site LEFT RADIAL      Specimen type (POC) ARTERIAL      Performed by Segundo Deal        Radiologic Studies -     3:18 PM  RADIOLOGY FINDINGS  Chest  X-ray shows no acute process  Pending review by Radiologist  Recorded by Chel Lopez ED Scribe, as dictated by Greta Cabrales MD     CT Results  (Last 48 hours)               07/16/18 1419  CT HEAD WO CONT Final result    Impression:  IMPRESSION:       Low-grade generalized chronic changes, however, no acute findings. _______________                                   Note: Dr. Jayde Covarrubias discussed the stroke code results with Dr. Belén Aden at [phone] on   7/16/2018. Narrative:  EXAM:  CT of the brain without intravenous contrast.       COMPARISON: CT July 3, 2018. REASON FOR EXAM: \"Decreased alertness; facial droop and ams\". DOSE REDUCTION:  One or more dose reduction techniques were used on this CT:   automated exposure control, adjustment of the mAs and/or kVp according to   patient's size, and iterative reconstruction techniques.  The specific techniques   utilized on this CT exam have been documented in the patient's electronic   medical record.       _______________       FINDINGS:             IMAGE QUALITY:  The images are mildly degraded by motion artifact. BRAIN AND EXTRA-AXIAL SPACE:                   SUBACUTE TERRITORIAL TRANSCORTICAL INFARCTION:  None detected. MASS:  None detected. HEMORRHAGE:  None. SUBDURAL FLUID COLLECTION:  None detected. HYDROCEPHALUS:  None. REMOTE CORTICAL INFARCTION:  None detected. REMOTE CEREBELLAR INFARCTION:  None detected. MISCELLANEOUS:  Non-applicable. STRIVE (STandards for Reporting Vascular changes on nEuroimaging):                       --[place] (age-indeterminate) or perivascular spaces of   \"presumed vascular origin\":  None definite. --[place] of white matter hypodensity of \"presumed vascular   origin\":  Low grade. --Degree of brain atrophy (subjective): Low grade. BURDEN OF CALCIFIC INTRACRANIAL ATHEROSCLEROSIS:  None significant. HEENT:                 INCLUDED UPPER ORBITS:  The lenses are replaced bilaterally. INCLUDED UPPER PARANASAL SINUSES:  Predominantly clear. MASTOID AIR CELLS:  Predominantly clear. BONES:  Unremarkable. SCALP:  Unremarkable.       _______________               CXR Results  (Last 48 hours)               07/16/18 1507  XR CHEST PORT Final result    Impression:  IMPRESSION:       Negative radiographic examination. _______________                                       Narrative:  EXAM: Portable semiupright chest radiograph. COMPARISON: July 9, 2018. REASON FOR EXAM: \"chest pain, sob,  arrhythmia\". _______________       FINDINGS:           LUNGS:           Expansion:  Adequate. Consolidation:  None definite. Pulmonary edema:   None definite. Other:  Non-applicable. PLEURAL SPACE:           Pleural effusion:   None definite. Pneumothorax:   None detected.       _______________                 Medications given in the ED-  Medications   sodium chloride 0.9 % bolus infusion 1,000 mL (not administered)   methylPREDNISolone (PF) (SOLU-MEDROL) injection 80 mg (not administered)   levoFLOXacin (LEVAQUIN) 750 mg in D5W IVPB (not administered)   albuterol-ipratropium (DUO-NEB) 2.5 MG-0.5 MG/3 ML (3 mL Nebulization Given 7/16/18 1643)         Medical Decision Making   I am the first provider for this patient. I reviewed the vital signs, available nursing notes, past medical history, past surgical history, family history and social history. Vital Signs-Reviewed the patient's vital signs. Pulse Oximetry Analysis - 1:58 PM 85% on RA     Cardiac Monitor:  Rate: 91 bpm  Rhythm: NSR    EKG interpretation: (Preliminary)  1:59 PM   NSR. Rate 90 bpm. Possible left atrial enlargement. No ST changes  EKG read by Kwesi Shaffer MD       Records Reviewed: Nursing Notes, Old Medical Records, Previous Radiology Studies and Previous Laboratory Studies    Provider Notes (Medical Decision Making): Ddx: TIA, CVA, encephalopathy, CNS bleed, COPD exacerbation, CO2 narcosis, CHF,  Pleural effusion, PNA, PTX, ACS, other infection    Procedures:  Procedures    ED Course:   1:58 PM Initial assessment performed. 1:59 PM Code S protocol initiated    2:04 PM Discussed patient's history and exam with Erika Pearl MD, teleneurologist, who will see the patient via Patron[employer]. 2:34 PM Erika Pearl MD has evaluated the patient and results. States to cancel the code S, and give fluids and ASA.     3:29 PM Discussed patient's history, exam, and available diagnostics results with Cherelle Hinton MD, internal medicine, who agrees to admit the patient to ICU. Also requests that we consult pulmonology. 3:44 PM Pt does not appear septic. He is mostly in respiratory distress from COPD and poor compliance with BIPAP. Pt has chronically elevated white count.  He is not tachycardic.       4:13 PM Discussed patient's history, exam, and available diagnostics results with Rosie Manzo MD, intensivist, who will consult for the patient and agrees with BIPAP.     4:38 PM Repeat blood gas shows CO2 is improving but PO2 is borderline low so will increase FIO2 from 24% to 28%      Diagnosis and Disposition       Critical Care Time:   I have spent 66 minutes of critical care time involved in lab review, consultations with specialist, family decision-making, and documentation. During this entire length of time I was immediately available to the patient. Critical Care: The reason for providing this level of medical care for this critically ill patient was due a critical illness that impaired one or more vital organ systems such that there was a high probability of imminent or life threatening deterioration in the patients condition. This care involved high complexity decision making to assess, manipulate, and support vital system functions, to treat this degreee vital organ system failure and to prevent further life threatening deterioration of the patients condition. Core Measures:  For Hospitalized Patients:    1. Hospitalization Decision Time:  The decision to hospitalize the patient was made by Leopold Rouse, MD at 1:59 PM on 7/16/2018    2. Aspirin: Aspirin was not given because the patient did not present with a stroke at the time of their Emergency Department evaluation    3:29 PM  Patient is being admitted to the hospital by Estrada Fuller MD to ICU. The results of their tests and reasons for their admission have been discussed with them and/or available family. They convey agreement and understanding for the need to be admitted and for their admission diagnosis. CLINICAL IMPRESSION:    1. CO2 narcosis    2. Infiltrate noted on imaging study    3.  Chronic obstructive pulmonary disease, unspecified [facility] ([facility] _______________________________    Attestations: This note is prepared by He Young, acting as Scribe for Pearl Aranda MD.    Pearl Aranda MD:  The scribe's documentation has been prepared under my direction and personally reviewed by me in its entirety.   I confirm that the note above accurately reflects all work, treatment, procedures, and medical decision making performed by me.  _______________________________

## 2018-07-16 NOTE — ED TRIAGE NOTES
Pt sent from  nursing and rehab center; pt with recent admission and discharge from hospital 4 days ago;  Per EMS staff at [address] [place] [address] stated that for the last 1 1/2 hours pt has been \"unresponsive\"  Pt arrives with eyes closed, opens to loud voice;   Pt able to raise right and left leg and move both upper extremities slowly to commands; pt extremely drowsy;

## 2018-07-16 NOTE — H&P
History & Physical 
 
Patient: Tong Preciado MRN: [id number]  CSN: [id number] YOB: 1945  Age: 74 y.o. Sex: male DOA: 7/16/2018 Primary Care Provider:  Alla Raygoza MD 
 
 
Assessment/Plan Hospital Problems  Never Reviewed Codes Class Noted POA Altered mental status ICD-10-CM: R41.82 
ICD-9-CM: 780.97  7/16/2018 Unknown Respiratory distress ICD-10-CM: R06.03 
ICD-9-CM: 786.09  7/16/2018 Unknown Hypoxia ICD-10-CM: R09.02 
ICD-9-CM: 799.02  7/16/2018 Unknown CO2 narcosis ICD-10-CM: R06.89 
ICD-9-CM: 786.09  7/16/2018 Unknown Hypernatremia ICD-10-CM: E87.0 ICD-9-CM: 276.0  7/16/2018 Unknown COPD (chronic obstructive pulmonary disease) (HCC) ICD-10-CM: J44.9 ICD-9-CM: 829  7/12/2018 Yes RUPERTO (obstructive sleep apnea) ICD-10-CM: G47.33 
ICD-9-CM: 327.23  7/12/2018 Yes Dementia ICD-10-CM: F03.90 ICD-9-CM: 294.20  7/12/2018 Yes HTN (hypertension) ICD-10-CM: I10 
ICD-9-CM: 401.9  7/12/2018 Yes Acute on chronic respiratory failure with hypoxia and hypercapnia (HCC) ICD-10-CM: J96.21, J96.22 
ICD-9-CM: 518.84, 786.09, 799.02  7/3/2018 Yes  
   
 CLL (chronic lymphocytic leukemia) (HCC) ICD-10-CM: C91.90 ICD-9-CM: 204.10  7/3/2018 Yes Acute encephalopathy ICD-10-CM: G93.40 ICD-9-CM: 348.30  7/3/2018 Yes Admit to ICU Respiratory Acute on chronic respiratory failure with hypercapnia and hypoxia  
abg   PCo2 94.9,  Repeated 81.6 two hrs after bipap Continue  bipap, breath tx   
 will give low dose iv steroid, 
cxr reviewed  
 emphysema lung Intensive on board  
   
Neuro Acute encephalopathy due to hypercapnia -co 2  Narcosis Ct head no acute issue Dementia  
 
 card: 
Htn  
Lasix and nitro patch Better controlled  
  
Hemo 
cll , leukocytosis -remained at the baseline  
  
 
  
Id : on levaqu and zosyn for empiric treatment Renal 
ckd 3, mild elevated cr, will give hydration Hypernatremia -continue d5 0.45 ns Will continue monitor renal function and  Urine out-put.  
  
Endo F/u glucose Full code   
  
Gi: will keep him npo for now. 70 minutes of critical care time spent in the direct evaluation and treatment of this high risk patient. The reason for providing this level of medical care for this critically ill patient was due a critical illness that impaired one or more vital organ systems such that there was a high probability of imminent or life threatening deterioration in the patients condition. This care involved high complexity decision making to assess, manipulate, and support vital system functions, to treat this degreee vital organ system failure and to prevent further life threatening deterioration of the patients condition. Will have palliative care on board. DVT : heparin  ppi proph CC: AMS  
    
HPI:  
 
Raman Paniagua. is a 74 y.o. male who hx of copd/emphysema/ ckd3/shmuel was sent from snf due to ams. Her was recently d/c to rehab and recommend bipap at night and prn. He is not compliance the treatment even when he was in the hospital last time. He was found P Co2 was  PCo2 94.9, and desat to 84%. He was put on bipap and repeated abg PCo2  81. 6. cxr no acute issue. He was lethargic on bipap. Ct head no acute issue. Intensive was called Visit Vitals  /75  Pulse 73  Temp 97.4 °F (36.3 °C)  Resp 18  SpO2 98% O2 Device: BIPAP Past Medical History:  
Diagnosis Date  Chronic kidney disease   
 stage 3 kidney disease  Chronic obstructive pulmonary disease ([address]  CLL (chronic lymphocytic leukemia) ([address]  Delirium  Emphysema lung ([address]  Fall  Frequent hospital admissions  Gout  Hyperlipidemia  Hypoxemia  Memory loss  Noncompliance  Oxygen dependent  Pneumonia  Pulmonary nodules  Respiratory failure ([address]  Risk for falls  Septic shock ([address]  Sleep apnea  Tobacco abuse  Vitamin D deficiency Past Surgical History:  
Procedure Laterality Date  HX CATARACT REMOVAL    
 HX HERNIA REPAIR    
 HX NEPHROSTOMY History reviewed. No pertinent family history. Social History Social History  Marital status:  Spouse name: N/A  
 Number of children: N/A  
 Years of education: N/A Social History Main Topics  Smoking status: Former Smoker  Smokeless tobacco: Never Used  Alcohol use No  
   Comment: former ETOH user; stopped December 2017  Drug use: None  Sexual activity: Not Asked Other Topics Concern  None Social History Narrative Prior to Admission medications Medication Sig Start Date End Date Taking? Authorizing Provider  
carvedilol (COREG) 12.5 mg tablet Take 1 Tab by mouth two (2) times daily (with meals). 7/12/18   Perri Worthy MD  
amLODIPine (NORVASC) 10 mg tablet Take 1 Tab by mouth daily. 7/13/18   Perri Worthy MD  
docusate sodium (COLACE) 100 mg capsule Take 1 Cap by mouth two (2) times a day for 90 days. 7/12/18 10/10/18  Perri Worthy MD  
hydrALAZINE (APRESOLINE) 25 mg tablet Take 1 Tab by mouth three (3) times daily. 7/12/18   Perri Worthy MD  
OLANZapine (ZYPREXA) 2.5 mg tablet Take 0.5 Tabs by mouth two (2) times a day. 7/12/18   Perri Worthy MD  
alfuzosin SR (UROXATRAL) 10 mg SR tablet Take 10 mg by mouth daily. Ronni Hernandez MD  
aspirin 81 mg chewable tablet Take 81 mg by mouth daily. Ronni Hernandez MD  
cycloSPORINE (RESTASIS) 0.05 % ophthalmic emulsion Administer 1 Drop to both eyes two (2) times a day. Ronni Hernandez MD  
famotidine (PEPCID) 20 mg tablet Take 20 mg by mouth two (2) times a day. Ronni Hernandez MD  
latanoprost (XALATAN) 0.005 % ophthalmic solution Administer 1 Drop to both eyes nightly. Ronni Hernandez MD  
furosemide (LASIX) 40 mg tablet Take  by mouth daily.     Ronni Hernandez MD  
Oxygen     Ronni Hernandez MD  
pravastatin sodium (PRAVASTATIN PO) Take  by mouth. Ronni Hernandez MD  
tiotropium (SPIRIVA WITH HANDIHALER) 18 mcg inhalation capsule Take 1 Cap by inhalation daily. Ronni Hernandez MD  
fluticasone/salmeterol (ADVAIR HFA IN) Take  by inhalation. Ronni Hernandez MD  
ALBUTEROL IN Take  by inhalation. Ronni Hernandez MD  
 
 
No Known Allergies Review of Systems Unable to obtain due to ams Physical Exam:  
 
Physical Exam: 
Visit Vitals  /75  Pulse 73  Temp 97.4 °F (36.3 °C)  Resp 18  SpO2 98% O2 Device: BIPAP Temp (24hrs), Av.4 °F (36.3 °C), Min:97.4 °F (36.3 °C), Max:97.4 °F (36.3 °C) General:  Open eyes per verbal stimuli. Some  cooperative, in mild distress Head:  Normocephalic, without obvious abnormality, atraumatic. Eyes:  Conjunctivae/corneas clear, sclera anicteric, PERRL, EOMs intact. Nose: Nares normal. No drainage or sinus tenderness. bipap mask noted Throat: Lips, mucosa, and tongue normal. .  
Neck: Supple, symmetrical, trachea midline, no adenopathy. Lungs:   Decrease bs bilaterally Heart:  Regular rate and rhythm, S1, S2 normal, + murmur, no click, rub or gallop. Abdomen: Soft, non-tender. Bowel sounds normal. No masses,  No organomegaly. Extremities: Extremities normal, atraumatic, no cyanosis or edema. Pulses: 2+ and symmetric all extremities. Skin: Skin color-pink, texture, turgor normal. No rashes or lesions. Capillary refill normal   
Neurologic: Unable to obtain, squeezed hands Labs Reviewed: 
 
BMP:  
Lab Results Component Value Date/Time  (H) 2018 02:01 PM  
 K 4.0 2018 02:01 PM  
  2018 02:01 PM  
 CO2 >45 (HH) 2018 02:01 PM  
 AGAP NEG 1 2018 02:01 PM  
  (H) 2018 02:01 PM  
 BUN 37 (H) 2018 02:01 PM  
 CREA 1.90 (H) 2018 02:01 PM  
 GFRAA 42 (L) 2018 02:01 PM  
 GFRNA 35 (L) 2018 02:01 PM  
 
CMP:  
Lab Results Component Value Date/Time  (H) 07/16/2018 02:01 PM  
 K 4.0 07/16/2018 02:01 PM  
  07/16/2018 02:01 PM  
 CO2 >45 (HH) 07/16/2018 02:01 PM  
 AGAP NEG 1 07/16/2018 02:01 PM  
  (H) 07/16/2018 02:01 PM  
 BUN 37 (H) 07/16/2018 02:01 PM  
 CREA 1.90 (H) 07/16/2018 02:01 PM  
 GFRAA 42 (L) 07/16/2018 02:01 PM  
 GFRNA 35 (L) 07/16/2018 02:01 PM  
 CA 8.8 07/16/2018 02:01 PM  
 MG 2.4 07/16/2018 02:01 PM  
 ALB 3.2 (L) 07/16/2018 02:01 PM  
 TP 7.0 07/16/2018 02:01 PM  
 GLOB 3.8 07/16/2018 02:01 PM  
 AGRAT 0.8 07/16/2018 02:01 PM  
 SGOT 22 07/16/2018 02:01 PM  
 ALT 35 07/16/2018 02:01 PM  
 
CBC:  
Lab Results Component Value Date/Time WBC 17.9 (H) 07/16/2018 02:01 PM  
 HGB 11.7 (L) 07/16/2018 02:01 PM  
 HCT 40.1 07/16/2018 02:01 PM  
  07/16/2018 02:01 PM  
 
All Cardiac Markers in the last 24 hours:  
Lab Results Component Value Date/Time  07/16/2018 02:01 PM  
 CKMB 2.3 07/16/2018 02:01 PM  
 CKND1 2.0 07/16/2018 02:01 PM  
 TROIQ <0.02 07/16/2018 02:01 PM  
 
Recent Glucose Results:  
Lab Results Component Value Date/Time  (H) 07/16/2018 02:01 PM  
 
ABG:  
Lab Results Component Value Date/Time PHI 7.308 (L) 07/16/2018 04:26 PM  
 PCO2I 81.6 (H) 07/16/2018 04:26 PM  
 PO2I 69 (L) 07/16/2018 04:26 PM  
 HCO3I 41.0 (H) 07/16/2018 04:26 PM  
 FIO2I 24 07/16/2018 04:26 PM  
 
COAGS: No results found for: APTT, PTP, INR Liver Panel:  
Lab Results Component Value Date/Time ALB 3.2 (L) 07/16/2018 02:01 PM  
 TP 7.0 07/16/2018 02:01 PM  
 GLOB 3.8 07/16/2018 02:01 PM  
 AGRAT 0.8 07/16/2018 02:01 PM  
 SGOT 22 07/16/2018 02:01 PM  
 ALT 35 07/16/2018 02:01 PM  
 AP 74 07/16/2018 02:01 PM  
 
Pancreatic Markers: No results found for: AMYLPOCT, AML, LIPPOCT, LPSE Procedures/imaging: see electronic medical records for all procedures/Xrays and details which were not copied into this note but were reviewed prior to creation of Plan Tim Ospina MD, Internal Medicine CC: Alla Raygoza MD

## 2018-07-16 NOTE — PROGRESS NOTES
PATIENT ARRIVED IN ER.  VERY LETHARGIC. ABG'S WERE DRAWN AND PATIENT WAS THEN PLACED ON BIPAP @ 24/12 WITH FIO2 24%. BIPAP SETTINGS ARE PER PRIOR ADMISSIONS AND RESULT OF SLEEP STUDY IN RECORDS.

## 2018-07-16 NOTE — CONSULTS
[facility]  Pulmonary, Critical Care, and Sleep Medicine    Initial Patient Consult    Name: Evi Nielsen. MRN: [id number]   : 1945 Hospital: [facility] [place]   Date: 2018  Room: [facility]/     Subjective: This patient has been seen and evaluated at the request of Dr. Ashley Enriquez for patient on BIPAP. Patient is a 74 y. o.AA male with hx of COPD, home o2, chronic hypercapneic resp failure, dementia, CLL, diast CHF, CKD who was recently in [facility] 2 weeks ago for hypercapneic resp failure needing BIPAP at high settings. He was subseqeuntly discharged to rehab. He has been sent to ER with altered mentation and elevated pCO2. He is wheezing. He has been on BIPAP 24/12pressures  in ER. Patient sleepy, arousable, minimally responsive  No reports of cough or chest pains or vomiting    ROS - limited due to patient condition    Past Medical History:   Diagnosis Date    Chronic kidney disease     stage 3 kidney disease    Chronic obstructive pulmonary disease (HCC)     CLL (chronic lymphocytic leukemia) (HCC)     Delirium     Em[address]ma lung ([address]     Fall     Frequent hospital admissions     Gout     Hyperlipidemia     Hypoxemia     Memory loss     Noncompliance     Oxygen dependent     Pneumonia     Pulmonary nodules     Respiratory failure (HCC)     Risk for falls     Septic shock (HCC)     Sleep apnea     Tobacco abuse     Vitamin D deficiency       Past Surgical History:   Procedure Laterality Date    HX CATARACT REMOVAL      HX HERNIA REPAIR      HX NEPHROSTOMY        Prior to Admission medications    Medication Sig Start Date End Date Taking? Authorizing Provider   carvedilol (COREG) 12.5 mg tablet Take 1 Tab by mouth two (2) times daily (with meals). 18   Valeria Pickett MD   amLODIPine (NORVASC) 10 mg tablet Take 1 Tab by mouth daily.  18   Valeria Pickett MD   docusate sodium (COLACE) 100 mg capsule Take 1 Cap by mouth two (2) times a day for 90 days. 7/12/18 10/10/18  Freddy Armenta MD   hydrALAZINE (APRESOLINE) 25 mg tablet Take 1 Tab by mouth three (3) times daily. 18   Freddy Armenta MD   OLANZapine (ZYPREXA) 2.5 mg tablet Take 0.5 Tabs by mouth two (2) times a day. 18   Freddy Armenta MD   alfuzosin SR (UROXATRAL) 10 mg SR tablet Take 10 mg by mouth daily. Ronni Hernandez MD   aspirin 81 mg chewable tablet Take 81 mg by mouth daily. Ronni Hernandez MD   cycloSPORINE (RESTASIS) 0.05 % ophthalmic emulsion Administer 1 Drop to both eyes two (2) times a day. Ronni Hernandez MD   famotidine (PEPCID) 20 mg tablet Take 20 mg by mouth two (2) times a day. Ronni Hernandez MD   latanoprost (XALATAN) 0.005 % ophthalmic solution Administer 1 Drop to both eyes nightly. Ronni Hernandez MD   furosemide (LASIX) 40 mg tablet Take  by mouth daily. Ronni Hernandez MD   Oxygen     Ronni Hernandez MD   pravastatin sodium (PRAVASTATIN PO) Take  by mouth. Ronni Hernandez MD   tiotropium (SPIRIVA WITH HANDIHALER) 18 mcg inhalation capsule Take 1 Cap by inhalation daily. Ronni Hernandez MD   fluticasone/salmeterol (ADVAIR HFA IN) Take  by inhalation. Ronni Hernandez MD   ALBUTEROL IN Take  by inhalation. Ronni Hernandez MD     No Known Allergies   Social History   Substance Use Topics    Smoking status: Former Smoker    Smokeless tobacco: Never Used    Alcohol use No      Comment: former ETOH user; stopped 2017      History reviewed. No pertinent family history.      Current Facility-Administered Medications   Medication Dose Route Frequency    levoFLOXacin (LEVAQUIN) 750 mg in D5W IVPB  750 mg IntraVENous ONCE    sodium chloride 0.9 % bolus infusion 1,000 mL  1,000 mL IntraVENous ONCE    aspirin chewable tablet 324 mg  324 mg Oral NOW       Objective:   Vital Signs:    Visit Vitals    /75    Pulse 73    Temp 97.4 °F (36.3 °C)    Resp 18    SpO2 98%       O2 Device: BIPAP       Temp (24hrs), Av.4 °F (36.3 °C), Min:97.4 °F (36.3 °C), Max:97.4 °F (36.3 °C)       Intake/Output:   Last shift:         Last 3 shifts:    No intake or output data in the 24 hours ending 07/16/18 1655         Physical Exam:   Comfortable; on BIPAP 24/12 pressures with FFM and 28% fio2; acyanotic  HEENT: pupils not dilated, reactive, no scleral jaundice, moist oral mucosa, no nasal drainage; neck supple  Neck: No adenopathy or thyroid swelling  CVS: S1S2 no murmurs; JVD not elevated  RS: Mod air entry bilaterally, decreased BS at bases, moderate bilateral wheezes and bi-basal crackles  Abd: soft, non tender, no hepatosplenomegaly, no abd distension, no guarding or rigidity, bowel sounds heard  Neuro: limited exam; somnolent and arousable  Extrm: mild bilateral pitting leg edema   Skin: no rash  Lymphatic: no cervical or supraclavicular adenopathy    Telemetry: sinus rhythm    Lines/Tubes:  PIVs    Data review:     Recent Results (from the past 24 hour(s))   EKG, 12 LEAD, INITIAL    Collection Time: 07/16/18  1:59 PM   Result Value Ref Range    Ventricular Rate 90 BPM    Atrial Rate 90 BPM    P-R Interval 156 ms    QRS Duration 74 ms    Q-T Interval 334 ms    QTC Calculation (Bezet) 408 ms    Calculated P Axis 63 degrees    Calculated R Axis 16 degrees    Calculated T Axis 69 degrees    Diagnosis       Normal sinus rhythm  Possible Left atrial enlargement  Left ventricular hypertrophy  Abnormal ECG  When compared with ECG of 03-JUL-2018 16:19,  No significant change was found     CBC WITH AUTOMATED DIFF    Collection Time: 07/16/18  2:01 PM   Result Value Ref Range    WBC 17.9 (H) 4.6 - 13.2 K/uL    RBC 4.82 4.70 - 5.50 M/uL    HGB 11.7 (L) 13.0 - 16.0 g/dL    HCT 40.1 36.0 - 48.0 %    MCV 83.2 74.0 - 97.0 FL    MCH 24.3 24.0 - 34.0 PG    MCHC 29.2 (L) 31.0 - 37.0 g/dL    RDW 16.7 (H) 11.6 - 14.5 %    PLATELET 440 617 - 694 K/uL    MPV 11.4 9.2 - 11.8 FL    NEUTROPHILS 40 (L) 42 - 75 %    BAND NEUTROPHILS 1 0 - 5 %    LYMPHOCYTES 50 20 - 51 %    MONOCYTES 8 2 - 9 % EOSINOPHILS 1 0 - 5 %    BASOPHILS 0 0 - 3 %    ABS. NEUTROPHILS 7.2 1.8 - 8.0 K/UL    ABS. LYMPHOCYTES 9.0 (H) 0.8 - 3.5 K/UL    ABS. MONOCYTES 1.4 (H) 0 - 1.0 K/UL    ABS. EOSINOPHILS 0.2 0.0 - 0.4 K/UL    ABS. BASOPHILS 0.0 0.0 - 0.1 K/UL    PLATELET COMMENTS LARGE PLATELETS      RBC COMMENTS ANISOCYTOSIS  1+        RBC COMMENTS HYPOCHROMIA  SLIGHT        WBC COMMENTS REACTIVE LYMPHS      DF MANUAL     METABOLIC PANEL, COMPREHENSIVE    Collection Time: 07/16/18  2:01 PM   Result Value Ref Range    Sodium 149 (H) 136 - 145 mmol/L    Potassium 4.0 3.5 - 5.5 mmol/L    Chloride 105 100 - 108 mmol/L    CO2 >45 (HH) 21 - 32 mmol/L    Anion gap NEG 1 3.0 - 18 mmol/L    Glucose 113 (H) 74 - 99 mg/dL    BUN 37 (H) 7.0 - 18 MG/DL    Creatinine 1.90 (H) 0.6 - 1.3 MG/DL    BUN/Creatinine ratio 19 12 - 20      GFR est AA 42 (L) >60 ml/min/1.73m2    GFR est non-AA 35 (L) >60 ml/min/1.73m2    Calcium 8.8 8.5 - 10.1 MG/DL    Bilirubin, total 0.2 0.2 - 1.0 MG/DL    ALT (SGPT) 35 16 - 61 U/L    AST (SGOT) 22 15 - 37 U/L    Alk. phosphatase 74 45 - 117 U/L    Protein, total 7.0 6.4 - 8.2 g/dL    Albumin 3.2 (L) 3.4 - 5.0 g/dL    Globulin 3.8 2.0 - 4.0 g/dL    A-G Ratio 0.8 0.8 - 1.7     MAGNESIUM    Collection Time: 07/16/18  2:01 PM   Result Value Ref Range    Magnesium 2.4 1.6 - 2.6 mg/dL   CARDIAC PANEL,(CK, CKMB & TROPONIN)    Collection Time: 07/16/18  2:01 PM   Result Value Ref Range     39 - 308 U/L    CK - MB 2.3 <3.6 ng/ml    CK-MB Index 2.0 0.0 - 4.0 %    Troponin-I, Qt. <0.02 0.00 - 0.06 NG/ML   POC G3    Collection Time: 07/16/18  2:36 PM   Result Value Ref Range    Device: NASAL CANNULA      Flow rate (POC) 2.0 L/M    FIO2 (POC) 24 %    pH (POC) 7.279 (L) 7.35 - 7.45      pCO2 (POC) 94.9 (H) 35.0 - 45.0 MMHG    pO2 (POC) 82 80 - 100 MMHG    HCO3 (POC) 44.5 (H) 22 - 26 MMOL/L    sO2 (POC) 93 92 - 97 %    Base excess (POC) 18 mmol/L    Allens test (POC) YES      Total resp.  rate 31      Site LEFT RADIAL Specimen type (POC) ARTERIAL      Performed by [facility]    POC G3    Collection Time: 07/16/18  4:26 PM   Result Value Ref Range    Device: BIPAP      FIO2 (POC) 24 %    pH (POC) 7.308 (L) 7.35 - 7.45      pCO2 (POC) 81.6 (H) 35.0 - 45.0 MMHG    pO2 (POC) 69 (L) 80 - 100 MMHG    HCO3 (POC) 41.0 (H) 22 - 26 MMOL/L    sO2 (POC) 90 (L) 92 - 97 %    Base excess (POC) 15 mmol/L    Allens test (POC) YES      Site RIGHT RADIAL      Specimen type (POC) ARTERIAL      Performed by [employer]            Recent Labs      07/16/18   1626  07/16/18   1436   FIO2I  24  24   HCO3I  41.0*  44.5*   PCO2I  81.6*  94.9*   PHI  7.308*  7.279*   PO2I  69*  82       [employer] Micro Results     None          Imaging:  [x]I have personally reviewed the patients chest radiographs images and report       Results from Hospital Encounter encounter on 07/16/18   XR CHEST PORT   Narrative EXAM: Portable semiupright chest radiograph. COMPARISON: July 9, 2018. REASON FOR EXAM: \"chest pain, sob,  arrhythmia\". _______________    FINDINGS:       LUNGS:         Expansion:  Adequate. Consolidation:  None definite. Pulmonary edema:   None definite. Other:  Non-applicable. PLEURAL SPACE:         Pleural effusion:   None definite. Pneumothorax:   None detected.    _______________         Impression IMPRESSION:    Negative radiographic examination. _______________                          Results from Hospital Encounter encounter on 07/16/18   CT HEAD WO CONT   Narrative EXAM:  CT of the brain without intravenous contrast.    COMPARISON: CT July 3, 2018. REASON FOR EXAM: \"Decreased alertness; facial droop and ams\". DOSE REDUCTION:  One or more dose reduction techniques were used on this CT:  automated exposure control, adjustment of the mAs and/or kVp according to  patient's size, and iterative reconstruction techniques.  The specific techniques  utilized on this CT exam have been documented in the patient's electronic  medical record.    _______________    FINDINGS:         IMAGE QUALITY:  The images are mildly degraded by motion artifact. BRAIN AND EXTRA-AXIAL SPACE:               SUBACUTE TERRITORIAL TRANSCORTICAL INFARCTION:  None detected. MASS:  None detected. HEMORRHAGE:  None. SUBDURAL FLUID COLLECTION:  None detected. HYDROCEPHALUS:  None. REMOTE CORTICAL INFARCTION:  None detected. REMOTE CEREBELLAR INFARCTION:  None detected. MISCELLANEOUS:  Non-applicable. STRIVE (STandards for Reporting Vascular changes on nEuroimaging):                     --[place] (age-indeterminate) or perivascular spaces of  \"presumed vascular origin\":  None definite. --[place] of white matter hypodensity of \"presumed vascular  origin\":  Low grade. --Degree of brain atrophy (subjective): Low grade. BURDEN OF CALCIFIC INTRACRANIAL ATHEROSCLEROSIS:  None significant. HEENT:             INCLUDED UPPER ORBITS:  The lenses are replaced bilaterally. INCLUDED UPPER PARANASAL SINUSES:  Predominantly clear. MASTOID AIR CELLS:  Predominantly clear. BONES:  Unremarkable. SCALP:  Unremarkable.    _______________         Impression IMPRESSION:    Low-grade generalized chronic changes, however, no acute findings. _______________                  Note: Dr. Lupe Alatorre discussed the stroke code results with Dr. Hali Miller at [id number] on  7/16/2018.                IMPRESSION:   · Acute metabolic encephalopathy from CO2 narcosis   · Dementia - severity not known, but advanced per prior notes  · COPD with home O2-FEV1 of 1.23 or 55% predicted but normal ratio follows with Dr. Madeline Stanley - on Spiriva and advair at home- Recent 6 min walk in may suggested requires home oxygen 2-4 liter  · Acute on chronic hypoxemic and hypercapneic respiratory failure  · Acute on chronic diast CHF  · Pneumonia - HAP  · CKD stage 3 with ARF  · HTN  · RUPERTO unknown severity on BIPAP21/17 non compliant-- not able to tolerate but best response noted on BIPAP 24/12 in recent hospitalization  · Hx of ETOH abuse (now residing in rehab facility)  · Chronic Leucocytosis with hx of CLL      RECOMMENDATIONS:   · Pulm: BIPAP management 24/12 pressures; follow ABGs; fio2 currently 28% - wean for O2 sats >92%; Duonebs q 4hrly; Budesonide nebs bid; IV solumedrol  · keep spo2 >92%  · Lasix 40 mg daily  · Ab - CXR shows left basal infiltrates; leucocytosis could be due to pneumonia or CLL; blood cxs, UA and urine cx; Zosyn and iv Vanc pending cxs  · Insert muller cath for accurate measurements  · Prophylaxis  · Gentle hydration with D5 1/2 NS 30/hr while NPO   -DVT Px: heparin  -GI Px: pepcid  · Full code status  · D/w family-called and d/w wife - reviewed patient appears to have end stage lung disease with hypercapnea, advanced dementia, CLL - discussed management; advised against aggressive measures such as CPR or vent support/breathing tube - this will cause pain, suffering, poor QOL, vent dependency with need for trach and peg etc.  Wife wishes full code. Will defer respective systems problem management to primary and other consultant and follow patient in ICU with primary and other medical team  Further recommendations will be based on the patient's response to recommended treatment and results of the investigation ordered. Quality Care: PPI, DVT prophylaxis, HOB elevated, Infection control all reviewed and addressed.   PAIN AND SEDATION: none   · Skin/Wound: no active issues  · Nutrition: NPO while on BIPAP  · Prophylaxis: DVT and GI Prophylaxis reviewed  · Restraints: none  · PT/OT eval and treat: as needed  · Lines/Tubes: PIVs  ADVANCE DIRECTIVE: Full Code; observe patient in icu for improvement vs intubation   Consult Palliative care medicine   · Thank you for the consult Dr John Puga complexity decision making was performed in this consultation and evaluation of this patient who is at high risk for decompensation with multiple organ involvement  Total critical care time spent rendering care exclusive of procedures: 40 minutes       Humberto Barragan MD

## 2018-07-16 NOTE — PROGRESS NOTES
Pulm/CC  Patient in icu; took himself off BIPAP; trying to walk out of icu  Low dose xanax prn  Cruz Amato MD

## 2018-07-16 NOTE — PROGRESS NOTES
RESPIRATORY NOTE:  Pt more awake, pulling and trying to break BIPAP mask off, nasal cannula on at 2 LPM at this time, will monitor.

## 2018-07-16 NOTE — IP AVS SNAPSHOT
[address] [address] 
[phone] Patient: Domenic Deshpande. MRN: [id number] EFA:4/4/2340 A check darleen indicates which time of day the medication should be taken. My Medications START taking these medications Instructions Each Dose to Equal  
 Morning Noon Evening Bedtime ALPRAZolam 0.5 mg tablet Commonly known as:  Samaria Sepideh Your last dose was: Your next dose is: Take 1 Tab by mouth three (3) times daily as needed for Anxiety. Max Daily Amount: 1.5 mg. Indications: anxiety 0.5 mg  
    
   
   
   
  
 predniSONE 5 mg dose pack Commonly known as:  STERAPRED Your last dose was: Your next dose is:    
   
   
 See administration instruction per 5mg dose pack CHANGE how you take these medications Instructions Each Dose to Equal  
 Morning Noon Evening Bedtime OLANZapine 2.5 mg tablet Commonly known as:  ZyPREXA What changed:  when to take this Your last dose was: Your next dose is: Take 0.5 Tabs by mouth two (2) times a day. 1.25 mg CONTINUE taking these medications Instructions Each Dose to Equal  
 Morning Noon Evening Bedtime ADVAIR HFA IN Your last dose was: Your next dose is: Take  by inhalation. ALBUTEROL IN Your last dose was: Your next dose is: Take  by inhalation. alfuzosin SR 10 mg SR tablet Commonly known as:  Rachid Arellano Your last dose was: Your next dose is: Take 10 mg by mouth daily. 10 mg  
    
   
   
   
  
 amLODIPine 10 mg tablet Commonly known as:  Kathy Ramachandran Your last dose was: Your next dose is: Take 1 Tab by mouth daily. 10 mg  
    
   
   
   
  
 aspirin 81 mg chewable tablet Your last dose was: Your next dose is: Take 81 mg by mouth daily. 81 mg  
    
   
   
   
  
 carvedilol 12.5 mg tablet Commonly known as:  Rolando Malone Your last dose was: Your next dose is: Take 1 Tab by mouth two (2) times daily (with meals). 12.5 mg  
    
   
   
   
  
 docusate sodium 100 mg capsule Commonly known as:  Mary Alice Duncan Your last dose was: Your next dose is: Take 1 Cap by mouth two (2) times a day for 90 days. 100 mg  
    
   
   
   
  
 hydrALAZINE 25 mg tablet Commonly known as:  APRESOLINE Your last dose was: Your next dose is: Take 1 Tab by mouth three (3) times daily. 25 mg  
    
   
   
   
  
 LASIX 40 mg tablet Generic drug:  furosemide Your last dose was: Your next dose is: Take  by mouth daily. Oxygen Your last dose was: Your next dose is: PEPCID 20 mg tablet Generic drug:  famotidine Your last dose was: Your next dose is: Take 20 mg by mouth two (2) times a day. 20 mg  
    
   
   
   
  
 PRAVASTATIN PO Your last dose was: Your next dose is: Take  by mouth. RESTASIS 0.05 % ophthalmic emulsion Generic drug:  cycloSPORINE Your last dose was: Your next dose is:    
   
   
 Administer 1 Drop to both eyes two (2) times a day. 1 Drop SPIRIVA WITH HANDIHALER 18 mcg inhalation capsule Generic drug:  tiotropium Your last dose was: Your next dose is: Take 1 Cap by inhalation daily. 1 Cap XALATAN 0.005 % ophthalmic solution Generic drug:  latanoprost  
   
Your last dose was: Your next dose is:    
   
   
 Administer 1 Drop to both eyes nightly. 1 Drop Where to Get Your Medications Information on where to get these meds will be given to you by the nurse or doctor. ! Ask your nurse or doctor about these medications ALPRAZolam 0.5 mg tablet OLANZapine 2.5 mg tablet  
 predniSONE 5 mg dose pack

## 2018-07-17 ENCOUNTER — APPOINTMENT (OUTPATIENT)
Dept: GENERAL RADIOLOGY | Age: 73
DRG: 189 | End: 2018-07-17
Attending: INTERNAL MEDICINE
Payer: MEDICARE

## 2018-07-17 LAB
ANION GAP SERPL CALC-SCNC: 2 MMOL/L (ref 3–18)
ARTERIAL PATENCY WRIST A: YES
BASE EXCESS BLD CALC-SCNC: 15 MMOL/L
BASOPHILS # BLD: 0 K/UL (ref 0–0.1)
BASOPHILS NFR BLD: 0 % (ref 0–2)
BDY SITE: ABNORMAL
BODY TEMPERATURE: 98.1
BUN SERPL-MCNC: 29 MG/DL (ref 7–18)
BUN/CREAT SERPL: 19 (ref 12–20)
CALCIUM SERPL-MCNC: 7.9 MG/DL (ref 8.5–10.1)
CHLORIDE SERPL-SCNC: 106 MMOL/L (ref 100–108)
CO2 SERPL-SCNC: 40 MMOL/L (ref 21–32)
CREAT SERPL-MCNC: 1.51 MG/DL (ref 0.6–1.3)
DIFFERENTIAL METHOD BLD: ABNORMAL
EOSINOPHIL # BLD: 0 K/UL (ref 0–0.4)
EOSINOPHIL NFR BLD: 0 % (ref 0–5)
ERYTHROCYTE [DISTWIDTH] IN BLOOD BY AUTOMATED COUNT: 16.6 % (ref 11.6–14.5)
EST. AVERAGE GLUCOSE BLD GHB EST-MCNC: 123 MG/DL
GAS FLOW.O2 O2 DELIVERY SYS: ABNORMAL L/MIN
GLUCOSE BLD STRIP.AUTO-MCNC: 123 MG/DL (ref 70–110)
GLUCOSE BLD STRIP.AUTO-MCNC: 141 MG/DL (ref 70–110)
GLUCOSE BLD STRIP.AUTO-MCNC: 142 MG/DL (ref 70–110)
GLUCOSE BLD STRIP.AUTO-MCNC: 144 MG/DL (ref 70–110)
GLUCOSE BLD STRIP.AUTO-MCNC: 145 MG/DL (ref 70–110)
GLUCOSE SERPL-MCNC: 149 MG/DL (ref 74–99)
HBA1C MFR BLD: 5.9 % (ref 4.5–5.6)
HCO3 BLD-SCNC: 41.4 MMOL/L (ref 22–26)
HCT VFR BLD AUTO: 37.8 % (ref 36–48)
HGB BLD-MCNC: 10.8 G/DL (ref 13–16)
LYMPHOCYTES # BLD: 4.3 K/UL (ref 0.9–3.6)
LYMPHOCYTES NFR BLD: 24 % (ref 21–52)
MAGNESIUM SERPL-MCNC: 1.9 MG/DL (ref 1.6–2.6)
MCH RBC QN AUTO: 23.9 PG (ref 24–34)
MCHC RBC AUTO-ENTMCNC: 28.6 G/DL (ref 31–37)
MCV RBC AUTO: 83.8 FL (ref 74–97)
MONOCYTES # BLD: 0.8 K/UL (ref 0.05–1.2)
MONOCYTES NFR BLD: 5 % (ref 3–10)
NEUTS SEG # BLD: 12.6 K/UL (ref 1.8–8)
NEUTS SEG NFR BLD: 71 % (ref 40–73)
O2/TOTAL GAS SETTING VFR VENT: 28 %
PCO2 BLD: 85.3 MMHG (ref 35–45)
PEEP RESPIRATORY: 12 CMH2O
PH BLD: 7.29 [PH] (ref 7.35–7.45)
PIP ISTAT,IPIP: 24
PLATELET # BLD AUTO: 129 K/UL (ref 135–420)
PMV BLD AUTO: 11.6 FL (ref 9.2–11.8)
PO2 BLD: 83 MMHG (ref 80–100)
POTASSIUM SERPL-SCNC: 4.4 MMOL/L (ref 3.5–5.5)
RBC # BLD AUTO: 4.51 M/UL (ref 4.7–5.5)
SAO2 % BLD: 94 % (ref 92–97)
SERVICE CMNT-IMP: ABNORMAL
SODIUM SERPL-SCNC: 148 MMOL/L (ref 136–145)
SPECIMEN TYPE: ABNORMAL
SPONTANEOUS TIMED, IST: YES
WBC # BLD AUTO: 17.8 K/UL (ref 4.6–13.2)

## 2018-07-17 PROCEDURE — 36600 WITHDRAWAL OF ARTERIAL BLOOD: CPT

## 2018-07-17 PROCEDURE — 65610000006 HC RM INTENSIVE CARE

## 2018-07-17 PROCEDURE — 82803 BLOOD GASES ANY COMBINATION: CPT

## 2018-07-17 PROCEDURE — 74011000250 HC RX REV CODE- 250: Performed by: INTERNAL MEDICINE

## 2018-07-17 PROCEDURE — 80048 BASIC METABOLIC PNL TOTAL CA: CPT | Performed by: HOSPITALIST

## 2018-07-17 PROCEDURE — 71045 X-RAY EXAM CHEST 1 VIEW: CPT

## 2018-07-17 PROCEDURE — 83036 HEMOGLOBIN GLYCOSYLATED A1C: CPT | Performed by: INTERNAL MEDICINE

## 2018-07-17 PROCEDURE — 82962 GLUCOSE BLOOD TEST: CPT

## 2018-07-17 PROCEDURE — 36415 COLL VENOUS BLD VENIPUNCTURE: CPT | Performed by: HOSPITALIST

## 2018-07-17 PROCEDURE — 94640 AIRWAY INHALATION TREATMENT: CPT

## 2018-07-17 PROCEDURE — 83735 ASSAY OF MAGNESIUM: CPT | Performed by: HOSPITALIST

## 2018-07-17 PROCEDURE — 77030035694 HC MSK BIPAP FLL FAC PERFMAX PHIL -B

## 2018-07-17 PROCEDURE — 74011000258 HC RX REV CODE- 258: Performed by: INTERNAL MEDICINE

## 2018-07-17 PROCEDURE — 94660 CPAP INITIATION&MGMT: CPT

## 2018-07-17 PROCEDURE — 97167 OT EVAL HIGH COMPLEX 60 MIN: CPT

## 2018-07-17 PROCEDURE — 74011250636 HC RX REV CODE- 250/636: Performed by: INTERNAL MEDICINE

## 2018-07-17 PROCEDURE — 85025 COMPLETE CBC W/AUTO DIFF WBC: CPT | Performed by: HOSPITALIST

## 2018-07-17 PROCEDURE — 76450000000

## 2018-07-17 PROCEDURE — 74011250636 HC RX REV CODE- 250/636: Performed by: HOSPITALIST

## 2018-07-17 PROCEDURE — 97162 PT EVAL MOD COMPLEX 30 MIN: CPT

## 2018-07-17 PROCEDURE — 77010033678 HC OXYGEN DAILY

## 2018-07-17 RX ORDER — HALOPERIDOL 5 MG/ML
1 INJECTION INTRAMUSCULAR
Status: DISCONTINUED | OUTPATIENT
Start: 2018-07-17 | End: 2018-07-25 | Stop reason: HOSPADM

## 2018-07-17 RX ORDER — DEXTROSE MONOHYDRATE 50 MG/ML
40 INJECTION, SOLUTION INTRAVENOUS CONTINUOUS
Status: DISCONTINUED | OUTPATIENT
Start: 2018-07-17 | End: 2018-07-19

## 2018-07-17 RX ORDER — VANCOMYCIN/0.9 % SOD CHLORIDE 1.5G/250ML
1500 PLASTIC BAG, INJECTION (ML) INTRAVENOUS EVERY 24 HOURS
Status: DISCONTINUED | OUTPATIENT
Start: 2018-07-17 | End: 2018-07-17

## 2018-07-17 RX ORDER — AZITHROMYCIN 250 MG/1
500 TABLET, FILM COATED ORAL DAILY
Status: DISPENSED | OUTPATIENT
Start: 2018-07-17 | End: 2018-07-22

## 2018-07-17 RX ADMIN — BUDESONIDE 500 MCG: 0.5 INHALANT RESPIRATORY (INHALATION) at 20:32

## 2018-07-17 RX ADMIN — METHYLPREDNISOLONE SODIUM SUCCINATE 40 MG: 40 INJECTION, POWDER, FOR SOLUTION INTRAMUSCULAR; INTRAVENOUS at 14:38

## 2018-07-17 RX ADMIN — BUDESONIDE 500 MCG: 0.5 INHALANT RESPIRATORY (INHALATION) at 07:28

## 2018-07-17 RX ADMIN — METHYLPREDNISOLONE SODIUM SUCCINATE 40 MG: 40 INJECTION, POWDER, FOR SOLUTION INTRAMUSCULAR; INTRAVENOUS at 20:02

## 2018-07-17 RX ADMIN — IPRATROPIUM BROMIDE AND ALBUTEROL SULFATE 3 ML: .5; 3 SOLUTION RESPIRATORY (INHALATION) at 11:18

## 2018-07-17 RX ADMIN — IPRATROPIUM BROMIDE AND ALBUTEROL SULFATE 3 ML: .5; 3 SOLUTION RESPIRATORY (INHALATION) at 20:32

## 2018-07-17 RX ADMIN — IPRATROPIUM BROMIDE AND ALBUTEROL SULFATE 3 ML: .5; 3 SOLUTION RESPIRATORY (INHALATION) at 03:21

## 2018-07-17 RX ADMIN — LORAZEPAM 0.5 MG: 2 INJECTION INTRAMUSCULAR; INTRAVENOUS at 14:12

## 2018-07-17 RX ADMIN — DEXTROSE MONOHYDRATE 40 ML/HR: 5 INJECTION, SOLUTION INTRAVENOUS at 14:40

## 2018-07-17 RX ADMIN — FUROSEMIDE 40 MG: 10 INJECTION, SOLUTION INTRAMUSCULAR; INTRAVENOUS at 08:22

## 2018-07-17 RX ADMIN — IPRATROPIUM BROMIDE AND ALBUTEROL SULFATE 3 ML: .5; 3 SOLUTION RESPIRATORY (INHALATION) at 15:30

## 2018-07-17 RX ADMIN — HEPARIN SODIUM 5000 UNITS: 5000 INJECTION, SOLUTION INTRAVENOUS; SUBCUTANEOUS at 08:29

## 2018-07-17 RX ADMIN — HEPARIN SODIUM 5000 UNITS: 5000 INJECTION, SOLUTION INTRAVENOUS; SUBCUTANEOUS at 02:02

## 2018-07-17 RX ADMIN — PIPERACILLIN SODIUM,TAZOBACTAM SODIUM 3.38 G: 3; .375 INJECTION, POWDER, FOR SOLUTION INTRAVENOUS at 07:50

## 2018-07-17 RX ADMIN — HEPARIN SODIUM 5000 UNITS: 5000 INJECTION, SOLUTION INTRAVENOUS; SUBCUTANEOUS at 18:52

## 2018-07-17 RX ADMIN — FAMOTIDINE 20 MG: 10 INJECTION INTRAVENOUS at 08:22

## 2018-07-17 RX ADMIN — METHYLPREDNISOLONE SODIUM SUCCINATE 60 MG: 40 INJECTION, POWDER, FOR SOLUTION INTRAMUSCULAR; INTRAVENOUS at 08:07

## 2018-07-17 RX ADMIN — PIPERACILLIN SODIUM,TAZOBACTAM SODIUM 3.38 G: 3; .375 INJECTION, POWDER, FOR SOLUTION INTRAVENOUS at 00:22

## 2018-07-17 RX ADMIN — IPRATROPIUM BROMIDE AND ALBUTEROL SULFATE 3 ML: .5; 3 SOLUTION RESPIRATORY (INHALATION) at 07:28

## 2018-07-17 RX ADMIN — HALOPERIDOL LACTATE 1 MG: 5 INJECTION, SOLUTION INTRAMUSCULAR at 10:05

## 2018-07-17 RX ADMIN — LORAZEPAM 0.5 MG: 2 INJECTION INTRAMUSCULAR; INTRAVENOUS at 00:22

## 2018-07-17 RX ADMIN — LORAZEPAM 0.5 MG: 2 INJECTION INTRAMUSCULAR; INTRAVENOUS at 20:10

## 2018-07-17 NOTE — PROGRESS NOTES
Hospitalist Progress Note-critical care note Patient: Ninfa Fonseca MRN: [id number]  CSN: [id number] YOB: 1945  Age: 74 y.o. Sex: male DOA: 7/16/2018 LOS:  LOS: 1 day Chief complaint: acute on chronic respiratory failure ,acute encephalopathy, hypernatremia , co2 narcosis Assessment/Plan Hospital Problems  Never Reviewed Codes Class Noted POA Altered mental status ICD-10-CM: R41.82 
ICD-9-CM: 780.97  7/16/2018 Unknown Respiratory distress ICD-10-CM: R06.03 
ICD-9-CM: 786.09  7/16/2018 Unknown Hypoxia ICD-10-CM: R09.02 
ICD-9-CM: 799.02  7/16/2018 Unknown CO2 narcosis ICD-10-CM: R06.89 
ICD-9-CM: 786.09  7/16/2018 Unknown Hypernatremia ICD-10-CM: E87.0 ICD-9-CM: 276.0  7/16/2018 Unknown COPD (chronic obstructive pulmonary disease) (HCC) ICD-10-CM: J44.9 ICD-9-CM: 702  7/12/2018 Yes RUPERTO (obstructive sleep apnea) ICD-10-CM: G47.33 
ICD-9-CM: 327.23  7/12/2018 Yes Dementia ICD-10-CM: F03.90 ICD-9-CM: 294.20  7/12/2018 Yes HTN (hypertension) ICD-10-CM: I10 
ICD-9-CM: 401.9  7/12/2018 Yes * (Principal)Acute on chronic respiratory failure with hypoxia and hypercapnia (HCC) ICD-10-CM: J96.21, J96.22 
ICD-9-CM: 518.84, 786.09, 799.02  7/3/2018 Yes  
   
 CLL (chronic lymphocytic leukemia) (HCC) ICD-10-CM: C91.90 ICD-9-CM: 204.10  7/3/2018 Yes Acute encephalopathy ICD-10-CM: G93.40 ICD-9-CM: 348.30  7/3/2018 Yes Acute on chronic respiratory failure with hypercapnia and hypoxia Improving per bipap , need bipap at night and prn  
Continu, breath tx   
 will give low dose iv steroid, 
 
 emphysema lung 
 breathing tx and supportive treatment  
   
  
Acute encephalopathy due to hypercapnia -co 2  Narcosis -plus demetia   
Ct head no acute issue Ativan and haloidal prn   
  
 
Htn Lasix  
   
cll , leukocytosis -remained at the baseline  
   
  
  
ckd 3 Back to his base line Hypernatremia Mild improving continue d5 0.45 ns Palliative care on board, Subjective: agitated last night. Review of systems: 
 
Unable to obtain due to on bipap Vital signs/Intake and Output: 
Visit Vitals  /76  Pulse 88  Temp 98.1 °F (36.7 °C)  Resp 17  Wt 91.6 kg (201 lb 15.1 oz)  SpO2 98%  BMI 32.59 kg/m2 Current Shift:  07/17 0701 - 07/17 1900 In: -  
Out: 1900 [WARMW:8739] Last three shifts:  07/15 1901 - 07/17 0700 In: 574.5 [I.V.:574.5] Out: 2900 [Urine:2900] Physical Exam: 
General: WD, WN. Alert, some cooperative, no acute distress   
HEENT: NC, Atraumatic. PERRLA, anicteric sclerae. bipap mask noted Lungs: Decrease bs Heart:  Regular  rhythm,  + murmur, No Rubs, No Gallops Abdomen: Soft, Non distended, Non tender.  +Bowel sounds, Extremities: No c/c/e Psych:   Not anxious or agitated. Neurologic:  No obtain due to on bipap, follow commands Labs: Results:  
   
Chemistry Recent Labs  
   07/17/18 
 7142  07/16/18 
 1401 GLU  149*  113* NA  148*  149*  
K  4.4  4.0  
CL  106  105 CO2  40*  >45* BUN  29*  37* CREA  1.51*  1.90* CA  7.9*  8.8 AGAP  2*  NEG 1  
BUCR  19  19 AP   --   74  
TP   --   7.0 ALB   --   3.2*  
GLOB   --   3.8 AGRAT   --   0.8 CBC w/Diff Recent Labs  
   07/17/18 
 0438  07/16/18 
 1401 WBC  17.8*  17.9*  
RBC  4.51*  4.82  
HGB  10.8*  11.7* HCT  37.8  40.1 PLT  129*  143 GRANS  71  40* LYMPH  24  50 EOS  0  1 Cardiac Enzymes Recent Labs  
   07/16/18 
 1401 CPK  114 CKND1  2.0 Coagulation No results for input(s): PTP, INR, APTT in the last 72 hours. No lab exists for component: INREXT Lipid Panel No results found for: CHOL, CHOLPOCT, CHOLX, CHLST, CHOLV, 703874, HDL, LDL, LDLC, DLDLP, 863127, VLDLC, VLDL, TGLX, TRIGL, TRIGP, TGLPOCT, CHHD, CHHDX  
BNP No results for input(s): BNPP in the last 72 hours. Liver Enzymes Recent Labs 07/16/18 
 1401 TP  7.0 ALB  3.2* AP  74 SGOT  22 Thyroid Studies No results found for: T4, T3U, TSH, TSHEXT Procedures/imaging: see electronic medical records for all procedures/Xrays and details which were not copied into this note but were reviewed prior to creation of Plan Britta South MD

## 2018-07-17 NOTE — PROGRESS NOTES
Pharmacy Dosing Services: Vancomycin    Consult for Vancomycin Dosing by Pharmacy by Dr. Lainey Natarajan provided for this 74y.o. year old male , for indication of Pneumonia (HAP). Day of Therapy 1    Ht Readings from Last 1 Encounters:   07/05/18 167.6 cm (66\")        Wt Readings from Last 1 Encounters:   07/16/18 90.6 kg (199 lb 11.8 oz)        Other Current Antibiotics Zosyn   Significant Cultures none   Serum Creatinine Lab Results   Component Value Date/Time    Creatinine 1.90 (H) 07/16/2018 02:01 PM      Creatinine Clearance Estimated Creatinine Clearance: 35.9 mL/min (based on Cr of 1.9). BUN Lab Results   Component Value Date/Time    BUN 37 (H) 07/16/2018 02:01 PM      WBC Lab Results   Component Value Date/Time    WBC 17.9 (H) 07/16/2018 02:01 PM      H/H Lab Results   Component Value Date/Time    HGB 11.7 (L) 07/16/2018 02:01 PM      Platelets Lab Results   Component Value Date/Time    PLATELET 242 29/48/1172 02:01 PM      Temp 98.1 °F (36.7 °C)     Start Vancomycin therapy with a loading dose of Vancomycin 2000mg IV x1. Follow with maintenance dose of Vancomycin 1250mg IV q24h. Dose calculated to approximate a therapeutic trough of 15-20 mcg/mL. Pharmacy to follow daily and will make changes to dose and/or frequency based on clinical status.     Gisele Emerson, [address]eda

## 2018-07-17 NOTE — PROGRESS NOTES
Problem: Mobility Impaired (Adult and Pediatric)  Goal: *Acute Goals and Plan of Care (Insert Text)  Physical Therapy Goals  Initiated 7/17/2018 and to be accomplished within 3-7 day(s)  1. Patient will move from supine to sit and sit to supine  in bed with supervision/set-up. 2.  Patient will transfer from bed to chair and chair to bed with supervision/set-up using the least restrictive device. 3.  Patient will perform sit to stand with supervision/set-up. 4.  Patient will ambulate with supervision/set-up for 100 feet with the least restrictive device. Outcome: Progressing Towards Goal  physical Therapy EVALUATION    Patient: Viry Kasper (69 y.o. male)  Date: 7/17/2018  Primary Diagnosis: CO2 narcosis  Respiratory distress  Hypoxia  Altered mental status  Precautions:   Fall    ASSESSMENT :  Based on the objective data described below, the patient presents with lower extremity weakness, decreased gait quality and endurance, impaired bed mobility and transfers, and overall limitations in functional mobility. Pt came to ED from rehab however pt home environment is apartment. Pt performed supine to sit with Oswald and extra time, sit to stand with Oswald and cues for placement. Patient ambulated 20 feet with RW, GB applied, CGA; decreased step length and angelo, poor initiation , required verbal cues to direct pt. Pt tolerated session well as evidenced by no c/o increased pain, no lightheadedness or dizziness. Patient would benefit from skilled inpatient physical therapy to address deficits, progress as tolerated to achieve long term goals and allow safe discharge. Patient will benefit from skilled intervention to address the above impairments.   Patients rehabilitation potential is considered to be Good  Factors which may influence rehabilitation potential include:   []         None noted  []         Mental ability/status  [x]         Medical condition  [x]         Home/family situation and support systems  [x]         Safety awareness  [x]         Pain tolerance/management  []         Other:      PLAN :  Recommendations and Planned Interventions:  [x]           Bed Mobility Training             [x]    Neuromuscular Re-Education  [x]           Transfer Training                   []    Orthotic/Prosthetic Training  [x]           Gait Training                          []    Modalities  [x]           Therapeutic Exercises          []    Edema Management/Control  [x]           Therapeutic Activities            [x]    Patient and Family Training/Education  []           Other (comment):    Frequency/Duration: Patient will be followed by physical therapy 1-2 times per day to address goals. 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     OBJECTIVE DATA SUMMARY:     Past Medical History:   Diagnosis Date    Chronic kidney disease     stage 3 kidney disease    Chronic obstructive pulmonary disease (HCC)     CLL (chronic lymphocytic leukemia) (HCC)     Delirium     Emphysema lung ([facility] [address]     Fall     Frequent hospital admissions     Gout     Hyperlipidemia     Hypoxemia     Memory loss     Noncompliance     Oxygen dependent     Pneumonia     Pulmonary nodules     Respiratory failure (HCC)     Risk for falls     Septic shock (HCC)     Sleep apnea     Tobacco abuse     Vitamin D deficiency      Past Surgical History:   Procedure Laterality Date    HX CATARACT REMOVAL      HX HERNIA REPAIR      HX NEPHROSTOMY       Barriers to Learning/Limitations: yes;  cognitive and altered mental status (i.e.Sedation, Confusion)  Compensate with: Visual Cues, Verbal Cues and Tactile Cues  Prior Level of Function/Home Situation: Amb c/RW  Home Situation  Home Environment: Apartment  One/Two Story Residence: One story  Living Alone: No  Support Systems: Spouse/Significant Other/Partner  Patient Expects to be Discharged to[de-identified] Rehabilitation facility  Strength:    Strength: Generally decreased, functional  Tone & Sensation:   Tone: Abnormal  Sensation: Impaired   Range Of Motion:  AROM: Generally decreased, functional  Functional Mobility:  Bed Mobility:  Supine to Sit: Minimum assistance  Sit to Supine: Minimum assistance  Transfers:  Sit to Stand: Minimum assistance  Stand to Sit: Minimum assistance  Balance:   Sitting: Intact  Standing: Intact; With support  Ambulation/Gait Training:  Distance (ft): 20 Feet (ft)  Assistive Device: Gait belt;Walker, rolling  Ambulation - Level of Assistance: Contact guard assistance  Gait Description (WDL): Exceptions to WDL  Gait Abnormalities: Decreased step clearance (poor initiation)  Base of Support: Widened  Speed/Carie: Slow  Step Length: Left shortened;Right shortened  Swing Pattern: Left asymmetrical;Right asymmetrical  Pain:  Pain Scale 1: Numeric (0 - 10)  Pain Intensity 1: 0  Activity Tolerance:   Good  Please refer to the flowsheet for vital signs taken during this treatment. After treatment:   []         Patient left in no apparent distress sitting up in chair  [x]         Patient left in no apparent distress in bed  [x]         Call bell left within reach  [x]         Nursing notified  []         Caregiver present  []         Bed alarm activated    COMMUNICATION/EDUCATION:   [x]         Fall prevention education was provided and the patient/caregiver indicated understanding. [x]         Patient/family have participated as able in goal setting and plan of care. [x]         Patient/family agree to work toward stated goals and plan of care. []         Patient understands intent and goals of therapy, but is neutral about his/her participation. []         Patient is unable to participate in goal setting and plan of care.     Thank you for this referral.  Alfreda Grant   Time Calculation: 21 mins   Eval Complexity: History: MEDIUM Complexity : 1-2 comorbidities / personal factors will impact the outcome/ POC Exam:MEDIUM Complexity : 3 Standardized tests and measures addressing body structure, function, activity limitation and / or participation in recreation  Presentation: MEDIUM Complexity : Evolving with changing characteristics  Clinical Decision Making:Medium Complexity amb <30 ft c/RW, Oswald for safety with mobility Overall Complexity:MEDIUM Mobility  Current  CK= 40-59%   Goal  CI= 1-19%. The severity rating is based on the Level of Assistance required for Functional Mobility and ADLs.

## 2018-07-17 NOTE — ACP (ADVANCE CARE PLANNING)
[employer] Medical Directive in EMR. AMD names wife Farrukh Knott as primary medical agent and then son Olita Libman secondary agent. Currently wife states wish is for all aggressive measures to include intubation and CPR.         MPOA: Farrukh Knott (wife)  [phone]     2nd MPOA: Olita Libman (son)  [phone]    Patient is FULL CODE per family wishes

## 2018-07-17 NOTE — PROGRESS NOTES
Problem: Self Care Deficits Care Plan (Adult)  Goal: *Acute Goals and Plan of Care (Insert Text)  Initial Occupational Therapy Goals (7/17/2018) 3DAY TRIAL Within 7 day(s):    1. Patient will perform grooming standing at sink with Supervision x 2-3 minutes for increased independence with ADLs. 2. Patient will perform UB dressing with setup/Jeimy for increased independence with ADLs. 3. Patient will perform LB dressing with setup/Jeimy for increased independence with ADLs. 4. Patient will perform all aspects of toileting with Supervision for increased independence in ADLs. Outcome: Progressing Towards Goal  Occupational Therapy EVALUATION    Patient: Jonah Bennett (69 y.o. male)  Date: 7/17/2018  Primary Diagnosis: CO2 narcosis  Respiratory distress  Hypoxia  Altered mental status        Precautions:  Fall    ASSESSMENT :  Based on the objective data described below, the patient presents with decreased functional strength, decreased functional balance, decreased overall activity tolerance limiting independence with ADLs. Patient known from previous admission. Pt continues to be severely limited in initiation and gets agitated that he is waiting for OT to assist. Pt able to get to EOB w/ tactile cues. Pt able to walk around bed and performed best w/ tactile cues. Pt also able to assist in scooting w/ counting to assist with initiation. Pt greatly limited in ADLs d/t lack of initiation and problem solving. Pt w/ flat affect and minimal interaction. Will trial OT to increase initiation and spontaneous ADLs, but limited by hospital environment. Education: Patient instructed on home safety, body mechanics for optimal respiratory effort, Energy Conservation/Work Simplification Techniques, adaptive strategies and adaptive dressing techniques including clothing modifications with patient verbalizing understanding at this time.       Patient will benefit from skilled intervention to address the above impairments. Patients rehabilitation potential is considered to be Fair  Factors which may influence rehabilitation potential include:   []             None noted  [x]             Mental ability/status  [x]             Medical condition  []             Home/family situation and support systems  [x]             Safety awareness  []             Pain tolerance/management  []             Other:      PLAN :  Recommendations and Planned Interventions:  [x]               Self Care Training                  [x]        Therapeutic Activities  [x]               Functional Mobility Training    [x]        Cognitive Retraining  [x]               Therapeutic Exercises           [x]        Endurance Activities  [x]               Balance Training                   [x]        Neuromuscular Re-Education  [x]               Visual/Perceptual Training     [x]   Home Safety Training  [x]               Patient Education                 [x]        Family Training/Education  []               Other (comment):    Frequency/Duration: Patient will be followed by occupational therapy 3 times a week to address goals. 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     OBJECTIVE DATA SUMMARY:     Past Medical History:   Diagnosis Date    Chronic kidney disease     stage 3 kidney disease    Chronic obstructive pulmonary disease (HCC)     CLL (chronic lymphocytic leukemia) (HCC)     Delirium     Emphysema lung ([facility] [address]     Fall     Frequent hospital admissions     Gout     Hyperlipidemia     Hypoxemia     Memory loss     Noncompliance     Oxygen dependent     Pneumonia     Pulmonary nodules     Respiratory failure (HCC)     Risk for falls     Septic shock (HCC)     Sleep apnea     Tobacco abuse     Vitamin D deficiency      Past Surgical History:   Procedure Laterality Date    HX CATARACT REMOVAL      HX HERNIA REPAIR  HX NEPHROSTOMY       Barriers to Learning/Limitations: yes;  cognitive  Compensate with: visual, verbal, tactile, kinesthetic cues/model    Prior Level of Function/Home Situation: Pt w/ supportive family; ?daughter at [address]: Apartment  One/Two Story Residence: One story  Living Alone: No  Support Systems: Spouse/Significant Other/Partner  Patient Expects to be Discharged to[de-identified] Rehabilitation facility  Current DME Used/Available at Home: Walker, rolling, Oxygen, portable  [x]  Right hand dominant   []  Left hand dominant    Cognitive/Behavioral Status:  Neurologic State: Alert;Confused  Orientation Level: Disoriented to situation;Disoriented to time;Oriented to person;Oriented to place  Cognition: Decreased attention/concentration;Decreased command following;Poor safety awareness; Impaired decision making; Impulsive;Memory loss  Safety/Judgement: Decreased awareness of need for assistance;Decreased awareness of need for safety; Lack of insight into deficits    Skin: appears intact  Edema: no edema noted    Vision/Perceptual:     limited visual scanning; vision at neutral   Will continue to assess      Coordination:  Coordination: Generally decreased, functional  Fine Motor Skills-Upper: Left Intact; Right Intact    Gross Motor Skills-Upper: Left Impaired;Right Intact    Balance:  Sitting: Intact  Standing: Intact; With support    Strength:  Strength: Generally decreased, functional  Tone & Sensation:  Tone: Abnormal  Sensation: Impaired  Range of Motion:  AROM: Generally decreased, functional  PROM: Generally decreased, functional    Functional Mobility and Transfers for ADLs:  Bed Mobility:  Supine to Sit: Minimum assistance  Sit to Supine: Minimum assistance  Transfers:  Sit to Stand: Minimum assistance    ADL Assessment:   Feeding: Contact guard assistance;Minimum assistance    Oral Facial Hygiene/Grooming: Moderate assistance    Bathing:  Moderate assistance;Maximum assistance    Upper Body Dressing: Moderate assistance    Lower Body Dressing: Maximum assistance    Toileting: Maximum assistance    ADL Intervention:  Lower Body Dressing Assistance  Socks: Total assistance (dependent)    Cognitive Retraining  Orientation Retraining: Reorienting;Situation;Time  Problem Solving: Inductive reason; Identifying the task; Identifying the problem;General alternative solution;Deductive reason  Executive Functions: Executing cognitive plans;Regulating behavior;Managing time  Organizing/Sequencing: Breaking task down;Prioritizing  Attention to Task: Distractibility; Single task  Maintains Attention For (Time): 30 seconds  Following Commands: Awareness of environment  Safety/Judgement: Decreased awareness of need for assistance;Decreased awareness of need for safety; Lack of insight into deficits  Cues: Tactile cues provided;Verbal cues provided;Visual cues provided    Pain:  Pre-treatment: 0/10  Post-treatment: 0/10    Activity Tolerance:   Patient able to stand 1-2 minute(s). Patient able to complete ADLs with constant rest breaks. Patient limited by cognition/respiratory status. Patient unsteady     Please refer to the flowsheet for vital signs taken during this treatment. After treatment:   [] Patient left in no apparent distress sitting up in chair  [x] Patient left in no apparent distress in bed  [x] Call bell left within reach  [x] Nursing notified  [x] Caregiver present  [x] Bed alarm activated    COMMUNICATION/EDUCATION:   [x] Home safety education was provided and the patient/caregiver indicated understanding. [x] Patient/family have participated as able in goal setting and plan of care. [x] Patient/family agree to work toward stated goals and plan of care. [] Patient understands intent and goals of therapy, but is neutral about his/her participation. [] Patient is unable to participate in goal setting and plan of care.     Thank you for this referral.  Jacky Sultana OTR/L  Time Calculation: 15 mins    G-Codes (GP)  Self Care   Current  CL= 60-79%   Goal  CJ= 20-39%  The severity rating is based on the professional judgement & direct observation of Level of Assistance required for Functional Mobility and ADLs. Eval Complexity: History: HIGH Complexity : Extensive review of history including physical, cognitive and psychosocial history ; Examination: HIGH Complexity : 5 or more performance deficits relating to physical, cognitive , or psychosocial skils that result in activity limitations and / or participation restrictions; Decision Making:HIGH Complexity : Patient presents with comorbidities that affect occupational performance.  Signifigant modification of tasks or assistance (eg, physical or verbal) with assessment (s) is necessary to enable patient to complete evaluation

## 2018-07-17 NOTE — PROGRESS NOTES
PATIENT FOUND MINIMALLY RESPONSIVE ON 2L NC. BIPAP RESUMED @ 24/12 WITH FIO2 24%. SCHEDULED DUONEB TX GIVEN INLINE. BS DIMINISHED BILATERALLY. NOTE THAT BIPAP MASK HAD TO BE REPLACED AS PATIENT HAD BROKEN THE PREVIOUS MASK.

## 2018-07-17 NOTE — PROGRESS NOTES
[address] [facility] [phone] (COPE)    Patient Name: Colby Benitez. YOB: 1945    Reason for Consult: Goals of care  Requesting Provider: Dr. Devonte Altman   Primary Care Physician: Zorita Crigler, MD     SUMMARY:   Colby Rolon is a 74 y. o. with a past history of COPD,  Emphysema, CKD, RUPERTO, who was admitted on 7/16/2018 from [facility] with a diagnosis of  Acute on chronic respiratory failure with hypercapnia and hypoxia, Emphysema,  Acute encephalopathy due to Hypercapnia, CO2 Narcosis, Dementia, HTN, CLL, Leukocytosis, CKD 3,   Hypernatremia. Current medical issues leading to Palliative Medicine involvement include: Recurrent hospitalization. Palliative medicine team is familiar with patient and his wife from admission earlier this month. He was admitted with much the same symptoms and was discharged 07/12 to [facility] for rehab. Wife is at bedside and states understanding of why Mr. Mohit Tolentino is here. \"It is the same thing as last time, his breathing. He just needs the BIPAP and he comes right around\". She stated her goals had not changed since last week. She wants to return to rehab at discharge and may want long term placement after that. She is working on [employer] for this plan. PM team will remain available for support. RECOMMENDATIONS:   1. Full Code per family request  2.  Return to rehab at discharge     [address] / TREATMENT PREFERENCES:     GOALS OF CARE:  Patient/Health Care Proxy Stated Goals: Prolong life    TREATMENT PREFERENCES:   Code Status: Full Code    Advance Care Planning:  Advance Care Planning 7/17/2018   Patient's Healthcare Decision Maker is: -   Primary Decision Maker Name Aureliano Hennessy   Primary Decision Maker Phone Number [phone]   Primary Decision Maker Relationship to Patient Spouse   Secondary Decision Maker Name Jag Sosa   Secondary Decision Maker Phone Number [phone]   Secondary Decision Maker Relationship to Patient -   Confirm Advance Directive Yes, on file   Does the patient have other document types -       Medical Interventions: Full interventions     Artificially Administered Nutrition: Feeding tube long-term, if indicated     CLINICAL ASSESSMENT:   Palliative Performance Scale (PPS):  PPS: 40    Modified ESAS Completed by: provider                                   Stool Occurrence(s): 1     Clinical Pain Assessment (nonverbal scale for severity on nonverbal patients):          Activity (Movement): Lying quietly, normal position     PSYCHOSOCIAL/SPIRITUAL ASSESSMENT:   Palliative IDT has assessed this patient for cultural preferences / practices and a referral made as appropriate to needs (Cultural Services, Patient Advocacy, Ethics, etc.)    Any spiritual / [employer]ism concerns:  [] Yes /  [x] No    Caregiver Burnout:  [] Yes /  [x] No /  [] No Caregiver Present      Anticipatory grief assessment:   [x] Normal  / [] Maladaptive

## 2018-07-17 NOTE — PROGRESS NOTES
1930-Verbal bedside report received from Brendan Perry RN. Sbar, mar, labs, kardex and patient status received. 2000-Assessment completed. Patient is sitting up in chair at bedside. Sitter with patient. Alert and orient to self, place and disoriented to situation. 2130-Patient is nodding off in chair. Verbally attempted to get patient up and refused at this time. Said he will \"get up when ready\". 2140-Pt assisted x2 RN back to bed. Resting quietly, placed on Bipap for sleep. 0000-Pt becoming verbally abusive to staff and threatening staff and becoming very agitated. 0020-Gave prn Ativan per PRN orders for agitation. Pt wants to talk to wife. Called wife on [employer] phone to calm patient. 0100-Cooperative at this time and appears relaxed. Able to complete assessment. Lungs clear and diminished. Confused to time and situation, but reorients well, history of dementia. Will continue to monitor.

## 2018-07-17 NOTE — PROGRESS NOTES
[facility]  Pulmonary, Critical Care, and Sleep Medicine    Pulm/CC    Name: Ninfa Abbasi. MRN: [id number]   : 1945 Hospital: [facility] [place]   Date: 2018  Room: [facility]/     Subjective: This patient has been seen and evaluated at the request of Dr. Pippa Gore for patient on BIPAP. Patient is a 74 y. o.AA male with hx of COPD, home o2, chronic hypercapneic resp failure, dementia, CLL, diast CHF, CKD who was recently in [facility] 2 weeks ago for hypercapneic resp failure needing BIPAP at high settings. He was subseqeuntly discharged to rehab. He has been sent to ER on  with altered mentation and elevated pCO2. He is wheezing.   He has been on BIPAP 24/12 pressures in ER.    18  Patient more awake today; no cough or vomiting  ROS - limited due to dementia at baseline  UOP 2.8 lits yesterday    PSG [facility] 18 Dr Lisy Sierra  AHI 34/hr RUPERTO and CSA events; O2 desats  BIPAP titration - snoring and apneas could not be eliminated with 24/20 pressures; central apneas with higher pressures; poorly tolerated overall    Past Medical History:   Diagnosis Date    Chronic kidney disease     stage 3 kidney disease    Chronic obstructive pulmonary disease (HCC)     CLL (chronic lymphocytic leukemia) (HCC)     Delirium     Emphysema lung ([facility] [address]     Fall     Frequent hospital admissions     Gout     Hyperlipidemia     Hypoxemia     Memory loss     Noncompliance     Oxygen dependent     Pneumonia     Pulmonary nodules     Respiratory failure (HCC)     Risk for falls     Septic shock (HCC)     Sleep apnea     Tobacco abuse     Vitamin D deficiency       Past Surgical History:   Procedure Laterality Date    HX CATARACT REMOVAL      HX HERNIA REPAIR      HX NEPHROSTOMY        No Known Allergies   Social History   Substance Use Topics    Smoking status: Former Smoker    Smokeless tobacco: Never Used    Alcohol use No      Comment: former ETOH user; stopped 2017        Current Facility-Administered Medications   Medication Dose Route Frequency    heparin (porcine) injection 5,000 Units  5,000 Units SubCUTAneous Q8H    dextrose 5 % - 0.45% NaCl infusion  30 mL/hr IntraVENous CONTINUOUS    albuterol-ipratropium (DUO-NEB) 2.5 MG-0.5 MG/3 ML  3 mL Nebulization Q4H RT    budesonide (PULMICORT) 500 mcg/2 ml nebulizer suspension  500 mcg Nebulization BID RT    methylPREDNISolone (PF) (SOLU-MEDROL) injection 60 mg  60 mg IntraVENous Q6H    furosemide (LASIX) injection 40 mg  40 mg IntraVENous DAILY    famotidine (PF) (PEPCID) 20 mg in sodium chloride 0.9% 10 mL injection  20 mg IntraVENous DAILY    piperacillin-tazobactam (ZOSYN) 3.375 g in 0.9% sodium chloride (MBP/ADV) 100 mL MBP  3.375 g IntraVENous Q6H    insulin lispro (HUMALOG) injection   SubCUTAneous Q6H    vancomycin (VANCOCIN) 1250 mg in  ml infusion  1,250 mg IntraVENous Q24H    Vancomycin - Pharmacy to Dose  1 Each Other Rx Dosing/Monitoring       Objective:   Vital Signs:    Visit Vitals    /76    Pulse 88    Temp 98.1 °F (36.7 °C)    Resp 17    Wt 91.6 kg (201 lb 15.1 oz)    SpO2 97%    BMI 32.59 kg/m2       O2 Device: Nasal cannula   O2 Flow Rate (L/min): 2 l/min   Temp (24hrs), Av °F (36.7 °C), Min:97.4 °F (36.3 °C), Max:98.2 °F (36.8 °C)       Intake/Output:   Last shift:         Last 3 shifts: 07/15 1901 -  0700  In: 574.5 [I.V.:574.5]  Out: 2900 [Urine:2900]    Intake/Output Summary (Last 24 hours) at 18 1236  Last data filed at 18 0659   Gross per 24 hour   Intake            574.5 ml   Output             2900 ml   Net          -2325.5 ml            Physical Exam:   Comfortable; on nc o2 at 2 lits; acyanotic  HEENT: pupils not dilated, reactive, no scleral jaundice  Neck: No adenopathy or thyroid swelling  CVS: S1S2 no murmurs; JVD not elevated  RS: Mod air entry bilaterally, decreased BS at bases, no wheezes, + bi-basal crackles  Abd: soft, non tender, no hepatosplenomegaly, no abd distension  Neuro: limited exam; somnolent and arousable  Extrm: mild bilateral pitting leg edema   Skin: no rash  Lymphatic: no cervical or supraclavicular adenopathy    Telemetry: sinus rhythm    Lines/Tubes:  PIVs    Data review:     Recent Results (from the past 24 hour(s))   EKG, 12 LEAD, INITIAL    Collection Time: 07/16/18  1:59 PM   Result Value Ref Range    Ventricular Rate 90 BPM    Atrial Rate 90 BPM    P-R Interval 156 ms    QRS Duration 74 ms    Q-T Interval 334 ms    QTC Calculation (Bezet) 408 ms    Calculated P Axis 63 degrees    Calculated R Axis 16 degrees    Calculated T Axis 69 degrees    Diagnosis       Normal sinus rhythm  Possible Left atrial enlargement  Left ventricular hypertrophy  Abnormal ECG  When compared with ECG of 03-JUL-2018 16:19,  No significant change was found     CBC WITH AUTOMATED DIFF    Collection Time: 07/16/18  2:01 PM   Result Value Ref Range    WBC 17.9 (H) 4.6 - 13.2 K/uL    RBC 4.82 4.70 - 5.50 M/uL    HGB 11.7 (L) 13.0 - 16.0 g/dL    HCT 40.1 36.0 - 48.0 %    MCV 83.2 74.0 - 97.0 FL    MCH 24.3 24.0 - 34.0 PG    MCHC 29.2 (L) 31.0 - 37.0 g/dL    RDW 16.7 (H) 11.6 - 14.5 %    PLATELET 102 805 - 167 K/uL    MPV 11.4 9.2 - 11.8 FL    NEUTROPHILS 40 (L) 42 - 75 %    BAND NEUTROPHILS 1 0 - 5 %    LYMPHOCYTES 50 20 - 51 %    MONOCYTES 8 2 - 9 %    EOSINOPHILS 1 0 - 5 %    BASOPHILS 0 0 - 3 %    ABS. NEUTROPHILS 7.2 1.8 - 8.0 K/UL    ABS. LYMPHOCYTES 9.0 (H) 0.8 - 3.5 K/UL    ABS. MONOCYTES 1.4 (H) 0 - 1.0 K/UL    ABS. EOSINOPHILS 0.2 0.0 - 0.4 K/UL    ABS.  BASOPHILS 0.0 0.0 - 0.1 K/UL    PLATELET COMMENTS LARGE PLATELETS      RBC COMMENTS ANISOCYTOSIS  1+        RBC COMMENTS HYPOCHROMIA  SLIGHT        WBC COMMENTS REACTIVE LYMPHS      DF MANUAL     METABOLIC PANEL, COMPREHENSIVE    Collection Time: 07/16/18  2:01 PM   Result Value Ref Range    Sodium 149 (H) 136 - 145 mmol/L    Potassium 4.0 3.5 - 5.5 mmol/L    Chloride 105 100 - 108 mmol/L    CO2 >45 (HH) 21 - 32 mmol/L    Anion gap NEG 1 3.0 - 18 mmol/L    Glucose 113 (H) 74 - 99 mg/dL    BUN 37 (H) 7.0 - 18 MG/DL    Creatinine 1.90 (H) 0.6 - 1.3 MG/DL    BUN/Creatinine ratio 19 12 - 20      GFR est AA 42 (L) >60 ml/min/1.73m2    GFR est non-AA 35 (L) >60 ml/min/1.73m2    Calcium 8.8 8.5 - 10.1 MG/DL    Bilirubin, total 0.2 0.2 - 1.0 MG/DL    ALT (SGPT) 35 16 - 61 U/L    AST (SGOT) 22 15 - 37 U/L    Alk. phosphatase 74 45 - 117 U/L    Protein, total 7.0 6.4 - 8.2 g/dL    Albumin 3.2 (L) 3.4 - 5.0 g/dL    Globulin 3.8 2.0 - 4.0 g/dL    A-G Ratio 0.8 0.8 - 1.7     MAGNESIUM    Collection Time: 07/16/18  2:01 PM   Result Value Ref Range    Magnesium 2.4 1.6 - 2.6 mg/dL   CARDIAC PANEL,(CK, CKMB & TROPONIN)    Collection Time: 07/16/18  2:01 PM   Result Value Ref Range     39 - 308 U/L    CK - MB 2.3 <3.6 ng/ml    CK-MB Index 2.0 0.0 - 4.0 %    Troponin-I, Qt. <0.02 0.00 - 0.06 NG/ML   HEMOGLOBIN A1C WITH EAG    Collection Time: 07/16/18  2:01 PM   Result Value Ref Range    Hemoglobin A1c 5.9 (H) 4.5 - 5.6 %    Est. average glucose 123 mg/dL   POC G3    Collection Time: 07/16/18  2:36 PM   Result Value Ref Range    Device: NASAL CANNULA      Flow rate (POC) 2.0 L/M    FIO2 (POC) 24 %    pH (POC) 7.279 (L) 7.35 - 7.45      pCO2 (POC) 94.9 (H) 35.0 - 45.0 MMHG    pO2 (POC) 82 80 - 100 MMHG    HCO3 (POC) 44.5 (H) 22 - 26 MMOL/L    sO2 (POC) 93 92 - 97 %    Base excess (POC) 18 mmol/L    Allens test (POC) YES      Total resp.  rate 31      Site LEFT RADIAL      Specimen type (POC) ARTERIAL      Performed by Segundo Deal    POC G3    Collection Time: 07/16/18  4:26 PM   Result Value Ref Range    Device: BIPAP      FIO2 (POC) 24 %    pH (POC) 7.308 (L) 7.35 - 7.45      pCO2 (POC) 81.6 (H) 35.0 - 45.0 MMHG    pO2 (POC) 69 (L) 80 - 100 MMHG    HCO3 (POC) 41.0 (H) 22 - 26 MMOL/L    sO2 (POC) 90 (L) 92 - 97 %    Base excess (POC) 15 mmol/L    Allens test (POC) YES      Site RIGHT RADIAL Specimen type (POC) ARTERIAL      Performed by Michelle Ramirez W/ RFLX MICROSCOPIC    Collection Time: 07/16/18  6:00 PM   Result Value Ref Range    Color YELLOW      Appearance CLEAR      Specific gravity 1.024 1.005 - 1.030      pH (UA) 5.0 5.0 - 8.0      Protein 300 (A) NEG mg/dL    Glucose NEGATIVE  NEG mg/dL    Ketone NEGATIVE  NEG mg/dL    Bilirubin NEGATIVE  NEG      Blood NEGATIVE  NEG      Urobilinogen 0.2 0.2 - 1.0 EU/dL    Nitrites NEGATIVE  NEG      Leukocyte Esterase NEGATIVE  NEG     URINE MICROSCOPIC ONLY    Collection Time: 07/16/18  6:00 PM   Result Value Ref Range    WBC 0 to 3 0 - 5 /hpf    RBC 0 to 3 0 - 5 /hpf    Epithelial cells FEW 0 - 5 /lpf    Bacteria 1+ (A) NEG /hpf   GLUCOSE, POC    Collection Time: 07/16/18  6:31 PM   Result Value Ref Range    Glucose (POC) 125 (H) 70 - 110 mg/dL   GLUCOSE, POC    Collection Time: 07/17/18 12:48 AM   Result Value Ref Range    Glucose (POC) 141 (H) 70 - 094 mg/dL   METABOLIC PANEL, BASIC    Collection Time: 07/17/18  4:38 AM   Result Value Ref Range    Sodium 148 (H) 136 - 145 mmol/L    Potassium 4.4 3.5 - 5.5 mmol/L    Chloride 106 100 - 108 mmol/L    CO2 40 (H) 21 - 32 mmol/L    Anion gap 2 (L) 3.0 - 18 mmol/L    Glucose 149 (H) 74 - 99 mg/dL    BUN 29 (H) 7.0 - 18 MG/DL    Creatinine 1.51 (H) 0.6 - 1.3 MG/DL    BUN/Creatinine ratio 19 12 - 20      GFR est AA 55 (L) >60 ml/min/1.73m2    GFR est non-AA 45 (L) >60 ml/min/1.73m2    Calcium 7.9 (L) 8.5 - 10.1 MG/DL   MAGNESIUM    Collection Time: 07/17/18  4:38 AM   Result Value Ref Range    Magnesium 1.9 1.6 - 2.6 mg/dL   CBC WITH AUTOMATED DIFF    Collection Time: 07/17/18  4:38 AM   Result Value Ref Range    WBC 17.8 (H) 4.6 - 13.2 K/uL    RBC 4.51 (L) 4.70 - 5.50 M/uL    HGB 10.8 (L) 13.0 - 16.0 g/dL    HCT 37.8 36.0 - 48.0 %    MCV 83.8 74.0 - 97.0 FL    MCH 23.9 (L) 24.0 - 34.0 PG    MCHC 28.6 (L) 31.0 - 37.0 g/dL    RDW 16.6 (H) 11.6 - 14.5 %    PLATELET 889 (L) 300 - 420 K/uL    MPV 11.6 9.2 - 11.8 FL    NEUTROPHILS 71 40 - 73 %    LYMPHOCYTES 24 21 - 52 %    MONOCYTES 5 3 - 10 %    EOSINOPHILS 0 0 - 5 %    BASOPHILS 0 0 - 2 %    ABS. NEUTROPHILS 12.6 (H) 1.8 - 8.0 K/UL    ABS. LYMPHOCYTES 4.3 (H) 0.9 - 3.6 K/UL    ABS. MONOCYTES 0.8 0.05 - 1.2 K/UL    ABS. EOSINOPHILS 0.0 0.0 - 0.4 K/UL    ABS.  BASOPHILS 0.0 0.0 - 0.1 K/UL    DF AUTOMATED     HEMOGLOBIN A1C WITH EAG    Collection Time: 07/17/18  4:38 AM   Result Value Ref Range    Hemoglobin A1c 5.9 (H) 4.5 - 5.6 %    Est. average glucose 123 mg/dL   POC G3    Collection Time: 07/17/18  5:52 AM   Result Value Ref Range    Device: BIPAP      FIO2 (POC) 28 %    pH (POC) 7.293 (L) 7.35 - 7.45      pCO2 (POC) 85.3 (H) 35.0 - 45.0 MMHG    pO2 (POC) 83 80 - 100 MMHG    HCO3 (POC) 41.4 (H) 22 - 26 MMOL/L    sO2 (POC) 94 92 - 97 %    Base excess (POC) 15 mmol/L    PEEP/CPAP (POC) 12 cmH2O    PIP (POC) 24      Allens test (POC) YES      Site RIGHT RADIAL      Patient temp. 98.1      Specimen type (POC) ARTERIAL      Performed by Lydia Crespo     Spontaneous timed YES     GLUCOSE, POC    Collection Time: 07/17/18  6:04 AM   Result Value Ref Range    Glucose (POC) 144 (H) 70 - 110 mg/dL   GLUCOSE, POC    Collection Time: 07/17/18 12:26 PM   Result Value Ref Range    Glucose (POC) 123 (H) 70 - 110 mg/dL           Recent Labs      07/17/18   0552  07/16/18   1626  07/16/18   1436   FIO2I  28  24  24   HCO3I  41.4*  41.0*  44.5*   PCO2I  85.3*  81.6*  94.9*   PHI  7.293*  7.308*  7.279*   PO2I  83  69*  82       All Micro Results     Procedure Component Value Units Date/Time    CULTURE, BLOOD [[id number]] Collected:  07/16/18 0438    Order Status:  Completed Specimen:  Blood from Blood Updated:  07/17/18 0850     Special Requests: NO SPECIAL REQUESTS        Culture result: NO GROWTH AFTER 3 HOURS       CULTURE, URINE [[id number]]     Order Status:  Sent Specimen:  Urine from [place] Specimen     CULTURE, BLOOD [[id number]]     Order Status:  Sent Specimen:  Blood from Blood           Imaging:  [x]I have personally reviewed the patients chest radiographs images and report   CXR 7/17    Results from Hospital Encounter encounter on 07/16/18   XR CHEST PORT   Narrative CHEST AP PORTABLE    Indication: Shortness of breath, COPD. Comparison: 07/16/2018. Findings: The lungs appear clear with near complete resolution of previously  noted left basilar streaky opacities. No evidence for pneumothorax or pleural  effusion. Cardiac silhouette and pulmonary vascularity appear within normal  limits. Impression IMPRESSION:    No acute cardiopulmonary disease. Results from [place] encounter on 07/16/18   CT HEAD WO CONT   Narrative EXAM:  CT of the brain without intravenous contrast.    COMPARISON: CT July 3, 2018. REASON FOR EXAM: \"Decreased alertness; facial droop and ams\". DOSE REDUCTION:  One or more dose reduction techniques were used on this CT:  automated exposure control, adjustment of the mAs and/or kVp according to  patient's size, and iterative reconstruction techniques. The specific techniques  utilized on this CT exam have been documented in the patient's electronic  medical record.    _______________    FINDINGS:         IMAGE QUALITY:  The images are mildly degraded by motion artifact. BRAIN AND EXTRA-AXIAL SPACE:               SUBACUTE TERRITORIAL TRANSCORTICAL INFARCTION:  None detected. MASS:  None detected. HEMORRHAGE:  None. SUBDURAL FLUID COLLECTION:  None detected. HYDROCEPHALUS:  None. REMOTE CORTICAL INFARCTION:  None detected. REMOTE CEREBELLAR INFARCTION:  None detected. MISCELLANEOUS:  Non-applicable. STRIVE (STandards for Reporting Vascular changes on nEuroimaging):                     --[place] (age-indeterminate) or perivascular spaces of  \"presumed vascular origin\":  None definite.                      --[place] of white matter hypodensity of \"presumed vascular  origin\":  Low grade. --Degree of brain atrophy (subjective): Low grade. BURDEN OF CALCIFIC INTRACRANIAL ATHEROSCLEROSIS:  None significant. HEENT:             INCLUDED UPPER ORBITS:  The lenses are replaced bilaterally. INCLUDED UPPER PARANASAL SINUSES:  Predominantly clear. MASTOID AIR CELLS:  Predominantly clear. BONES:  Unremarkable. SCALP:  Unremarkable.    _______________         Impression IMPRESSION:    Low-grade generalized chronic changes, however, no acute findings. _______________                  Note: Dr. Tyesha Barbosa discussed the stroke code results with Dr. Manolo Stearns at [phone] on  7/16/2018. IMPRESSION:   · Acute metabolic encephalopathy from CO2 narcosis   · Dementia - severity not known, but advanced per prior notes  · COPD with home O2-FEV1 of 1.23 or 55% predicted but normal ratio follows with Dr. Renee Almodovar - on Spiriva and advair at home- Recent 6 min walk in may suggested requires home oxygen 2-4 liter  · Acute on chronic hypoxemic and hypercapneic respiratory failure  · Acute on chronic diast CHF  · CKD stage 3 with ARF  · HTN  · RUPERTO unknown severity on BIPAP21/17 non compliant-- not able to tolerate but best response noted on BIPAP 24/12 in recent hospitalization  · Hx of ETOH abuse (now residing in rehab facility)  · Chronic Leucocytosis with hx of CLL      RECOMMENDATIONS:   · Pulm: BIPAP as needed - 24/12 pressures; ABG shows resp acidosis; patient poorly compliant with bipap due to dementia; wean for O2 sats >92%;  Duonebs q 4hrly; Budesonide nebs bid; IV solumedrol weaned to 40 mg q6  · Lasix 40 mg daily; stable renal fn; watch IOs  · Ab - CXR clear - likely had left basal atelectasis; leucocytosis due to CLL; blood cx neg so far; UA neg; Zithromax x 5 days for possible bacterial bronchitis; stopped zosyn and iv Vanc   · D5W 40/hr for mild hypernatremia  · Prophylaxis  -DVT Px: heparin  -GI Px: pepcid  · Full code status  · D/w family-updated wife at bedside; she says she does not live with patient for 16 years but still  legally; reviewed patient having end stage lung disease with hypercapnea, advanced dementia, complex sleep apnea, hx of CLL - discussed management; advised against aggressive measures such as CPR or vent support/breathing tube - this will cause pain, suffering, poor QOL, vent dependency with need for trach and peg etc.  He is also non-compliant due to dementia and hence likelihood of cardio-resp complications. Wife wishes full code. Will defer respective systems problem management to primary and other consultant and follow patient in ICU with primary and other medical team  Further recommendations will be based on the patient's response to recommended treatment and results of the investigation ordered. Quality Care: PPI, DVT prophylaxis, HOB elevated, Infection control all reviewed and addressed.   PAIN AND SEDATION: none   · Skin/Wound: no active issues  · Nutrition: NPO while on BIPAP  · Prophylaxis: DVT and GI Prophylaxis reviewed  · Restraints: none  · PT/OT eval and treat: as needed  · Lines/Tubes: PIVs  ADVANCE DIRECTIVE: Full Code; ok to transfer to tele - patient stable off BIPAP and also not compliant due to dementia  Consulted Palliative care medicine      High complexity decision making was performed in this consultation and evaluation of this patient who is at high risk for decompensation with multiple organ involvement  Total critical care time spent rendering care exclusive of procedures: 38 minutes       Lencho Luna MD

## 2018-07-17 NOTE — PROGRESS NOTES
Vancomycin discontinued by Dr. Karo Vega  7/17/18. Pharmacy Dosing consult has been canceled. ________________________________________________    Pharmacy Dosing Services: Vancomycin    Consult for Vancomycin Dosing by Pharmacy by Dr. Vero Clark provided for this 74y.o. year old male , for indication of pneumonia (HAP). Day of Therapy 2 of 10   Scr = 1.51  (improved from 1.9 on 7/16/18)  CrCl = 45.5 ml/min      Due to improvement in Scr, maintenance dose adjusted to:  Vancomycin 1500 mg IV every 24 hours, scheduled for 7/17/18 at 20:00. Dose calculated to approximate a therapeutic trough of 15 -20 mcg/mL. Ht Readings from Last 1 Encounters:   07/05/18 167.6 cm (66\")        Wt Readings from Last 1 Encounters:   07/16/18 91.6 kg (201 lb 15.1 oz)        Previous Regimen Vancomycin 1250 mg IV q24h   Other Current Antibiotics Zosyn   Significant Cultures pending   Serum Creatinine Lab Results   Component Value Date/Time    Creatinine 1.51 (H) 07/17/2018 04:38 AM      Creatinine Clearance Estimated Creatinine Clearance: 45.5 mL/min (based on Cr of 1.51). BUN Lab Results   Component Value Date/Time    BUN 29 (H) 07/17/2018 04:38 AM      WBC Lab Results   Component Value Date/Time    WBC 17.8 (H) 07/17/2018 04:38 AM      H/H Lab Results   Component Value Date/Time    HGB 10.8 (L) 07/17/2018 04:38 AM      Platelets Lab Results   Component Value Date/Time    PLATELET 933 (L) 47/87/0651 04:38 AM      Temp 98.1 °F (36.7 °C)     Pharmacy to follow daily and will make changes to dose and/or frequency based on clinical status.   Pharmacist Joanne Gaucher, [facility]

## 2018-07-17 NOTE — PROGRESS NOTES
0730 - Bedside and Verbal shift change report given to Ann-Marie Limon RN (oncoming nurse) by Sunny King RN (offgoing nurse). Report included the following information SBAR, Kardex, ED Summary, Intake/Output, Accordion, Recent Results and Cardiac Rhythm NSR.   0800 - Pt assessed and vitals obtained. Pt in no acute distress, answers questions minimally. States name and in hospital. Denies pain. Lungs diminished, vitals stable at this time. Pt on Bipap. Resting comfortably. Will continue to monitor, see EMR for full detials. 1045 - RT at bedside, Bipap removed. 1200 - Pt assessed and vitals obtained. No acute distress. 2LNC. Wife at the bedside. Bipap placed on pt for napping/resting. Will continue to monitor. See EMR for full details. 1315 - Pt removed BIPAP aggressively and refuses to wear NC at this time. 1400 - Pt aggressive and yelling at RN and MD. Pt gets out of bed, wobbling, removed IV in hand. Shouts \"YOU ARE A NAZI. I'M NOT IN THE HOSPITAL\" at RN. Pt returned to bed and sitter at bedside. 1410 - Pt continues to be aggressive and swings at staff. Given ativan per orders. Sitter at bedside. 1430 - Pt resting comfortably in bed, 2L NC applied. 1500 - RT at the bedside, places pt back on Bipap. Glassy eyes and minimally responsive at this time. 1600 - Pt assessed and vitals obtained. Pt sleeping, responds to loud stimuli and follows commands. Short angry answers given. Will continue to monitor, see EMR for full details. 1800 - Pt continues to rest comfortably and without aggression towards staff. Moves all extremities well. 1935 - Bedside and Verbal shift change report given to Hammad Tolliver RN (oncoming nurse) by Ann-Marie Limon RN (offgoing nurse).  Report included the following information SBAR, Kardex, Procedure Summary, Intake/Output, MAR, Recent Results, Med Rec Status and Cardiac Rhythm SR.

## 2018-07-18 ENCOUNTER — APPOINTMENT (OUTPATIENT)
Dept: GENERAL RADIOLOGY | Age: 73
DRG: 189 | End: 2018-07-18
Attending: INTERNAL MEDICINE
Payer: MEDICARE

## 2018-07-18 LAB
ANION GAP SERPL CALC-SCNC: 0 MMOL/L (ref 3–18)
BASOPHILS # BLD: 0.1 K/UL (ref 0–0.1)
BASOPHILS NFR BLD: 0 % (ref 0–2)
BUN SERPL-MCNC: 29 MG/DL (ref 7–18)
BUN/CREAT SERPL: 18 (ref 12–20)
CALCIUM SERPL-MCNC: 8.9 MG/DL (ref 8.5–10.1)
CHLORIDE SERPL-SCNC: 105 MMOL/L (ref 100–108)
CO2 SERPL-SCNC: 43 MMOL/L (ref 21–32)
CREAT SERPL-MCNC: 1.6 MG/DL (ref 0.6–1.3)
DIFFERENTIAL METHOD BLD: ABNORMAL
EOSINOPHIL # BLD: 0 K/UL (ref 0–0.4)
EOSINOPHIL NFR BLD: 0 % (ref 0–5)
ERYTHROCYTE [DISTWIDTH] IN BLOOD BY AUTOMATED COUNT: 16.9 % (ref 11.6–14.5)
GLUCOSE BLD STRIP.AUTO-MCNC: 114 MG/DL (ref 70–110)
GLUCOSE BLD STRIP.AUTO-MCNC: 114 MG/DL (ref 70–110)
GLUCOSE BLD STRIP.AUTO-MCNC: 116 MG/DL (ref 70–110)
GLUCOSE BLD STRIP.AUTO-MCNC: 133 MG/DL (ref 70–110)
GLUCOSE SERPL-MCNC: 133 MG/DL (ref 74–99)
HCT VFR BLD AUTO: 39.8 % (ref 36–48)
HGB BLD-MCNC: 11.4 G/DL (ref 13–16)
LYMPHOCYTES # BLD: 4.6 K/UL (ref 0.9–3.6)
LYMPHOCYTES NFR BLD: 26 % (ref 21–52)
MAGNESIUM SERPL-MCNC: 2.1 MG/DL (ref 1.6–2.6)
MCH RBC QN AUTO: 23.9 PG (ref 24–34)
MCHC RBC AUTO-ENTMCNC: 28.6 G/DL (ref 31–37)
MCV RBC AUTO: 83.6 FL (ref 74–97)
MONOCYTES # BLD: 1 K/UL (ref 0.05–1.2)
MONOCYTES NFR BLD: 6 % (ref 3–10)
NEUTS SEG # BLD: 12.4 K/UL (ref 1.8–8)
NEUTS SEG NFR BLD: 68 % (ref 40–73)
PLATELET # BLD AUTO: 131 K/UL (ref 135–420)
PMV BLD AUTO: 11.8 FL (ref 9.2–11.8)
POTASSIUM SERPL-SCNC: 4.5 MMOL/L (ref 3.5–5.5)
RBC # BLD AUTO: 4.76 M/UL (ref 4.7–5.5)
SODIUM SERPL-SCNC: 148 MMOL/L (ref 136–145)
WBC # BLD AUTO: 18.1 K/UL (ref 4.6–13.2)

## 2018-07-18 PROCEDURE — 65610000006 HC RM INTENSIVE CARE

## 2018-07-18 PROCEDURE — 94640 AIRWAY INHALATION TREATMENT: CPT

## 2018-07-18 PROCEDURE — 74011000250 HC RX REV CODE- 250: Performed by: INTERNAL MEDICINE

## 2018-07-18 PROCEDURE — 74011250636 HC RX REV CODE- 250/636: Performed by: HOSPITALIST

## 2018-07-18 PROCEDURE — 77010033678 HC OXYGEN DAILY

## 2018-07-18 PROCEDURE — 74011250636 HC RX REV CODE- 250/636: Performed by: INTERNAL MEDICINE

## 2018-07-18 PROCEDURE — 71045 X-RAY EXAM CHEST 1 VIEW: CPT

## 2018-07-18 PROCEDURE — 82962 GLUCOSE BLOOD TEST: CPT

## 2018-07-18 PROCEDURE — 85025 COMPLETE CBC W/AUTO DIFF WBC: CPT | Performed by: HOSPITALIST

## 2018-07-18 PROCEDURE — 94660 CPAP INITIATION&MGMT: CPT

## 2018-07-18 PROCEDURE — 74011250637 HC RX REV CODE- 250/637: Performed by: INTERNAL MEDICINE

## 2018-07-18 PROCEDURE — 80048 BASIC METABOLIC PNL TOTAL CA: CPT | Performed by: HOSPITALIST

## 2018-07-18 PROCEDURE — 83735 ASSAY OF MAGNESIUM: CPT | Performed by: HOSPITALIST

## 2018-07-18 PROCEDURE — 36415 COLL VENOUS BLD VENIPUNCTURE: CPT | Performed by: HOSPITALIST

## 2018-07-18 RX ORDER — FAMOTIDINE 20 MG/1
20 TABLET, FILM COATED ORAL DAILY
Status: DISCONTINUED | OUTPATIENT
Start: 2018-07-19 | End: 2018-07-25 | Stop reason: HOSPADM

## 2018-07-18 RX ADMIN — FUROSEMIDE 40 MG: 10 INJECTION, SOLUTION INTRAMUSCULAR; INTRAVENOUS at 08:55

## 2018-07-18 RX ADMIN — IPRATROPIUM BROMIDE AND ALBUTEROL SULFATE 3 ML: .5; 3 SOLUTION RESPIRATORY (INHALATION) at 20:40

## 2018-07-18 RX ADMIN — ALPRAZOLAM 0.5 MG: 0.5 TABLET ORAL at 08:25

## 2018-07-18 RX ADMIN — IPRATROPIUM BROMIDE AND ALBUTEROL SULFATE 3 ML: .5; 3 SOLUTION RESPIRATORY (INHALATION) at 07:40

## 2018-07-18 RX ADMIN — FAMOTIDINE 20 MG: 10 INJECTION INTRAVENOUS at 08:56

## 2018-07-18 RX ADMIN — LORAZEPAM 0.5 MG: 2 INJECTION INTRAMUSCULAR; INTRAVENOUS at 00:43

## 2018-07-18 RX ADMIN — HEPARIN SODIUM 5000 UNITS: 5000 INJECTION, SOLUTION INTRAVENOUS; SUBCUTANEOUS at 16:15

## 2018-07-18 RX ADMIN — LORAZEPAM 0.5 MG: 2 INJECTION INTRAMUSCULAR; INTRAVENOUS at 07:44

## 2018-07-18 RX ADMIN — METHYLPREDNISOLONE SODIUM SUCCINATE 30 MG: 40 INJECTION, POWDER, FOR SOLUTION INTRAMUSCULAR; INTRAVENOUS at 14:02

## 2018-07-18 RX ADMIN — DEXTROSE MONOHYDRATE 40 ML/HR: 5 INJECTION, SOLUTION INTRAVENOUS at 18:43

## 2018-07-18 RX ADMIN — DEXTROSE MONOHYDRATE 40 ML/HR: 5 INJECTION, SOLUTION INTRAVENOUS at 05:10

## 2018-07-18 RX ADMIN — BUDESONIDE 500 MCG: 0.5 INHALANT RESPIRATORY (INHALATION) at 07:40

## 2018-07-18 RX ADMIN — HALOPERIDOL LACTATE 1 MG: 5 INJECTION, SOLUTION INTRAMUSCULAR at 08:26

## 2018-07-18 RX ADMIN — METHYLPREDNISOLONE SODIUM SUCCINATE 40 MG: 40 INJECTION, POWDER, FOR SOLUTION INTRAMUSCULAR; INTRAVENOUS at 01:45

## 2018-07-18 RX ADMIN — METHYLPREDNISOLONE SODIUM SUCCINATE 30 MG: 40 INJECTION, POWDER, FOR SOLUTION INTRAMUSCULAR; INTRAVENOUS at 21:20

## 2018-07-18 RX ADMIN — METHYLPREDNISOLONE SODIUM SUCCINATE 40 MG: 40 INJECTION, POWDER, FOR SOLUTION INTRAMUSCULAR; INTRAVENOUS at 08:55

## 2018-07-18 RX ADMIN — HEPARIN SODIUM 5000 UNITS: 5000 INJECTION, SOLUTION INTRAVENOUS; SUBCUTANEOUS at 01:45

## 2018-07-18 RX ADMIN — IPRATROPIUM BROMIDE AND ALBUTEROL SULFATE 3 ML: .5; 3 SOLUTION RESPIRATORY (INHALATION) at 04:00

## 2018-07-18 RX ADMIN — IPRATROPIUM BROMIDE AND ALBUTEROL SULFATE 3 ML: .5; 3 SOLUTION RESPIRATORY (INHALATION) at 00:00

## 2018-07-18 RX ADMIN — IPRATROPIUM BROMIDE AND ALBUTEROL SULFATE 3 ML: .5; 3 SOLUTION RESPIRATORY (INHALATION) at 15:50

## 2018-07-18 RX ADMIN — HEPARIN SODIUM 5000 UNITS: 5000 INJECTION, SOLUTION INTRAVENOUS; SUBCUTANEOUS at 08:55

## 2018-07-18 RX ADMIN — IPRATROPIUM BROMIDE AND ALBUTEROL SULFATE 3 ML: .5; 3 SOLUTION RESPIRATORY (INHALATION) at 11:00

## 2018-07-18 RX ADMIN — AZITHROMYCIN 500 MG: 250 TABLET, FILM COATED ORAL at 08:55

## 2018-07-18 RX ADMIN — ALPRAZOLAM 0.5 MG: 0.5 TABLET ORAL at 16:13

## 2018-07-18 RX ADMIN — HALOPERIDOL LACTATE 1 MG: 5 INJECTION, SOLUTION INTRAMUSCULAR at 22:15

## 2018-07-18 RX ADMIN — BUDESONIDE 500 MCG: 0.5 INHALANT RESPIRATORY (INHALATION) at 20:40

## 2018-07-18 NOTE — PROGRESS NOTES
Problem: Pressure Injury - Risk of  Goal: *Prevention of pressure injury  Document Percy Scale and appropriate interventions in the flowsheet. Outcome: Progressing Towards Goal  Pressure Injury Interventions:  Sensory Interventions: Assess changes in LOC    Moisture Interventions: Apply protective barrier, creams and emollients, Absorbent underpads    Activity Interventions: Pressure redistribution bed/mattress(bed type)    Mobility Interventions: HOB 30 degrees or less    Nutrition Interventions: Document food/fluid/supplement intake    Friction and Shear Interventions: HOB 30 degrees or less               Problem: Falls - Risk of  Goal: *Absence of Falls  Document Stan Fall Risk and appropriate interventions in the flowsheet.    Outcome: Progressing Towards Goal  Fall Risk Interventions:       Mentation Interventions: Adequate sleep, hydration, pain control    Medication Interventions: Bed/chair exit alarm    Elimination Interventions: Call light in reach

## 2018-07-18 NOTE — PROGRESS NOTES
Problem: Pressure Injury - Risk of  Goal: *Prevention of pressure injury  Document Percy Scale and appropriate interventions in the flowsheet.    Outcome: Progressing Towards Goal  Pressure Injury Interventions:  Sensory Interventions: Assess changes in LOC, Keep linens dry and wrinkle-free    Moisture Interventions: Apply protective barrier, creams and emollients    Activity Interventions: Pressure redistribution bed/mattress(bed type)    Mobility Interventions: HOB 30 degrees or less    Nutrition Interventions: Document food/fluid/supplement intake    Friction and Shear Interventions: HOB 30 degrees or less

## 2018-07-18 NOTE — PROGRESS NOTES
Hospitalist Progress Note-critical care note Patient: Ninfa Fonseca MRN: [id number]  CSN: [id number] YOB: 1945  Age: 74 y.o. Sex: male DOA: 7/16/2018 LOS:  LOS: 2 days Chief complaint: acute on chronic respiratory failure ,acute encephalopathy, hypernatremia , co2 narcosis Assessment/Plan Hospital Problems  Never Reviewed Codes Class Noted POA Altered mental status ICD-10-CM: R41.82 
ICD-9-CM: 780.97  7/16/2018 Unknown Respiratory distress ICD-10-CM: R06.03 
ICD-9-CM: 786.09  7/16/2018 Unknown Hypoxia ICD-10-CM: R09.02 
ICD-9-CM: 799.02  7/16/2018 Unknown CO2 narcosis ICD-10-CM: R06.89 
ICD-9-CM: 786.09  7/16/2018 Unknown Hypernatremia ICD-10-CM: E87.0 ICD-9-CM: 276.0  7/16/2018 Unknown COPD (chronic obstructive pulmonary disease) (HCC) ICD-10-CM: J44.9 ICD-9-CM: 474  7/12/2018 Yes RUPERTO (obstructive sleep apnea) ICD-10-CM: G47.33 
ICD-9-CM: 327.23  7/12/2018 Yes Dementia ICD-10-CM: F03.90 ICD-9-CM: 294.20  7/12/2018 Yes HTN (hypertension) ICD-10-CM: I10 
ICD-9-CM: 401.9  7/12/2018 Yes * (Principal)Acute on chronic respiratory failure with hypoxia and hypercapnia (HCC) ICD-10-CM: J96.21, J96.22 
ICD-9-CM: 518.84, 786.09, 799.02  7/3/2018 Yes  
   
 CLL (chronic lymphocytic leukemia) (HCC) ICD-10-CM: C91.90 ICD-9-CM: 204.10  7/3/2018 Yes Acute encephalopathy ICD-10-CM: G93.40 ICD-9-CM: 348.30  7/3/2018 Yes Acute on chronic respiratory failure with hypercapnia and hypoxia Was put on bipap AM due to CO2 retained Continu, breath tx   
 will give low dose iv steroid, 
 
 emphysema lung 
 breathing tx and supportive treatment  
   
  
Acute encephalopathy due to hypercapnia -co 2  Narcosis plus demetia   
Confused AM due to co2 narcosis Ct head no acute issue Ativan and haloidal prn   
  
 
Htn Lasix  
   
cll , leukocytosis -remained at the baseline   
  
  
ckd 3 Back to his base line Hypernatremia Mild improving continue d5 0.45 ns Palliative care on board Need family meeting for  Goal care. Subjective: agitated last night. Review of systems: 
 
Unable to obtain due to on bipap Vital signs/Intake and Output: 
Visit Vitals  /83  Pulse 82  Temp 98.1 °F (36.7 °C)  Resp (!) 31  
 Wt 91.6 kg (201 lb 15.1 oz)  SpO2 100%  BMI 32.59 kg/m2 Current Shift:    
Last three shifts:  07/16 1901 - 07/18 0700 In: 574.5 [I.V.:574.5] Out: 5308 [WPGUV:1091] Physical Exam: 
General: On bipap. no acute distress   
HEENT: NC, Atraumatic. PERRLA, anicteric sclerae. bipap mask noted Lungs: Increased expiration bs Heart:  Regular  rhythm,  + murmur, No Rubs, No Gallops Abdomen: Soft, Non distended, Non tender.  +Bowel sounds, Extremities: No c/c/e Psych:   Not anxious or agitated. Neurologic:  No obtain due to on bipap, Labs: Results:  
   
Chemistry Recent Labs  
   07/18/18 0457 07/17/18 0438 07/16/18 
 1401 GLU  133*  149*  113* NA  148*  148*  149*  
K  4.5  4.4  4.0  
CL  105  106  105 CO2  43*  40*  >45* BUN  29*  29*  37* CREA  1.60*  1.51*  1.90* CA  8.9  7.9*  8.8 AGAP  0*  2*  NEG 1  
BUCR  18  19  19 AP   --    --   74  
TP   --    --   7.0 ALB   --    --   3.2*  
GLOB   --    --   3.8 AGRAT   --    --   0.8 CBC w/Diff Recent Labs  
   07/18/18 0457 07/17/18 
 0438  07/16/18 
 1401 WBC  18.1*  17.8*  17.9*  
RBC  4.76  4.51*  4.82  
HGB  11.4*  10.8*  11.7* HCT  39.8  37.8  40.1 PLT  131*  129*  143 GRANS  68  71  40* LYMPH  26  24  50 EOS  0  0  1 Cardiac Enzymes Recent Labs  
   07/16/18 
 1401 CPK  114 CKND1  2.0 Coagulation No results for input(s): PTP, INR, APTT in the last 72 hours. No lab exists for component: INREXT, INREXT Lipid Panel No results found for: CHOL, CHOLPOCT, CHOLX, CHLST, CHOLV, 264564, HDL, LDL, LDLC, DLDLP, 250855, VLDLC, VLDL, TGLX, TRIGL, TRIGP, TGLPOCT, CHHD, CHHDX  
BNP No results for input(s): BNPP in the last 72 hours. Liver Enzymes Recent Labs  
   07/16/18 
 1401 TP  7.0 ALB  3.2* AP  74 SGOT  22 Thyroid Studies No results found for: T4, T3U, TSH, TSHEXT, TSHEXT Procedures/imaging: see electronic medical records for all procedures/Xrays and details which were not copied into this note but were reviewed prior to creation of Plan Efren Galindo MD

## 2018-07-18 NOTE — PROGRESS NOTES
Chart reviewed. Spoke with MD JUDD-AMG [facility] and he states he is not recommending triology at discharge, but a Bipap. Pt previously went to [facility] and they rented a Bipap for him. Per MD JUDD-AMG [facility] pts pulmonologist outside of the hospital is possibly facilitating getting the patient a triology. Spoke with pts wife and she would like pt return to rehab with a possible transition into LTC. FOC offered and pts wife chose [facility], [address] or any facility that could accommodate DME needs. The potential need for a triology could make placement difficult. Referrals will be placed to area SNFs and CM will cont to follow. Name Relation Home Work [place]  [phone]       Martha Cuello [phone] with Nitin Pineda @ [phone] . [facility] and she will check to see if they can accommodate patient's needs. [address] with Indio Marino from [employer]. She will call family to discuss hospice options.

## 2018-07-18 NOTE — PROGRESS NOTES
0700  Bedside shift change report received from Juan Manuel Christensen RN. Report included the following information SBAR, Kardex, Procedure Summary, Intake/Output, MAR, Recent Results, Cardiac Rhythm ST and Alarm Parameters .      0800 Assessment complete    0826  Pt. Removed gown, standing at side of bed pulling IV and Shanks, returned patient to bed and administered Haldol and Xanax     0831 Patient becoming unresponsive RASS -2, Bipap restarted. 1200 Reassessment complete    1545  Bipap removed from patien, on 2L Nasal canulla. Patient calm, wife at bedside. 1600 Reassessment complete    1613 Pt. Agitated and disoriented to place, time and situation. Demands that a cab is called so he can go home. Patient removed gown and attempted to get out of bed, administered Xanax. 1700 NC at 1 l/min    1820 Shanks removed    1830  Patient resting in bed watching TV. Alert to self, situation and location, no longer attempting to leave bed. Patient is no longer agitated, no sitter is required at this time. 1930   Bedside shift change report given to Juan Manuel Christensen RN. Report included the following information SBAR, Kardex, Procedure Summary, Intake/Output, MAR, Recent Results, Cardiac Rhythm ST and Alarm Parameters .

## 2018-07-18 NOTE — PROGRESS NOTES
Bedside and Verbal shift change report received from Rosalie Arango RN (offgoing nurse). Report included the following information SBAR, Kardex, Procedure Summary, Intake/Output, MAR and Recent Results. 2000 Shift assessment completed, see EMR.     0045 Reassessment completed, see EMR. Patient combative kicking and swing fist. Patient broke Bipap mask and refused to put it back on. Ativan given as ordered for agitation. The documentation for this period 5005-2290 is being entered following the guidelines as defined in the [facility] by Sarath Dozier RN     0125 BiPAP mask back on patient at this time. 0430 Reassessment completed, see EMR.    0250 Dr. Felipe Gotti notified of patient's critical CO2 level of 43. No new orders given at this time. Bedside and Verbal shift change report given to CHEMA Sotelo RN (oncoming nurse)  Report included the following information SBAR, Kardex, Intake/Output, MAR and Recent Results.

## 2018-07-19 LAB
ANION GAP SERPL CALC-SCNC: ABNORMAL MMOL/L (ref 3–18)
BACTERIA SPEC CULT: ABNORMAL
BASOPHILS # BLD: 0 K/UL (ref 0–0.1)
BASOPHILS NFR BLD: 0 % (ref 0–2)
BUN SERPL-MCNC: 28 MG/DL (ref 7–18)
BUN/CREAT SERPL: 21 (ref 12–20)
CALCIUM SERPL-MCNC: 8.5 MG/DL (ref 8.5–10.1)
CHLORIDE SERPL-SCNC: 101 MMOL/L (ref 100–108)
CO2 SERPL-SCNC: 44 MMOL/L (ref 21–32)
CREAT SERPL-MCNC: 1.35 MG/DL (ref 0.6–1.3)
DIFFERENTIAL METHOD BLD: ABNORMAL
EOSINOPHIL # BLD: 0 K/UL (ref 0–0.4)
EOSINOPHIL NFR BLD: 0 % (ref 0–5)
ERYTHROCYTE [DISTWIDTH] IN BLOOD BY AUTOMATED COUNT: 16.8 % (ref 11.6–14.5)
GLUCOSE BLD STRIP.AUTO-MCNC: 112 MG/DL (ref 70–110)
GLUCOSE BLD STRIP.AUTO-MCNC: 112 MG/DL (ref 70–110)
GLUCOSE BLD STRIP.AUTO-MCNC: 117 MG/DL (ref 70–110)
GLUCOSE BLD STRIP.AUTO-MCNC: 90 MG/DL (ref 70–110)
GLUCOSE SERPL-MCNC: 136 MG/DL (ref 74–99)
HCT VFR BLD AUTO: 39.4 % (ref 36–48)
HGB BLD-MCNC: 11.5 G/DL (ref 13–16)
LYMPHOCYTES # BLD: 4.7 K/UL (ref 0.9–3.6)
LYMPHOCYTES NFR BLD: 30 % (ref 21–52)
MAGNESIUM SERPL-MCNC: 2.1 MG/DL (ref 1.6–2.6)
MCH RBC QN AUTO: 24.1 PG (ref 24–34)
MCHC RBC AUTO-ENTMCNC: 29.2 G/DL (ref 31–37)
MCV RBC AUTO: 82.6 FL (ref 74–97)
MONOCYTES # BLD: 1.1 K/UL (ref 0.05–1.2)
MONOCYTES NFR BLD: 7 % (ref 3–10)
NEUTS SEG # BLD: 10.1 K/UL (ref 1.8–8)
NEUTS SEG NFR BLD: 63 % (ref 40–73)
PLATELET # BLD AUTO: 117 K/UL (ref 135–420)
PMV BLD AUTO: 11.5 FL (ref 9.2–11.8)
POTASSIUM SERPL-SCNC: 4.3 MMOL/L (ref 3.5–5.5)
RBC # BLD AUTO: 4.77 M/UL (ref 4.7–5.5)
SERVICE CMNT-IMP: ABNORMAL
SODIUM SERPL-SCNC: 140 MMOL/L (ref 136–145)
WBC # BLD AUTO: 15.8 K/UL (ref 4.6–13.2)

## 2018-07-19 PROCEDURE — 74011250636 HC RX REV CODE- 250/636: Performed by: INTERNAL MEDICINE

## 2018-07-19 PROCEDURE — 80048 BASIC METABOLIC PNL TOTAL CA: CPT | Performed by: HOSPITALIST

## 2018-07-19 PROCEDURE — 74011250637 HC RX REV CODE- 250/637: Performed by: HOSPITALIST

## 2018-07-19 PROCEDURE — 36415 COLL VENOUS BLD VENIPUNCTURE: CPT | Performed by: HOSPITALIST

## 2018-07-19 PROCEDURE — 74011000250 HC RX REV CODE- 250: Performed by: INTERNAL MEDICINE

## 2018-07-19 PROCEDURE — 94660 CPAP INITIATION&MGMT: CPT

## 2018-07-19 PROCEDURE — 65610000006 HC RM INTENSIVE CARE

## 2018-07-19 PROCEDURE — 77010033678 HC OXYGEN DAILY

## 2018-07-19 PROCEDURE — 94640 AIRWAY INHALATION TREATMENT: CPT

## 2018-07-19 PROCEDURE — 97116 GAIT TRAINING THERAPY: CPT

## 2018-07-19 PROCEDURE — 74011250637 HC RX REV CODE- 250/637: Performed by: INTERNAL MEDICINE

## 2018-07-19 PROCEDURE — 82962 GLUCOSE BLOOD TEST: CPT

## 2018-07-19 PROCEDURE — 85025 COMPLETE CBC W/AUTO DIFF WBC: CPT | Performed by: HOSPITALIST

## 2018-07-19 PROCEDURE — 83735 ASSAY OF MAGNESIUM: CPT | Performed by: HOSPITALIST

## 2018-07-19 PROCEDURE — 74011250636 HC RX REV CODE- 250/636: Performed by: HOSPITALIST

## 2018-07-19 PROCEDURE — 97530 THERAPEUTIC ACTIVITIES: CPT

## 2018-07-19 RX ORDER — CARVEDILOL 12.5 MG/1
12.5 TABLET ORAL 2 TIMES DAILY WITH MEALS
Status: DISCONTINUED | OUTPATIENT
Start: 2018-07-19 | End: 2018-07-25 | Stop reason: HOSPADM

## 2018-07-19 RX ORDER — PRAVASTATIN SODIUM 20 MG/1
20 TABLET ORAL DAILY
Status: DISCONTINUED | OUTPATIENT
Start: 2018-07-20 | End: 2018-07-25 | Stop reason: HOSPADM

## 2018-07-19 RX ORDER — AMLODIPINE BESYLATE 5 MG/1
5 TABLET ORAL DAILY
Status: DISCONTINUED | OUTPATIENT
Start: 2018-07-19 | End: 2018-07-25 | Stop reason: HOSPADM

## 2018-07-19 RX ORDER — OLANZAPINE 2.5 MG/1
2.5 TABLET ORAL 2 TIMES DAILY
Status: DISCONTINUED | OUTPATIENT
Start: 2018-07-19 | End: 2018-07-20 | Stop reason: DRUGHIGH

## 2018-07-19 RX ORDER — GUAIFENESIN 100 MG/5ML
81 LIQUID (ML) ORAL DAILY
Status: DISCONTINUED | OUTPATIENT
Start: 2018-07-20 | End: 2018-07-25 | Stop reason: HOSPADM

## 2018-07-19 RX ADMIN — IPRATROPIUM BROMIDE AND ALBUTEROL SULFATE 3 ML: .5; 3 SOLUTION RESPIRATORY (INHALATION) at 20:23

## 2018-07-19 RX ADMIN — IPRATROPIUM BROMIDE AND ALBUTEROL SULFATE 3 ML: .5; 3 SOLUTION RESPIRATORY (INHALATION) at 15:28

## 2018-07-19 RX ADMIN — CARVEDILOL 12.5 MG: 12.5 TABLET, FILM COATED ORAL at 18:47

## 2018-07-19 RX ADMIN — METHYLPREDNISOLONE SODIUM SUCCINATE 30 MG: 40 INJECTION, POWDER, FOR SOLUTION INTRAMUSCULAR; INTRAVENOUS at 06:24

## 2018-07-19 RX ADMIN — HEPARIN SODIUM 5000 UNITS: 5000 INJECTION, SOLUTION INTRAVENOUS; SUBCUTANEOUS at 08:20

## 2018-07-19 RX ADMIN — BUDESONIDE 500 MCG: 0.5 INHALANT RESPIRATORY (INHALATION) at 20:23

## 2018-07-19 RX ADMIN — BUDESONIDE 500 MCG: 0.5 INHALANT RESPIRATORY (INHALATION) at 07:47

## 2018-07-19 RX ADMIN — FAMOTIDINE 20 MG: 20 TABLET ORAL at 08:18

## 2018-07-19 RX ADMIN — HEPARIN SODIUM 5000 UNITS: 5000 INJECTION, SOLUTION INTRAVENOUS; SUBCUTANEOUS at 02:42

## 2018-07-19 RX ADMIN — AZITHROMYCIN 500 MG: 250 TABLET, FILM COATED ORAL at 08:18

## 2018-07-19 RX ADMIN — FUROSEMIDE 40 MG: 10 INJECTION, SOLUTION INTRAMUSCULAR; INTRAVENOUS at 08:18

## 2018-07-19 RX ADMIN — IPRATROPIUM BROMIDE AND ALBUTEROL SULFATE 3 ML: .5; 3 SOLUTION RESPIRATORY (INHALATION) at 11:07

## 2018-07-19 RX ADMIN — HALOPERIDOL LACTATE 1 MG: 5 INJECTION, SOLUTION INTRAMUSCULAR at 12:45

## 2018-07-19 RX ADMIN — AMLODIPINE BESYLATE 5 MG: 5 TABLET ORAL at 12:45

## 2018-07-19 RX ADMIN — IPRATROPIUM BROMIDE AND ALBUTEROL SULFATE 3 ML: .5; 3 SOLUTION RESPIRATORY (INHALATION) at 00:41

## 2018-07-19 RX ADMIN — IPRATROPIUM BROMIDE AND ALBUTEROL SULFATE 3 ML: .5; 3 SOLUTION RESPIRATORY (INHALATION) at 04:57

## 2018-07-19 RX ADMIN — ALPRAZOLAM 0.5 MG: 0.5 TABLET ORAL at 08:18

## 2018-07-19 RX ADMIN — IPRATROPIUM BROMIDE AND ALBUTEROL SULFATE 3 ML: .5; 3 SOLUTION RESPIRATORY (INHALATION) at 07:47

## 2018-07-19 RX ADMIN — METHYLPREDNISOLONE SODIUM SUCCINATE 30 MG: 40 INJECTION, POWDER, FOR SOLUTION INTRAMUSCULAR; INTRAVENOUS at 21:10

## 2018-07-19 NOTE — PROGRESS NOTES
Bedside and Verbal shift change report received from CHEMA Ramírez RN (offgoing nurse). Report included the following information SBAR, Kardex, Intake/Output, MAR and Recent Results. 1930 Shift assessment completed, see EMR. Sitter at bedside. 2215 Haldol given for increase agitation. Patient pulling on IV line and attempted to get out of bed. 0030 Reassessment completed, see EMR.    0045 BiPAP on patient at this time, agitation improved. 3531 Reassessment completed, see EMR    Bedside and Verbal shift change report given to LORENA Chritsian RN (oncoming nurse) Report included the following information SBAR, Kardex, Intake/Output, MAR and Recent Results.

## 2018-07-19 NOTE — PROGRESS NOTES
[facility]  Pulmonary, Critical Care, and Sleep Medicine    Pulm/CC    Name: Gita Rodríguez. MRN: [id number]   : 1945 Hospital: [facility]   Date: 2018  Room: [facility]/     Subjective: This patient has been seen and evaluated at the request of Dr. Compa Nagy for patient on BIPAP. Patient is a 74 y. o.AA male with hx of COPD, home o2, chronic hypercapneic resp failure, dementia, CLL, diast CHF, CKD who was recently in [facility] 2 weeks ago for hypercapneic resp failure needing BIPAP at high settings. He was subseqeuntly discharged to rehab. He has been sent to ER on  with altered mentation and elevated pCO2. He is wheezing.   He has been on BIPAP 24/12 pressures in ER.    18  Baseline dementia; episodic confusions and agitations  He is able to use BIPAP at night and prn with neither xanax or haldol  Episodic SOB; no cough or vomiting  ROS - limited due to dementia at baseline    PSG [facility] 18 Dr Lazarus Campbell  AHI 34/hr RUPERTO and CSA events; O2 desats  BIPAP titration - snoring and apneas could not be eliminated with 24/20 pressures; central apneas with higher pressures; poorly tolerated overall    Past Medical History:   Diagnosis Date    Chronic kidney disease     stage 3 kidney disease    Chronic obstructive pulmonary disease (HCC)     CLL (chronic lymphocytic leukemia) (HCC)     Delirium     Emphysema lung ([address]     Fall     Frequent hospital admissions     Gout     Hyperlipidemia     Hypoxemia     Memory loss     Noncompliance     Oxygen dependent     Pneumonia     Pulmonary nodules     Respiratory failure (HCC)     Risk for falls     Septic shock (HCC)     Sleep apnea     Tobacco abuse     Vitamin D deficiency       Past Surgical History:   Procedure Laterality Date    HX CATARACT REMOVAL      HX HERNIA REPAIR      HX NEPHROSTOMY        No Known Allergies   Social History   Substance Use Topics    Smoking status: Former Smoker    Smokeless tobacco: Never Used    Alcohol use No      Comment: former ETOH user; stopped 2017        Current Facility-Administered Medications   Medication Dose Route Frequency    amLODIPine (NORVASC) tablet 5 mg  5 mg Oral DAILY    famotidine (PEPCID) tablet 20 mg  20 mg Oral DAILY    methylPREDNISolone (PF) (SOLU-MEDROL) injection 30 mg  30 mg IntraVENous Q8H    azithromycin (ZITHROMAX) tablet 500 mg  500 mg Oral DAILY    albuterol-ipratropium (DUO-NEB) 2.5 MG-0.5 MG/3 ML  3 mL Nebulization Q4H RT    budesonide (PULMICORT) 500 mcg/2 ml nebulizer suspension  500 mcg Nebulization BID RT    insulin lispro (HUMALOG) injection   SubCUTAneous Q6H       Objective:   Vital Signs:    Visit Vitals    /83    Pulse 85    Temp 98.2 °F (36.8 °C)    Resp 21    Wt 91.6 kg (201 lb 15.1 oz)    SpO2 99%    BMI 32.59 kg/m2       O2 Device: BIPAP, Nasal cannula   O2 Flow Rate (L/min): 2 l/min   Temp (24hrs), Av.9 °F (36.6 °C), Min:97.5 °F (36.4 °C), Max:98.3 °F (36.8 °C)       Intake/Output:   Last shift:         Last 3 shifts:  1901 -  0700  In: 1573.3 [I.V.:1573.3]  Out: 2171 [Urine:1825]    Intake/Output Summary (Last 24 hours) at 18 1059  Last data filed at 18 0600   Gross per 24 hour   Intake          1573.33 ml   Output             1450 ml   Net           123.33 ml            Physical Exam:   Comfortable; on nc O2 at 2 L; acyanotic  HEENT: pupils not dilated, reactive, no scleral jaundice  Neck: No adenopathy or thyroid swelling  CVS: S1S2 no murmurs; JVD not elevated  RS: Mod air entry bilaterally, decreased BS at bases, no wheezes, + bi-basal crackles  Abd: soft, non tender, no hepatosplenomegaly, no abd distension  Neuro: limited exam; arousable; episodic agitations; confused at baseline  Extrm: mild bilateral pitting leg edema   Skin: no rash  Lymphatic: no cervical or supraclavicular adenopathy    Telemetry: sinus rhythm    Lines/Tubes:  PIVs    Data review:     Recent Results (from the past 24 hour(s))   GLUCOSE, POC    Collection Time: 07/18/18 12:12 PM   Result Value Ref Range    Glucose (POC) 114 (H) 70 - 110 mg/dL   GLUCOSE, POC    Collection Time: 07/18/18  5:23 PM   Result Value Ref Range    Glucose (POC) 116 (H) 70 - 110 mg/dL   GLUCOSE, POC    Collection Time: 07/18/18 11:29 PM   Result Value Ref Range    Glucose (POC) 114 (H) 70 - 110 mg/dL   MAGNESIUM    Collection Time: 07/19/18  4:31 AM   Result Value Ref Range    Magnesium 2.1 1.6 - 2.6 mg/dL   CBC WITH AUTOMATED DIFF    Collection Time: 07/19/18  4:31 AM   Result Value Ref Range    WBC 15.8 (H) 4.6 - 13.2 K/uL    RBC 4.77 4.70 - 5.50 M/uL    HGB 11.5 (L) 13.0 - 16.0 g/dL    HCT 39.4 36.0 - 48.0 %    MCV 82.6 74.0 - 97.0 FL    MCH 24.1 24.0 - 34.0 PG    MCHC 29.2 (L) 31.0 - 37.0 g/dL    RDW 16.8 (H) 11.6 - 14.5 %    PLATELET 923 (L) 502 - 420 K/uL    MPV 11.5 9.2 - 11.8 FL    NEUTROPHILS 63 40 - 73 %    LYMPHOCYTES 30 21 - 52 %    MONOCYTES 7 3 - 10 %    EOSINOPHILS 0 0 - 5 %    BASOPHILS 0 0 - 2 %    ABS. NEUTROPHILS 10.1 (H) 1.8 - 8.0 K/UL    ABS. LYMPHOCYTES 4.7 (H) 0.9 - 3.6 K/UL    ABS. MONOCYTES 1.1 0.05 - 1.2 K/UL    ABS. EOSINOPHILS 0.0 0.0 - 0.4 K/UL    ABS.  BASOPHILS 0.0 0.0 - 0.1 K/UL    DF AUTOMATED     METABOLIC PANEL, BASIC    Collection Time: 07/19/18  4:31 AM   Result Value Ref Range    Sodium 140 136 - 145 mmol/L    Potassium 4.3 3.5 - 5.5 mmol/L    Chloride 101 100 - 108 mmol/L    CO2 44 (HH) 21 - 32 mmol/L    Anion gap NEG 5 3.0 - 18 mmol/L    Glucose 136 (H) 74 - 99 mg/dL    BUN 28 (H) 7.0 - 18 MG/DL    Creatinine 1.35 (H) 0.6 - 1.3 MG/DL    BUN/Creatinine ratio 21 (H) 12 - 20      GFR est AA >60 >60 ml/min/1.73m2    GFR est non-AA 52 (L) >60 ml/min/1.73m2    Calcium 8.5 8.5 - 10.1 MG/DL   GLUCOSE, POC    Collection Time: 07/19/18  5:24 AM   Result Value Ref Range    Glucose (POC) 117 (H) 70 - 110 mg/dL           Recent Labs      07/17/18   0552  07/16/18   1626  07/16/18   1436   FIO2I  28  24 24   HCO3I  41.4*  41.0*  44.5*   PCO2I  85.3*  81.6*  94.9*   PHI  7.293*  7.308*  7.279*   PO2I  83  69*  82       All Micro Results     Procedure Component Value Units Date/Time    CULTURE, BLOOD [[id number]] Collected:  07/16/18 0438    Order Status:  Completed Specimen:  Blood from Blood Updated:  07/19/18 0703     Special Requests: NO SPECIAL REQUESTS        Culture result: NO GROWTH 2 DAYS       CULTURE, URINE [[id number]] Collected:  07/16/18 1800    Order Status:  Completed Specimen:  Urine from Shanks Specimen Updated:  07/18/18 1047     Special Requests: NO SPECIAL REQUESTS        Culture result:         CULTURE IN PROGRESS,FURTHER UPDATES TO FOLLOW    CULTURE, BLOOD [[id number]] Collected:  07/16/18 1730    Order Status:  Canceled Specimen:  Blood from Blood           Imaging:  [x]I have personally reviewed the patients chest radiographs images and report   CXR 7/18    Results from Hospital Encounter encounter on 07/16/18   XR CHEST PORT   Narrative History: COPD    Technique: AP CHEST: Portable chest obtained at 5:56 AM on 07/18/18 and is  compared to the prior exam of 07/17/18. Findings: External monitoring wires and leads partially obscures visibility. Mild streaky opacities are present in the left base. No acute consolidation,  congestive heart failure, pleural effusion or pneumothorax. Impression IMPRESSION:    No acute cardiopulmonary disease, allowing for technique. Results from [place] encounter on 07/16/18   CT HEAD WO CONT   Narrative EXAM:  CT of the brain without intravenous contrast.    COMPARISON: CT July 3, 2018. REASON FOR EXAM: \"Decreased alertness; facial droop and ams\". DOSE REDUCTION:  One or more dose reduction techniques were used on this CT:  automated exposure control, adjustment of the mAs and/or kVp according to  patient's size, and iterative reconstruction techniques.  The specific techniques  utilized on this CT exam have been documented in the patient's electronic  medical record.    _______________    FINDINGS:         IMAGE QUALITY:  The images are mildly degraded by motion artifact. BRAIN AND EXTRA-AXIAL SPACE:               SUBACUTE TERRITORIAL TRANSCORTICAL INFARCTION:  None detected. MASS:  None detected. HEMORRHAGE:  None. SUBDURAL FLUID COLLECTION:  None detected. HYDROCEPHALUS:  None. REMOTE CORTICAL INFARCTION:  None detected. REMOTE CEREBELLAR INFARCTION:  None detected. MISCELLANEOUS:  Non-applicable. STRIVE (STandards for Reporting Vascular changes on nEuroimaging):                     --[place] (age-indeterminate) or perivascular spaces of  \"presumed vascular origin\":  None definite. --[place] of white matter hypodensity of \"presumed vascular  origin\":  Low grade. --Degree of brain atrophy (subjective): Low grade. BURDEN OF CALCIFIC INTRACRANIAL ATHEROSCLEROSIS:  None significant. HEENT:             INCLUDED UPPER ORBITS:  The lenses are replaced bilaterally. INCLUDED UPPER PARANASAL SINUSES:  Predominantly clear. MASTOID AIR CELLS:  Predominantly clear. BONES:  Unremarkable. SCALP:  Unremarkable.    _______________         Impression IMPRESSION:    Low-grade generalized chronic changes, however, no acute findings. _______________                  Note: Dr. Omar Vila discussed the stroke code results with Dr. Sabino Oppenheim at [phone] on  7/16/2018.                IMPRESSION:   · Acute metabolic encephalopathy from CO2 narcosis   · Dementia - severity not known, but advanced per prior notes  · COPD with home O2-FEV1 of 1.23 or 55% predicted but normal ratio follows with Dr. Ashanti Nathan - on Spiriva and advair at home- Recent 6 min walk in may suggested requires home oxygen 2-4 liter  · Acute on chronic hypoxemic and hypercapneic respiratory failure  · Acute on chronic diast CHF  · CKD stage 3 with ARF  · HTN  · RUPERTO unknown severity on BIPAP21/17 non compliant-- not able to tolerate but best response noted on BIPAP 24/12 in recent hospitalization  · Hx of ETOH abuse (now residing in rehab facility)  · Chronic Leucocytosis with hx of CLL      RECOMMENDATIONS:   · Pulm: stable respirations; BIPAP qhs and prn with haldol or xanax - 24/12 pressures; ABG showed baseline hypercapnea and mild resp acidosis; patient poorly compliant with bipap due to dementia; wean for O2 sats >92%; Duonebs q 4hrly; Budesonide nebs bid; IV solumedrol weaned to 30 mg q12  · Patient has BIPAP for use at rehab  · Ab - CXR clear - likely had left basal atelectasis; leucocytosis due to CLL; blood cx neg so far; UA neg; Zithromax x 5 days for possible bacterial bronchitis  · Oral hydration  · Prophylaxis  -DVT Px: heparin held due to mild decrease in platelets; plan SCDs as tolerated by patient   -GI Px: pepcid  · Full code status  · D/w family-updated wife at bedside on 7/17; she says she does not live with patient for 16 years but still  legally; reviewed patient having end stage lung disease with hypercapnea, advanced dementia, complex sleep apnea, hx of CLL - discussed management; advised against aggressive measures such as CPR or vent support/breathing tube - this will cause pain, suffering, poor QOL, vent dependency with need for trach and peg etc.  He is also non-compliant due to dementia and hence likelihood of cardio-resp complications. Wife wishes full code. Will defer respective systems problem management to primary and other consultant and follow patient in ICU with primary and other medical team  Further recommendations will be based on the patient's response to recommended treatment and results of the investigation ordered. Quality Care: PPI, DVT prophylaxis, HOB elevated, Infection control all reviewed and addressed.   PAIN AND SEDATION: none   · Skin/Wound: no active issues  · Nutrition: NPO while on BIPAP  · Prophylaxis: DVT and GI Prophylaxis reviewed  · Restraints: none  · PT/OT eval and treat: as needed  · Lines/Tubes: PIVs  ADVANCE DIRECTIVE: Full Code  Disposition: likely can transfer back to rehab soon; poor baseline with advanced lung disease and dementia with poor compliance due to dementia state. Noted plans for Dr Dominick Jarvis to try Trilogy non-invasive vent at night in the rehab - defer to outpatient management and set up. Continue using BIPAP in hospital and rehab for now. I suspect patient needs NH placement for further care.    Consulted Palliative care medicine      Mod complexity decision making was performed in this consultation and evaluation of this patient         Bertha Reese MD

## 2018-07-19 NOTE — PROGRESS NOTES
Chart reviewed. Met with pts estranged wife with MD Juanita Mortensen to discuss goals of care. She is aware Jose Sanders of [facility] and possibly [facility] may be able to accept patient for rehab. (Per Tracy Washington @ [facility] and [facility] they can accept). She is refusing to allow pt to return back to [facility]. She is aware patient is medically ready to discharge tomorrow, so long as he has an accepting facility and Bipap per MD Juanita Mortensen. She also had lots of questions about hospice. [employer] [facility] and asked for a return call. However, pts wife states pt has no resources and they have no plans to bring he him home. Pt lives alone. CM will follow up with pts wife in the morning. Pts wife also discussing LTC, and states she has applied for medicaid.

## 2018-07-19 NOTE — PROGRESS NOTES
Problem: Mobility Impaired (Adult and Pediatric)  Goal: *Acute Goals and Plan of Care (Insert Text)  Physical Therapy Goals  Initiated 7/17/2018 and to be accomplished within 3-7 day(s)  1. Patient will move from supine to sit and sit to supine  in bed with supervision/set-up. 2.  Patient will transfer from bed to chair and chair to bed with supervision/set-up using the least restrictive device. 3.  Patient will perform sit to stand with supervision/set-up. 4.  Patient will ambulate with supervision/set-up for 100 feet with the least restrictive device. Outcome: Progressing Towards Goal  physical Therapy TREATMENT    Patient: Fredrik Homans. (69 y.o. male)  Date: 7/19/2018  Diagnosis: CO2 narcosis  Respiratory distress  Hypoxia  Altered mental status Acute on chronic respiratory failure with hypoxia and hypercapnia (HCC)  Precautions: Fall   Chart, physical therapy assessment, plan of care and goals were reviewed. ASSESSMENT:  Pt agreeable to go for a walk. Pt with very slow mobility, poor initiation, delayed processing. Pt amb 50 ft c/RW, CGA for safety; very slow angelo, requires verbal cues for almost every time. Will continue to progress as pt tolerates. Progression toward goals:  []      Improving appropriately and progressing toward goals  [x]      Improving slowly and progressing toward goals  []      Not making progress toward goals and plan of care will be adjusted     PLAN:  Patient continues to benefit from skilled intervention to address the above impairments. Continue treatment per established plan of care. Discharge Recommendations:  Aftab Huang  Further Equipment Recommendations for Discharge:  N/A     SUBJECTIVE:   Patient stated I'm fine.     OBJECTIVE DATA SUMMARY:   Critical Behavior:  Neurologic State: Confused, Restless, Eyes open spontaneously  Orientation Level: Oriented to place, Oriented to person, Disoriented to situation, Disoriented to time  Cognition: Poor safety awareness, Memory loss, Follows commands, Decreased command following, Decreased attention/concentration  Safety/Judgement: Decreased awareness of need for assistance, Decreased awareness of need for safety, Lack of insight into deficits  Functional Mobility Training:  Bed Mobility:  Supine to Sit: Minimum assistance  Sit to Supine: Minimum assistance  Transfers:  Sit to Stand: Minimum assistance  Stand to Sit: Minimum assistance  Balance:  Sitting: Intact  Standing: Intact; With support  Ambulation/Gait Training:  Distance (ft): 50 Feet (ft)  Assistive Device: Gait belt;Walker, rolling  Ambulation - Level of Assistance: Contact guard assistance  Gait Abnormalities: Decreased step clearance; Step to gait  Base of Support: Widened  Speed/Carie: Slow  Step Length: Right shortened;Left shortened  Pain:  Pain Scale 1: Numeric (0 - 10)  Pain Intensity 1: 0  Activity Tolerance:   Good  Please refer to the flowsheet for vital signs taken during this treatment.   After treatment:   [] Patient left in no apparent distress sitting up in chair  [x] Patient left in no apparent distress in bed  [x] Call bell left within reach  [x] Nursing notified  [] Caregiver present  [] Bed alarm activated      Luis Freemancomdarinel   Time Calculation: 23 mins

## 2018-07-19 NOTE — PROGRESS NOTES
Hospitalist Progress Note-critical care note Patient: Shannan Ferreira. MRN: [id number]  [facility]: [id number] YOB: 1945  Age: 74 y.o. Sex: male DOA: 7/16/2018 LOS:  LOS: 3 days Chief complaint: acute on chronic respiratory failure ,acute encephalopathy, hypernatremia , co2 narcosis Assessment/Plan Hospital Problems  Never Reviewed Codes Class Noted POA Altered mental status ICD-10-CM: R41.82 
ICD-9-CM: 780.97  7/16/2018 Unknown Respiratory distress ICD-10-CM: R06.03 
ICD-9-CM: 786.09  7/16/2018 Unknown Hypoxia ICD-10-CM: R09.02 
ICD-9-CM: 799.02  7/16/2018 Unknown CO2 narcosis ICD-10-CM: R06.89 
ICD-9-CM: 786.09  7/16/2018 Unknown Hypernatremia ICD-10-CM: E87.0 ICD-9-CM: 276.0  7/16/2018 Unknown COPD (chronic obstructive pulmonary disease) (HCC) ICD-10-CM: J44.9 ICD-9-CM: 750  7/12/2018 Yes RUPERTO (obstructive sleep apnea) ICD-10-CM: G47.33 
ICD-9-CM: 327.23  7/12/2018 Yes Dementia ICD-10-CM: F03.90 ICD-9-CM: 294.20  7/12/2018 Yes HTN (hypertension) ICD-10-CM: I10 
ICD-9-CM: 401.9  7/12/2018 Yes * (Principal)Acute on chronic respiratory failure with hypoxia and hypercapnia (HCC) ICD-10-CM: J96.21, J96.22 
ICD-9-CM: 518.84, 786.09, 799.02  7/3/2018 Yes  
   
 CLL (chronic lymphocytic leukemia) (HCC) ICD-10-CM: C91.90 ICD-9-CM: 204.10  7/3/2018 Yes Acute encephalopathy ICD-10-CM: G93.40 ICD-9-CM: 348.30  7/3/2018 Yes Acute on chronic respiratory failure with hypercapnia and hypoxia Need bipap at night and also prn  
Continu, breath tx   
Weaning off steroid  
 
 emphysema lung 
 breathing tx and supportive treatment  
   
  
Acute encephalopathy due to hypercapnia -co 2  Narcosis plus demetia   
Will restart  Olanzapine- 
Ct head no acute issue Ativan and haloidal prn   
  
 
Htn Lasix  
   
cll , leukocytosis -remained at the baseline   
  
  
ckd 3 Back to his base line  
Hypernatremia Resolved Palliative care on board Addendum : met wife with cm, poor prognosis discussed with wife. She indicated a verbal understanding, but she neither brings him home or dnr/I, she though hospice will provide more care for her . Will continue try replacement. All questions have been answered. 55 total min's spent on patient care including >50% on counseling/coordinating care. Discussed the above assessments. also discussed labs, medications and hospital course Subjective: agitated last night. Review of systems: 
 
Unable to obtain due to not answering questions Vital signs/Intake and Output: 
Visit Vitals  /77  Pulse 90  Temp 97.4 °F (36.3 °C)  Resp 19  Wt 91.6 kg (201 lb 15.1 oz)  SpO2 99%  BMI 32.59 kg/m2 Current Shift:    
Last three shifts:  07/17 1901 - 07/19 0700 In: 1573.3 [I.V.:1573.3] Out: 1825 [OVTBR:[id number]] Physical Exam: 
General: Alert , oriented , no acute distress   
HEENT: NC, Atraumatic. PERRLA, anicteric sclerae. Lungs: Increased expiration bs Heart:  Regular  rhythm,  + murmur, No Rubs, No Gallops Abdomen: Soft, Non distended, Non tender.  +Bowel sounds, Extremities: No c/c/e Psych:   Not anxious or agitated. Neurologic:  No acute neuro deficit Labs: Results:  
   
Chemistry Recent Labs  
   07/19/18 
 0431 07/18/18 
 0457 07/17/18 
 9905 GLU  136*  133*  149* NA  140  148*  148* K  4.3  4.5  4.4  
CL  101  105  106 CO2  44*  43*  40* BUN  28*  29*  29* CREA  1.35*  1.60*  1.51* CA  8.5  8.9  7.9* AGAP  NEG 5  0*  2*  
BUCR  21*  18  19 CBC w/Diff Recent Labs  
   07/19/18 
 0431 07/18/18 
 0457  07/17/18 
 1492 WBC  15.8*  18.1*  17.8*  
RBC  4.77  4.76  4.51* HGB  11.5*  11.4*  10.8* HCT  39.4  39.8  37.8 PLT  117*  131*  129* GRANS  63  68  71 LYMPH  30  26  24 EOS  0  0  0 Cardiac Enzymes No results for input(s): CPK, CKND1, LOREN in the last 72 hours. 
 
No lab exists for component: Will Zaragoza Coagulation No results for input(s): PTP, INR, APTT in the last 72 hours. No lab exists for component: INREXT, INREXT Lipid Panel No results found for: CHOL, CHOLPOCT, CHOLX, CHLST, CHOLV, 120108, HDL, LDL, LDLC, DLDLP, 569546, VLDLC, VLDL, TGLX, TRIGL, TRIGP, TGLPOCT, CHHD, CHHDX  
BNP No results for input(s): BNPP in the last 72 hours. Liver Enzymes No results for input(s): TP, ALB, TBIL, AP, SGOT, GPT in the last 72 hours. No lab exists for component: DBIL Thyroid Studies No results found for: T4, T3U, TSH, TSHEXT, TSHEXT Procedures/imaging: see electronic medical records for all procedures/Xrays and details which were not copied into this note but were reviewed prior to creation of Plan Sandip Mock MD

## 2018-07-19 NOTE — HOSPICE
Received call from Katerina Menchaca that patient's wife requested hospice information. Explained hospice care and services to wife. Answered questions. Wife states she is still trying to make up her mind about what to do. Provided hospice telephone number. Encouraged wife to call hospice if she has other questions. Will follow up with wife tomorrow.

## 2018-07-19 NOTE — PROGRESS NOTES
0745 - Bedside and Verbal shift change report given to Arin Downing RN (oncoming nurse) by Deisi Jalloh RN (offgoing nurse). Report included the following information SBAR, Kardex, Intake/Output, MAR, Recent Results and Cardiac Rhythm NSR. D5 at 40.  0800 - Pt assessed and vitals obtained. No acute pain or discomfort. Pt is slow to answer questions but answers all questions, AxOx2 to self and to hospital. Pt asks to remove Bipap and to eat breakfast, Bipap removed and placed on 2L NC. Sitter at bedside, will continue to monitor, see EMR for full details. 1050 - IDR: Stop D5W, discontinue sitter, continue with Q6 BG checks due to inconsistent PO intake. 1200 - Pt assessed and vitals obtained. Only oriented to self at this time, some anger and 'sas' towards staff. Attmepting to slide out of bed. Pt changed returned to bed and given lunch. Will continue to monitor see EMR for full details. 1245 - Pt again sliding out of bed and agitated. Given PRN haldol per orders. Pt calmed down and returned to bed.   1500 - Pt up with walker to walk with PT and RN. Pt ambulated in hallway, needed much redirection and encouragement to walk. Pt noted to have increased drooling. RN paged Dr Andres Cervantes and SLP Mary ordered. 65 - Wife at the bedside wants to speak with Dr Deandre Singer, Dr Andres Cervantes, and Care Management Adry. All paged and all came to the bedside to speak with wife about concerns, specifically discharge, prognosis, goals of care. 1600 - Pt assessed and vitals obtained. No acute distress or change in mentation - pt can state name and that he is in the hospital, with delayed response. Appears comfortable in bed, pt denies needing anything. 1645 - Pt's wife requests for pt to go back on Bipap. Pt refuses and loudly states \"NO. NO. NO. I DON'T WANT TO\". RN informed wife that pt has broken masks and in the pts interest of not becoming further agitated that Bipap would not be applied at this time.    1715 - Pt placed back on Bipap per request of wife, pt more agreeable at this time. No shouting or apparent distress. 1845 - Pt removed from Bipap and dinner served. 1905 - Pt coughing loudly. Wife at bedside, 'he's choking'. Pt pulled up in bed and given suction. Coughing spell resolved. 1920 - Bedside and Verbal shift change report given to Mukesh Nugent RN (oncoming nurse) by Melany Hirsch RN (offgoing nurse). Report included the following information SBAR, Kardex, Intake/Output, MAR, Accordion, Recent Results and Cardiac Rhythm NSR.

## 2018-07-19 NOTE — DIABETES MGMT
GLYCEMIC CONTROL PROGRESS NOTE:    -discussed in rounds, no known h/o DM  -BG in target range ICU: 140-180 mg/dL  -POCT q6 hrs r/t sporadic PO intake + IV steroids 30 Q8 hrs, recommend continue  -D5 IVF discontinued    Recent Glucose Results:   Lab Results   Component Value Date/Time     (H) 07/19/2018 04:31 AM    GLUCPOC 117 (H) 07/19/2018 05:24 AM    GLUCPOC 114 (H) 07/18/2018 11:29 PM    GLUCPOC 116 (H) 07/18/2018 05:23 PM     Kristina Reese RN, MS  Glycemic Control Team  Pager [phone] (M-TH 8:30-5P)  *After Hours pager [phone]

## 2018-07-20 ENCOUNTER — APPOINTMENT (OUTPATIENT)
Dept: GENERAL RADIOLOGY | Age: 73
DRG: 189 | End: 2018-07-20
Attending: INTERNAL MEDICINE
Payer: MEDICARE

## 2018-07-20 LAB
ANION GAP SERPL CALC-SCNC: ABNORMAL MMOL/L (ref 3–18)
BASOPHILS # BLD: 0 K/UL (ref 0–0.1)
BASOPHILS NFR BLD: 0 % (ref 0–3)
BUN SERPL-MCNC: 34 MG/DL (ref 7–18)
BUN/CREAT SERPL: 22 (ref 12–20)
CALCIUM SERPL-MCNC: 8.9 MG/DL (ref 8.5–10.1)
CHLORIDE SERPL-SCNC: 100 MMOL/L (ref 100–108)
CO2 SERPL-SCNC: 45 MMOL/L (ref 21–32)
CREAT SERPL-MCNC: 1.55 MG/DL (ref 0.6–1.3)
DIFFERENTIAL METHOD BLD: ABNORMAL
EOSINOPHIL # BLD: 0 K/UL (ref 0–0.4)
EOSINOPHIL NFR BLD: 0 % (ref 0–5)
ERYTHROCYTE [DISTWIDTH] IN BLOOD BY AUTOMATED COUNT: 17.1 % (ref 11.6–14.5)
GLUCOSE BLD STRIP.AUTO-MCNC: 119 MG/DL (ref 70–110)
GLUCOSE BLD STRIP.AUTO-MCNC: 89 MG/DL (ref 70–110)
GLUCOSE BLD STRIP.AUTO-MCNC: 89 MG/DL (ref 70–110)
GLUCOSE SERPL-MCNC: 131 MG/DL (ref 74–99)
HCT VFR BLD AUTO: 41.5 % (ref 36–48)
HGB BLD-MCNC: 12.1 G/DL (ref 13–16)
LYMPHOCYTES # BLD: 4.2 K/UL (ref 0.8–3.5)
LYMPHOCYTES NFR BLD: 28 % (ref 20–51)
MAGNESIUM SERPL-MCNC: 2.3 MG/DL (ref 1.6–2.6)
MCH RBC QN AUTO: 24.1 PG (ref 24–34)
MCHC RBC AUTO-ENTMCNC: 29.2 G/DL (ref 31–37)
MCV RBC AUTO: 82.7 FL (ref 74–97)
MONOCYTES # BLD: 0.3 K/UL (ref 0–1)
MONOCYTES NFR BLD: 2 % (ref 2–9)
NEUTS BAND NFR BLD MANUAL: 1 % (ref 0–5)
NEUTS SEG # BLD: 10.4 K/UL (ref 1.8–8)
NEUTS SEG NFR BLD: 69 % (ref 42–75)
PLATELET # BLD AUTO: 132 K/UL (ref 135–420)
PLATELET COMMENTS,PCOM: ABNORMAL
PMV BLD AUTO: 10.8 FL (ref 9.2–11.8)
POTASSIUM SERPL-SCNC: 4.2 MMOL/L (ref 3.5–5.5)
RBC # BLD AUTO: 5.02 M/UL (ref 4.7–5.5)
RBC MORPH BLD: ABNORMAL
RBC MORPH BLD: ABNORMAL
SODIUM SERPL-SCNC: 143 MMOL/L (ref 136–145)
WBC # BLD AUTO: 15.1 K/UL (ref 4.6–13.2)
WBC MORPH BLD: ABNORMAL

## 2018-07-20 PROCEDURE — 94640 AIRWAY INHALATION TREATMENT: CPT

## 2018-07-20 PROCEDURE — 65610000006 HC RM INTENSIVE CARE

## 2018-07-20 PROCEDURE — 74011636637 HC RX REV CODE- 636/637: Performed by: INTERNAL MEDICINE

## 2018-07-20 PROCEDURE — 94660 CPAP INITIATION&MGMT: CPT

## 2018-07-20 PROCEDURE — 74011000250 HC RX REV CODE- 250: Performed by: INTERNAL MEDICINE

## 2018-07-20 PROCEDURE — 85025 COMPLETE CBC W/AUTO DIFF WBC: CPT | Performed by: HOSPITALIST

## 2018-07-20 PROCEDURE — 77010033678 HC OXYGEN DAILY

## 2018-07-20 PROCEDURE — 36415 COLL VENOUS BLD VENIPUNCTURE: CPT | Performed by: HOSPITALIST

## 2018-07-20 PROCEDURE — 71045 X-RAY EXAM CHEST 1 VIEW: CPT

## 2018-07-20 PROCEDURE — 74011250637 HC RX REV CODE- 250/637: Performed by: HOSPITALIST

## 2018-07-20 PROCEDURE — 83735 ASSAY OF MAGNESIUM: CPT | Performed by: HOSPITALIST

## 2018-07-20 PROCEDURE — 92526 ORAL FUNCTION THERAPY: CPT

## 2018-07-20 PROCEDURE — 74011250637 HC RX REV CODE- 250/637: Performed by: INTERNAL MEDICINE

## 2018-07-20 PROCEDURE — 92610 EVALUATE SWALLOWING FUNCTION: CPT

## 2018-07-20 PROCEDURE — 82962 GLUCOSE BLOOD TEST: CPT

## 2018-07-20 PROCEDURE — 80048 BASIC METABOLIC PNL TOTAL CA: CPT | Performed by: HOSPITALIST

## 2018-07-20 RX ORDER — ALPRAZOLAM 0.5 MG/1
0.5 TABLET ORAL
Qty: 6 TAB | Refills: 0 | Status: SHIPPED | OUTPATIENT
Start: 2018-07-20 | End: 2018-09-11

## 2018-07-20 RX ORDER — HALOPERIDOL 0.5 MG/1
0.5 TABLET ORAL
Qty: 5 TAB | Refills: 0 | Status: SHIPPED | OUTPATIENT
Start: 2018-07-20 | End: 2018-07-20

## 2018-07-20 RX ORDER — PREDNISONE 5 MG/1
TABLET ORAL
Qty: 21 TAB | Refills: 0 | Status: SHIPPED | OUTPATIENT
Start: 2018-07-20 | End: 2018-07-25

## 2018-07-20 RX ORDER — OLANZAPINE 2.5 MG/1
1.25 TABLET ORAL 2 TIMES DAILY
Status: DISCONTINUED | OUTPATIENT
Start: 2018-07-20 | End: 2018-07-25 | Stop reason: HOSPADM

## 2018-07-20 RX ORDER — AMOXICILLIN AND CLAVULANATE POTASSIUM 500; 125 MG/1; MG/1
1 TABLET, FILM COATED ORAL EVERY 12 HOURS
Status: DISCONTINUED | OUTPATIENT
Start: 2018-07-20 | End: 2018-07-25 | Stop reason: HOSPADM

## 2018-07-20 RX ORDER — AZITHROMYCIN 250 MG/1
250 TABLET, FILM COATED ORAL DAILY
Qty: 6 TAB | Refills: 0 | Status: SHIPPED | OUTPATIENT
Start: 2018-07-20 | End: 2018-07-25

## 2018-07-20 RX ORDER — AMOXICILLIN AND CLAVULANATE POTASSIUM 500; 125 MG/1; MG/1
1 TABLET, FILM COATED ORAL EVERY 12 HOURS
Qty: 10 TAB | Refills: 0 | Status: SHIPPED | OUTPATIENT
Start: 2018-07-20 | End: 2018-07-25

## 2018-07-20 RX ADMIN — AMLODIPINE BESYLATE 5 MG: 5 TABLET ORAL at 12:09

## 2018-07-20 RX ADMIN — AMOXICILLIN AND CLAVULANATE POTASSIUM 1 TABLET: 500; 125 TABLET, FILM COATED ORAL at 18:11

## 2018-07-20 RX ADMIN — AZITHROMYCIN 500 MG: 250 TABLET, FILM COATED ORAL at 12:08

## 2018-07-20 RX ADMIN — ASPIRIN 81 MG 81 MG: 81 TABLET ORAL at 12:08

## 2018-07-20 RX ADMIN — CARVEDILOL 12.5 MG: 12.5 TABLET, FILM COATED ORAL at 18:11

## 2018-07-20 RX ADMIN — PREDNISONE 30 MG: 20 TABLET ORAL at 18:11

## 2018-07-20 RX ADMIN — CARVEDILOL 12.5 MG: 12.5 TABLET, FILM COATED ORAL at 12:11

## 2018-07-20 RX ADMIN — IPRATROPIUM BROMIDE AND ALBUTEROL SULFATE 3 ML: .5; 3 SOLUTION RESPIRATORY (INHALATION) at 13:35

## 2018-07-20 RX ADMIN — OLANZAPINE 2.5 MG: 2.5 TABLET ORAL at 12:09

## 2018-07-20 RX ADMIN — IPRATROPIUM BROMIDE AND ALBUTEROL SULFATE 3 ML: .5; 3 SOLUTION RESPIRATORY (INHALATION) at 15:58

## 2018-07-20 RX ADMIN — IPRATROPIUM BROMIDE AND ALBUTEROL SULFATE 3 ML: .5; 3 SOLUTION RESPIRATORY (INHALATION) at 00:30

## 2018-07-20 RX ADMIN — IPRATROPIUM BROMIDE AND ALBUTEROL SULFATE 3 ML: .5; 3 SOLUTION RESPIRATORY (INHALATION) at 07:18

## 2018-07-20 RX ADMIN — IPRATROPIUM BROMIDE AND ALBUTEROL SULFATE 3 ML: .5; 3 SOLUTION RESPIRATORY (INHALATION) at 04:40

## 2018-07-20 RX ADMIN — BUDESONIDE 500 MCG: 0.5 INHALANT RESPIRATORY (INHALATION) at 07:18

## 2018-07-20 RX ADMIN — PRAVASTATIN SODIUM 20 MG: 20 TABLET ORAL at 12:08

## 2018-07-20 RX ADMIN — FAMOTIDINE 20 MG: 20 TABLET ORAL at 12:09

## 2018-07-20 RX ADMIN — IPRATROPIUM BROMIDE AND ALBUTEROL SULFATE 3 ML: .5; 3 SOLUTION RESPIRATORY (INHALATION) at 20:32

## 2018-07-20 RX ADMIN — BUDESONIDE 500 MCG: 0.5 INHALANT RESPIRATORY (INHALATION) at 20:32

## 2018-07-20 NOTE — PROGRESS NOTES
Problem: Pressure Injury - Risk of  Goal: *Prevention of pressure injury  Document Percy Scale and appropriate interventions in the flowsheet. Outcome: Progressing Towards Goal  Pressure Injury Interventions:  Sensory Interventions: Assess changes in LOC, Discuss PT/OT consult with provider, Float heels, Keep linens dry and wrinkle-free, Maintain/enhance activity level, Minimize linen layers, Pressure redistribution bed/mattress (bed type), Sit a 90-degree angle/use footstool if needed, Turn and reposition approx. every two hours (pillows and wedges if needed)    Moisture Interventions: Absorbent underpads, Apply protective barrier, creams and emollients, Check for incontinence Q2 hours and as needed, Limit adult briefs, Maintain skin hydration (lotion/cream), Offer toileting Q_hr, Moisture barrier    Activity Interventions: Pressure redistribution bed/mattress(bed type), Increase time out of bed, PT/OT evaluation    Mobility Interventions: HOB 30 degrees or less, Pressure redistribution bed/mattress (bed type), PT/OT evaluation, Turn and reposition approx. every two hours(pillow and wedges), Assess need for specialty bed, Float heels    Nutrition Interventions: Document food/fluid/supplement intake, Discuss nutritional consult with provider, Offer support with meals,snacks and hydration    Friction and Shear Interventions: Lift team/patient mobility team, Apply protective barrier, creams and emollients, Feet elevated on foot rest, Foam dressings/transparent film/skin sealants, Lift sheet, Transferring/repositioning devices               Problem: Falls - Risk of  Goal: *Absence of Falls  Document Stan Fall Risk and appropriate interventions in the flowsheet.    Outcome: Progressing Towards Goal  Fall Risk Interventions:  Mobility Interventions: Assess mobility with egress test, Bed/chair exit alarm, Communicate number of staff needed for ambulation/transfer, OT consult for ADLs, Patient to call before getting OOB, PT Consult for mobility concerns, PT Consult for assist device competence, Strengthening exercises (ROM-active/passive), Utilize walker, cane, or other assistive device    Mentation Interventions: Adequate sleep, hydration, pain control, Bed/chair exit alarm, Door open when patient unattended, Evaluate medications/consider consulting pharmacy, Familiar objects from home, Family/sitter at bedside, Increase mobility, More frequent rounding, Reorient patient, Toileting rounds, Update white board    Medication Interventions: Evaluate medications/consider consulting pharmacy, Bed/chair exit alarm, Patient to call before getting OOB, Teach patient to arise slowly    Elimination Interventions: Bed/chair exit alarm, Call light in reach, Patient to call for help with toileting needs, Toilet paper/wipes in reach, Toileting schedule/hourly rounds, Urinal in reach

## 2018-07-20 NOTE — PROGRESS NOTES
0800--Attempted to do AM assessment. Patient refused and is confused and combative. VSS. Will attempt at some other time as long as patient is no longer combative and cooperative with plan of care. SLP at bedside for eval. Patient also refusing to wear oxygen. 1145--Patient removed EKG leads. A lot of throat clearing currently, patient drank coffee too fast. I removed his tray. Replaced EKG leads. Tachycardia. Oral suctioning provided. 1209--Oral AM meds given late d/t pending swallow eval. Patient did have some coughing after taking meds. 1228--Patient still tachypneic. Replaced pulse ox and O2 sats 70%. Patient patient on 4L NC. Oxygen sats improved to 100% and HR <100. RR still 40s. Will contact Pulmonary. Made patient aware of need for possible BiPAP. 1257--Patient placed on BiPAP for continued tachypnea and AMS.     1301--Spoke c Dr. Goel about patient condition, received orders to CXR and continue BiPAP for now. 1755--Patient removed from BiPAP and placed back on 4L NC. Patient is alert and answering most questions like his previous baseline. 1904--Titrated oxygen to 3L NC. O2 sats 100%. 1930--Bedside and Verbal shift change report given to Luis Delgado RN (oncoming nurse) by Sandy Huggins RN (offgoing nurse).  Report included the following information SBAR, Kardex, Intake/Output, MAR, Recent Results, Med Rec Status and Cardiac Rhythm SR.

## 2018-07-20 NOTE — PROGRESS NOTES
[facility]  Pulmonary, Critical Care, and Sleep Medicine    Pulm/CC    Name: Jonah Fernandez. MRN: [id number]   : 1945 Hospital: [facility] [place]   Date: 2018  Room: [facility]/     Subjective: This patient has been seen and evaluated at the request of Dr. Dipika Stanley for patient on BIPAP. Patient is a 74 y. o.AA male with hx of COPD, home o2, chronic hypercapneic resp failure, dementia, CLL, diast CHF, CKD who was recently in [facility] 2 weeks ago for hypercapneic resp failure needing BIPAP at high settings. He was subseqeuntly discharged to rehab. He has been sent to ER on  with altered mentation and elevated pCO2. He is wheezing.   He has been on BIPAP 24/12 pressures in ER.    18  Baseline dementia; episodic confusions and agitations  Declines BIPAP based on his agitations  Breathing stable; no cough or vomiting  ROS - limited due to dementia at baseline    PSG [facility] Dr Kina Dai  AHI 34/hr RUPERTO and CSA events; O2 desats  BIPAP titration - snoring and apneas could not be eliminated with 24/20 pressures; central apneas with higher pressures; poorly tolerated overall    Past Medical History:   Diagnosis Date    Chronic kidney disease     stage 3 kidney disease    Chronic obstructive pulmonary disease (HCC)     CLL (chronic lymphocytic leukemia) (HCC)     Delirium     Emphysema lung ([facility] [address]     Fall     Frequent hospital admissions     Gout     Hyperlipidemia     Hypoxemia     Memory loss     Noncompliance     Oxygen dependent     Pneumonia     Pulmonary nodules     Respiratory failure (HCC)     Risk for falls     Septic shock (HCC)     Sleep apnea     Tobacco abuse     Vitamin D deficiency       Past Surgical History:   Procedure Laterality Date    HX CATARACT REMOVAL      HX HERNIA REPAIR      HX NEPHROSTOMY        No Known Allergies   Social History   Substance Use Topics    Smoking status: Former Smoker    Smokeless tobacco: Never Used  Alcohol use No      Comment: former ETOH user; stopped 2017        Current Facility-Administered Medications   Medication Dose Route Frequency    amLODIPine (NORVASC) tablet 5 mg  5 mg Oral DAILY    methylPREDNISolone (PF) (SOLU-MEDROL) injection 30 mg  30 mg IntraVENous Q12H    carvedilol (COREG) tablet 12.5 mg  12.5 mg Oral BID WITH MEALS    aspirin chewable tablet 81 mg  81 mg Oral DAILY    OLANZapine (ZyPREXA) tablet 2.5 mg  2.5 mg Oral BID    pravastatin (PRAVACHOL) tablet 20 mg  20 mg Oral DAILY    famotidine (PEPCID) tablet 20 mg  20 mg Oral DAILY    azithromycin (ZITHROMAX) tablet 500 mg  500 mg Oral DAILY    albuterol-ipratropium (DUO-NEB) 2.5 MG-0.5 MG/3 ML  3 mL Nebulization Q4H RT    budesonide (PULMICORT) 500 mcg/2 ml nebulizer suspension  500 mcg Nebulization BID RT    insulin lispro (HUMALOG) injection   SubCUTAneous Q6H       Objective:   Vital Signs:    Visit Vitals    /76    Pulse 86    Temp 97.4 °F (36.3 °C)    Resp 17    Wt 91.6 kg (201 lb 15.1 oz)    SpO2 99%    BMI 32.59 kg/m2       O2 Device: BIPAP   O2 Flow Rate (L/min): 2 l/min   Temp (24hrs), Av.8 °F (36.6 °C), Min:97.3 °F (36.3 °C), Max:98.6 °F (37 °C)       Intake/Output:   Last shift:         Last 3 shifts:  1901 -  0700  In: 470 [I.V.:470]  Out: -     Intake/Output Summary (Last 24 hours) at 18 1143  Last data filed at 18 2117   Gross per 24 hour   Intake               10 ml   Output                0 ml   Net               10 ml            Physical Exam:   Comfortable; on nc O2 at 2 L; acyanotic  HEENT: pupils not dilated, reactive, no scleral jaundice  Neck: No adenopathy or thyroid swelling  CVS: S1S2 no murmurs; JVD not elevated  RS: Mod air entry bilaterally, decreased BS at bases, no wheezes, + bi-basal crackles  Abd: soft, non tender, no hepatosplenomegaly, no abd distension  Neuro: limited exam; awake, agitated - asking me to leave; confused at baseline  Extrm: mild bilateral pitting leg edema   Skin: no rash  Lymphatic: no cervical or supraclavicular adenopathy    Telemetry: sinus rhythm    Lines/Tubes:  PIVs    Data review:     Recent Results (from the past 24 hour(s))   GLUCOSE, POC    Collection Time: 07/19/18 12:11 PM   Result Value Ref Range    Glucose (POC) 112 (H) 70 - 110 mg/dL   GLUCOSE, POC    Collection Time: 07/19/18  6:06 PM   Result Value Ref Range    Glucose (POC) 90 70 - 110 mg/dL   GLUCOSE, POC    Collection Time: 07/19/18 11:21 PM   Result Value Ref Range    Glucose (POC) 112 (H) 70 - 110 mg/dL   MAGNESIUM    Collection Time: 07/20/18 12:42 AM   Result Value Ref Range    Magnesium 2.3 1.6 - 2.6 mg/dL   CBC WITH AUTOMATED DIFF    Collection Time: 07/20/18 12:42 AM   Result Value Ref Range    WBC 15.1 (H) 4.6 - 13.2 K/uL    RBC 5.02 4.70 - 5.50 M/uL    HGB 12.1 (L) 13.0 - 16.0 g/dL    HCT 41.5 36.0 - 48.0 %    MCV 82.7 74.0 - 97.0 FL    MCH 24.1 24.0 - 34.0 PG    MCHC 29.2 (L) 31.0 - 37.0 g/dL    RDW 17.1 (H) 11.6 - 14.5 %    PLATELET 254 (L) 730 - 420 K/uL    MPV 10.8 9.2 - 11.8 FL    NEUTROPHILS 69 42 - 75 %    BAND NEUTROPHILS 1 0 - 5 %    LYMPHOCYTES 28 20 - 51 %    MONOCYTES 2 2 - 9 %    EOSINOPHILS 0 0 - 5 %    BASOPHILS 0 0 - 3 %    ABS. NEUTROPHILS 10.4 (H) 1.8 - 8.0 K/UL    ABS. LYMPHOCYTES 4.2 (H) 0.8 - 3.5 K/UL    ABS. MONOCYTES 0.3 0 - 1.0 K/UL    ABS. EOSINOPHILS 0.0 0.0 - 0.4 K/UL    ABS.  BASOPHILS 0.0 0.0 - 0.1 K/UL    PLATELET COMMENTS FEW LARGE PLATELETS     RBC COMMENTS ANISOCYTOSIS  1+        RBC COMMENTS MICROCYTOSIS  1+        WBC COMMENTS 1+ SMUDGE CELLS     DF MANUAL     METABOLIC PANEL, BASIC    Collection Time: 07/20/18 12:42 AM   Result Value Ref Range    Sodium 143 136 - 145 mmol/L    Potassium 4.2 3.5 - 5.5 mmol/L    Chloride 100 100 - 108 mmol/L    CO2 45 (HH) 21 - 32 mmol/L    Anion gap NEG 2 3.0 - 18 mmol/L    Glucose 131 (H) 74 - 99 mg/dL    BUN 34 (H) 7.0 - 18 MG/DL    Creatinine 1.55 (H) 0.6 - 1.3 MG/DL    BUN/Creatinine ratio 22 (H) 12 - 20      GFR est AA 53 (L) >60 ml/min/1.73m2    GFR est non-AA 44 (L) >60 ml/min/1.73m2    Calcium 8.9 8.5 - 10.1 MG/DL   GLUCOSE, POC    Collection Time: 07/20/18  5:24 AM   Result Value Ref Range    Glucose (POC) 119 (H) 70 - 110 mg/dL   GLUCOSE, POC    Collection Time: 07/20/18 11:16 AM   Result Value Ref Range    Glucose (POC) 89 70 - 110 mg/dL           No results for input(s): FIO2I, IFO2, HCO3I, IHCO3, HCOPOC, PCO2I, PCOPOC, IPHI, PHI, PHPOC, PO2I, PO2POC in the last 72 hours. No lab exists for component: IPOC2    All Micro Results     Procedure Component Value Units Date/Time    CULTURE, BLOOD [[id number]] Collected:  07/16/18 0438    Order Status:  Completed Specimen:  Blood from Blood Updated:  07/20/18 0724     Special Requests: NO SPECIAL REQUESTS        Culture result: NO GROWTH 3 DAYS       CULTURE, URINE [[id number]]  (Abnormal) Collected:  07/16/18 1800    Order Status:  Completed Specimen:  Urine from [place] Specimen Updated:  07/19/18 1323     Special Requests: NO SPECIAL REQUESTS        Culture result:         <10,000 COLONIES/mL GRAM NEGATIVE RODS (A)    CULTURE, BLOOD [[id number]] Collected:  07/16/18 1730    Order Status:  Canceled Specimen:  Blood from Blood           Imaging:  [x]I have personally reviewed the patients chest radiographs images and report   CXR 7/18    Results from Hospital Encounter encounter on 07/16/18   XR CHEST PORT   Narrative History: COPD    Technique: AP CHEST: Portable chest obtained at 5:56 AM on 07/18/18 and is  compared to the prior exam of 07/17/18. Findings: External monitoring wires and leads partially obscures visibility. Mild streaky opacities are present in the left base. No acute consolidation,  congestive heart failure, pleural effusion or pneumothorax. Impression IMPRESSION:    No acute cardiopulmonary disease, allowing for technique.           Results from [place] encounter on 07/16/18   CT HEAD WO CONT   Narrative EXAM: CT of the brain without intravenous contrast.    COMPARISON: CT July 3, 2018. REASON FOR EXAM: \"Decreased alertness; facial droop and ams\". DOSE REDUCTION:  One or more dose reduction techniques were used on this CT:  automated exposure control, adjustment of the mAs and/or kVp according to  patient's size, and iterative reconstruction techniques. The specific techniques  utilized on this CT exam have been documented in the patient's electronic  medical record.    _______________    FINDINGS:         IMAGE QUALITY:  The images are mildly degraded by motion artifact. BRAIN AND EXTRA-AXIAL SPACE:               SUBACUTE TERRITORIAL TRANSCORTICAL INFARCTION:  None detected. MASS:  None detected. HEMORRHAGE:  None. SUBDURAL FLUID COLLECTION:  None detected. HYDROCEPHALUS:  None. REMOTE CORTICAL INFARCTION:  None detected. REMOTE CEREBELLAR INFARCTION:  None detected. MISCELLANEOUS:  Non-applicable. STRIVE (STandards for Reporting Vascular changes on nEuroimaging):                     --[place]unes (age-indeterminate) or perivascular spaces of  \"presumed vascular origin\":  None definite. --[place] of white matter hypodensity of \"presumed vascular  origin\":  Low grade. --Degree of brain atrophy (subjective): Low grade. BURDEN OF CALCIFIC INTRACRANIAL ATHEROSCLEROSIS:  None significant. HEENT:             INCLUDED UPPER ORBITS:  The lenses are replaced bilaterally. INCLUDED UPPER PARANASAL SINUSES:  Predominantly clear. MASTOID AIR CELLS:  Predominantly clear. BONES:  Unremarkable. SCALP:  Unremarkable.    _______________         Impression IMPRESSION:    Low-grade generalized chronic changes, however, no acute findings.      _______________                  Note: Dr. Colletta Skelton discussed the stroke code results with Dr. Wade Lott at 8263 on  7/16/2018. IMPRESSION:   · Acute metabolic encephalopathy from CO2 narcosis   · Dementia - severity not known, but advanced per prior notes  · COPD with home O2-FEV1 of 1.23 or 55% predicted but normal ratio follows with Dr. Shery Kocher - on Spiriva and advair at home- Recent 6 min walk in may suggested requires home oxygen 2-4 liter  · Acute on chronic hypoxemic and hypercapneic respiratory failure  · Acute on chronic diast CHF  · CKD stage 3 with ARF  · HTN  · RUPERTO unknown severity on BIPAP21/17 non compliant-- not able to tolerate but best response noted on BIPAP 24/12 in recent hospitalization  · Hx of ETOH abuse (now residing in rehab facility)  · Chronic Leucocytosis with hx of CLL  · UTI GNR      RECOMMENDATIONS:   · Pulm: stable respirations; BIPAP qhs and prn with haldol or xanax - 24/12 pressures; ABG showed baseline hypercapnea and mild resp acidosis; patient poorly compliant with bipap due to dementia; wean for O2 sats >92%;  Duonebs q 4hrly; Budesonide nebs bid; IV solumedrol weaned to prednisone 30 mg daily  · Patient has BIPAP for use at rehab  · Ab - CXR clear - likely had left basal atelectasis; leucocytosis due to CLL; blood cx neg so far; UA neg; Zithromax x 5 days for possible bacterial bronchitis  · GNR reported in urine cx - Augmentin 500 mg bid x 5 days  · Oral hydration  · Platelets 746P today - improved  · Prophylaxis  -DVT Px: heparin held due to mild decrease in platelets; plan SCDs as tolerated by patient   -GI Px: pepcid  · Full code status  · D/w family-updated wife at bedside on 7/17; she says she does not live with patient for 16 years but still  legally; reviewed patient having end stage lung disease with hypercapnea, advanced dementia, complex sleep apnea, hx of CLL - discussed management; advised against aggressive measures such as CPR or vent support/breathing tube - this will cause pain, suffering, poor QOL, vent dependency with need for trach and peg etc.  He is also non-compliant due to dementia and hence likelihood of cardio-resp complications. Wife wishes full code. · D/w wife 7/19 updated - it appears Dr Dea Duenas is planning Trilogy niv; patient is non-compliant and usually advanced dementia patients cannot tolerate BIPAP or Trilogy; he likely will need nursing home placement. Will defer respective systems problem management to primary and other consultant and follow patient in ICU with primary and other medical team  Further recommendations will be based on the patient's response to recommended treatment and results of the investigation ordered. Quality Care: PPI, DVT prophylaxis, HOB elevated, Infection control all reviewed and addressed. PAIN AND SEDATION: none   · Skin/Wound: no active issues  · Nutrition: oral diet as tolerated - poor  · Prophylaxis: DVT and GI Prophylaxis reviewed  · Restraints: none  · PT/OT eval and treat: as needed  · Lines/Tubes: PIVs  ADVANCE DIRECTIVE: Full Code  Disposition: likely can transfer back to rehab soon; poor baseline with advanced lung disease and dementia with poor compliance due to dementia state; consider NH placement . Palliative medicine consulted.       Mod complexity decision making was performed in this consultation and evaluation of this patient         De Judge MD

## 2018-07-20 NOTE — DISCHARGE SUMMARY
Discharge Summary    Patient: Monet Bain MRN: [id number]  CSN: [id number]    YOB: 1945  Age: 74 y.o. Sex: male    DOA: 7/16/2018 LOS:  LOS: 9 days   Discharge Date:      Primary Care Provider:  Deepali Gongora MD    Admission Diagnoses: CO2 narcosis  Respiratory distress  Hypoxia  Altered mental status    Discharge Diagnoses:    Hospital Problems  Never Reviewed          Codes Class Noted POA    Altered mental status ICD-10-CM: R41.82  ICD-9-CM: 780.97  7/16/2018 Unknown        Respiratory distress ICD-10-CM: R06.03  ICD-9-CM: 786.09  7/16/2018 Unknown        Hypoxia ICD-10-CM: R09.02  ICD-9-CM: 799.02  7/16/2018 Unknown        CO2 narcosis ICD-10-CM: R06.89  ICD-9-CM: 786.09  7/16/2018 Unknown        Hypernatremia ICD-10-CM: E87.0  ICD-9-CM: 276.0  7/16/2018 Unknown        COPD (chronic obstructive pulmonary disease) ([facility] ICD-10-CM: J44.9  ICD-9-CM: 496  7/12/2018 Yes        RUPERTO (obstructive sleep apnea) ICD-10-CM: G47.33  ICD-9-CM: 327.23  7/12/2018 Yes        Dementia ICD-10-CM: F03.90  ICD-9-CM: 294.20  7/12/2018 Yes        HTN (hypertension) ICD-10-CM: I10  ICD-9-CM: 401.9  7/12/2018 Yes        * (Principal)Acute on chronic respiratory failure with hypoxia and hypercapnia (HCC) ICD-10-CM: J96.21, J96.22  ICD-9-CM: 518.84, 786.09, 799.02  7/3/2018 Yes        CLL (chronic lymphocytic leukemia) ([facility] ICD-10-CM: C91.90  ICD-9-CM: 204.10  7/3/2018 Yes        Acute encephalopathy ICD-10-CM: G93.40  ICD-9-CM: 348.30  7/3/2018 Yes              Discharge Condition: Stable    Discharge Medications:     Discharge Medication List as of 7/25/2018 11:46 AM      START taking these medications    Details   ALPRAZolam (XANAX) 0.5 mg tablet Take 1 Tab by mouth three (3) times daily as needed for Anxiety. Max Daily Amount: 1.5 mg.  Indications: anxiety, Print, Disp-6 Tab, R-0      predniSONE (STERAPRED) 5 mg dose pack See administration instruction per 5mg dose pack, Print, Disp-21 Tab, R-0 CONTINUE these medications which have CHANGED    Details   OLANZapine (ZYPREXA) 2.5 mg tablet Take 0.5 Tabs by mouth two (2) times a day., Print, Disp-30 Tab, R-0         CONTINUE these medications which have NOT CHANGED    Details   carvedilol (COREG) 12.5 mg tablet Take 1 Tab by mouth two (2) times daily (with meals). , Print, Disp-60 Tab, R-0      docusate sodium (COLACE) 100 mg capsule Take 1 Cap by mouth two (2) times a day for 90 days. , Print, Disp-60 Cap, R-2      amLODIPine (NORVASC) 10 mg tablet Take 1 Tab by mouth daily. , Print, Disp-30 Tab, R-0      hydrALAZINE (APRESOLINE) 25 mg tablet Take 1 Tab by mouth three (3) times daily. , Print, Disp-90 Tab, R-0      alfuzosin SR (UROXATRAL) 10 mg SR tablet Take 10 mg by mouth daily. , Historical Med      aspirin 81 mg chewable tablet Take 81 mg by mouth daily. , Historical Med      cycloSPORINE (RESTASIS) 0.05 % ophthalmic emulsion Administer 1 Drop to both eyes two (2) times a day., Historical Med      famotidine (PEPCID) 20 mg tablet Take 20 mg by mouth two (2) times a day., Historical Med      latanoprost (XALATAN) 0.005 % ophthalmic solution Administer 1 Drop to both eyes nightly., Historical Med      furosemide (LASIX) 40 mg tablet Take  by mouth daily. , Historical Med      Oxygen Historical Med      pravastatin sodium (PRAVASTATIN PO) Take  by mouth., Historical Med      tiotropium (SPIRIVA WITH HANDIHALER) 18 mcg inhalation capsule Take 1 Cap by inhalation daily. , Historical Med      fluticasone/salmeterol (ADVAIR HFA IN) Take  by inhalation. , Historical Med      ALBUTEROL IN Take  by inhalation. , Historical Med         STOP taking these medications       amoxicillin-clavulanate (AUGMENTIN) 500-125 mg per tablet Comments:   Reason for Stopping:         azithromycin (ZITHROMAX) 250 mg tablet Comments:   Reason for Stopping:               Procedures : none     Consults: Pulmonary/Critical Care      PHYSICAL EXAM   Visit Vitals    /76    Pulse 81    Temp 97.8 °F (36.6 °C)    Resp 25    Wt 84.3 kg (185 lb 13.6 oz)    SpO2 96%    BMI 30 kg/m2     General: Awake, cooperative, no acute distress    HEENT: NC, Atraumatic. PERRLA, EOMI. Anicteric sclerae. Lungs:  CTA Bilaterally. No Wheezing/Rhonchi/Rales. Heart:  Regular  rhythm,  +murmur, No Rubs, No Gallops  Abdomen: Soft, Non distended, Non tender. +Bowel sounds,   Extremities: No c/c/e  Psych:   +anxious, no  agitated. Neurologic:  No acute neurological deficits. Admission HPI :   Melody Sterling is a 74 y.o. male who hx of copd/emphysema/ ckd3/shmuel was sent from snf due to ams. Her was recently d/c to rehab and recommend bipap at night and prn. He is not compliance the treatment even when he was in the hospital last time. He was found P Co2 was  PCo2 94.9, and desat to 84%. He was put on bipap and repeated abg PCo2  81. 6. cxr no acute issue. He was lethargic on bipap. Ct head no acute issue. Intensive was called     Hospital Course :   Acute on chronic respiratory failure with hypercapnia and hypoxia, he need bipap at night and also prn in the future facility. Need f/u with pulmonology as out pt. Iv steroid was given and will d.c with po steroid tapering.          Acute encephalopathy due to hypercapnia -co 2  Narcosis plus dementia. He was given  xanax and halodal for anxiety and psychosis. Olanzapine was continued. Ct head no acute issue. He has uti and bronchitis, he  completed 5 days azythromycin and augmentin.   His ckd stable and hypernatremia resolved per iv hydration. During his stay, PT/OT recommended rehab and he was d/c to rehab. However, wife-has been  for 16 years, refused to let patient to be d/c . She filed the appeal for discharging. After she lost appeal, she would like to bring pt home with her and she would like to provide 24 hr care. Patient was d/c home trilogy at the bedside.    Activity: Activity as tolerated    Diet: Dysphagia mecj altered -ndd2    Follow-up: pcm and pulm     Disposition: rehab     Minutes spent on discharge: 60 min       Labs: Results:       Chemistry Recent Labs      07/24/18 0458 07/23/18 0421   GLU  88  106*   NA  142  142   K  4.1  4.0   CL  101  102   CO2  40*  40*   BUN  27*  32*   CREA  1.38*  1.48*   CA  9.5  9.4   AGAP  1*  0*   BUCR  20  22*      CBC w/Diff Recent Labs      07/24/18 0458 07/23/18 0421   WBC  16.4*  16.6*   RBC  5.11  4.98   HGB  12.5*  12.0*   HCT  41.3  40.4   PLT  110*  118*   GRANS  36*  34*   LYMPH  58*  59*   EOS  2  0      Cardiac Enzymes No results for input(s): CPK, CKND1, LOREN in the last 72 hours. No lab exists for component: CKRMB, TROIP   Coagulation No results for input(s): PTP, INR, APTT in the last 72 hours. No lab exists for component: INREXT, INREXT    Lipid Panel No results found for: CHOL, CHOLPOCT, CHOLX, CHLST, CHOLV, 392554, HDL, LDL, LDLC, DLDLP, 492131, VLDLC, VLDL, TGLX, TRIGL, TRIGP, TGLPOCT, CHHD, CHHDX   BNP No results for input(s): BNPP in the last 72 hours. Liver Enzymes No results for input(s): TP, ALB, TBIL, AP, SGOT, GPT in the last 72 hours. No lab exists for component: DBIL   Thyroid Studies No results found for: T4, T3U, TSH, TSHEXT, TSHEXT         Significant Diagnostic Studies: Ct Head Wo Cont    Result Date: 7/16/2018  EXAM:  CT of the brain without intravenous contrast. COMPARISON: CT July 3, 2018. REASON FOR EXAM: \"Decreased alertness; facial droop and ams\". DOSE REDUCTION:  One or more dose reduction techniques were used on this CT: automated exposure control, adjustment of the mAs and/or kVp according to patient's size, and iterative reconstruction techniques. The specific techniques utilized on this CT exam have been documented in the patient's electronic medical record. _______________ FINDINGS:      IMAGE QUALITY:  The images are mildly degraded by motion artifact.       BRAIN AND EXTRA-AXIAL SPACE:            SUBACUTE TERRITORIAL TRANSCORTICAL INFARCTION:  None detected. MASS:  None detected. HEMORRHAGE:  None. SUBDURAL FLUID COLLECTION:  None detected. HYDROCEPHALUS:  None. REMOTE CORTICAL INFARCTION:  None detected. REMOTE CEREBELLAR INFARCTION:  None detected. MISCELLANEOUS:  Non-applicable. STRIVE (STandards for Reporting Vascular changes on nEuroimaging):                     --Lacunes (age-indeterminate) or perivascular spaces of \"presumed vascular origin\":  None definite. --Clymer of white matter hypodensity of \"presumed vascular origin\":  Low grade. --Degree of brain atrophy (subjective): Low grade. BURDEN OF CALCIFIC INTRACRANIAL ATHEROSCLEROSIS:  None significant. HEENT:           INCLUDED UPPER ORBITS:  The lenses are replaced bilaterally. INCLUDED UPPER PARANASAL SINUSES:  Predominantly clear. MASTOID AIR CELLS:  Predominantly clear. BONES:  Unremarkable. SCALP:  Unremarkable. _______________     IMPRESSION: Low-grade generalized chronic changes, however, no acute findings. _______________ Note: Dr. Ann Turner discussed the stroke code results with Dr. Lilly Frias at [id number] on 7/16/2018. Ct Head Wo Cont    Result Date: 7/3/2018  EXAM: CT head INDICATION: Altered mental status. Decreased alertness. COMPARISON: 6/16/2018 TECHNIQUE: Axial CT imaging of the head was performed without intravenous contrast. One or more dose reduction techniques were used on this CT: automated exposure control, adjustment of the mAs and/or kVp according to patient's size, and iterative reconstruction techniques. The specific techniques utilized on this CT exam have been documented in the patient's electronic medical record. _______________ FINDINGS: BRAIN: Intraparenchymal hemorrhage: None. Mass effect/edema: None. Infarcts/encephalomalacia: None. White matter: Normal. Brain volume: Normal for age. EXTRA-AXIAL SPACES: Hemorrhage: None. Mass: None. Hydrocephalus: None. SINUSES: Clear. CALVARIUM: Unremarkable. OTHER: None. _______________     IMPRESSION: No evidence of acute intracranial process. Xr Chest Port    Result Date: 7/18/2018  History: COPD Technique: AP CHEST: Portable chest obtained at 5:56 AM on 07/18/18 and is compared to the prior exam of 07/17/18. Findings: External monitoring wires and leads partially obscures visibility. Mild streaky opacities are present in the left base. No acute consolidation, congestive heart failure, pleural effusion or pneumothorax. IMPRESSION: No acute cardiopulmonary disease, allowing for technique. Xr Chest Port    Result Date: 7/17/2018  CHEST AP PORTABLE Indication: Shortness of breath, COPD. Comparison: 07/16/2018. Findings: The lungs appear clear with near complete resolution of previously noted left basilar streaky opacities. No evidence for pneumothorax or pleural effusion. Cardiac silhouette and pulmonary vascularity appear within normal limits. IMPRESSION: No acute cardiopulmonary disease. Xr Chest Port    Result Date: 7/16/2018  EXAM: Portable semiupright chest radiograph. COMPARISON: July 9, 2018. REASON FOR EXAM: \"chest pain, sob,  arrhythmia\". _______________ FINDINGS:    LUNGS:         Expansion:  Adequate. Consolidation:  None definite. Pulmonary edema:   None definite. Other:  Non-applicable. PLEURAL SPACE:         Pleural effusion:   None definite. Pneumothorax:   None detected. _______________     IMPRESSION: Negative radiographic examination. _______________     [place] Chest Port    Result Date: 7/9/2018  EXAM:Chest X-Ray  History: Hypoxia Technique:  Portable Frontal View Comparison: 07/08/2018, 07/07/2018 _______________ FINDINGS: The trachea is midline. Stable midline cardiac silhouette and hilar vascular structures. Relative mild hypoinflation with minimal increased lateral right basilar opacity. Minimal increased lateral left basilar linear atelectasis/scarring. No pneumothorax. Intact osseous structures. _______________     IMPRESSION: 1. Mild hypoinflation with minimal increased bibasilar opacities, favoring atelectasis with accentuation of the left basilar scar. Xr Chest Port    Result Date: 7/8/2018  Chest, single view Indication: Shortness of breath Comparison: July 7, 2018 Findings:  Portable upright AP view of the chest was obtained. When he or atelectasis at the left lung base noted, without focal pneumonic consolidation, pneumothorax, or pleural effusion. Normal cardiac size and stable mediastinal contours. No acute osseous abnormality. Impression: Linear atelectasis at the left lung base without superimposed acute radiographic abnormality. Xr Chest Port    Result Date: 7/7/2018  Chest, single view Indication: Hypoxia Comparison: July 6, 2018 Findings:  Portable upright AP view of the chest was obtained. The lungs are progressively underexpanded by comparison to the prior days exam, with increased linear opacities at each lung base. No pneumothorax or pleural effusion. Stable cardiac size and mediastinal contours. No acute osseous abnormality. Impression: Mild progressive pulmonary hypoinflation with parallel increased in bibasilar atelectasis. Xr Chest Port    Result Date: 7/6/2018  Chest, single view Indication: Hypoxia Comparison: Several prior exams, most recently July 5, 2018 Findings:  Portable upright AP view of the chest was obtained. Linear opacity at the left lung base is demonstrated, similar to prior exams. No focal pneumonic opacity. No pneumothorax or pleural effusion. Stable cardiac size and mediastinal contours. No acute osseous abnormality. Impression: Left basilar atelectasis or scarring, similar to prior, without superimposed acute radiographic abnormality. Xr Chest Port    Result Date: 7/5/2018  Chest, single view Indication: Hypoxia.  Altered mental status with decreased level of alertness Comparison: July 3, 2018 Findings:  Portable upright AP view of the chest was obtained. No focal pneumonic opacity, pneumothorax, or pleural effusion. Linear scarring and/or atelectasis at the left lung is noted, unchanged. Cardiac size and mediastinal contours are normal. No acute osseous abnormality. IMPRESSION: 1. Minimal linear scarring or atelectasis at the left lung base unchanged from prior available exams without superimposed acute radiographic abnormality. Xr Chest Port    Result Date: 7/3/2018  INDICATION:  altered mental status COMPARISON:  6/16/2018 FINDINGS: A portable AP radiograph of the chest was obtained. The patient is on a cardiac monitor. Slightly low lung volumes. Linear opacity at the left lung base appears similar, could be scarring or partial atelectasis. There is mild crowding of basilar pulmonary markings related to hypoventilation. The costophrenic angles are indistinct, right greater than left, suggest tiny effusions. Upper lungs clear. No vascular congestion. No pneumothorax. Mediastinal contour stable. No acute bony finding. IMPRESSION: Linear opacity at the left lung base appears similar, could be scarring or partial atelectasis. There is mild crowding of basilar pulmonary markings related to hypoventilation. The costophrenic angles are indistinct, right greater than left, suggest tiny effusions.              [facility]     CC: Abbey Dunne MD

## 2018-07-20 NOTE — ROUTINE PROCESS
Bedside and Verbal shift change report given to Vivek Keller RN (oncoming nurse) by Mukesh Nugent RN (offgoing nurse).  Report included the following information SBAR, Kardex, Intake/Output, MAR, Recent Results, Med Rec Status and Cardiac Rhythm SR.

## 2018-07-20 NOTE — PROGRESS NOTES
Problem: Pressure Injury - Risk of  Goal: *Prevention of pressure injury  Document Percy Scale and appropriate interventions in the flowsheet. Outcome: Progressing Towards Goal  Pressure Injury Interventions:  Sensory Interventions: Assess changes in LOC, Discuss PT/OT consult with provider, Float heels, Keep linens dry and wrinkle-free, Maintain/enhance activity level, Minimize linen layers, Pressure redistribution bed/mattress (bed type), Sit a 90-degree angle/use footstool if needed, Turn and reposition approx. every two hours (pillows and wedges if needed)    Moisture Interventions: Minimize layers, Moisture barrier, Limit adult briefs, Check for incontinence Q2 hours and as needed, Apply protective barrier, creams and emollients, Absorbent underpads    Activity Interventions: Increase time out of bed, Pressure redistribution bed/mattress(bed type), PT/OT evaluation    Mobility Interventions: Pressure redistribution bed/mattress (bed type), PT/OT evaluation, Turn and reposition approx. every two hours(pillow and wedges), HOB 30 degrees or less    Nutrition Interventions: Document food/fluid/supplement intake, Offer support with meals,snacks and hydration    Friction and Shear Interventions: Lift team/patient mobility team, Minimize layers, Sit at 90-degree angle, Lift sheet, HOB 30 degrees or less, Apply protective barrier, creams and emollients               Problem: Falls - Risk of  Goal: *Absence of Falls  Document Stan Fall Risk and appropriate interventions in the flowsheet.    Outcome: Progressing Towards Goal  Fall Risk Interventions:  Mobility Interventions: Bed/chair exit alarm, PT Consult for mobility concerns, Patient to call before getting OOB    Mentation Interventions: Adequate sleep, hydration, pain control, Bed/chair exit alarm, Door open when patient unattended, Evaluate medications/consider consulting pharmacy, Increase mobility, More frequent rounding, Reorient patient, Toileting rounds, Update white board    Medication Interventions: Evaluate medications/consider consulting pharmacy, Patient to call before getting OOB, Bed/chair exit alarm    Elimination Interventions: Bed/chair exit alarm, Toileting schedule/hourly rounds

## 2018-07-20 NOTE — PROGRESS NOTES
Problem: Dysphagia (Adult)  Goal: *Acute Goals and Plan of Care (Insert Text)  Recommendations:  Diet: NPO  Meds: Per patient preference  Aspiration Precautions  Oral Care TID  Other: Single sips/bites, alternate solid/liquid     Goals:  Patient will:  1. Tolerate PO trials with 0 s/s overt distress in 4/5 trials  2. Utilize compensatory swallow strategies/maneuvers (decrease bite/sip, size/rate, alt. liq/sol) with min cues in 4/5 trials         Outcome: Progressing Towards Goal  Speech language pathology dysphagia treatment    Patient: Raman Paniagua. (69 y.o. male)  Date: 7/20/2018  Diagnosis: CO2 narcosis  Respiratory distress  Hypoxia  Altered mental status Acute on chronic respiratory failure with hypoxia and hypercapnia (HCC)       Precautions: Aspiration Fall  PLOF: Living with family     ASSESSMENT:  Followed up this day with dysphagia tx at RN request. Initial dysphagia evaluation completed this AM with recs of mech-soft, thin liquid diet with supervision. Per RN, pt exhibited coughing with lunch and med pass. Upon entering the room, patient noted to have significant change in alertness from AM, with poor secretion management and decreased strength and movement of oral structures. Eyes open, but did not track SLP or RN with gaze. Pt with partially dissolved pills still on tongue. Pt attempted thin liquid + straw, unable to propel bolus posteriorly. Accepted thin liquid via spoon and cup sip and puree with L anterior spillage and delayed throat clear. Pt with lingual residue following puree trial, cleared with mod cues for double swallow. At this time recommend patient NPO 2/2 altered mental status. Dr. Ekta Brand aware of change in status. SLP will continue to follow as indicated.        Progression toward goals:  []         Improving appropriately and progressing toward goals  [x]         Improving slowly and progressing toward goals  []         Not making progress toward goals and plan of care will be adjusted     PLAN:  Recommendations and Planned Interventions:  NPO  Patient continues to benefit from skilled intervention to address the above impairments. Continue treatment per established plan of care. Discharge Recommendations: To Be Determined     SUBJECTIVE:   Patient stated [place]. OBJECTIVE:   Cognitive and Communication Status:  Neurologic State: Lethargic  Orientation Level: Oriented to person, Disoriented to place, Disoriented to situation, Disoriented to time  Cognition: Decreased command following  Perception: Appears intact  Perseveration: No perseveration noted  Safety/Judgement: Decreased awareness of need for safety  Dysphagia Treatment:  Oral Assessment:  Oral Assessment  Labial: Decreased seal, Decreased rate  Dentition: Intact  Oral Hygiene: Fair  Lingual: Decreased rate, Decreased strength  Velum: No impairment  Mandible: No impairment  P.O. Trials:   Patient Position: hob 60   Vocal quality prior to P.O.: No impairment   Consistency Presented: Thin liquid, Puree   How Presented: Straw, Cup/sip       Bolus Acceptance: No impairment   Bolus Formation/Control: Impaired   Type of Impairment: Delayed, Mastication   Propulsion: Delayed (# of seconds)   Oral Residue: Lingual, 10-50% of bolus   Initiation of Swallow: Delayed (# of seconds)   Laryngeal Elevation: Functional   Aspiration Signs/Symptoms: Weak cough, Delayed cough/throat clear   Pharyngeal Phase Characteristics: No impairment, issues, or problems    Effective Modifications: Alternate liquids/solids, Small sips and bites   Cues for Modifications:  Moderate         Oral Phase Severity: Moderate   Pharyngeal Phase Severity : Moderate     PAIN:  Start of Tx: 0  End of Tx: 0     GCODESwallowing:  Swallow Current Status CN= 100%   Swallow Goal Status CI= 1-19%        The severity rating is based on the following outcomes:  ELIZABETH Noms Swallow Level 1    Clinical Judgement    After treatment:   []              Patient left in no apparent distress sitting up in chair  [x]              Patient left in no apparent distress in bed  [x]              Call bell left within reach  [x]              Nursing notified  []              Family present  [x]              Caregiver present  []              Bed alarm activated      COMMUNICATION/EDUCATION:   [x] Safe swallowing guidelines; compensatory techniques  [x]        Patient unable to participate in education; education ongoing with staff  []  Posted safety precautions in patient's room.   [] Oral-motor/laryngeal strengthening exercises      JOSEPH Clemente  Time Calculation: 20 mins

## 2018-07-20 NOTE — PROGRESS NOTES
Spoke with Joel Mills @ the [place] ([phone]). Informed her patient's family is looking for long term care. She states patient does not have a LTC benefit through the [place]. Radha Arcos is requesting clinicals be faxed to [phone]. Ernatereso Carolinepk will assist in sending clinicals. Spoke with [facility] and they are unable to accept patient because pts estranged wife informed [facility] she was unwilling to give up her social security benefit for LTC. Spoke with Arthur Real from [facility] and [facility] and informed her pts wife is now refusing to allow pt to return to [facility]. Arthur Real will verify if [facility] can accept patient. Name Relation Home Work Hobbs Nino Pantoja [phone]     Brie Beard Child [phone]  Spoke with pts wife regarding discharge plan. She wants patient to be transferred to a specialty hospital, [facility].  Reinforced the facilities that could accept the patient under rehab, possibly [facility] and [facility]. Reinforced no facility would accept patient as skilled with a LTC transition if she was unwilling to give up social security/resouces. Pts wife abruptly got off phone and asked to given CM a call back to discuss. Confirmed CM contact number. [phone]  Received return call. Pts wife unwilling to let him discharge to rehab at this time. She is stating she wants patient transferred to a \"specialty hospital\" or a rehab that will allow him to have a trilogy. [phone]  [facility] noted [facility]. [phone]  It is noted MD Bob Jackson has placed discharge order. Pts wife contacted Dhruv Funes with [facility], trilogy rep [phone] to see which rehab facilities they had worked with previously. Dhruv Funes informed pts wife Cristoadam Jesse and [facility] facilities has used trilogy previously. All area [facility] properties have declined. [facility] has no admissions person to admit today [phone].   [facility] [phone] has no male beds available today, per [facility]. [address] stated they do not ever take trilogy and now after reviewing clinicals further are unable to accept, per [place]. Ant Hogan had accepted. This CM spoke with Rosario at [facility] who states they will have male bed available today, however, she is uncertain if at some point they could accommodate trilogy. She will call back CM. [facility] states they can accept patient and trilogy. Ijeoma Heart at [facility] will check bed availability for today and call back CM. Jeni 53 Tiffanie Kat approached CM and stated patient is having episodes of choking, speech re-eval patient. Uncertain if patient will be able to dc to SNF today. She has paged MD Keesha Cameron and MD Abbie Bolaños.    1300  Pt will not dc today, per MD    [phone] again with pts wife regarding transition of care plan. She is aware pt will not dc today, per MD Abbie Bolaños and MD [facility]. Informed her of the three facilities that can accept patient and accommodate a trilogy, if needed. Those facilities are [facility] [phone], [facility], [place] [facility] [phone]. Made wife aware the plan will be to discharge early Monday morning, assuming the patient is stable. Informed her she needed to make a decision today about which facility she would like. Made her aware [facility] could not offer [facility] with placement, as pt is not [facility] connected enough, per Hernando Curtis. Patient's wife then stated she may want pt to return home instead of rehab, so that he could have a trilogy. Reiterated that the Tr[facility] is not being recommended by the pulmonologist seeing pt here at [facility],  [address]. If pts wife decides to take patient home she will need home health. Offered [facility] and told her she would need to select a [facility] agency. If pts wife decides to take him home the wife aware the patient will need 24hr care.   If wife decides to take pt home it would be prudent to have [facility]PS called.

## 2018-07-20 NOTE — PROGRESS NOTES
1920:Bedside and Verbal shift change report received from Ria Denton RN. Report included the following information SBAR, Intake/Output, MAR, Recent Results and Cardiac Rhythm NSR. Family at the bedside. Pt just finished dinner, wife states she thinks he has choked on the dinner. Previous RN suctioned, lung sounds coarse. 2000: Assessment complete, see flow sheets. 2100: Spoke with Dr. Gretta Goins, informed her of pt dinner event per wife pt choked on spaghetti. Pt not answering questions appropriately. Delayed responses. Pt placed NPO and speech consult placed for swallowing. BIPAP in place at this time. Pt agreeing to place. Aware if needed how to remove. Son at the bedside, updated with plan of care. Zyprexa 2.5 mg held at this time. Dr. Gretta Goins aware, orders if need give PRN haldol. (see MAR). 0000: Reassessment, no changes. Pt resting, on BIPAP. NAD.    0112: Critical result: carbon dioxide 45 called from Lab. Notified Dr. Gretta Goins. No orders received. 0400: Reassessment, no changes. 0500: Pt bathed by this nurse and CNA. CHG wipes complete. Pt tolerated. BIPAP remains in place. 0715: Bedside and Verbal shift change report given to LORENA Persaud RN (oncoming nurse) by Genoveva Conner RN   (offgoing nurse). Report included the following information Kardex, SBAR.

## 2018-07-20 NOTE — PROGRESS NOTES
Problem: Dysphagia (Adult)  Goal: *Acute Goals and Plan of Care (Insert Text)  Recommendations:  Diet: Mechanical soft, thin liquid   Meds: Per patient preference  Aspiration Precautions  Oral Care TID  Other: Single sips/bites, alternate solid/liquid     Goals:  Patient will:  1. Tolerate PO trials with 0 s/s overt distress in 4/5 trials  2. Utilize compensatory swallow strategies/maneuvers (decrease bite/sip, size/rate, alt. liq/sol) with min cues in 4/5 trials        Outcome: Progressing Towards Goal  Speech LAnguage Pathology bedside swallow evaluation    Patient: Shannan Cordon (69 y.o. male)  Date: 7/20/2018  Primary Diagnosis: CO2 narcosis  Respiratory distress  Hypoxia  Altered mental status        Precautions: Aspiration  Fall    PLOF: Living with family    ASSESSMENT :  Based on the objective data described below, the patient presents with mild oropharyngeal dysphagia in the setting of respiratory distress and AMS. Per RN, pt with choking incident previous PM. Pt A&O to self. Command following intact, reduced attention/concentration. Oral-motor exam revealed structures grossly intact for mastication and deglutition. Pt accepted self-feed thin liquid + straw, required mod verbal cues to reduce rate of intake. Puree and solid trials; pt demo mildly delayed mastication, otherwise functional swallow. 0 overt s/sx of aspiration. Recommend mechanical soft, chopped meat/thin liquid diet with strict aspiration precautions, supervision with PO, single sips/bites. D/w RN. ST will follow as indicated. Patient will benefit from skilled intervention to address the above impairments.   Patients rehabilitation potential is considered to be Fair  Factors which may influence rehabilitation potential include:   []            None noted  [x]            Mental ability/status  []            Medical condition  []            Home/family situation and support systems  [x]            Safety awareness  []            Pain tolerance/management  []            Other:      PLAN :  Recommendations and Planned Interventions:  Mech-soft, thin liquid   Frequency/Duration: Patient will be followed by speech-language pathology 1-2 times per day/4-7 days per week to address goals. Discharge Recommendations: To Be Determined     SUBJECTIVE:   Patient stated Mr. Betty Randhawa.     OBJECTIVE:     Past Medical History:   Diagnosis Date    Chronic kidney disease     stage 3 kidney disease    Chronic obstructive pulmonary disease (HCC)     CLL (chronic lymphocytic leukemia) (HCC)     Delirium     Emphysema lung ([address]     Fall     Frequent hospital admissions     Gout     Hyperlipidemia     Hypoxemia     Memory loss     Noncompliance     Oxygen dependent     Pneumonia     Pulmonary nodules     Respiratory failure (HCC)     Risk for falls     Septic shock (HCC)     Sleep apnea     Tobacco abuse     Vitamin D deficiency      Past Surgical History:   Procedure Laterality Date    HX CATARACT REMOVAL      HX HERNIA REPAIR      HX NEPHROSTOMY       Prior Level of Function/Home Situation: Living with family  Home Situation  Home Environment: Apartment  One/Two Story Residence: One story  Living Alone: No  Support Systems: Spouse/Significant Other/Partner  Patient Expects to be Discharged to[de-identified] Rehabilitation facility  Current DME Used/Available at Home: Walker, rolling, Oxygen, portable  Diet prior to admission: Regular/thin  Current Diet:  Mech-soft, thin liquid    Cognitive and Communication Status:  Neurologic State: Alert  Orientation Level: Oriented to person, Disoriented to place, Disoriented to situation, Disoriented to time  Cognition: Follows commands, Decreased attention/concentration  Perception: Appears intact  Perseveration: No perseveration noted  Safety/Judgement: Decreased awareness of need for safety  Oral Assessment:  Oral Assessment  Labial: No impairment  Dentition: Intact  Oral Hygiene: Fair  Lingual: Decreased rate  Velum: No impairment  Mandible: No impairment  P.O. Trials:  Patient Position: HOB 60  Vocal quality prior to P.O.: No impairment  Consistency Presented: Thin liquid; Solid;Puree  How Presented: Self-fed/presented;Straw;Successive swallows     Bolus Acceptance: No impairment  Bolus Formation/Control: Impaired  Type of Impairment: Delayed;Mastication  Propulsion: No impairment  Oral Residue: None  Initiation of Swallow: No impairment  Laryngeal Elevation: Functional  Aspiration Signs/Symptoms: None  Pharyngeal Phase Characteristics: No impairment, issues, or problems   Effective Modifications: Alternate liquids/solids;Small sips and bites  Cues for Modifications: Moderate       Oral Phase Severity: Mild  Pharyngeal Phase Severity : Minimal    GCODESwallowing:  Swallow Current Status CK= 40-59%   Swallow Goal Status CI= 1-19%    The severity rating is based on the following outcomes:  ELIZABETH Noms Swallow Level 4    Clinical Judgement    PAIN:  Start of Eval: 0  End of Eval: 0     After treatment:   []            Patient left in no apparent distress sitting up in chair  [x]            Patient left in no apparent distress in bed  [x]            Call bell left within reach  [x]            Nursing notified  []            Family present  []            Caregiver present  []            Bed alarm activated    COMMUNICATION/EDUCATION:   [x]            Safe swallowing guidelines; compensatory techniques. [x]            Patient/family have participated as able in goal setting and plan of care. []            Patient/family agree to work toward stated goals and plan of care. []            Patient understands intent and goals of therapy; neutral about participation. []            Patient unable to participate in goal setting/plan of care; educ ongoing with interdisciplinary staff  []         Posted safety precautions in patient's room.     Thank you for this referral.  Vale Lee, SLP  Time Calculation: 15 mins

## 2018-07-20 NOTE — PROGRESS NOTES
Problem: Mobility Impaired (Adult and Pediatric)  Goal: *Acute Goals and Plan of Care (Insert Text)  Physical Therapy Goals  Initiated 7/17/2018 and to be accomplished within 3-7 day(s)  1. Patient will move from supine to sit and sit to supine  in bed with supervision/set-up. 2.  Patient will transfer from bed to chair and chair to bed with supervision/set-up using the least restrictive device. 3.  Patient will perform sit to stand with supervision/set-up. 4.  Patient will ambulate with supervision/set-up for 100 feet with the least restrictive device. Outcome: Not Progressing Towards Goal  PT session held due to:  []  Nausea/vomiting  [x]  RN Communication/ suggestion (Aspiratation event and now on Bi-PAP)   []  Extreme Pain  []  Dialysis treatment in progress. Will f/u tomorrow. Thank you.   Loretta Paula, PTA

## 2018-07-20 NOTE — PROGRESS NOTES
Hospitalist Progress Note-critical care note Patient: Nitin Eagle. MRN: [id number]  CSN: [id number] YOB: 1945  Age: 74 y.o. Sex: male DOA: 7/16/2018 LOS:  LOS: 4 days Chief complaint: acute on chronic respiratory failure ,acute encephalopathy, hypernatremia , co2 narcosis Assessment/Plan Hospital Problems  Never Reviewed Codes Class Noted POA Altered mental status ICD-10-CM: R41.82 
ICD-9-CM: 780.97  7/16/2018 Unknown Respiratory distress ICD-10-CM: R06.03 
ICD-9-CM: 786.09  7/16/2018 Unknown Hypoxia ICD-10-CM: R09.02 
ICD-9-CM: 799.02  7/16/2018 Unknown CO2 narcosis ICD-10-CM: R06.89 
ICD-9-CM: 786.09  7/16/2018 Unknown Hypernatremia ICD-10-CM: E87.0 ICD-9-CM: 276.0  7/16/2018 Unknown COPD (chronic obstructive pulmonary disease) (HCC) ICD-10-CM: J44.9 ICD-9-CM: 473  7/12/2018 Yes RUPERTO (obstructive sleep apnea) ICD-10-CM: G47.33 
ICD-9-CM: 327.23  7/12/2018 Yes Dementia ICD-10-CM: F03.90 ICD-9-CM: 294.20  7/12/2018 Yes HTN (hypertension) ICD-10-CM: I10 
ICD-9-CM: 401.9  7/12/2018 Yes * (Principal)Acute on chronic respiratory failure with hypoxia and hypercapnia (HCC) ICD-10-CM: J96.21, J96.22 
ICD-9-CM: 518.84, 786.09, 799.02  7/3/2018 Yes  
   
 CLL (chronic lymphocytic leukemia) (HCC) ICD-10-CM: C91.90 ICD-9-CM: 204.10  7/3/2018 Yes Acute encephalopathy ICD-10-CM: G93.40 ICD-9-CM: 348.30  7/3/2018 Yes Acute on chronic respiratory failure with hypercapnia and hypoxia Need bipap at night and also prn  
Continu, breath tx   
Weaning off steroid  
  
 emphysema lung 
 breathing tx and supportive treatment  
   
  
Acute encephalopathy due to hypercapnia -co 2  Narcosis plus demetia   
Agitated am and yelling at the staff On  Olanzapine- 
Ct head no acute issue Ativan and haloidal prn   
  
 
Htn Lasix  
   
cll , leukocytosis -remained at the baseline   
  
  
ckd 3 Back to his base line Hypernatremia Resolved Palliative care on board Subjective: be away from me Nurse; agitated Subjective: agitated last night. Review of systems: 
 
Unable to obtain due to agitation Vital signs/Intake and Output: 
Visit Vitals  /76  Pulse 86  Temp 98.6 °F (37 °C)  Resp 17  Wt 91.6 kg (201 lb 15.1 oz)  SpO2 99%  BMI 32.59 kg/m2 Current Shift:    
Last three shifts:  07/18 1901 - 07/20 0700 In: 470 [I.V.:470] Out: - Physical Exam: Not examed due agitation and refused Labs: Results:  
   
Chemistry Recent Labs  
   07/20/18 
 5928  07/19/18 
 0431  07/18/18 
 0457 GLU  131*  136*  133* NA  143  140  148* K  4.2  4.3  4.5  
CL  100  101  105 CO2  45*  44*  43* BUN  34*  28*  29* CREA  1.55*  1.35*  1.60* CA  8.9  8.5  8.9 AGAP  NEG 2  NEG 5  0* BUCR  22*  21*  18  
  
CBC w/Diff Recent Labs  
   07/20/18 
 0042  07/19/18 
 0431  07/18/18 
 0457 WBC  15.1*  15.8*  18.1*  
RBC  5.02  4.77  4.76 HGB  12.1*  11.5*  11.4* HCT  41.5  39.4  39.8 PLT  132*  117*  131* GRANS  69  63  68 LYMPH  28  30  26 EOS  0  0  0 Cardiac Enzymes No results for input(s): CPK, CKND1, LOREN in the last 72 hours. No lab exists for component: [place] Coagulation No results for input(s): PTP, INR, APTT in the last 72 hours. No lab exists for component: INREXT, INREXT Lipid Panel No results found for: CHOL, CHOLPOCT, CHOLX, CHLST, CHOLV, 419129, HDL, LDL, LDLC, DLDLP, 286726, VLDLC, VLDL, TGLX, TRIGL, TRIGP, TGLPOCT, CHHD, CHHDX  
BNP No results for input(s): BNPP in the last 72 hours. Liver Enzymes No results for input(s): TP, ALB, TBIL, AP, SGOT, GPT in the last 72 hours. No lab exists for component: DBIL Thyroid Studies No results found for: T4, T3U, TSH, TSHEXT, TSHEXT Procedures/imaging: see electronic medical records for all procedures/Xrays and details which were not copied into this note but were reviewed prior to creation of Plan Alejandro Kirkland MD

## 2018-07-21 LAB
ANION GAP SERPL CALC-SCNC: 0 MMOL/L (ref 3–18)
BASOPHILS # BLD: 0 K/UL (ref 0–0.1)
BASOPHILS NFR BLD: 0 % (ref 0–2)
BUN SERPL-MCNC: 35 MG/DL (ref 7–18)
BUN/CREAT SERPL: 25 (ref 12–20)
CALCIUM SERPL-MCNC: 9.4 MG/DL (ref 8.5–10.1)
CHLORIDE SERPL-SCNC: 103 MMOL/L (ref 100–108)
CO2 SERPL-SCNC: 42 MMOL/L (ref 21–32)
CREAT SERPL-MCNC: 1.4 MG/DL (ref 0.6–1.3)
DIFFERENTIAL METHOD BLD: ABNORMAL
EOSINOPHIL # BLD: 0 K/UL (ref 0–0.4)
EOSINOPHIL NFR BLD: 0 % (ref 0–5)
ERYTHROCYTE [DISTWIDTH] IN BLOOD BY AUTOMATED COUNT: 17.4 % (ref 11.6–14.5)
GLUCOSE BLD STRIP.AUTO-MCNC: 100 MG/DL (ref 70–110)
GLUCOSE BLD STRIP.AUTO-MCNC: 110 MG/DL (ref 70–110)
GLUCOSE BLD STRIP.AUTO-MCNC: 114 MG/DL (ref 70–110)
GLUCOSE BLD STRIP.AUTO-MCNC: 90 MG/DL (ref 70–110)
GLUCOSE BLD STRIP.AUTO-MCNC: 96 MG/DL (ref 70–110)
GLUCOSE SERPL-MCNC: 102 MG/DL (ref 74–99)
HCT VFR BLD AUTO: 43.2 % (ref 36–48)
HGB BLD-MCNC: 12.9 G/DL (ref 13–16)
LYMPHOCYTES # BLD: 6.4 K/UL (ref 0.9–3.6)
LYMPHOCYTES NFR BLD: 49 % (ref 21–52)
MAGNESIUM SERPL-MCNC: 2.4 MG/DL (ref 1.6–2.6)
MCH RBC QN AUTO: 24.5 PG (ref 24–34)
MCHC RBC AUTO-ENTMCNC: 29.9 G/DL (ref 31–37)
MCV RBC AUTO: 82 FL (ref 74–97)
MONOCYTES # BLD: 1.3 K/UL (ref 0.05–1.2)
MONOCYTES NFR BLD: 10 % (ref 3–10)
NEUTS SEG # BLD: 5.4 K/UL (ref 1.8–8)
NEUTS SEG NFR BLD: 41 % (ref 40–73)
PLATELET # BLD AUTO: 108 K/UL (ref 135–420)
PLATELET COMMENTS,PCOM: ABNORMAL
PMV BLD AUTO: 11.2 FL (ref 9.2–11.8)
POTASSIUM SERPL-SCNC: 4.3 MMOL/L (ref 3.5–5.5)
RBC # BLD AUTO: 5.27 M/UL (ref 4.7–5.5)
RBC MORPH BLD: ABNORMAL
SODIUM SERPL-SCNC: 145 MMOL/L (ref 136–145)
WBC # BLD AUTO: 13.1 K/UL (ref 4.6–13.2)

## 2018-07-21 PROCEDURE — 74011000250 HC RX REV CODE- 250: Performed by: INTERNAL MEDICINE

## 2018-07-21 PROCEDURE — 82962 GLUCOSE BLOOD TEST: CPT

## 2018-07-21 PROCEDURE — 74011250637 HC RX REV CODE- 250/637: Performed by: HOSPITALIST

## 2018-07-21 PROCEDURE — 36415 COLL VENOUS BLD VENIPUNCTURE: CPT | Performed by: HOSPITALIST

## 2018-07-21 PROCEDURE — 77010033678 HC OXYGEN DAILY

## 2018-07-21 PROCEDURE — 65610000006 HC RM INTENSIVE CARE

## 2018-07-21 PROCEDURE — 74011250636 HC RX REV CODE- 250/636: Performed by: HOSPITALIST

## 2018-07-21 PROCEDURE — 83735 ASSAY OF MAGNESIUM: CPT | Performed by: HOSPITALIST

## 2018-07-21 PROCEDURE — 77030032490 HC SLV COMPR SCD KNE COVD -B

## 2018-07-21 PROCEDURE — 74011636637 HC RX REV CODE- 636/637: Performed by: INTERNAL MEDICINE

## 2018-07-21 PROCEDURE — 85025 COMPLETE CBC W/AUTO DIFF WBC: CPT | Performed by: HOSPITALIST

## 2018-07-21 PROCEDURE — 74011250637 HC RX REV CODE- 250/637: Performed by: INTERNAL MEDICINE

## 2018-07-21 PROCEDURE — 80048 BASIC METABOLIC PNL TOTAL CA: CPT | Performed by: HOSPITALIST

## 2018-07-21 PROCEDURE — 94660 CPAP INITIATION&MGMT: CPT

## 2018-07-21 PROCEDURE — 94640 AIRWAY INHALATION TREATMENT: CPT

## 2018-07-21 RX ADMIN — AMLODIPINE BESYLATE 5 MG: 5 TABLET ORAL at 09:00

## 2018-07-21 RX ADMIN — IPRATROPIUM BROMIDE AND ALBUTEROL SULFATE 3 ML: .5; 3 SOLUTION RESPIRATORY (INHALATION) at 07:53

## 2018-07-21 RX ADMIN — BUDESONIDE 500 MCG: 0.5 INHALANT RESPIRATORY (INHALATION) at 21:01

## 2018-07-21 RX ADMIN — IPRATROPIUM BROMIDE AND ALBUTEROL SULFATE 3 ML: .5; 3 SOLUTION RESPIRATORY (INHALATION) at 15:30

## 2018-07-21 RX ADMIN — HALOPERIDOL LACTATE 1 MG: 5 INJECTION, SOLUTION INTRAMUSCULAR at 00:37

## 2018-07-21 RX ADMIN — ASPIRIN 81 MG 81 MG: 81 TABLET ORAL at 08:57

## 2018-07-21 RX ADMIN — AZITHROMYCIN 500 MG: 250 TABLET, FILM COATED ORAL at 08:58

## 2018-07-21 RX ADMIN — OLANZAPINE 1.25 MG: 2.5 TABLET ORAL at 08:58

## 2018-07-21 RX ADMIN — OLANZAPINE 1.25 MG: 2.5 TABLET ORAL at 20:19

## 2018-07-21 RX ADMIN — PRAVASTATIN SODIUM 20 MG: 20 TABLET ORAL at 08:58

## 2018-07-21 RX ADMIN — CARVEDILOL 12.5 MG: 12.5 TABLET, FILM COATED ORAL at 18:06

## 2018-07-21 RX ADMIN — AMOXICILLIN AND CLAVULANATE POTASSIUM 1 TABLET: 500; 125 TABLET, FILM COATED ORAL at 18:05

## 2018-07-21 RX ADMIN — IPRATROPIUM BROMIDE AND ALBUTEROL SULFATE 3 ML: .5; 3 SOLUTION RESPIRATORY (INHALATION) at 00:29

## 2018-07-21 RX ADMIN — IPRATROPIUM BROMIDE AND ALBUTEROL SULFATE 3 ML: .5; 3 SOLUTION RESPIRATORY (INHALATION) at 11:43

## 2018-07-21 RX ADMIN — PREDNISONE 30 MG: 20 TABLET ORAL at 08:58

## 2018-07-21 RX ADMIN — CARVEDILOL 12.5 MG: 12.5 TABLET, FILM COATED ORAL at 09:00

## 2018-07-21 RX ADMIN — BUDESONIDE 500 MCG: 0.5 INHALANT RESPIRATORY (INHALATION) at 07:52

## 2018-07-21 RX ADMIN — IPRATROPIUM BROMIDE AND ALBUTEROL SULFATE 3 ML: .5; 3 SOLUTION RESPIRATORY (INHALATION) at 21:01

## 2018-07-21 RX ADMIN — IPRATROPIUM BROMIDE AND ALBUTEROL SULFATE 3 ML: .5; 3 SOLUTION RESPIRATORY (INHALATION) at 04:37

## 2018-07-21 RX ADMIN — FAMOTIDINE 20 MG: 20 TABLET ORAL at 08:58

## 2018-07-21 NOTE — PROGRESS NOTES
Initial assessment completed. Arrousable when name loudly called & follow command by squessing both hands strong & equally. Oriented mindy to name & oriented to time & place. On n/c @ 23l/m w/out sob. Afebrile. Monitor shows nsr w/out ectopy. Meds given w/ applesauce w/out difficulty. Will adressed diet to md.    1000- Dr Wang Fearkermit visited w/ new orders , to placed back on Bipap re so sleepy. VS stable. 1200- Assessment no changed except off bipap now while eating w/ supervision. B/P positional. & better if adjusted. Monitor no changed. Afebrile. 1400- Incontinent of large urine. 1600- Assessment no changed. Wife visited, more alert. No c/o of pain. No sob. Afebrile. 1700- Dr Roswell Bumpers visited & n/c down to 1liter/m. Spoke to wife. 18- Son visited & help feed dinner & tolerated well w/out signed of aspiration. Rest of assessment no changed. 1920- Bedside and Verbal shift change report given to Maksim Choi RN (oncoming nurse) by  Bladimir Mckeon RN (offgoing nurse). Report included the following information SBAR, Kardex, ED Summary, Intake/Output, MAR and Recent Results.

## 2018-07-21 NOTE — PROGRESS NOTES
1900: Bedside and Verbal shift change report received from NEGRO Jenkins RN. Report included the following information SBAR, Intake/Output, MAR, Recent Results, Cardiac Rhythm NSR and Alarm Parameters . 1930: Assessment complete, see flow sheets (1937). Pt resting in bed. NAD. Watching tv, requesting to call wife. Phone given. 0000: Reassessment, no changes. Pt placed on BIPAP at this time by RT.     0400: Reassessment, no changes. 0500: BIPAP removed at this time. 2L NC applied. 9441: Bathed, CHG, gown and linen changed. 0710:Bedside and Verbal shift change report given to AMANDA Owens RN (oncoming nurse) by Sofiya Abraham RN   (offgoing nurse). Report included the following information SBAR, Intake/Output, MAR and Cardiac Rhythm NSR.

## 2018-07-21 NOTE — PROGRESS NOTES
Hospitalist Progress Note Patient: Becky Ontiveros. MRN: [id number]  CSN: [id number] YOB: 1945  Age: 74 y.o. Sex: male DOA: 7/16/2018 LOS:  LOS: 5 days Chief Complaint:  Acute on chronic respiratory failure Assessment/Plan Disposition : 
Patient Active Problem List  
Diagnosis Code  Hypercapnia R06.89  
 Acute on chronic respiratory failure with hypoxia and hypercapnia (HCC) J96.21, J96.22  
 CLL (chronic lymphocytic leukemia) (HCC) C91.90  Emphysema lung ([address] J43.9  Memory loss R41.3  Acute encephalopathy G93.40  Elevated serum creatinine R79.89  
 COPD (chronic obstructive pulmonary disease) ([facility]) J44.9  RUPERTO (obstructive sleep apnea) G47.33  
 Stage 3 chronic kidney disease N18.3  Dementia F03.90  
 HTN (hypertension) I10  
 Altered mental status R41.82  Respiratory distress R06.03  
 Hypoxia R09.02  
 CO2 narcosis R06.89  
 Hypernatremia E87.0 Acute on chronic respiratory failure with hypercapnia and hypoxia Need bipap at night and also prn  
Continu, breath tx   
Weaning off steroid  
   
 emphysema lung 
 breathing tx and supportive treatment  
   
  
Acute encephalopathy due to hypercapnia -co 2 Wean oxygen to keep sats < 94%; nursing instructed at the bedside Narcosis plus demetia   
Agitated am and yelling at the staff On  Olanzapine- 
Ct head no acute issue   
Ativan and haloidal prn   
   
  
Htn Lasix  
   
cll , leukocytosis -remained at the baseline   
   
   
ckd 3 Back to his base line Hypernatremia Resolved   
  
Palliative care on board Subjective: 
Patient confused on exam; discussed with the patient's daughter. Review of systems: 
 
Constitutional: denies fevers, chills, myalgias Respiratory: denies SOB, cough Cardiovascular: denies chest pain, palpitations Gastrointestinal: denies nausea, vomiting, diarrhea Vital signs/Intake and Output: 
Visit Vitals  /74  Pulse 85  Temp 98.4 °F (36.9 °C)  Resp 24  Wt 91.6 kg (201 lb 15.1 oz)  SpO2 100%  BMI 32.59 kg/m2 Current Shift:    
Last three shifts:  07/19 1901 - 07/21 0700 In: 190 [P.O.:180; I.V.:10] Out: - Exam: 
 
General: Well developed, alert, NAD, OX2 Head/Neck: NCAT, supple, No masses, No lymphadenopathy CVS:Regular rate and rhythm, no M/R/G, S1/S2 heard, no thrill Lungs:Clear to auscultation bilaterally, no wheezes, rhonchi, or rales Abdomen: Soft, Nontender, No distention, Normal Bowel sounds, No hepatomegaly Extremities: No C/C/E, pulses palpable 2+ Skin:normal texture and turgor, no rashes, no lesions Neuro:grossly normal, follows commands Psych:confused Labs: Results:  
   
Chemistry Recent Labs  
   07/20/18 
 7473  07/19/18 
 0431 GLU  131*  136* NA  143  140  
K  4.2  4.3 CL  100  101 CO2  45*  44* BUN  34*  28* CREA  1.55*  1.35* CA  8.9  8.5 AGAP  NEG 2  NEG 5  
BUCR  22*  21* CBC w/Diff Recent Labs  
   07/20/18 
 0042  07/19/18 
 0431 WBC  15.1*  15.8*  
RBC  5.02  4.77 HGB  12.1*  11.5* HCT  41.5  39.4 PLT  132*  117* GRANS  69  63 LYMPH  28  30 EOS  0  0 Cardiac Enzymes No results for input(s): CPK, CKND1, LOREN in the last 72 hours. No lab exists for component: [facility] [place] Coagulation No results for input(s): PTP, INR, APTT in the last 72 hours. No lab exists for component: INREXT Lipid Panel No results found for: CHOL, CHOLPOCT, CHOLX, CHLST, CHOLV, 294645, HDL, LDL, LDLC, DLDLP, 698606, VLDLC, VLDL, TGLX, TRIGL, TRIGP, TGLPOCT, CHHD, CHHDX  
BNP No results for input(s): BNPP in the last 72 hours. Liver Enzymes No results for input(s): TP, ALB, TBIL, AP, SGOT, GPT in the last 72 hours. No lab exists for component: DBIL Thyroid Studies No results found for: T4, T3U, TSH, TSHEXT Procedures/imaging: see electronic medical records for all procedures/Xrays and details which were not copied into this note but were reviewed prior to creation of Plan Daniella Sahu MD

## 2018-07-21 NOTE — PROGRESS NOTES
[facility]  Pulmonary, Critical Care, and Sleep Medicine    Pulm/CC    Name: Zakia Pinzon. MRN: [id number]   : 1945 Hospital: [facility] [place]   Date: 2018  Room: 108/     Subjective: This patient has been seen and evaluated at the request of Dr. Ben Dawson for patient on BIPAP. Patient is a 74 y. o.AA male with hx of COPD, home o2, chronic hypercapneic resp failure, dementia, CLL, diast CHF, CKD who was recently in [facility] 2 weeks ago for hypercapneic resp failure needing BIPAP at high settings. He was subseqeuntly discharged to rehab. He has been sent to ER on  with altered mentation and elevated pCO2. He is wheezing.   He has been on BIPAP 24/12 pressures in ER.    18  Baseline dementia; episodic confusions and agitations - declines BIPAP now and then  He is now sleeping   Breathing stable; no cough or vomiting  ROS - limited due to dementia at baseline    PSG [facility] Dr Mony Miller  AHI 34/hr RUPERTO and CSA events; O2 desats  BIPAP titration - snoring and apneas could not be eliminated with 24/20 pressures; central apneas with higher pressures; poorly tolerated overall    Past Medical History:   Diagnosis Date    Chronic kidney disease     stage 3 kidney disease    Chronic obstructive pulmonary disease (HCC)     CLL (chronic lymphocytic leukemia) (HCC)     Delirium     Emphysema lung ([address]     Fall     Frequent hospital admissions     Gout     Hyperlipidemia     Hypoxemia     Memory loss     Noncompliance     Oxygen dependent     Pneumonia     Pulmonary nodules     Respiratory failure ([address]     Risk for falls     Septic shock (HCC)     Sleep apnea     Tobacco abuse     Vitamin D deficiency       Past Surgical History:   Procedure Laterality Date    HX CATARACT REMOVAL      HX HERNIA REPAIR      HX NEPHROSTOMY        No Known Allergies   Social History   Substance Use Topics    Smoking status: Former Smoker    Smokeless tobacco: Never Used    Alcohol use No      Comment: former ETOH user; stopped 2017        Current Facility-Administered Medications   Medication Dose Route Frequency    amoxicillin-clavulanate (AUGMENTIN) 500-125 mg per tablet 1 Tab  1 Tab Oral Q12H    predniSONE (DELTASONE) tablet 30 mg  30 mg Oral DAILY WITH BREAKFAST    OLANZapine (ZyPREXA) tablet 1.25 mg  1.25 mg Oral BID    amLODIPine (NORVASC) tablet 5 mg  5 mg Oral DAILY    carvedilol (COREG) tablet 12.5 mg  12.5 mg Oral BID WITH MEALS    aspirin chewable tablet 81 mg  81 mg Oral DAILY    pravastatin (PRAVACHOL) tablet 20 mg  20 mg Oral DAILY    famotidine (PEPCID) tablet 20 mg  20 mg Oral DAILY    azithromycin (ZITHROMAX) tablet 500 mg  500 mg Oral DAILY    albuterol-ipratropium (DUO-NEB) 2.5 MG-0.5 MG/3 ML  3 mL Nebulization Q4H RT    budesonide (PULMICORT) 500 mcg/2 ml nebulizer suspension  500 mcg Nebulization BID RT    insulin lispro (HUMALOG) injection   SubCUTAneous Q6H       Objective:   Vital Signs:    Visit Vitals    /65    Pulse 77    Temp 98.4 °F (36.9 °C)    Resp (!) 33    Wt 91.6 kg (201 lb 15.1 oz)    SpO2 99%    BMI 32.59 kg/m2       O2 Device: BIPAP   O2 Flow Rate (L/min): 22 l/min   Temp (24hrs), Av.3 °F (36.8 °C), Min:98 °F (36.7 °C), Max:98.6 °F (37 °C)       Intake/Output:   Last shift:      701 - 1900  In: 120 [P.O.:120]  Out: -   Last 3 shifts: 1901 -  07  In: 190 [P.O.:180; I.V.:10]  Out: -     Intake/Output Summary (Last 24 hours) at 18 1054  Last data filed at 18 0800   Gross per 24 hour   Intake              300 ml   Output                0 ml   Net              300 ml            Physical Exam:   Comfortable; on nc O2 at 2 L; acyanotic  HEENT: pupils not dilated, reactive, no scleral jaundice  Neck: No adenopathy or thyroid swelling  CVS: S1S2 no murmurs; JVD not elevated  RS: Mod air entry bilaterally, decreased BS at bases, no wheezes, + bi-basal crackles  Abd: soft, non tender, no hepatosplenomegaly, no abd distension  Neuro: limited exam; sleeping; arousable; confused at baseline  Extrm: mild bilateral pitting leg edema   Skin: no rash  Lymphatic: no cervical or supraclavicular adenopathy    Telemetry: sinus rhythm    Lines/Tubes:  PIVs    Data review:     Recent Results (from the past 24 hour(s))   GLUCOSE, POC    Collection Time: 07/20/18 11:16 AM   Result Value Ref Range    Glucose (POC) 89 70 - 110 mg/dL   GLUCOSE, POC    Collection Time: 07/20/18  5:55 PM   Result Value Ref Range    Glucose (POC) 89 70 - 110 mg/dL   GLUCOSE, POC    Collection Time: 07/21/18 12:08 AM   Result Value Ref Range    Glucose (POC) 114 (H) 70 - 110 mg/dL   GLUCOSE, POC    Collection Time: 07/21/18  6:28 AM   Result Value Ref Range    Glucose (POC) 96 70 - 110 mg/dL   CBC WITH AUTOMATED DIFF    Collection Time: 07/21/18 10:41 AM   Result Value Ref Range    WBC 13.1 4.6 - 13.2 K/uL    RBC 5.27 4.70 - 5.50 M/uL    HGB 12.9 (L) 13.0 - 16.0 g/dL    HCT 43.2 36.0 - 48.0 %    MCV 82.0 74.0 - 97.0 FL    MCH 24.5 24.0 - 34.0 PG    MCHC 29.9 (L) 31.0 - 37.0 g/dL    RDW 17.4 (H) 11.6 - 14.5 %    PLATELET 944 (L) 499 - 420 K/uL    MPV 11.2 9.2 - 11.8 FL    NEUTROPHILS PENDING %    LYMPHOCYTES PENDING %    MONOCYTES PENDING %    EOSINOPHILS PENDING %    BASOPHILS PENDING %    ABS. NEUTROPHILS PENDING K/UL    ABS. LYMPHOCYTES PENDING K/UL    ABS. MONOCYTES PENDING K/UL    ABS. EOSINOPHILS PENDING K/UL    ABS. BASOPHILS PENDING K/UL    DF PENDING            No results for input(s): FIO2I, IFO2, HCO3I, IHCO3, HCOPOC, PCO2I, PCOPOC, IPHI, PHI, PHPOC, PO2I, PO2POC in the last 72 hours.     No lab exists for component: IPOC2    All Micro Results     Procedure Component Value Units Date/Time    CULTURE, BLOOD [[id number]] Collected:  07/16/18 0438    Order Status:  Completed Specimen:  Blood from Blood Updated:  07/21/18 0706     Special Requests: NO SPECIAL REQUESTS        Culture result: NO GROWTH 4 DAYS CULTURE, URINE [[id number]]  (Abnormal) Collected:  07/16/18 1800    Order Status:  Completed Specimen:  Urine from [place] Specimen Updated:  07/19/18 1323     Special Requests: NO SPECIAL REQUESTS        Culture result:         <10,000 COLONIES/mL GRAM NEGATIVE RODS (A)    CULTURE, BLOOD [[id number]] Collected:  07/16/18 1730    Order Status:  Canceled Specimen:  Blood from Blood           Imaging:  [x]I have personally reviewed the patients chest radiographs images and report   CXR 7/18    Results from Hospital Encounter encounter on 07/16/18   XR CHEST PORT   Narrative Portable chest 7/20/2018. History: Tachypnea. Hypoxia. Technique: A semierect portable radiograph of the chest was obtained. Comparison:  Portable chest x-ray from 7/18/2018. Findings: The cardiac silhouette is enlarged but unchanged in size and  configuration. The thoracic aorta is tortuous. There is no pulmonary vascular  congestion or edema. The lungs are clear. There is no pneumothorax or pleural  effusion. Impression IMPRESSION:    1. No acute pulmonary disease. Results from [place] encounter on 07/16/18   CT HEAD WO CONT   Narrative EXAM:  CT of the brain without intravenous contrast.    COMPARISON: CT July 3, 2018. REASON FOR EXAM: \"Decreased alertness; facial droop and ams\". DOSE REDUCTION:  One or more dose reduction techniques were used on this CT:  automated exposure control, adjustment of the mAs and/or kVp according to  patient's size, and iterative reconstruction techniques. The specific techniques  utilized on this CT exam have been documented in the patient's electronic  medical record.    _______________    FINDINGS:         IMAGE QUALITY:  The images are mildly degraded by motion artifact. BRAIN AND EXTRA-AXIAL SPACE:               SUBACUTE TERRITORIAL TRANSCORTICAL INFARCTION:  None detected.              MASS:  None detected. HEMORRHAGE:  None. SUBDURAL FLUID COLLECTION:  None detected. HYDROCEPHALUS:  None. REMOTE CORTICAL INFARCTION:  None detected. REMOTE CEREBELLAR INFARCTION:  None detected. MISCELLANEOUS:  Non-applicable. STRIVE (STandards for Reporting Vascular changes on nEuroimaging):                     --[place] (age-indeterminate) or perivascular spaces of  \"presumed vascular origin\":  None definite. --[place] of white matter hypodensity of \"presumed vascular  origin\":  Low grade. --Degree of brain atrophy (subjective): Low grade. BURDEN OF CALCIFIC INTRACRANIAL ATHEROSCLEROSIS:  None significant. HEENT:             INCLUDED UPPER ORBITS:  The lenses are replaced bilaterally. INCLUDED UPPER PARANASAL SINUSES:  Predominantly clear. MASTOID AIR CELLS:  Predominantly clear. BONES:  Unremarkable. SCALP:  Unremarkable.    _______________         Impression IMPRESSION:    Low-grade generalized chronic changes, however, no acute findings. _______________                  Note: Dr. Oamr Vila discussed the stroke code results with Dr. Sabino Oppenheim at [phone] on  7/16/2018.                IMPRESSION:   · Acute metabolic encephalopathy from CO2 narcosis   · Dementia - severity not known, but advanced per prior notes  · COPD with home O2-FEV1 of 1.23 or 55% predicted but normal ratio follows with Dr. Ashanti Nathan - on Spiriva and advair at home- Recent 6 min walk in may suggested requires home oxygen 2-4 liter  · Acute on chronic hypoxemic and hypercapneic respiratory failure  · Acute on chronic diast CHF  · CKD stage 3 with ARF  · HTN  · RUPERTO unknown severity on BIPAP21/17 non compliant-- not able to tolerate but best response noted on BIPAP 24/12 in recent hospitalization  · Hx of ETOH abuse (now residing in rehab facility)  · Chronic Leucocytosis with hx of CLL  · UTI GNR      RECOMMENDATIONS:   · Pulm: stable respirations; place on BIPAP while sleeping; prn haldol or xanax; BIPAP 24/12 pressures; ABG showed baseline hypercapnea and mild resp acidosis; patient poorly compliant with bipap due to dementia; wean for O2 sats >92%; Duonebs q 4hrly; Budesonide nebs bid; IV solumedrol weaned to prednisone 30 mg daily  · Patient has BIPAP for use at rehab  · Ab - CXR clear; leucocytosis due to CLL; blood cx neg so far; s/p Zithromax x 5 days for possible bacterial bronchitis  · GNR reported in urine cx - Augmentin 500 mg bid x 5 days  · Oral hydration  · Platelets 944 k today - stable; check heparin platelet Ab  · Prophylaxis  -DVT Px: heparin held due to mild decrease in platelets; plan SCDs as tolerated by patient   -GI Px: pepcid  · Full code status  · D/w family-updated wife at bedside on 7/17; she says she does not live with patient for 16 years but still  legally; reviewed patient having end stage lung disease with hypercapnea, advanced dementia, complex sleep apnea, hx of CLL - discussed management; advised against aggressive measures such as CPR or vent support/breathing tube - this will cause pain, suffering, poor QOL, vent dependency with need for trach and peg etc.  He is also non-compliant due to dementia and hence likelihood of cardio-resp complications. Wife wishes full code. · D/w wife 7/19 updated - it appears Dr Dea Duenas is planning Trilogy niv; patient is non-compliant and usually advanced dementia patients cannot tolerate BIPAP or Trilogy; he likely will need nursing home placement. Will defer respective systems problem management to primary and other consultant and follow patient in ICU with primary and other medical team  Further recommendations will be based on the patient's response to recommended treatment and results of the investigation ordered.   Quality Care: PPI, DVT prophylaxis, HOB elevated, Infection control all reviewed and addressed. PAIN AND SEDATION: none   · Skin/Wound: no active issues  · Nutrition: oral diet as tolerated - poor  · Prophylaxis: DVT and GI Prophylaxis reviewed  · Restraints: none  · PT/OT eval and treat: as needed  · Lines/Tubes: PIVs  ADVANCE DIRECTIVE: Full Code  Disposition: likely can transfer back to rehab soon; poor baseline with advanced lung disease and dementia with poor compliance due to dementia state; consider NH placement . Palliative medicine consulted. Remains in icu after d/w icu nursing on daily basis.       Mod complexity decision making was performed in this consultation and evaluation of this patient         Ping Campbell MD

## 2018-07-21 NOTE — PROGRESS NOTES
@7525 pt taken over lethargic in bed, open eyes to sound, follows commands at times,  flacc 0 vital signs fluctuates. Assessment done and documented in appropriate flow sheets. Wheezing heard and resp rate elevated. Pt placed on BIPAP . Nursing management continues. @2130 No new developments care continues. @2230 pt awake watching television following commands ,denies pain when asked . Nursing observation continues. @0030 pt agitated kicking and coughing attempting to climb out of bed . Haldol administered as prescribed. Nursing observation continues. @0100 pt calmer ,asleep intermittently, vital signs stable  ,denies pain when awake. Nursing management continues. @0300 pt awake but calm at this time. @0500 pt awake alert oriented to self and place, continues to deny pain vital signs fluctuates . No new changes . Nursing care continues. @2348 personal hygiene needs met care continues. @0800 Bedside and Verbal shift change report given to Candie latham(oncoming nurse) by Vijay Levy (offgoing nurse). Report included the following information SBAR, Kardex, Intake/Output, MAR, Recent Results and Med Rec Status.  Alarm parameters reviewed, on and audible Appropriate for patient clinical condition

## 2018-07-22 LAB
ANION GAP SERPL CALC-SCNC: 2 MMOL/L (ref 3–18)
BASOPHILS # BLD: 0.1 K/UL (ref 0–0.1)
BASOPHILS NFR BLD: 0 % (ref 0–2)
BUN SERPL-MCNC: 34 MG/DL (ref 7–18)
BUN/CREAT SERPL: 24 (ref 12–20)
CALCIUM SERPL-MCNC: 9 MG/DL (ref 8.5–10.1)
CHLORIDE SERPL-SCNC: 103 MMOL/L (ref 100–108)
CO2 SERPL-SCNC: 41 MMOL/L (ref 21–32)
CREAT SERPL-MCNC: 1.44 MG/DL (ref 0.6–1.3)
DIFFERENTIAL METHOD BLD: ABNORMAL
EOSINOPHIL # BLD: 0.1 K/UL (ref 0–0.4)
EOSINOPHIL NFR BLD: 0 % (ref 0–5)
ERYTHROCYTE [DISTWIDTH] IN BLOOD BY AUTOMATED COUNT: 17.3 % (ref 11.6–14.5)
GLUCOSE BLD STRIP.AUTO-MCNC: 113 MG/DL (ref 70–110)
GLUCOSE BLD STRIP.AUTO-MCNC: 244 MG/DL (ref 70–110)
GLUCOSE BLD STRIP.AUTO-MCNC: 80 MG/DL (ref 70–110)
GLUCOSE BLD STRIP.AUTO-MCNC: 91 MG/DL (ref 70–110)
GLUCOSE SERPL-MCNC: 99 MG/DL (ref 74–99)
HCT VFR BLD AUTO: 41.2 % (ref 36–48)
HGB BLD-MCNC: 12.1 G/DL (ref 13–16)
LYMPHOCYTES # BLD: 10.9 K/UL (ref 0.9–3.6)
LYMPHOCYTES NFR BLD: 64 % (ref 21–52)
MAGNESIUM SERPL-MCNC: 2.1 MG/DL (ref 1.6–2.6)
MCH RBC QN AUTO: 24.2 PG (ref 24–34)
MCHC RBC AUTO-ENTMCNC: 29.4 G/DL (ref 31–37)
MCV RBC AUTO: 82.2 FL (ref 74–97)
MONOCYTES # BLD: 1.2 K/UL (ref 0.05–1.2)
MONOCYTES NFR BLD: 7 % (ref 3–10)
NEUTS SEG # BLD: 4.9 K/UL (ref 1.8–8)
NEUTS SEG NFR BLD: 29 % (ref 40–73)
PLATELET # BLD AUTO: 121 K/UL (ref 135–420)
PMV BLD AUTO: 11.9 FL (ref 9.2–11.8)
POTASSIUM SERPL-SCNC: 3.9 MMOL/L (ref 3.5–5.5)
RBC # BLD AUTO: 5.01 M/UL (ref 4.7–5.5)
SODIUM SERPL-SCNC: 146 MMOL/L (ref 136–145)
WBC # BLD AUTO: 17.2 K/UL (ref 4.6–13.2)

## 2018-07-22 PROCEDURE — 77010033678 HC OXYGEN DAILY

## 2018-07-22 PROCEDURE — 86022 PLATELET ANTIBODIES: CPT | Performed by: HOSPITALIST

## 2018-07-22 PROCEDURE — 83735 ASSAY OF MAGNESIUM: CPT | Performed by: HOSPITALIST

## 2018-07-22 PROCEDURE — 74011636637 HC RX REV CODE- 636/637: Performed by: INTERNAL MEDICINE

## 2018-07-22 PROCEDURE — 74011250637 HC RX REV CODE- 250/637: Performed by: HOSPITALIST

## 2018-07-22 PROCEDURE — 97530 THERAPEUTIC ACTIVITIES: CPT

## 2018-07-22 PROCEDURE — 74011000250 HC RX REV CODE- 250: Performed by: INTERNAL MEDICINE

## 2018-07-22 PROCEDURE — 85025 COMPLETE CBC W/AUTO DIFF WBC: CPT | Performed by: INTERNAL MEDICINE

## 2018-07-22 PROCEDURE — 65610000006 HC RM INTENSIVE CARE

## 2018-07-22 PROCEDURE — 97116 GAIT TRAINING THERAPY: CPT

## 2018-07-22 PROCEDURE — 82962 GLUCOSE BLOOD TEST: CPT

## 2018-07-22 PROCEDURE — 94640 AIRWAY INHALATION TREATMENT: CPT

## 2018-07-22 PROCEDURE — 80048 BASIC METABOLIC PNL TOTAL CA: CPT | Performed by: INTERNAL MEDICINE

## 2018-07-22 PROCEDURE — 36415 COLL VENOUS BLD VENIPUNCTURE: CPT | Performed by: HOSPITALIST

## 2018-07-22 PROCEDURE — 74011250637 HC RX REV CODE- 250/637: Performed by: INTERNAL MEDICINE

## 2018-07-22 RX ORDER — INSULIN LISPRO 100 [IU]/ML
INJECTION, SOLUTION INTRAVENOUS; SUBCUTANEOUS
Status: DISCONTINUED | OUTPATIENT
Start: 2018-07-22 | End: 2018-07-25 | Stop reason: HOSPADM

## 2018-07-22 RX ADMIN — IPRATROPIUM BROMIDE AND ALBUTEROL SULFATE 3 ML: .5; 3 SOLUTION RESPIRATORY (INHALATION) at 11:23

## 2018-07-22 RX ADMIN — OLANZAPINE 1.25 MG: 2.5 TABLET ORAL at 08:50

## 2018-07-22 RX ADMIN — BUDESONIDE 500 MCG: 0.5 INHALANT RESPIRATORY (INHALATION) at 07:46

## 2018-07-22 RX ADMIN — ASPIRIN 81 MG 81 MG: 81 TABLET ORAL at 08:50

## 2018-07-22 RX ADMIN — PRAVASTATIN SODIUM 20 MG: 20 TABLET ORAL at 08:50

## 2018-07-22 RX ADMIN — PREDNISONE 30 MG: 20 TABLET ORAL at 08:50

## 2018-07-22 RX ADMIN — OLANZAPINE 1.25 MG: 2.5 TABLET ORAL at 20:19

## 2018-07-22 RX ADMIN — FAMOTIDINE 20 MG: 20 TABLET ORAL at 08:50

## 2018-07-22 RX ADMIN — BUDESONIDE 500 MCG: 0.5 INHALANT RESPIRATORY (INHALATION) at 20:53

## 2018-07-22 RX ADMIN — AMLODIPINE BESYLATE 5 MG: 5 TABLET ORAL at 08:50

## 2018-07-22 RX ADMIN — IPRATROPIUM BROMIDE AND ALBUTEROL SULFATE 3 ML: .5; 3 SOLUTION RESPIRATORY (INHALATION) at 15:04

## 2018-07-22 RX ADMIN — IPRATROPIUM BROMIDE AND ALBUTEROL SULFATE 3 ML: .5; 3 SOLUTION RESPIRATORY (INHALATION) at 20:53

## 2018-07-22 RX ADMIN — IPRATROPIUM BROMIDE AND ALBUTEROL SULFATE 3 ML: .5; 3 SOLUTION RESPIRATORY (INHALATION) at 02:19

## 2018-07-22 RX ADMIN — CARVEDILOL 12.5 MG: 12.5 TABLET, FILM COATED ORAL at 08:50

## 2018-07-22 RX ADMIN — IPRATROPIUM BROMIDE AND ALBUTEROL SULFATE 3 ML: .5; 3 SOLUTION RESPIRATORY (INHALATION) at 07:46

## 2018-07-22 RX ADMIN — AMOXICILLIN AND CLAVULANATE POTASSIUM 1 TABLET: 500; 125 TABLET, FILM COATED ORAL at 17:28

## 2018-07-22 RX ADMIN — CARVEDILOL 12.5 MG: 12.5 TABLET, FILM COATED ORAL at 17:28

## 2018-07-22 RX ADMIN — AMOXICILLIN AND CLAVULANATE POTASSIUM 1 TABLET: 500; 125 TABLET, FILM COATED ORAL at 05:46

## 2018-07-22 RX ADMIN — INSULIN LISPRO 4 UNITS: 100 INJECTION, SOLUTION INTRAVENOUS; SUBCUTANEOUS at 17:33

## 2018-07-22 NOTE — PROGRESS NOTES
[facility]  Pulmonary, Critical Care, and Sleep Medicine    Pulm/CC    Name: Domenic Deshpande. MRN: [id number]   : 1945 Hospital: [facility] [place]   Date: 2018  Room: [facility]     Subjective: This patient has been seen and evaluated at the request of Dr. Millicent Wilson for patient on BIPAP. Patient is a 74 y. o.AA male with hx of COPD, home o2, chronic hypercapneic resp failure, dementia, CLL, diast CHF, CKD who was recently in [facility] 2 weeks ago for hypercapneic resp failure needing BIPAP at high settings. He was subseqeuntly discharged to rehab. He has been sent to ER on  with altered mentation and elevated pCO2. He is wheezing.   He has been on BIPAP 24/12 pressures in ER.    18  Patient awake this am; eating breakfast with supervision  O2 sats 88% off nc o2; placed back on 2 L o2  Stable baseline dementia; episodic confusions and agitations - declines BIPAP now and then  Breathing stable; no significant cough; no vomiting  ROS - limited due to dementia at baseline    PSG [facility] [address] Dr Logan Palmer  AHI 34/hr RUPERTO and CSA events; O2 desats  BIPAP titration - snoring and apneas could not be eliminated with 24/20 pressures; central apneas with higher pressures; poorly tolerated overall    Past Medical History:   Diagnosis Date    Chronic kidney disease     stage 3 kidney disease    Chronic obstructive pulmonary disease (HCC)     CLL (chronic lymphocytic leukemia) (HCC)     Delirium     Emphysema lung ([address]     Fall     Frequent hospital admissions     Gout     Hyperlipidemia     Hypoxemia     Memory loss     Noncompliance     Oxygen dependent     Pneumonia     Pulmonary nodules     Respiratory failure ([address]     Risk for falls     Septic shock (HCC)     Sleep apnea     Tobacco abuse     Vitamin D deficiency       Past Surgical History:   Procedure Laterality Date    HX CATARACT REMOVAL      HX HERNIA REPAIR      HX NEPHROSTOMY        No Known Allergies   Social History   Substance Use Topics    Smoking status: Former Smoker    Smokeless tobacco: Never Used    Alcohol use No      Comment: former ETOH user; stopped 2017        Current Facility-Administered Medications   Medication Dose Route Frequency    amoxicillin-clavulanate (AUGMENTIN) 500-125 mg per tablet 1 Tab  1 Tab Oral Q12H    predniSONE (DELTASONE) tablet 30 mg  30 mg Oral DAILY WITH BREAKFAST    OLANZapine (ZyPREXA) tablet 1.25 mg  1.25 mg Oral BID    amLODIPine (NORVASC) tablet 5 mg  5 mg Oral DAILY    carvedilol (COREG) tablet 12.5 mg  12.5 mg Oral BID WITH MEALS    aspirin chewable tablet 81 mg  81 mg Oral DAILY    pravastatin (PRAVACHOL) tablet 20 mg  20 mg Oral DAILY    famotidine (PEPCID) tablet 20 mg  20 mg Oral DAILY    albuterol-ipratropium (DUO-NEB) 2.5 MG-0.5 MG/3 ML  3 mL Nebulization Q4H RT    budesonide (PULMICORT) 500 mcg/2 ml nebulizer suspension  500 mcg Nebulization BID RT    insulin lispro (HUMALOG) injection   SubCUTAneous Q6H       Objective:   Vital Signs:    Visit Vitals    /72    Pulse 79    Temp 97.5 °F (36.4 °C)    Resp 30    Wt 84.3 kg (185 lb 13.6 oz)    SpO2 96%    BMI 30 kg/m2       O2 Device: Nasal cannula   O2 Flow Rate (L/min): 2 l/min   Temp (24hrs), Av.3 °F (36.8 °C), Min:97.5 °F (36.4 °C), Max:98.8 °F (37.1 °C)       Intake/Output:   Last shift:      701 - 1900  In: 480 [P.O.:480]  Out: -   Last 3 shifts: 1901 -  07  In: 600 [P.O.:600]  Out: 250 [Urine:250]    Intake/Output Summary (Last 24 hours) at 18 1036  Last data filed at 18 1003   Gross per 24 hour   Intake              960 ml   Output              250 ml   Net              710 ml            Physical Exam:   Comfortable; on nc O2 at 2 L; acyanotic  HEENT: pupils not dilated, reactive, no scleral jaundice  Neck: No adenopathy or thyroid swelling  CVS: S1S2 no murmurs; JVD not elevated  RS: Mod air entry bilaterally, decreased BS at bases, no wheezes, + bi-basal crackles  Abd: soft, non tender, no hepatosplenomegaly, no abd distension  Neuro: limited exam; sleeping; arousable; confused at baseline  Extrm: mild bilateral pitting leg edema   Skin: no rash  Lymphatic: no cervical or supraclavicular adenopathy    Telemetry: sinus rhythm    Lines/Tubes:  PIVs    Data review:     Recent Results (from the past 12 hour(s))   GLUCOSE, POC    Collection Time: 07/21/18 11:48 PM   Result Value Ref Range    Glucose (POC) 90 70 - 110 mg/dL   MAGNESIUM    Collection Time: 07/22/18  5:01 AM   Result Value Ref Range    Magnesium 2.1 1.6 - 2.6 mg/dL   GLUCOSE, POC    Collection Time: 07/22/18  5:42 AM   Result Value Ref Range    Glucose (POC) 80 70 - 110 mg/dL             No results for input(s): FIO2I, IFO2, HCO3I, IHCO3, HCOPOC, PCO2I, PCOPOC, IPHI, PHI, PHPOC, PO2I, PO2POC in the last 72 hours. No lab exists for component: IPOC2    All Micro Results     Procedure Component Value Units Date/Time    CULTURE, BLOOD [[id number]] Collected:  07/16/18 0438    Order Status:  Completed Specimen:  Blood from Blood Updated:  07/22/18 0043     Special Requests: NO SPECIAL REQUESTS        Culture result: NO GROWTH 5 DAYS       CULTURE, URINE [[id number]]  (Abnormal) Collected:  07/16/18 1800    Order Status:  Completed Specimen:  Urine from [place] Specimen Updated:  07/19/18 1323     Special Requests: NO SPECIAL REQUESTS        Culture result:         <10,000 COLONIES/mL GRAM NEGATIVE RODS (A)    CULTURE, BLOOD [[id number]] Collected:  07/16/18 1730    Order Status:  Canceled Specimen:  Blood from Blood           Imaging:  [x]I have personally reviewed the patients chest radiographs images and report   CXR 7/20    Results from [place] encounter on 07/16/18   XR CHEST PORT   Narrative Portable chest 7/20/2018. History: Tachypnea. Hypoxia. Technique: A semierect portable radiograph of the chest was obtained.     Comparison:  Portable chest x-ray from 7/18/2018. Findings: The cardiac silhouette is enlarged but unchanged in size and  configuration. The thoracic aorta is tortuous. There is no pulmonary vascular  congestion or edema. The lungs are clear. There is no pneumothorax or pleural  effusion. Impression IMPRESSION:    1. No acute pulmonary disease. Results from [place] encounter on 07/16/18   CT HEAD WO CONT   Narrative EXAM:  CT of the brain without intravenous contrast.    COMPARISON: CT July 3, 2018. REASON FOR EXAM: \"Decreased alertness; facial droop and ams\". DOSE REDUCTION:  One or more dose reduction techniques were used on this CT:  automated exposure control, adjustment of the mAs and/or kVp according to  patient's size, and iterative reconstruction techniques. The specific techniques  utilized on this CT exam have been documented in the patient's electronic  medical record.    _______________    FINDINGS:         IMAGE QUALITY:  The images are mildly degraded by motion artifact. BRAIN AND EXTRA-AXIAL SPACE:               SUBACUTE TERRITORIAL TRANSCORTICAL INFARCTION:  None detected. MASS:  None detected. HEMORRHAGE:  None. SUBDURAL FLUID COLLECTION:  None detected. HYDROCEPHALUS:  None. REMOTE CORTICAL INFARCTION:  None detected. REMOTE CEREBELLAR INFARCTION:  None detected. MISCELLANEOUS:  Non-applicable. STRIVE (STandards for Reporting Vascular changes on nEuroimaging):                     --[place] (age-indeterminate) or perivascular spaces of  \"presumed vascular origin\":  None definite. --[place] of white matter hypodensity of \"presumed vascular  origin\":  Low grade. --Degree of brain atrophy (subjective): Low grade. BURDEN OF CALCIFIC INTRACRANIAL ATHEROSCLEROSIS:  None significant. HEENT:             INCLUDED UPPER ORBITS:  The lenses are replaced bilaterally. INCLUDED UPPER PARANASAL SINUSES:  Predominantly clear. MASTOID AIR CELLS:  Predominantly clear. BONES:  Unremarkable. SCALP:  Unremarkable.    _______________         Impression IMPRESSION:    Low-grade generalized chronic changes, however, no acute findings. _______________                  Note: Dr. Aayush Ortega discussed the stroke code results with Dr. Avis Sahu at [phone] on  7/16/2018.                IMPRESSION:   · Acute metabolic encephalopathy from CO2 narcosis   · Dementia - severity not known, but advanced per prior notes  · COPD with home O2-FEV1 of 1.23 or 55% predicted but normal ratio follows with Dr. Adán Waddell - on Spiriva and advair at home- Recent 6 min walk in may suggested requires home oxygen 2-4 liter  · Acute on chronic hypoxemic and hypercapneic respiratory failure  · Acute on chronic diast CHF  · CKD stage 3 with ARF  · HTN  · Severe complex sleep apnea - AHI 34/hr RUPERTO and CSA events; on BIPAP21/17 non compliant-- not able to tolerate but best response noted on BIPAP 24/12 in recent hospitalization  · Hx of ETOH abuse (now residing in rehab facility)  · Chronic Leucocytosis with hx of CLL  · UTI GNR      RECOMMENDATIONS:   · Pulm: stable respirations; nc o2 daytime and BIPAP while sleeping; prn haldol or xanax to help BIPAP tolerance/compliance; BIPAP 24/12 pressures; ABG showed baseline hypercapnea and mild resp acidosis  · Patient poorly compliant with bipap due to dementia at baseline; Patient has BIPAP for use at rehab  · Nc o2 - wean for O2 sats >92%  · Duonebs q 4hrly; Budesonide nebs bid; IV solumedrol weaned to prednisone 30 mg daily  · Ab - CXR clear; leucocytosis due to CLL; blood cx neg so far; s/p Zithromax x 5 days for possible bacterial bronchitis  · GNR reported in urine cx - Augmentin 500 mg bid x 5 days  · Oral hydration  · Platelets 551 k today - stable; check heparin platelet Ab-pending; daily CBC ordered  · Prophylaxis  -DVT Px: heparin held due to mild decrease in platelets; plan SCDs as tolerated by patient   -GI Px: pepcid  · Full code status  · D/w family-updated wife at bedside on 7/17; she says she does not live with patient for 16 years but still  legally; reviewed patient having end stage lung disease with hypercapnea, advanced dementia, complex sleep apnea, hx of CLL - discussed management; advised against aggressive measures such as CPR or vent support/breathing tube - this will cause pain, suffering, poor QOL, vent dependency with need for trach and peg etc.  He is also non-compliant due to dementia and hence likelihood of cardio-resp complications. Wife wishes full code. · D/w wife 7/19 updated - it appears Dr Victor M Nelson is planning Trilogy niv; patient is non-compliant and usually advanced dementia patients cannot tolerate BIPAP or Trilogy; he likely will need nursing home placement. Will defer respective systems problem management to primary and other consultant and follow patient in ICU with primary and other medical team  Further recommendations will be based on the patient's response to recommended treatment and results of the investigation ordered. Quality Care: PPI, DVT prophylaxis, HOB elevated, Infection control all reviewed and addressed. PAIN AND SEDATION: none   · Skin/Wound: no active issues  · Nutrition: oral diet as tolerated - supervised  · Prophylaxis: DVT and GI Prophylaxis reviewed  · Restraints: none  · PT/OT eval and treat: as needed  · Lines/Tubes: PIVs  ADVANCE DIRECTIVE: Full Code  Disposition: likely can transfer back to rehab soon; poor baseline with advanced lung disease and dementia with poor compliance due to dementia state; consider NH placement . Palliative medicine consulted. Remains in icu after d/w icu nursing on daily basis.       Mod complexity decision making was performed in this consultation and evaluation of this patient         Evelyne Ashford MD

## 2018-07-22 NOTE — PROGRESS NOTES
Problem: Pressure Injury - Risk of  Goal: *Prevention of pressure injury  Document Percy Scale and appropriate interventions in the flowsheet. Outcome: Progressing Towards Goal  Pressure Injury Interventions:  Sensory Interventions: Assess changes in LOC, Avoid rigorous massage over bony prominences, Discuss PT/OT consult with provider, Float heels, Keep linens dry and wrinkle-free, Maintain/enhance activity level, Minimize linen layers, Monitor skin under medical devices, Pressure redistribution bed/mattress (bed type), Turn and reposition approx. every two hours (pillows and wedges if needed)    Moisture Interventions: Absorbent underpads, Apply protective barrier, creams and emollients, Check for incontinence Q2 hours and as needed, Limit adult briefs, Maintain skin hydration (lotion/cream), Minimize layers, Moisture barrier, Offer toileting Q_hr    Activity Interventions: Pressure redistribution bed/mattress(bed type)    Mobility Interventions: Pressure redistribution bed/mattress (bed type), PT/OT evaluation, Turn and reposition approx. every two hours(pillow and wedges)    Nutrition Interventions: Document food/fluid/supplement intake, Discuss nutritional consult with provider    Friction and Shear Interventions: Apply protective barrier, creams and emollients, Lift team/patient mobility team               Problem: Falls - Risk of  Goal: *Absence of Falls  Document Stan Fall Risk and appropriate interventions in the flowsheet.    Outcome: Progressing Towards Goal  Fall Risk Interventions:  Mobility Interventions: Patient to call before getting OOB    Mentation Interventions: Adequate sleep, hydration, pain control, Evaluate medications/consider consulting pharmacy, More frequent rounding, Increase mobility, Toileting rounds, Update white board    Medication Interventions: Evaluate medications/consider consulting pharmacy    Elimination Interventions: Call light in reach, Bed/chair exit alarm, Patient to call for help with toileting needs, Toileting schedule/hourly rounds

## 2018-07-22 NOTE — PROGRESS NOTES
Problem: Mobility Impaired (Adult and Pediatric)  Goal: *Acute Goals and Plan of Care (Insert Text)  Physical Therapy Goals  Initiated 7/17/2018 and to be accomplished within 3-7 day(s)  1. Patient will move from supine to sit and sit to supine  in bed with supervision/set-up. 2.  Patient will transfer from bed to chair and chair to bed with supervision/set-up using the least restrictive device. 3.  Patient will perform sit to stand with supervision/set-up. 4.  Patient will ambulate with supervision/set-up for 100 feet with the least restrictive device. Outcome: Not Progressing Towards Goal  PT session held due to:  []  Nausea/vomiting  [x]  Pulmo Communication/ suggestion  [x]  Lethagic on Bi-PAP  []  Dialysis treatment in progress. Will f/u tomorrow. Thank you.   Deuce Orourke, PTA

## 2018-07-22 NOTE — PROGRESS NOTES
Hospitalist Progress Note Patient: Melody Sterling MRN: [id number]  CSN: [id number] YOB: 1945  Age: 74 y.o. Sex: male DOA: 7/16/2018 LOS:  LOS: 6 days Chief Complaint:  Acute on chronic respiratory failure Assessment/Plan Disposition : 
Patient Active Problem List  
Diagnosis Code  Hypercapnia R06.89  
 Acute on chronic respiratory failure with hypoxia and hypercapnia (HCC) J96.21, J96.22  
 CLL (chronic lymphocytic leukemia) (HCC) C91.90  Emphysema lung ([address] J43.9  Memory loss R41.3  Acute encephalopathy G93.40  Elevated serum creatinine R79.89  
 COPD (chronic obstructive pulmonary disease) (HCC) J44.9  RUPERTO (obstructive sleep apnea) G47.33  
 Stage 3 chronic kidney disease N18.3  Dementia F03.90  
 HTN (hypertension) I10  
 Altered mental status R41.82  Respiratory distress R06.03  
 Hypoxia R09.02  
 CO2 narcosis R06.89  
 Hypernatremia E87.0 Acute on chronic respiratory failure with hypercapnia and hypoxia Needs bipap at night and also prn  
Continue, breath tx   
Weaning off steroid; Prednisone 30 mg daily currently  
   
 Emphysema 
 breathing tx and supportive treatment  
    
Acute encephalopathy due to hypercapnia -co 2 Wean oxygen to keep sats < 94%; nursing instructed at the bedside Narcosis plus demetia   
On  Olanzapine- 
Ct head no acute issue   
Ativan and haloidal prn   
   
Htn Continue Amlodipine, Carvedilol  
   
CLL; leukocytosis -remained at the baseline   
   
ckd 3 Back to his base line Hypernatremia Resolved   
  
Palliative care on board Subjective: On Bipap at the time of interview Review of systems: 
 
Unable to communicate 2/2 to noninvasive ventilation Vital signs/Intake and Output: 
Visit Vitals  /72  Pulse 79  Temp 97.5 °F (36.4 °C)  Resp 30  Wt 84.3 kg (185 lb 13.6 oz)  SpO2 96%  BMI 30 kg/m2 Current Shift:  07/22 0701 - 07/22 1900 In: 240 [P.O.:240] Out: - Last three shifts:  07/20 1901 - 07/22 0700 In: 600 [P.O.:600] Out: 250 [Urine:250] Exam: 
 
General: Well developed, alert, NAD, OX2 Head/Neck: NCAT, supple, No masses, No lymphadenopathy CVS:Regular rate and rhythm, no M/R/G, S1/S2 heard, no thrill Lungs:Clear to auscultation bilaterally, no wheezes, rhonchi, or rales Abdomen: Soft, Nontender, No distention, Normal Bowel sounds, No hepatomegaly Extremities: No C/C/E, pulses palpable 2+ Skin:normal texture and turgor, no rashes, no lesions Neuro:grossly normal, follows commands Psych:confused Labs: Results:  
   
Chemistry Recent Labs  
   07/21/18 
 1041  07/20/18 
 0042 GLU  102*  131* NA  145  143  
K  4.3  4.2 CL  103  100 CO2  42*  45* BUN  35*  34* CREA  1.40*  1.55* CA  9.4  8.9 AGAP  0*  NEG 2  
BUCR  25*  22* CBC w/Diff Recent Labs  
   07/21/18 
 1041  07/20/18 
 0042 WBC  13.1  15.1*  
RBC  5.27  5.02  
HGB  12.9*  12.1*  
HCT  43.2  41.5 PLT  108*  132* GRANS  41  69 LYMPH  49  28 EOS  0  0 Cardiac Enzymes No results for input(s): CPK, CKND1, LOREN in the last 72 hours. No lab exists for component: Dionne Ventura Coagulation No results for input(s): PTP, INR, APTT in the last 72 hours. No lab exists for component: INREXT, INREXT Lipid Panel No results found for: CHOL, CHOLPOCT, CHOLX, CHLST, CHOLV, 407633, HDL, LDL, LDLC, DLDLP, 357107, VLDLC, VLDL, TGLX, TRIGL, TRIGP, TGLPOCT, CHHD, CHHDX  
BNP No results for input(s): BNPP in the last 72 hours. Liver Enzymes No results for input(s): TP, ALB, TBIL, AP, SGOT, GPT in the last 72 hours. No lab exists for component: DBIL Thyroid Studies No results found for: T4, T3U, TSH, TSHEXT, TSHEXT Procedures/imaging: see electronic medical records for all procedures/Xrays and details which were not copied into this note but were reviewed prior to creation of Plan Oneil Loera MD

## 2018-07-22 NOTE — PROGRESS NOTES
RESPIRATORY NOTE:  Pt comfortable SPO2 97% on room air, somewhat groggy after neb treatment, offered BIPAP and said \"not right now\"  Will continue to monitor.

## 2018-07-22 NOTE — PROGRESS NOTES
Problem: Mobility Impaired (Adult and Pediatric)  Goal: *Acute Goals and Plan of Care (Insert Text)  Physical Therapy Goals  Initiated 7/17/2018 and to be accomplished within 3-7 day(s)  1. Patient will move from supine to sit and sit to supine  in bed with supervision/set-up. 2.  Patient will transfer from bed to chair and chair to bed with supervision/set-up using the least restrictive device. 3.  Patient will perform sit to stand with supervision/set-up. 4.  Patient will ambulate with supervision/set-up for 100 feet with the least restrictive device. Outcome: Progressing Towards Goal  physical Therapy TREATMENT    Patient: Bill Chavez (69 y.o. male)  Date: 7/22/2018  Diagnosis: CO2 narcosis  Respiratory distress  Hypoxia  Altered mental status Acute on chronic respiratory failure with hypoxia and hypercapnia (HCC)  Precautions: Fall   Chart, physical therapy assessment, plan of care and goals were reviewed. ASSESSMENT:  Pt remains confused however following cues for mobility. Pt noted to be soiled in urine. Cleaned in standing and applied clean brief. SpO2 >88% on RA. Multiple cues for correct breathing technique. Pt amb 48' with RW Min A for correct RW management while negotiating obstacles in [place]. Moderate fatigue also noted and multiple standing rest needed. 2nd person needed for line management and safety. Assisted pt back to supine, and positioned for comfort. Cont POC. Progression toward goals:  []      Improving appropriately and progressing toward goals  [x]      Improving slowly and progressing toward goals  []      Not making progress toward goals and plan of care will be adjusted     PLAN:  Patient continues to benefit from skilled intervention to address the above impairments. Continue treatment per established plan of care.   Discharge Recommendations:  Rehab   Further Equipment Recommendations for Discharge:  rolling walker     SUBJECTIVE:   Patient stated  I was in [place]       OBJECTIVE DATA SUMMARY:   Critical Behavior:  Neurologic State: Confused, Drowsy  Orientation Level: Oriented to person, Oriented to time, Disoriented to situation, Disoriented to place  Cognition: Follows commands  Safety/Judgement: Decreased awareness of need for safety  Functional Mobility Training:  Bed Mobility:  Supine to Sit: Minimum assistance  Sit to Supine: Minimum assistance  Transfers:  Sit to Stand: Minimum assistance  Stand to Sit: Minimum assistance  Balance:  Sitting: Intact  Standing: Intact; With support  Ambulation/Gait Training:  Distance (ft): 50 Feet (ft)  Assistive Device: Walker, rolling;Gait belt  Ambulation - Level of Assistance: Contact guard assistance;Minimal assistance  Gait Abnormalities: Decreased step clearance; Step to gait  Base of Support: Widened  Speed/Carie: Slow  Step Length: Right shortened;Left shortened    Pain:  Pain Scale 1: Numeric (0 - 10)  Pain Intensity 1: 0  Activity Tolerance:   Fair     After treatment:   [] Patient left in no apparent distress sitting up in chair  [x] Patient left in no apparent distress in bed  [x] Call bell left within reach  [x] Nursing notified  [x] Caregiver present  [] Bed alarm activated      Monty Aviles PTA   Time Calculation: 29 mins

## 2018-07-23 LAB
ANION GAP SERPL CALC-SCNC: 0 MMOL/L (ref 3–18)
BACTERIA SPEC CULT: NORMAL
BASOPHILS # BLD: 0 K/UL (ref 0–0.1)
BASOPHILS NFR BLD: 0 % (ref 0–3)
BUN SERPL-MCNC: 32 MG/DL (ref 7–18)
BUN/CREAT SERPL: 22 (ref 12–20)
CALCIUM SERPL-MCNC: 9.4 MG/DL (ref 8.5–10.1)
CHLORIDE SERPL-SCNC: 102 MMOL/L (ref 100–108)
CO2 SERPL-SCNC: 40 MMOL/L (ref 21–32)
CREAT SERPL-MCNC: 1.48 MG/DL (ref 0.6–1.3)
DIFFERENTIAL METHOD BLD: ABNORMAL
EOSINOPHIL # BLD: 0 K/UL (ref 0–0.4)
EOSINOPHIL NFR BLD: 0 % (ref 0–5)
ERYTHROCYTE [DISTWIDTH] IN BLOOD BY AUTOMATED COUNT: 16.7 % (ref 11.6–14.5)
GLUCOSE BLD STRIP.AUTO-MCNC: 107 MG/DL (ref 70–110)
GLUCOSE BLD STRIP.AUTO-MCNC: 118 MG/DL (ref 70–110)
GLUCOSE BLD STRIP.AUTO-MCNC: 192 MG/DL (ref 70–110)
GLUCOSE BLD STRIP.AUTO-MCNC: 74 MG/DL (ref 70–110)
GLUCOSE SERPL-MCNC: 106 MG/DL (ref 74–99)
HCT VFR BLD AUTO: 40.4 % (ref 36–48)
HGB BLD-MCNC: 12 G/DL (ref 13–16)
LYMPHOCYTES # BLD: 9.8 K/UL (ref 0.8–3.5)
LYMPHOCYTES NFR BLD: 59 % (ref 20–51)
MAGNESIUM SERPL-MCNC: 2.2 MG/DL (ref 1.6–2.6)
MCH RBC QN AUTO: 24.1 PG (ref 24–34)
MCHC RBC AUTO-ENTMCNC: 29.7 G/DL (ref 31–37)
MCV RBC AUTO: 81.1 FL (ref 74–97)
MONOCYTES # BLD: 1.2 K/UL (ref 0–1)
MONOCYTES NFR BLD: 7 % (ref 2–9)
NEUTS SEG # BLD: 5.6 K/UL (ref 1.8–8)
NEUTS SEG NFR BLD: 34 % (ref 42–75)
PLATELET # BLD AUTO: 118 K/UL (ref 135–420)
PLATELET COMMENTS,PCOM: ABNORMAL
PMV BLD AUTO: 11.7 FL (ref 9.2–11.8)
POTASSIUM SERPL-SCNC: 4 MMOL/L (ref 3.5–5.5)
RBC # BLD AUTO: 4.98 M/UL (ref 4.7–5.5)
RBC MORPH BLD: ABNORMAL
RBC MORPH BLD: ABNORMAL
SERVICE CMNT-IMP: NORMAL
SODIUM SERPL-SCNC: 142 MMOL/L (ref 136–145)
WBC # BLD AUTO: 16.6 K/UL (ref 4.6–13.2)

## 2018-07-23 PROCEDURE — 85025 COMPLETE CBC W/AUTO DIFF WBC: CPT | Performed by: HOSPITALIST

## 2018-07-23 PROCEDURE — 74011000250 HC RX REV CODE- 250: Performed by: INTERNAL MEDICINE

## 2018-07-23 PROCEDURE — 74011250637 HC RX REV CODE- 250/637: Performed by: HOSPITALIST

## 2018-07-23 PROCEDURE — 65610000006 HC RM INTENSIVE CARE

## 2018-07-23 PROCEDURE — 74011250637 HC RX REV CODE- 250/637: Performed by: INTERNAL MEDICINE

## 2018-07-23 PROCEDURE — 74011636637 HC RX REV CODE- 636/637: Performed by: INTERNAL MEDICINE

## 2018-07-23 PROCEDURE — 82962 GLUCOSE BLOOD TEST: CPT

## 2018-07-23 PROCEDURE — 83735 ASSAY OF MAGNESIUM: CPT | Performed by: HOSPITALIST

## 2018-07-23 PROCEDURE — 36415 COLL VENOUS BLD VENIPUNCTURE: CPT | Performed by: HOSPITALIST

## 2018-07-23 PROCEDURE — 94640 AIRWAY INHALATION TREATMENT: CPT

## 2018-07-23 PROCEDURE — 80048 BASIC METABOLIC PNL TOTAL CA: CPT | Performed by: HOSPITALIST

## 2018-07-23 PROCEDURE — 77010033678 HC OXYGEN DAILY

## 2018-07-23 PROCEDURE — 94660 CPAP INITIATION&MGMT: CPT

## 2018-07-23 PROCEDURE — 92526 ORAL FUNCTION THERAPY: CPT

## 2018-07-23 RX ADMIN — IPRATROPIUM BROMIDE AND ALBUTEROL SULFATE 3 ML: .5; 3 SOLUTION RESPIRATORY (INHALATION) at 00:08

## 2018-07-23 RX ADMIN — CARVEDILOL 12.5 MG: 12.5 TABLET, FILM COATED ORAL at 09:22

## 2018-07-23 RX ADMIN — AMOXICILLIN AND CLAVULANATE POTASSIUM 1 TABLET: 500; 125 TABLET, FILM COATED ORAL at 06:50

## 2018-07-23 RX ADMIN — AMLODIPINE BESYLATE 5 MG: 5 TABLET ORAL at 09:21

## 2018-07-23 RX ADMIN — OLANZAPINE 1.25 MG: 2.5 TABLET ORAL at 09:21

## 2018-07-23 RX ADMIN — PREDNISONE 30 MG: 20 TABLET ORAL at 09:21

## 2018-07-23 RX ADMIN — IPRATROPIUM BROMIDE AND ALBUTEROL SULFATE 3 ML: .5; 3 SOLUTION RESPIRATORY (INHALATION) at 09:25

## 2018-07-23 RX ADMIN — OLANZAPINE 1.25 MG: 2.5 TABLET ORAL at 20:17

## 2018-07-23 RX ADMIN — ASPIRIN 81 MG 81 MG: 81 TABLET ORAL at 09:21

## 2018-07-23 RX ADMIN — PRAVASTATIN SODIUM 20 MG: 20 TABLET ORAL at 09:22

## 2018-07-23 RX ADMIN — BUDESONIDE 500 MCG: 0.5 INHALANT RESPIRATORY (INHALATION) at 19:58

## 2018-07-23 RX ADMIN — FAMOTIDINE 20 MG: 20 TABLET ORAL at 09:21

## 2018-07-23 RX ADMIN — AMOXICILLIN AND CLAVULANATE POTASSIUM 1 TABLET: 500; 125 TABLET, FILM COATED ORAL at 18:25

## 2018-07-23 RX ADMIN — IPRATROPIUM BROMIDE AND ALBUTEROL SULFATE 3 ML: .5; 3 SOLUTION RESPIRATORY (INHALATION) at 05:08

## 2018-07-23 RX ADMIN — BUDESONIDE 500 MCG: 0.5 INHALANT RESPIRATORY (INHALATION) at 09:25

## 2018-07-23 RX ADMIN — IPRATROPIUM BROMIDE AND ALBUTEROL SULFATE 3 ML: .5; 3 SOLUTION RESPIRATORY (INHALATION) at 19:58

## 2018-07-23 RX ADMIN — IPRATROPIUM BROMIDE AND ALBUTEROL SULFATE 3 ML: .5; 3 SOLUTION RESPIRATORY (INHALATION) at 16:16

## 2018-07-23 RX ADMIN — IPRATROPIUM BROMIDE AND ALBUTEROL SULFATE 3 ML: .5; 3 SOLUTION RESPIRATORY (INHALATION) at 13:46

## 2018-07-23 RX ADMIN — CARVEDILOL 12.5 MG: 12.5 TABLET, FILM COATED ORAL at 18:25

## 2018-07-23 NOTE — PROGRESS NOTES
Hospitalist Progress Note-critical care note Patient: Xiomara Taylor MRN: [id number]  [facility]: [id number] YOB: 1945  Age: 74 y.o. Sex: male DOA: 7/16/2018 LOS:  LOS: 7 days Chief complaint: acute on chronic respiratory failure ,acute encephalopathy, hypernatremia , co2 narcosis Assessment/Plan Hospital Problems  Never Reviewed Codes Class Noted POA Altered mental status ICD-10-CM: R41.82 
ICD-9-CM: 780.97  7/16/2018 Unknown Respiratory distress ICD-10-CM: R06.03 
ICD-9-CM: 786.09  7/16/2018 Unknown Hypoxia ICD-10-CM: R09.02 
ICD-9-CM: 799.02  7/16/2018 Unknown CO2 narcosis ICD-10-CM: R06.89 
ICD-9-CM: 786.09  7/16/2018 Unknown Hypernatremia ICD-10-CM: E87.0 ICD-9-CM: 276.0  7/16/2018 Unknown COPD (chronic obstructive pulmonary disease) (HCC) ICD-10-CM: J44.9 ICD-9-CM: 299  7/12/2018 Yes RUPERTO (obstructive sleep apnea) ICD-10-CM: G47.33 
ICD-9-CM: 327.23  7/12/2018 Yes Dementia ICD-10-CM: F03.90 ICD-9-CM: 294.20  7/12/2018 Yes HTN (hypertension) ICD-10-CM: I10 
ICD-9-CM: 401.9  7/12/2018 Yes * (Principal)Acute on chronic respiratory failure with hypoxia and hypercapnia (HCC) ICD-10-CM: J96.21, J96.22 
ICD-9-CM: 518.84, 786.09, 799.02  7/3/2018 Yes  
   
 CLL (chronic lymphocytic leukemia) (HCC) ICD-10-CM: C91.90 ICD-9-CM: 204.10  7/3/2018 Yes Acute encephalopathy ICD-10-CM: G93.40 ICD-9-CM: 348.30  7/3/2018 Yes Acute on chronic respiratory failure with hypercapnia and hypoxia Using bipap at night and also prn  
Continu, breath tx   
On po steroid  
  
 emphysema lung 
 breathing tx and supportive treatment  
   
  
Acute encephalopathy due to hypercapnia -co 2  Narcosis plus demetia   
Alert and oriented today On  Olanzapine Ct head no acute issue Ativan and haloidal prn   
  
 
Htn Lasix  
   
cll , leukocytosis -remained at the baseline   
  
  
ckd 3 Around his base line Hypernatremia Resolved Palliative care on board Subjective: I feel fine Nurse: using bipap last night Pt is medically ready for being d/c to rehab. Discussed with wife and the plan , she refused. She will file appeal.  
 
Talked with her wife per phone. All questions have been answered. 55 total min's spent on patient care including >50% on countseling/coordinating care. Discussed the above assessments. also discussed labs, medications and hospital course Subjective: agitated last night. Review of systems: 
General: no fever Rachel Higgins Lung: no sob/wheezing Gi :no n/v . Vital signs/Intake and Output: 
Visit Vitals  /60  Pulse 73  Temp 97.3 °F (36.3 °C)  Resp (!) 37  Wt 84.3 kg (185 lb 13.6 oz)  SpO2 96%  BMI 30 kg/m2 Current Shift:  07/23 0701 - 07/23 1900 In: 640 [P.O.:640] Out: - Last three shifts:  07/21 1901 - 07/23 0700 In: 720 [P.O.:720] Out: 250 [Urine:250] Physical Exam: 
genderal:  Alert and oriented ,  cooperative, no acute  distress Head:  Normocephalic, without obvious abnormality, atraumatic. Eyes:  Conjunctivae/corneas clear, sclera anicteric, PERRL, EOMs intact. Nose: Nares normal. No drainage or sinus tenderness. bipap mask noted Throat: Lips, mucosa, and tongue normal. .  
Neck: Supple, symmetrical, trachea midline, no adenopathy.  
     
Lungs:   Decrease bs bilaterally   
     
Heart:  Regular rate and rhythm, S1, S2 normal, + murmur, no click, rub or gallop. Abdomen: Soft, non-tender. Bowel sounds normal. No masses,  No organomegaly. Extremities: Extremities normal, atraumatic, no cyanosis or edema Neuro : no acute neuro deficit Labs: Results:  
   
Chemistry Recent Labs  
   07/23/18 
 0421  07/22/18 
 0501  07/21/18 
 1041 GLU  106*  99  102* NA  142  146*  145  
K  4.0  3.9  4.3 CL  102  103  103 CO2  40*  41*  42*  
BUN  32*  34*  35* CREA  1.48*  1.44*  1.40* CA  9.4  9.0 9. 4 AGAP  0*  2*  0*  
BUCR  22*  24*  25* CBC w/Diff Recent Labs  
   07/23/18 
 0421  07/22/18 
 0501  07/21/18 
 1041 WBC  16.6*  17.2*  13.1 RBC  4.98  5.01  5.27  
HGB  12.0*  12.1*  12.9*  
HCT  40.4  41.2  43.2 PLT  118*  121*  108* GRANS  34*  29*  41 LYMPH  59*  64*  49 EOS  0  0  0 Cardiac Enzymes No results for input(s): CPK, CKND1, LOREN in the last 72 hours. No lab exists for component: Carol Beltran Coagulation No results for input(s): PTP, INR, APTT in the last 72 hours. No lab exists for component: INREXT, INREXT Lipid Panel No results found for: CHOL, CHOLPOCT, CHOLX, CHLST, CHOLV, 719914, HDL, LDL, LDLC, DLDLP, 698455, VLDLC, VLDL, TGLX, TRIGL, TRIGP, TGLPOCT, CHHD, CHHDX  
BNP No results for input(s): BNPP in the last 72 hours. Liver Enzymes No results for input(s): TP, ALB, TBIL, AP, SGOT, GPT in the last 72 hours. No lab exists for component: DBIL Thyroid Studies No results found for: T4, T3U, TSH, TSHEXT, TSHEXT Procedures/imaging: see electronic medical records for all procedures/Xrays and details which were not copied into this note but were reviewed prior to creation of Plan Talha Peralta MD

## 2018-07-23 NOTE — PROGRESS NOTES
Problem: Mobility Impaired (Adult and Pediatric)  Goal: *Acute Goals and Plan of Care (Insert Text)  Physical Therapy Goals  Initiated 7/17/2018 and to be accomplished within 3-7 day(s)  1. Patient will move from supine to sit and sit to supine  in bed with supervision/set-up. 2.  Patient will transfer from bed to chair and chair to bed with supervision/set-up using the least restrictive device. 3.  Patient will perform sit to stand with supervision/set-up. 4.  Patient will ambulate with supervision/set-up for 100 feet with the least restrictive device. Attempted to see pt for PT session. Pt lethargic and recently placed on bipap, will follow up later as pt schedule allows.

## 2018-07-23 NOTE — PROGRESS NOTES
Called Maryanne [phone]. She disagreed her   to be discharged and she thinks that the rehab will not provide more care. Informed her that her  is medically ready to be d/c to rehab and needs bipap at night and prn.

## 2018-07-23 NOTE — PROGRESS NOTES
Problem: Mobility Impaired (Adult and Pediatric)  Goal: *Acute Goals and Plan of Care (Insert Text)  Physical Therapy Goals  Initiated 7/17/2018 and to be accomplished within 3-7 day(s)  1. Patient will move from supine to sit and sit to supine  in bed with supervision/set-up. 2.  Patient will transfer from bed to chair and chair to bed with supervision/set-up using the least restrictive device. 3.  Patient will perform sit to stand with supervision/set-up. 4.  Patient will ambulate with supervision/set-up for 100 feet with the least restrictive device. Attempted to see pt for PT session. Pt much more alert today. Pt reporting is not sure what the plan is for him leaving. Pt encouraged to participate in ambulation. Pt declined at this time. He reports \"Nope I just want to stay put. \" Will follow up later as pt schedule allows.

## 2018-07-23 NOTE — PROGRESS NOTES
Chart reviewed, noted dc summary, spoke with wife Sofía Flores [phone] to discuss transition plan. Noted as of Friday three facilities able to assist with pt respiratory needs of potential Trilogy. [address] and [facility]. Wife has doubts about pt being ready for discharge today, discussed bipap at night and prn at receiving facility. Wife would like to speak with MD, MD made aware. CM following, will finalize transition plan today if needed. 1200- noted discharge, spoke with wife Sofía Flores who would like to appeal discharge, gave phone number to [facility] [phone]. Wife calling in appeal;  JENNIFER letter, and Detailed Notice of Discharge reviewed on phone and copies left in pt room and wife aware. MD made aware.

## 2018-07-23 NOTE — PROGRESS NOTES
[facility]  Pulmonary, Critical Care, and Sleep Medicine    Pulm/CC    Name: Ronni Ontiveros. MRN: [id number]   : 1945 Hospital: [facility] [place]   Date: 2018  Room: [facility]     Subjective:   Patient is a 74 y. o.AA male with hx of COPD, home O2, chronic hypercapneic resp failure, dementia, CLL, diast CHF, CKD who was recently in [facility] for hypercapneic resp failure needing BIPAP at high settings. He was subseqeuntly discharged to rehab. He has been sent to ER on  with altered mentation and elevated pCO2, wheezing. On BIPAP 24/12 pressures in ER.    18  Patient awake this am, on O2 via NC, no distress  Used BiPAP at night. Hemodynamically stable. Stable baseline dementia; episodic chronic confusions and agitations  Breathing stable; no significant cough; no vomiting  Tolerating PO diet well. No fever.     ROS - limited due to dementia at baseline    PSG [facility] [address] Wiliam Lala  AHI 34/hr RUPERTO and CSA events; O2 desats  BIPAP titration - snoring and apneas could not be eliminated with 24/20 pressures; central apneas with higher pressures; poorly tolerated overall    Past Medical History:   Diagnosis Date    Chronic kidney disease     stage 3 kidney disease    Chronic obstructive pulmonary disease (HCC)     CLL (chronic lymphocytic leukemia) (HCC)     Delirium     Emphysema lung ([facility] [address]     Fall     Frequent hospital admissions     Gout     Hyperlipidemia     Hypoxemia     Memory loss     Noncompliance     Oxygen dependent     Pneumonia     Pulmonary nodules     Respiratory failure (HCC)     Risk for falls     Septic shock (HCC)     Sleep apnea     Tobacco abuse     Vitamin D deficiency       Past Surgical History:   Procedure Laterality Date    HX CATARACT REMOVAL      HX HERNIA REPAIR      HX NEPHROSTOMY        No Known Allergies   Social History   Substance Use Topics    Smoking status: Former Smoker    Smokeless tobacco: Never Used    Alcohol use No      Comment: former ETOH user; stopped 2017        Current Facility-Administered Medications   Medication Dose Route Frequency    insulin lispro (HUMALOG) injection   SubCUTAneous AC&HS    amoxicillin-clavulanate (AUGMENTIN) 500-125 mg per tablet 1 Tab  1 Tab Oral Q12H    predniSONE (DELTASONE) tablet 30 mg  30 mg Oral DAILY WITH BREAKFAST    OLANZapine (ZyPREXA) tablet 1.25 mg  1.25 mg Oral BID    amLODIPine (NORVASC) tablet 5 mg  5 mg Oral DAILY    carvedilol (COREG) tablet 12.5 mg  12.5 mg Oral BID WITH MEALS    aspirin chewable tablet 81 mg  81 mg Oral DAILY    pravastatin (PRAVACHOL) tablet 20 mg  20 mg Oral DAILY    famotidine (PEPCID) tablet 20 mg  20 mg Oral DAILY    albuterol-ipratropium (DUO-NEB) 2.5 MG-0.5 MG/3 ML  3 mL Nebulization Q4H RT    budesonide (PULMICORT) 500 mcg/2 ml nebulizer suspension  500 mcg Nebulization BID RT       Objective:   Vital Signs:    Visit Vitals    /66    Pulse 64    Temp 97.3 °F (36.3 °C)    Resp 16    Wt 84.3 kg (185 lb 13.6 oz)    SpO2 99%    BMI 30 kg/m2       O2 Device: Nasal cannula   O2 Flow Rate (L/min): 1 l/min   Temp (24hrs), Av.8 °F (36.6 °C), Min:97.3 °F (36.3 °C), Max:98.4 °F (36.9 °C)       Intake/Output:   Last shift:      701 - 1900  In: 240 [P.O.:240]  Out: -   Last 3 shifts: 1901 - 700  In: 720 [P.O.:720]  Out: 250 [Urine:250]    Intake/Output Summary (Last 24 hours) at 18 1126  Last data filed at 18 0947   Gross per 24 hour   Intake              480 ml   Output                0 ml   Net              480 ml            Physical Exam:   Gene: Comfortable; on NC O2 at 2 L; acyanotic, awake, demented  HEENT: pupils not dilated, reactive, no scleral jaundice  Neck: No adenopathy or thyroid swelling  CVS: S1S2 no murmurs; JVD not elevated  RS: Mod air entry bilaterally, decreased BS at bases, no wheezes, +few bi-basal crackles  Abd: soft, non tender, no hepatosplenomegaly, no abd distension  Neuro: limited exam; awake, alert, dementia, confusion at baseline  Extrm: trace bilateral pitting leg edema   Skin: no rash  Lymphatic: no cervical or supraclavicular adenopathy    Telemetry: sinus rhythm      Data review:     Recent Results (from the past 12 hour(s))   GLUCOSE, POC    Collection Time: 07/22/18 11:28 PM   Result Value Ref Range    Glucose (POC) 91 70 - 110 mg/dL   MAGNESIUM    Collection Time: 07/23/18  4:21 AM   Result Value Ref Range    Magnesium 2.2 1.6 - 2.6 mg/dL   CBC WITH AUTOMATED DIFF    Collection Time: 07/23/18  4:21 AM   Result Value Ref Range    WBC 16.6 (H) 4.6 - 13.2 K/uL    RBC 4.98 4.70 - 5.50 M/uL    HGB 12.0 (L) 13.0 - 16.0 g/dL    HCT 40.4 36.0 - 48.0 %    MCV 81.1 74.0 - 97.0 FL    MCH 24.1 24.0 - 34.0 PG    MCHC 29.7 (L) 31.0 - 37.0 g/dL    RDW 16.7 (H) 11.6 - 14.5 %    PLATELET 766 (L) 888 - 420 K/uL    MPV 11.7 9.2 - 11.8 FL    NEUTROPHILS 34 (L) 42 - 75 %    LYMPHOCYTES 59 (H) 20 - 51 %    MONOCYTES 7 2 - 9 %    EOSINOPHILS 0 0 - 5 %    BASOPHILS 0 0 - 3 %    ABS. NEUTROPHILS 5.6 1.8 - 8.0 K/UL    ABS. LYMPHOCYTES 9.8 (H) 0.8 - 3.5 K/UL    ABS. MONOCYTES 1.2 (H) 0 - 1.0 K/UL    ABS. EOSINOPHILS 0.0 0.0 - 0.4 K/UL    ABS.  BASOPHILS 0.0 0.0 - 0.1 K/UL    PLATELET COMMENTS LARGE PLATELETS      RBC COMMENTS ANISOCYTOSIS  1+        RBC COMMENTS SMUDGE CELLS      DF MANUAL     METABOLIC PANEL, BASIC    Collection Time: 07/23/18  4:21 AM   Result Value Ref Range    Sodium 142 136 - 145 mmol/L    Potassium 4.0 3.5 - 5.5 mmol/L    Chloride 102 100 - 108 mmol/L    CO2 40 (H) 21 - 32 mmol/L    Anion gap 0 (L) 3.0 - 18 mmol/L    Glucose 106 (H) 74 - 99 mg/dL    BUN 32 (H) 7.0 - 18 MG/DL    Creatinine 1.48 (H) 0.6 - 1.3 MG/DL    BUN/Creatinine ratio 22 (H) 12 - 20      GFR est AA 56 (L) >60 ml/min/1.73m2    GFR est non-AA 46 (L) >60 ml/min/1.73m2    Calcium 9.4 8.5 - 10.1 MG/DL   GLUCOSE, POC    Collection Time: 07/23/18  7:55 AM   Result Value Ref Range    Glucose (POC) 74 70 - 110 mg/dL             No results for input(s): FIO2I, IFO2, HCO3I, IHCO3, HCOPOC, PCO2I, PCOPOC, IPHI, PHI, PHPOC, PO2I, PO2POC in the last 72 hours. No lab exists for component: IPOC2    All Micro Results     Procedure Component Value Units Date/Time    CULTURE, BLOOD [[id number]] Collected:  07/16/18 0438    Order Status:  Completed Specimen:  Blood from Blood Updated:  07/23/18 0811     Special Requests: NO SPECIAL REQUESTS        Culture result: NO GROWTH 6 DAYS       CULTURE, URINE [[id number]]  (Abnormal) Collected:  07/16/18 1800    Order Status:  Completed Specimen:  Urine from [place] Specimen Updated:  07/19/18 1323     Special Requests: NO SPECIAL REQUESTS        Culture result:         <10,000 COLONIES/mL GRAM NEGATIVE RODS (A)    CULTURE, BLOOD [[id number]] Collected:  07/16/18 1730    Order Status:  Canceled Specimen:  Blood from Blood           Imaging:  [x]I have personally reviewed the patients chest radiographs images and report   CXR 7/20    Results from [place] encounter on 07/16/18   XR CHEST PORT   Narrative Portable chest 7/20/2018. History: Tachypnea. Hypoxia. Technique: A semierect portable radiograph of the chest was obtained. Comparison:  Portable chest x-ray from 7/18/2018. Findings: The cardiac silhouette is enlarged but unchanged in size and  configuration. The thoracic aorta is tortuous. There is no pulmonary vascular  congestion or edema. The lungs are clear. There is no pneumothorax or pleural  effusion. Impression IMPRESSION:    1. No acute pulmonary disease. Results from [place] encounter on 07/16/18   CT HEAD WO CONT   Narrative EXAM:  CT of the brain without intravenous contrast.    COMPARISON: CT July 3, 2018. REASON FOR EXAM: \"Decreased alertness; facial droop and ams\".     DOSE REDUCTION:  One or more dose reduction techniques were used on this CT:  automated exposure control, adjustment of the mAs and/or kVp according to  patient's size, and iterative reconstruction techniques. The specific techniques  utilized on this CT exam have been documented in the patient's electronic  medical record.    _______________    FINDINGS:         IMAGE QUALITY:  The images are mildly degraded by motion artifact. BRAIN AND EXTRA-AXIAL SPACE:               SUBACUTE TERRITORIAL TRANSCORTICAL INFARCTION:  None detected. MASS:  None detected. HEMORRHAGE:  None. SUBDURAL FLUID COLLECTION:  None detected. HYDROCEPHALUS:  None. REMOTE CORTICAL INFARCTION:  None detected. REMOTE CEREBELLAR INFARCTION:  None detected. MISCELLANEOUS:  Non-applicable. STRIVE (STandards for Reporting Vascular changes on nEuroimaging):                     --Lacunes (age-indeterminate) or perivascular spaces of  \"presumed vascular origin\":  None definite. --Speed of white matter hypodensity of \"presumed vascular  origin\":  Low grade. --Degree of brain atrophy (subjective): Low grade. BURDEN OF CALCIFIC INTRACRANIAL ATHEROSCLEROSIS:  None significant. HEENT:             INCLUDED UPPER ORBITS:  The lenses are replaced bilaterally. INCLUDED UPPER PARANASAL SINUSES:  Predominantly clear. MASTOID AIR CELLS:  Predominantly clear. BONES:  Unremarkable. SCALP:  Unremarkable.    _______________         Impression IMPRESSION:    Low-grade generalized chronic changes, however, no acute findings. _______________                  Note: Dr. Meagan Luis discussed the stroke code results with Dr. Devyn Isaac at [phone] on  7/16/2018.                IMPRESSION:   · Metabolic encephalopathy from CO2 narcosis    · Dementia - severity not known, but advanced per prior notes  · COPD with home O2-FEV1 of 1.23 or 55% predicted but normal ratio follows with Dr. Sofiya Abraham - on Spiriva and advair at home- Recent 6 min walk in May suggested requires home oxygen 2-4 liter  · Acute on chronic hypoxemic and hypercapneic respiratory failure  · Acute on chronic diast CHF  · CKD stage 3 with ARF  · HTN  · Severe complex sleep apnea - AHI 34/hr RUPERTO and CSA events; on BIPAP21/17 non compliant - not able to tolerate but best response noted on BIPAP 24/12 in recent hospitalization  · Hx of ETOH abuse (residing in rehab facility)  · Chronic Leukocytosis with hx of CLL  · UTI GNR      RECOMMENDATIONS:   · Pulm: compensated respirations; NC O2 daytime and BIPAP while sleeping; prn haldol or xanax to help with BIPAP tolerance/compliance; BIPAP 24/12 pressures and NC O2 - wean for O2 sats >92%  · Patient poorly compliant with NIPAP outside hospital due to dementia at baseline. Patient has BIPAP for use at rehab  · Duonebs q 4hrly; Budesonide nebs bid; IV solumedrol weaned to prednisone 30 mg daily  · ID - CXR clear; leukocytosis due to CLL; blood cx neg so far; s/p Zithromax x 5 days for possible bacterial bronchitis. GNR reported in urine cx - treating with Augmentin 500 mg bid x 5 days  · Oral hydration  · Platelets 038 k today - stable; await heparin platelet Ab; daily CBC ordered  · Prophylaxis-DVT Px: heparin held due to decrease in platelets; SCDs as tolerated by patient; GI Px: pepcid  · Full code status  · Dr. Katerine Masters discussed with wife at bedside on 7/17; wife does not live with patient for 16 years but still  legally - discussed various management options including no CPR or vent support/breathing tube. Wife wishes full code. · It appears Dr Sofiya Abraham is planning Trilogy niv; patient is non-compliant and usually advanced dementia patients cannot tolerate BIPAP or Trilogy; he likely will need nursing home placement.   · Possible transfer to SNF/Rehab today  Will defer respective systems problem management to primary and other consultant and follow patient with primary and other medical team  Further recommendations will be based on the patient's response to recommended treatment and results of the investigation ordered. Quality Care: PPI, DVT prophylaxis, HOB elevated, Infection control all reviewed and addressed. PAIN AND SEDATION: none   · Skin/Wound: no active issues  · Nutrition: oral diet as tolerated - supervised  · Prophylaxis: DVT and GI Prophylaxis reviewed  · Restraints: none  · PT/OT eval and treat: as needed  · Lines/Tubes: PIVs  ADVANCE DIRECTIVE: Full Code  Palliative medicine consulted.      Mod complexity decision making was performed in this consultation and evaluation of this patient         Paul Wu MD

## 2018-07-23 NOTE — PROGRESS NOTES
1930 - Bedside and Verbal shift change report given to Melany Hirsch RN (oncoming nurse) by Bay Baltazar RN and Jorgito Gilliam RN (offgoing nurses). Report included the following information SBAR, Kardex, Intake/Output, MAR, Recent Results, Med Rec Status and Cardiac Rhythm NSR.   2000 - Pt assessed and vitals obtained. No acute distress and denies all pain. Pt Ax0x3, states it is 2008, and when reoriented to 2018 pt denies that it is 2018. Pt eating dinner and refuses cally care at this time. Pt is tachypneic with expiratory wheezes but denies shortness of breath, RT to administer nebulizer treatment shortly. Will continue to monitor, see EMR for full details. 2130 - Pt refused 2units insulin for a BG of 192, bathed, cally care performed, linens changed. Pt declined to wear Bipap at this time. 2245 - Pt resting comfortably at this time Bipap applied. 0000 - Pt assessed and vitals obtained. No acute distress noted and vital signs stable. Pt resting comfortably in bed at this time. Will continue to monitor, see EMR for full details. 0200 - Pt continues to rest comfortably. Bipap continued at this time. Brief dry at this time. 0350 - Pt awake and frightened at this time. Asked for Bipap to be removed, placed on 1L NC at this time. Pt is unable to state where he is or year or date. Pt reoriented and comforted. Explained that Bipap will be reapplied when pt is ready to go back to sleep, RT aware. 0403 - Pt off leads, RN in the room. Pt found to have ambulated to bathroom, without leads and oxygen, removed brief and thrown in trash. Pt ambulated back to bed without incident. HR in the 80, Sp02 92% on room air, 1L NC reapplied and RT to begin nebulizer treatment. 0410 - Pt assessed and vitals obtained. No acute distress, back in bed comfortably. Denies all pain, denies falling, no abrasions or bruising noted. No acute changes. Pt AxOx4. Will continue to monitor, see EMR for full details.    0430 - Bipap reapplied   0445 - Pt asleep at this time  0500 - Pt awake requesting Bipap off and food, given PO meds in applesauce. Placed on 1L NC.   0530 - [place] at the bedside, visited with pt   - Pt asleep, RN attempted to put Bipap on. Pt refused \"I'll put it on when I'm sleeping\"  [phone] - Pt ambulated to [facility].   0745 - After pt used BSC unsuccessfully, pt put back to bed.   0835 - Bedside and Verbal shift change report given to Tila Peck RN (oncoming nurse) by Luci Garvey RN (offgoing nurse). Report included the following information SBAR, Kardex, ED Summary, OR Summary, Intake/Output, MAR, Accordion, Recent Results, Med Rec Status and Cardiac Rhythm NSR.

## 2018-07-23 NOTE — PROGRESS NOTES
Bedside and Verbal shift change report received from AMANDA Arrieta (offgoing nurse). Report included the following information SBAR, Kardex, Intake/Output, MAR and Recent Results. 2020  Shift assessment completed,see EMR. Patient alert and orient 2, eyes round reactive to light at   3 mm. Lung sound coarse on auscultation on 1L NC . Patient has active bowel sound. 94 [phone] Patient places on BiPAP at this time. 0030 Reassessment completed, see EMR    0430 Reassessment completed, see EMR    Patient had uneventful night, followed all commands. Remained on BiPap all night without any issues. Patient generally more alert overnight. Bedside and Verbal shift change report given to CHEMA Duran RN (oncoming nurse). Report included the following information SBAR, Kardex, Intake/Output, MAR and Recent Results.

## 2018-07-23 NOTE — DIABETES MGMT
GLYCEMIC CONTROL PROGRESS NOTE:    -discussed in rounds, no known h/o DM  -BG out of target range ICU: 140-180 mg/dL  -Prednisone daily  -TDD = 4 units - Humalog Normal Insulin Sensitivity Corrective Coverage  -responding well to IP regimen, recommend continue    Recent Glucose Results:   Lab Results   Component Value Date/Time     (H) 07/23/2018 04:21 AM    GLUCPOC 74 07/23/2018 07:55 AM    GLUCPOC 91 07/22/2018 11:28 PM    GLUCPOC 244 (H) 07/22/2018 05:30 PM     Deena Pereyra RN, MS  Glycemic Control Team  Pager [phone] (M-TH 8:30-5P)  *After Hours pager [phone]

## 2018-07-23 NOTE — PROGRESS NOTES
conducted a Follow up consultation and Spiritual Assessment for Lyudmila Mireles, who is a 74 y.o.,male. Patient was in good spirits. He was trying to remember his daughter's telephone number so he could call her. He stated that he was very active at [employer]. The  provided the following Interventions:  Continued the relationship of care and support. Listened empathically. Offered prayer and assurance of continued prayer on patients behalf. Chart reviewed. The following outcomes were achieved:  Patient expressed gratitude for Spiritual Care visit. Patient was reviewed in ICU Interdisciplinary Rounds. Assessment:  There are no further spiritual or Spiritism issues which require Spiritual Care Services intervention at this time. Plan:  Chaplains will continue to follow and will provide pastoral care as needed or requested.  recommends bedside caregivers page the  on duty if patient shows signs of acute spiritual or emotional distress. [id number] [address].    Board Certified   [phone] - Office

## 2018-07-23 NOTE — PROGRESS NOTES
Problem: Dysphagia (Adult)  Goal: *Acute Goals and Plan of Care (Insert Text)  Recommendations:  Diet: Mech-soft, chopped meat/thin liquid  Meds: Per patient preference  Aspiration Precautions  Oral Care TID  Other: SMALL sips/bites,1:1 supervision with all PO intake     Goals:  Patient will:  1. Tolerate PO trials with 0 s/s overt distress in 4/5 trials  2. Utilize compensatory swallow strategies/maneuvers (decrease bite/sip, size/rate, alt. liq/sol) with min cues in 4/5 trials          Outcome: Progressing Towards Goal  Speech language pathology dysphagia treatment    Patient: Cayla Gupta. (69 y.o. male)  Date: 7/23/2018  Diagnosis: CO2 narcosis  Respiratory distress  Hypoxia  Altered mental status Acute on chronic respiratory failure with hypoxia and hypercapnia (HCC)       Precautions: Aspiration Fall  PLOF: Rehab     ASSESSMENT:  Followed up this day with mech-soft, chopped meat/thin liquid skilled meal assessment. Upon entering the room, patient self-feeding. A&O to self only. Pt demo delayed mastication and large bite size with solids; demo immediate strong cough and watery eyes, required max verbal and tactile cues to reduce size of intake. Recommend mech-soft, chopped meat/thin liquid diet with aspiration precautions, 1:1 supervision with ALL PO intake, small bites and sips. D/w RN, Nicolas Ludwig. SLP will continue to follow as indicated. Progression toward goals:  []         Improving appropriately and progressing toward goals  [x]         Improving slowly and progressing toward goals  []         Not making progress toward goals and plan of care will be adjusted     PLAN:  Recommendations and Planned Interventions:  Mech-soft, thin   Patient continues to benefit from skilled intervention to address the above impairments. Continue treatment per established plan of care. Discharge Recommendations:  Rehab     SUBJECTIVE:   Patient stated We're on base of course.     OBJECTIVE:   Cognitive and Communication Status:  Neurologic State: Alert  Orientation Level: Oriented to person  Cognition: Follows commands  Perception: Appears intact  Perseveration: No perseveration noted  Safety/Judgement: Lack of insight into deficits  Dysphagia Treatment:  Oral Assessment:  Oral Assessment  Labial: No impairment  Dentition: Intact  Oral Hygiene: Fair  Lingual: Decreased rate  Velum: No impairment  Mandible: No impairment  P.O. Trials:   Patient Position: HOB 60   Vocal quality prior to P.O.: No impairment   Consistency Presented: Thin liquid, Puree, Mechanical soft   How Presented: Self-fed/presented, Cup/sip, Straw       Bolus Acceptance: No impairment   Bolus Formation/Control: Impaired   Type of Impairment: Delayed, Mastication   Propulsion: No impairment   Oral Residue: Less than 10% of bolus, Lingual   Initiation of Swallow: No impairment   Laryngeal Elevation: Functional   Aspiration Signs/Symptoms: Watery eyes, Strong cough   Pharyngeal Phase Characteristics: Audible swallow   Effective Modifications:  Alternate liquids/solids, Small sips and bites   Cues for Modifications: Maximal         Oral Phase Severity: Mild   Pharyngeal Phase Severity : Mild     PAIN:  Start of Tx: 0  End of Tx: 0     GCODESwallowing:  Swallow Current Status CK= 40-59%   Swallow Goal Status CI= 1-19%    The severity rating is based on the following outcomes:  ELIZABETH Noms Swallow Level 4    Clinical Judgement    After treatment:   []              Patient left in no apparent distress sitting up in chair  [x]              Patient left in no apparent distress in bed  [x]              Call bell left within reach  [x]              Nursing notified  []              Family present  [x]              Caregiver present  []              Bed alarm activated      COMMUNICATION/EDUCATION:   [x] Safe swallowing guidelines; compensatory techniques  []        Patient unable to participate in education; education ongoing with staff  []  Posted safety precautions in patient's room.   [] Oral-motor/laryngeal strengthening exercises      Jarad Pond, SLP  Time Calculation: 15 mins

## 2018-07-23 NOTE — PROGRESS NOTES
0730  Bedside and Verbal shift change report received from Mala Hagen RN (offgoing nurse). Report included the following information SBAR, Kardex, Intake/Output, MAR and Recent Results. 0800 Assessment complete    1030 MDRs completed with Dr. Shana Post and critical care team.  Plan of care reviewed and orders received. 1200 Reassessment complete    1600 Reassessment complete    1620  O2 sat at 72%, unresponsive, unable to follow commands or take a deep breath. RT placed patient on Bipap, 24/12, rate 8, O2 24%,     1835 Patient responsive, following commands, able to take oral meds. Removed BiPap to administer meds, placed on NC at 1L. 1930 Bedside and Verbal shift change reportgiven to BRYN Powell (oncoming nurse). Report included the following information SBAR, Kardex, Intake/Output, MAR and Recent Results.

## 2018-07-23 NOTE — PROGRESS NOTES
Problem: Falls - Risk of  Goal: *Absence of Falls  Document Stan Fall Risk and appropriate interventions in the flowsheet.    Outcome: Progressing Towards Goal  Fall Risk Interventions:  Mobility Interventions: Bed/chair exit alarm    Mentation Interventions: Evaluate medications/consider consulting pharmacy    Medication Interventions: Evaluate medications/consider consulting pharmacy    Elimination Interventions: Call light in reach

## 2018-07-24 LAB
ANION GAP SERPL CALC-SCNC: 1 MMOL/L (ref 3–18)
BASOPHILS # BLD: 0 K/UL (ref 0–0.1)
BASOPHILS NFR BLD: 0 % (ref 0–3)
BUN SERPL-MCNC: 27 MG/DL (ref 7–18)
BUN/CREAT SERPL: 20 (ref 12–20)
CALCIUM SERPL-MCNC: 9.5 MG/DL (ref 8.5–10.1)
CHLORIDE SERPL-SCNC: 101 MMOL/L (ref 100–108)
CO2 SERPL-SCNC: 40 MMOL/L (ref 21–32)
CREAT SERPL-MCNC: 1.38 MG/DL (ref 0.6–1.3)
DIFFERENTIAL METHOD BLD: ABNORMAL
EOSINOPHIL # BLD: 0.3 K/UL (ref 0–0.4)
EOSINOPHIL NFR BLD: 2 % (ref 0–5)
ERYTHROCYTE [DISTWIDTH] IN BLOOD BY AUTOMATED COUNT: 17 % (ref 11.6–14.5)
GLUCOSE BLD STRIP.AUTO-MCNC: 106 MG/DL (ref 70–110)
GLUCOSE BLD STRIP.AUTO-MCNC: 106 MG/DL (ref 70–110)
GLUCOSE BLD STRIP.AUTO-MCNC: 80 MG/DL (ref 70–110)
GLUCOSE BLD STRIP.AUTO-MCNC: 93 MG/DL (ref 70–110)
GLUCOSE SERPL-MCNC: 88 MG/DL (ref 74–99)
HCT VFR BLD AUTO: 41.3 % (ref 36–48)
HGB BLD-MCNC: 12.5 G/DL (ref 13–16)
LYMPHOCYTES # BLD: 9.5 K/UL (ref 0.8–3.5)
LYMPHOCYTES NFR BLD: 58 % (ref 20–51)
MAGNESIUM SERPL-MCNC: 2.1 MG/DL (ref 1.6–2.6)
MCH RBC QN AUTO: 24.5 PG (ref 24–34)
MCHC RBC AUTO-ENTMCNC: 30.3 G/DL (ref 31–37)
MCV RBC AUTO: 80.8 FL (ref 74–97)
MONOCYTES # BLD: 0.7 K/UL (ref 0–1)
MONOCYTES NFR BLD: 4 % (ref 2–9)
NEUTS SEG # BLD: 5.9 K/UL (ref 1.8–8)
NEUTS SEG NFR BLD: 36 % (ref 42–75)
PF4 HEPARIN CMPLX AB SER-ACNC: 0.15 OD (ref 0–0.4)
PLATELET # BLD AUTO: 110 K/UL (ref 135–420)
PLATELET COMMENTS,PCOM: ABNORMAL
PMV BLD AUTO: 10.8 FL (ref 9.2–11.8)
POTASSIUM SERPL-SCNC: 4.1 MMOL/L (ref 3.5–5.5)
RBC # BLD AUTO: 5.11 M/UL (ref 4.7–5.5)
RBC MORPH BLD: ABNORMAL
SODIUM SERPL-SCNC: 142 MMOL/L (ref 136–145)
WBC # BLD AUTO: 16.4 K/UL (ref 4.6–13.2)
WBC MORPH BLD: ABNORMAL

## 2018-07-24 PROCEDURE — 36415 COLL VENOUS BLD VENIPUNCTURE: CPT | Performed by: HOSPITALIST

## 2018-07-24 PROCEDURE — 94640 AIRWAY INHALATION TREATMENT: CPT

## 2018-07-24 PROCEDURE — 74011250636 HC RX REV CODE- 250/636: Performed by: HOSPITALIST

## 2018-07-24 PROCEDURE — 97110 THERAPEUTIC EXERCISES: CPT

## 2018-07-24 PROCEDURE — 85025 COMPLETE CBC W/AUTO DIFF WBC: CPT | Performed by: HOSPITALIST

## 2018-07-24 PROCEDURE — 80048 BASIC METABOLIC PNL TOTAL CA: CPT | Performed by: HOSPITALIST

## 2018-07-24 PROCEDURE — 82962 GLUCOSE BLOOD TEST: CPT

## 2018-07-24 PROCEDURE — 74011250637 HC RX REV CODE- 250/637: Performed by: INTERNAL MEDICINE

## 2018-07-24 PROCEDURE — 74011250637 HC RX REV CODE- 250/637: Performed by: HOSPITALIST

## 2018-07-24 PROCEDURE — 97535 SELF CARE MNGMENT TRAINING: CPT

## 2018-07-24 PROCEDURE — 77010033678 HC OXYGEN DAILY

## 2018-07-24 PROCEDURE — 94660 CPAP INITIATION&MGMT: CPT

## 2018-07-24 PROCEDURE — 83735 ASSAY OF MAGNESIUM: CPT | Performed by: HOSPITALIST

## 2018-07-24 PROCEDURE — 97116 GAIT TRAINING THERAPY: CPT

## 2018-07-24 PROCEDURE — 74011000250 HC RX REV CODE- 250: Performed by: INTERNAL MEDICINE

## 2018-07-24 PROCEDURE — 97530 THERAPEUTIC ACTIVITIES: CPT

## 2018-07-24 PROCEDURE — 74011636637 HC RX REV CODE- 636/637: Performed by: INTERNAL MEDICINE

## 2018-07-24 PROCEDURE — 65610000006 HC RM INTENSIVE CARE

## 2018-07-24 RX ORDER — PREDNISONE 20 MG/1
20 TABLET ORAL
Status: DISCONTINUED | OUTPATIENT
Start: 2018-07-25 | End: 2018-07-25 | Stop reason: HOSPADM

## 2018-07-24 RX ADMIN — CARVEDILOL 12.5 MG: 12.5 TABLET, FILM COATED ORAL at 10:02

## 2018-07-24 RX ADMIN — BUDESONIDE 500 MCG: 0.5 INHALANT RESPIRATORY (INHALATION) at 07:17

## 2018-07-24 RX ADMIN — IPRATROPIUM BROMIDE AND ALBUTEROL SULFATE 3 ML: .5; 3 SOLUTION RESPIRATORY (INHALATION) at 07:17

## 2018-07-24 RX ADMIN — IPRATROPIUM BROMIDE AND ALBUTEROL SULFATE 3 ML: .5; 3 SOLUTION RESPIRATORY (INHALATION) at 15:34

## 2018-07-24 RX ADMIN — PRAVASTATIN SODIUM 20 MG: 20 TABLET ORAL at 10:03

## 2018-07-24 RX ADMIN — HALOPERIDOL LACTATE 1 MG: 5 INJECTION, SOLUTION INTRAMUSCULAR at 14:33

## 2018-07-24 RX ADMIN — OLANZAPINE 1.25 MG: 2.5 TABLET ORAL at 10:02

## 2018-07-24 RX ADMIN — FAMOTIDINE 20 MG: 20 TABLET ORAL at 10:03

## 2018-07-24 RX ADMIN — OLANZAPINE 1.25 MG: 2.5 TABLET ORAL at 22:09

## 2018-07-24 RX ADMIN — IPRATROPIUM BROMIDE AND ALBUTEROL SULFATE 3 ML: .5; 3 SOLUTION RESPIRATORY (INHALATION) at 04:08

## 2018-07-24 RX ADMIN — IPRATROPIUM BROMIDE AND ALBUTEROL SULFATE 3 ML: .5; 3 SOLUTION RESPIRATORY (INHALATION) at 00:13

## 2018-07-24 RX ADMIN — AMLODIPINE BESYLATE 5 MG: 5 TABLET ORAL at 10:03

## 2018-07-24 RX ADMIN — IPRATROPIUM BROMIDE AND ALBUTEROL SULFATE 3 ML: .5; 3 SOLUTION RESPIRATORY (INHALATION) at 20:12

## 2018-07-24 RX ADMIN — BUDESONIDE 500 MCG: 0.5 INHALANT RESPIRATORY (INHALATION) at 20:12

## 2018-07-24 RX ADMIN — AMOXICILLIN AND CLAVULANATE POTASSIUM 1 TABLET: 500; 125 TABLET, FILM COATED ORAL at 05:10

## 2018-07-24 RX ADMIN — IPRATROPIUM BROMIDE AND ALBUTEROL SULFATE 3 ML: .5; 3 SOLUTION RESPIRATORY (INHALATION) at 11:36

## 2018-07-24 RX ADMIN — ASPIRIN 81 MG 81 MG: 81 TABLET ORAL at 10:02

## 2018-07-24 NOTE — PROGRESS NOTES
[facility]  Pulmonary, Critical Care, and Sleep Medicine    Pulm/CC    Name: Jonah Fernandez. MRN: [id number]   : 1945 Hospital: [facility] [place]   Date: 2018  Room: [facility]     Subjective:   Patient is a 74 y. o.AA male with hx of COPD, home O2, chronic hypercapneic resp failure, dementia, CLL, diast CHF, CKD who was recently in [facility] for hypercapneic resp failure needing BIPAP at high settings. He was subseqeuntly discharged to rehab. He has been sent to ER on  with altered mentation and elevated pCO2, wheezing. On BIPAP 24/12 pressures in ER.    18  Patient awake this am, on O2 via NC, no distress  Used BiPAP at night. Hemodynamically stable. Breathing stable; no significant cough; no vomiting  More awake, energetic looking; eating breakfast in bed  Episodic chronic confusions and agitations  Tolerating PO diet well. No fever.     ROS - limited due to dementia at baseline    PSG [facility] [address] Dr Kina Dai  AHI 34/hr RUPERTO and CSA events; O2 desats  BIPAP titration - snoring and apneas could not be eliminated with 24/20 pressures; central apneas with higher pressures; poorly tolerated overall    Past Medical History:   Diagnosis Date    Chronic kidney disease     stage 3 kidney disease    Chronic obstructive pulmonary disease (HCC)     CLL (chronic lymphocytic leukemia) (HCC)     Delirium     Emphysema lung ([facility] [address]     Fall     Frequent hospital admissions     Gout     Hyperlipidemia     Hypoxemia     Memory loss     Noncompliance     Oxygen dependent     Pneumonia     Pulmonary nodules     Respiratory failure (HCC)     Risk for falls     Septic shock (HCC)     Sleep apnea     Tobacco abuse     Vitamin D deficiency       Past Surgical History:   Procedure Laterality Date    HX CATARACT REMOVAL      HX HERNIA REPAIR      HX NEPHROSTOMY        No Known Allergies   Social History   Substance Use Topics    Smoking status: Former Smoker    Smokeless tobacco: Never Used    Alcohol use No      Comment: former ETOH user; stopped 2017        Current Facility-Administered Medications   Medication Dose Route Frequency    insulin lispro (HUMALOG) injection   SubCUTAneous AC&HS    amoxicillin-clavulanate (AUGMENTIN) 500-125 mg per tablet 1 Tab  1 Tab Oral Q12H    predniSONE (DELTASONE) tablet 30 mg  30 mg Oral DAILY WITH BREAKFAST    OLANZapine (ZyPREXA) tablet 1.25 mg  1.25 mg Oral BID    amLODIPine (NORVASC) tablet 5 mg  5 mg Oral DAILY    carvedilol (COREG) tablet 12.5 mg  12.5 mg Oral BID WITH MEALS    aspirin chewable tablet 81 mg  81 mg Oral DAILY    pravastatin (PRAVACHOL) tablet 20 mg  20 mg Oral DAILY    famotidine (PEPCID) tablet 20 mg  20 mg Oral DAILY    albuterol-ipratropium (DUO-NEB) 2.5 MG-0.5 MG/3 ML  3 mL Nebulization Q4H RT    budesonide (PULMICORT) 500 mcg/2 ml nebulizer suspension  500 mcg Nebulization BID RT       Objective:   Vital Signs:    Visit Vitals    /74    Pulse 77    Temp 97.6 °F (36.4 °C)    Resp 18    Wt 84.3 kg (185 lb 13.6 oz)    SpO2 100%    BMI 30 kg/m2       O2 Device: Nasal cannula   O2 Flow Rate (L/min): 1 l/min   Temp (24hrs), Av.6 °F (36.4 °C), Min:97.3 °F (36.3 °C), Max:98.4 °F (36.9 °C)       Intake/Output:   Last shift:      701 - 1900  In: -   Out: 400 [Urine:400]  Last 3 shifts: 1901 - 700  In: 880 [P.O.:880]  Out: -     Intake/Output Summary (Last 24 hours) at 18 0910  Last data filed at 18 0845   Gross per 24 hour   Intake              640 ml   Output              400 ml   Net              240 ml            Physical Exam:   Gene: Comfortable; on NC O2; acyanotic, awake, demented  HEENT: pupils not dilated, reactive, no scleral jaundice  Neck: No adenopathy or thyroid swelling  CVS: S1S2 no murmurs; JVD not elevated  RS: Mod air entry bilaterally, decreased BS at bases, no wheezes, +few bi-basal crackles  Abd: soft, non tender, no hepatosplenomegaly, no abd distension  Neuro: limited exam; awake, alert, dementia, following all commands  Extrm: trace bilateral pitting leg edema   Skin: no rash  Lymphatic: no cervical or supraclavicular adenopathy    Telemetry: sinus rhythm      Data review:     Recent Results (from the past 12 hour(s))   GLUCOSE, POC    Collection Time: 07/23/18  9:12 PM   Result Value Ref Range    Glucose (POC) 192 (H) 70 - 110 mg/dL   MAGNESIUM    Collection Time: 07/24/18  4:58 AM   Result Value Ref Range    Magnesium 2.1 1.6 - 2.6 mg/dL   CBC WITH AUTOMATED DIFF    Collection Time: 07/24/18  4:58 AM   Result Value Ref Range    WBC 16.4 (H) 4.6 - 13.2 K/uL    RBC 5.11 4.70 - 5.50 M/uL    HGB 12.5 (L) 13.0 - 16.0 g/dL    HCT 41.3 36.0 - 48.0 %    MCV 80.8 74.0 - 97.0 FL    MCH 24.5 24.0 - 34.0 PG    MCHC 30.3 (L) 31.0 - 37.0 g/dL    RDW 17.0 (H) 11.6 - 14.5 %    PLATELET 684 (L) 829 - 420 K/uL    MPV 10.8 9.2 - 11.8 FL    NEUTROPHILS 36 (L) 42 - 75 %    LYMPHOCYTES 58 (H) 20 - 51 %    MONOCYTES 4 2 - 9 %    EOSINOPHILS 2 0 - 5 %    BASOPHILS 0 0 - 3 %    ABS. NEUTROPHILS 5.9 1.8 - 8.0 K/UL    ABS. LYMPHOCYTES 9.5 (H) 0.8 - 3.5 K/UL    ABS. MONOCYTES 0.7 0 - 1.0 K/UL    ABS. EOSINOPHILS 0.3 0.0 - 0.4 K/UL    ABS.  BASOPHILS 0.0 0.0 - 0.1 K/UL    PLATELET COMMENTS FEW LARGE PLATELETS     RBC COMMENTS ANISOCYTOSIS  1+        WBC COMMENTS REACTIVE LYMPHS      DF MANUAL     METABOLIC PANEL, BASIC    Collection Time: 07/24/18  4:58 AM   Result Value Ref Range    Sodium 142 136 - 145 mmol/L    Potassium 4.1 3.5 - 5.5 mmol/L    Chloride 101 100 - 108 mmol/L    CO2 40 (H) 21 - 32 mmol/L    Anion gap 1 (L) 3.0 - 18 mmol/L    Glucose 88 74 - 99 mg/dL    BUN 27 (H) 7.0 - 18 MG/DL    Creatinine 1.38 (H) 0.6 - 1.3 MG/DL    BUN/Creatinine ratio 20 12 - 20      GFR est AA >60 >60 ml/min/1.73m2    GFR est non-AA 50 (L) >60 ml/min/1.73m2    Calcium 9.5 8.5 - 10.1 MG/DL   GLUCOSE, POC    Collection Time: 07/24/18  7:11 AM   Result Value Ref Range    Glucose (POC) 80 70 - 110 mg/dL             No results for input(s): FIO2I, IFO2, HCO3I, IHCO3, HCOPOC, PCO2I, PCOPOC, IPHI, PHI, PHPOC, PO2I, PO2POC in the last 72 hours. No lab exists for component: IPOC2    All Micro Results     Procedure Component Value Units Date/Time    CULTURE, BLOOD [[id number]] Collected:  07/16/18 0438    Order Status:  Completed Specimen:  Blood from Blood Updated:  07/23/18 0811     Special Requests: NO SPECIAL REQUESTS        Culture result: NO GROWTH 6 DAYS       CULTURE, URINE [[id number]]  (Abnormal) Collected:  07/16/18 1800    Order Status:  Completed Specimen:  Urine from [place] Specimen Updated:  07/19/18 1323     Special Requests: NO SPECIAL REQUESTS        Culture result:         <10,000 COLONIES/mL GRAM NEGATIVE RODS (A)    CULTURE, BLOOD [[id number]] Collected:  07/16/18 1730    Order Status:  Canceled Specimen:  Blood from Blood           Imaging:  [x]I have personally reviewed the patients chest radiographs images and report   CXR 7/20    Results from [place] encounter on 07/16/18   XR CHEST PORT   Narrative Portable chest 7/20/2018. History: Tachypnea. Hypoxia. Technique: A semierect portable radiograph of the chest was obtained. Comparison:  Portable chest x-ray from 7/18/2018. Findings: The cardiac silhouette is enlarged but unchanged in size and  configuration. The thoracic aorta is tortuous. There is no pulmonary vascular  congestion or edema. The lungs are clear. There is no pneumothorax or pleural  effusion. Impression IMPRESSION:    1. No acute pulmonary disease. Results from [place] encounter on 07/16/18   CT HEAD WO CONT   Narrative EXAM:  CT of the brain without intravenous contrast.    COMPARISON: CT July 3, 2018. REASON FOR EXAM: \"Decreased alertness; facial droop and ams\".     DOSE REDUCTION:  One or more dose reduction techniques were used on this CT:  automated exposure control, adjustment of the mAs and/or kVp according to  patient's size, and iterative reconstruction techniques. The specific techniques  utilized on this CT exam have been documented in the patient's electronic  medical record.    _______________    FINDINGS:         IMAGE QUALITY:  The images are mildly degraded by motion artifact. BRAIN AND EXTRA-AXIAL SPACE:               SUBACUTE TERRITORIAL TRANSCORTICAL INFARCTION:  None detected. MASS:  None detected. HEMORRHAGE:  None. SUBDURAL FLUID COLLECTION:  None detected. HYDROCEPHALUS:  None. REMOTE CORTICAL INFARCTION:  None detected. REMOTE CEREBELLAR INFARCTION:  None detected. MISCELLANEOUS:  Non-applicable. STRIVE (STandards for Reporting Vascular changes on nEuroimaging):                     --[place] (age-indeterminate) or perivascular spaces of  \"presumed vascular origin\":  None definite. --[place] of white matter hypodensity of \"presumed vascular  origin\":  Low grade. --Degree of brain atrophy (subjective): Low grade. BURDEN OF CALCIFIC INTRACRANIAL ATHEROSCLEROSIS:  None significant. HEENT:             INCLUDED UPPER ORBITS:  The lenses are replaced bilaterally. INCLUDED UPPER PARANASAL SINUSES:  Predominantly clear. MASTOID AIR CELLS:  Predominantly clear. BONES:  Unremarkable. SCALP:  Unremarkable.    _______________         Impression IMPRESSION:    Low-grade generalized chronic changes, however, no acute findings. _______________                  Note: Dr. Gurpreet Penn discussed the stroke code results with Dr. Mesfin Farley at [phone] on  7/16/2018.                IMPRESSION:   · Metabolic encephalopathy from CO2 narcosis    · Dementia - severity not known, but advanced per prior notes  · COPD with home O2-FEV1 of 1.23 or 55% predicted, normal FEV1%, follows with Dr. Shery Kocher - on Spiriva and advair at home- Recent 6 min walk in May suggested requires home oxygen 2-4 liter  · Acute on chronic hypoxemic and hypercapneic respiratory failure  · Acute on chronic diast CHF  · CKD stage 3 with ARF  · HTN  · Severe complex sleep apnea - AHI 34/hr RUPERTO and CSA events; on BIPAP21/17 non compliant - not able to tolerate but best response noted on BIPAP 24/12 in recent hospitalization  · Hx of ETOH abuse (residing in rehab facility)  · Chronic leukocytosis, thrombocytopenia with hx of CLL  · UTI GNR      RECOMMENDATIONS:   · Pulm: compensated respirations; NC O2 daytime and BIPAP while sleeping; prn haldol or xanax to help with BIPAP tolerance/compliance; BIPAP 24/12 pressures and NC O2 - wean for O2 sats >92%  · Patient poorly compliant with NIPAP outside hospital due to dementia at baseline. Patient has BIPAP for use at rehab  · Duonebs q 4hrly; Budesonide nebs bid; continue prednisone, taper dose to 20 mg daily  · ID - CXR clear; leukocytosis due to CLL; blood cx neg so far; s/p Zithromax x 5 days for possible bacterial bronchitis. GNR reported in urine cx - treating with Augmentin 500 mg bid x 5 days  · Oral hydration  · Platelets - stable; no active bleeding; await heparin platelet Ab; daily CBC ordered  · Prophylaxis-DVT Px: heparin held due to decrease in platelets; SCDs as tolerated by patient; GI Px: pepcid  · Full code status  · Dr. Phong Conteh discussed with wife at bedside on 7/17; wife does not live with patient for 16 years but still  legally - he discussed various management options including no CPR or vent support/breathing tube. Wife wishes full code. · It appears Dr Shery Kocher is planning Trilogy niv; patient is non-compliant and usually advanced dementia patients cannot tolerate BIPAP or Trilogy; he likely will need nursing home placement.   · Possible transfer to SNF/Rehab once final status on wife's appeal available  Will defer respective systems problem management to primary and other consultant and follow patient with primary and other medical team  Further recommendations will be based on the patient's response to recommended treatment and results of the investigation ordered. Quality Care: PPI, DVT prophylaxis, HOB elevated, Infection control all reviewed and addressed. PAIN AND SEDATION: none   · Skin/Wound: no active issues  · Nutrition: oral diet as tolerated - supervised  · Prophylaxis: DVT and GI Prophylaxis reviewed  · Restraints: none  · PT/OT eval and treat: as needed  · Lines/Tubes: PIVs  ADVANCE DIRECTIVE: Full Code  Palliative medicine consulted.      Mod complexity decision making was performed in this consultation and evaluation of this patient         Mona Aceves MD

## 2018-07-24 NOTE — PROGRESS NOTES
Bedside change report given to Marielena Diaz  (oncoming nurse) by Ric Victoria  (offgoing nurse). Report included the following information SBAR, Kardex, ED Summary, Intake/Output, MAR, Accordion, Recent Results, Med Rec Status, Cardiac Rhythm NSR and Alarm Parameters . Patient ambulated with PT and assisted back to bed at this time  with call bell in reach. Patient denies any pain or distress.

## 2018-07-24 NOTE — DIABETES MGMT
GLYCEMIC CONTROL PROGRESS NOTE:    -discussed in rounds, no known h/o DM  -BG in target range ICU: 140-180 mg/dL  -Prednisone daily  -responding well to IP regimen, recommend continue    Recent Glucose Results:   Lab Results   Component Value Date/Time    GLU 88 07/24/2018 04:58 AM    GLUCPOC 80 07/24/2018 07:11 AM    GLUCPOC 192 (H) 07/23/2018 09:12 PM    GLUCPOC 118 (H) 07/23/2018 04:04 PM     Rafael Zee RN, MS  Glycemic Control Team  Pager [phone] (M-TH 8:30-5P)  *After Hours pager [phone]

## 2018-07-24 NOTE — PROGRESS NOTES
Transition of care plan: Home with [facility] Wednesday      CM has confirmed, [facility] was found favorable in the appeal process. CM has notified Mrs. Irena Kirk that patient will be discharged at [facility] on Wednesday 7-24-18, if he is not discharged at that time, she will begin to incur any costs associated with his care. As per earlier conversation- [facility] has been arranged with [facility] [phone] for continuity of care needs. [facility] will deliver his trilogy machine to the hospital between 10:30-11 AM, so that we can ensure he has it upon his return home at [facility]. Wife was again offered and local SNF that could meet his needs and she has declined. Wife does request medical transport to make it easier on him to return home as she does not have a wheelchair. CM has informed bedside nurse Danny Gamez, who will make medical transport arrangements with [facility] for tomorrow at [facility]. CM has offered to order a Wheelchair and informed wife that he may incur a cost. At this time, Wife desires to obtain from her local [employer]. No other needs identified at this time. All needs/concerns addressed. MD notified of appeal status and plan for dc in am.     Care Management Interventions  PCP Verified by CM: Yes  Palliative Care Criteria Met (RRAT>21 & CHF Dx)?: Yes  Palliative Consult Recommended?: Yes  Transition of Care Consult (CM Consult): [address]: No  Reason Outside [place]: Patient already serviced by other home care/hospice agency ([facility])  Physical Therapy Consult: Yes  Occupational Therapy Consult: Yes  Speech Therapy Consult: Yes  Current Support Network:  Other  Confirm Follow Up Transport: Family  Plan discussed with Pt/Family/Caregiver: Yes  Freedom of Choice Offered: Yes   Resource Information Provided?: Yes  Discharge Location  Discharge Placement: Home with home health

## 2018-07-24 NOTE — PROGRESS NOTES
1200  Report received from Louisa Garrido RN, at bedside, using [employer]. All questions answered and all clinicals reviewed. Pt is agitated and requesting that he get OOB without assistance and \"take a shower\". After an explanation from Louisa Garrido RN, Danuta Becker RN, and Most Purvi CNA, the patient continued to insist that he did not want assistance of any kind and made derogatory remarks about the staff. Patient stated that this RN was \"dumb and had no sense\". When explanations made that the staff was attempting to keep the patient safe from falls, he stated, \"just go and get out, I'm going home\". Bed alarm on with loud volume and monitor reattached after patient removed leads. 0  Pts grandson visiting patient. Patient climbed to the bottom of the bed with all 4 side rails up. He pulled off all ECG leads. Took of pulse oxymetry. Removed B/P cuff and started yelling at this RN that he needed to get up to look for his wife's IN BOX. He had the copy of the appeal information in his hand and stated he needed to put it in the [address] [place] [address]. Pt refusing to get back in bed. Grandson at bedside. Pt reciting derogatory remarks at his Grandson. This RN and Most Purvi CNA, attempted to get patient back in bed or to the recliner. Pt then decided he would sit on bedside commode. Pt refusing oxygen and very angry at staff. [address]  Pt continuing to refuse to go back to bed and also to be connected to the monitor. Pt states, \"Get your hands off me! \"  This RN explained that his lunch tray that he pushed onto the floor would be reordered and he would need to get back in bed to eat. At that point, pt stood up, very unsteady, and was assisted back to bed. Pt continues to refuse oxygen. 1433  Haldol 1 mg given to patient due to his increasing agitation and confusion. During the events above, the Leslie Husbands called his father, the patient's son.   He handed this RN the phone and I explained the above events. I also explained that the patient was confused and agitated throughout the day since this morning at 0730. All attempts to calm the patient and keep in in bed and also provide him with reorientation and nurturing care were refused. Pt will only agree to staying in bed when he receives a meal tray. Patient's son questioned labwork on this patient. Dr. Kar Mancia on the unit. No changes in care necessary. Patient asked the Ardia Lower are you not listning to me? Why did you take such a bad path in life? Why do you have no sense? \". Grandson appeared embarrassed. Grandson then said goodbye to the patient and left patient's room. 1700  Sitter at bedside. Pt placed back on bipap due to he is much more calm and sleepy. Pt awakens easily and when asked how he is he states, \"I'm sleeping\". 1930  Report given to Luis Delgado RN, at bedside using SBAR format. Events of the day discussed and questions answered. All clinicals reviewed. Pt awake and alert on BIPAP with sitter at bedside. VSS. Pt resting quietly.

## 2018-07-24 NOTE — PROGRESS NOTES
Problem: Mobility Impaired (Adult and Pediatric)  Goal: *Acute Goals and Plan of Care (Insert Text)  Physical Therapy Goals  Initiated 7/17/2018 and to be accomplished within 3-7 day(s)  1. Patient will move from supine to sit and sit to supine  in bed with supervision/set-up. 2.  Patient will transfer from bed to chair and chair to bed with supervision/set-up using the least restrictive device. 3.  Patient will perform sit to stand with supervision/set-up. 4.  Patient will ambulate with supervision/set-up for 100 feet with the least restrictive device. Outcome: Progressing Towards Goal  physical Therapy TREATMENT/DISCHARGE    Patient: Evi Rutledge (69 y.o. male)  Date: 7/24/2018  Diagnosis: CO2 narcosis  Respiratory distress  Hypoxia  Altered mental status Acute on chronic respiratory failure with hypoxia and hypercapnia (HCC)       Precautions: Fall, Aspiration (Mech/Thin)  Chart, physical therapy assessment, plan of care and goals were reviewed. ASSESSMENT:  Pt seen in supine prior to session w/ supplemental O2 at 1 L, telemonitor donned, BP,  pulse ox, and B/L SCDs donned. Pt reported no pain at this time. Pt is confused during session but alert and oriented x 4 at this time. Pt able to ambulate 100 ft w/ RW/GB but requires a lot of VCing for proper gait sequencing w/ the RW as pt tend to bump into objects. Pt during session demonstrates hyperpneic w/ RR assessed between 20s-30s. Pt able to perform therex activity (please see below). Pt left back in supine after session, all rails up, call bell and tray in reach, B/L SCDs, nurse notified after session. Progression toward goals:  [x]      Goals met  []      Improving appropriately and progressing toward goals  []      Improving slowly and progressing toward goals  []      Not making progress toward goals and plan of care will be adjusted     PLAN:  Patient will be discharged from physical therapy at this time.   Rationale for discharge:  [x] Goals Achieved  [] Plateau Reached  [] Patient not participating in therapy  [] Other:  Discharge Recommendations:  Rehab  Further Equipment Recommendations for Discharge:  rolling walker     SUBJECTIVE:   Patient stated I can I get these crackers to go at home.     OBJECTIVE DATA SUMMARY:   Critical Behavior:  Neurologic State: Confused  Orientation Level: Disoriented to place, Disoriented to situation, Oriented to person  Cognition: Decreased attention/concentration, Decreased command following, Impulsive, Memory loss, Impaired decision making, Poor safety awareness  Safety/Judgement: Decreased awareness of environment, Decreased awareness of need for assistance, Decreased awareness of need for safety, Lack of insight into deficits  Functional Mobility Training:  Bed Mobility:  Supine to Sit: Contact guard assistance  Sit to Supine: Contact guard assistance  Transfers:  Sit to Stand: Contact guard assistance;Minimum assistance  Stand to Sit: Contact guard assistance;Minimum assistance  Balance:  Sitting: Intact  Standing: Intact; With support  Ambulation/Gait Training:  Distance (ft): 100 Feet (ft)  Assistive Device: Gait belt;Walker, rolling  Ambulation - Level of Assistance: Contact guard assistance  Gait Abnormalities: Decreased step clearance  Base of Support: Widened  Speed/Carie: Slow  Step Length: Left shortened;Right shortened  Swing Pattern: Left asymmetrical;Right asymmetrical  Therapeutic Exercises:       EXERCISE   Sets   Reps   Active Active Assist   Passive Self ROM   Comments   Ankle Pumps 1 10  [x] [] [] []    Quad Sets/Glut Sets   [] [] [] []    Hamstring Sets   [] [] [] []    Short Arc Quads   [] [] [] []    Heel Slides   [] [] [] []    Straight Leg Raises   [] [] [] []    Hip Abd/Add   [] [] [] []    Long Arc Quads 1 10 [x] [] [] []    Seated Marching 1 20 [] [] [] []    Standing Marching 1 20 [x] [] [] [] w/RW      [] [] [] []      Pain:  Pain Scale 1: Numeric (0 - 10)  Pain Intensity 1: 0  Activity Tolerance:   Fair  Please refer to the flowsheet for vital signs taken during this treatment.   After treatment:   [] Patient left in no apparent distress sitting up in chair  [x] Patient left in no apparent distress in bed  [x] Call bell left within reach  [x] Nursing notified  [] Caregiver present  [] Bed alarm activated  Julito German, PT   Time Calculation: 43 mins

## 2018-07-24 NOTE — PROGRESS NOTES
Speech Therapy Note:    Could not progress with ST treatment because patient :     [ ] was unresponsive  [ ] deferred due to:   [ ] was off the unit   [X] adamantly refused, stating \"If you're not coming to get me out of here, you need to leave\"   [ ] NPO for procedure    SLP will re-attempt treatment later this day or as schedule permits.     Marzena Hawk M.S., CF-SLP  Speech Language Pathologist

## 2018-07-24 NOTE — INTERDISCIPLINARY ROUNDS
IDR Rounds          Patient: Felipe Mercedes MRN: [id number]    Age: 74 y.o. YOB: 1945 Room/Bed: 108/01   Admit Diagnosis: CO2 narcosis  Respiratory distress  Hypoxia  Altered mental status  Principal Diagnosis: Acute on chronic respiratory failure with hypoxia and hypercapnia (HCC)         Goals:   · Discharge awaiting  Pulm: compensated respirations; NC O2 daytime and BIPAP while sleeping; prn haldol or xanax to help with BIPAP tolerance/compliance; BIPAP 24/12 pressures and NC O2 - wean for O2 sats >92%  · Patient poorly compliant with NIPAP outside hospital due to dementia at baseline. Patient has BIPAP for use at rehab  · Duonebs q 4hrly; Budesonide nebs bid; continue prednisone, taper dose to 20 mg daily  · ID - CXR clear; leukocytosis due to CLL; blood cx neg so far; s/p Zithromax x 5 days for possible bacterial bronchitis. GNR reported in urine cx - treating with Augmentin 500 mg bid x 5 days  · Oral hydration  · Platelets - stable; no active bleeding; await heparin platelet Ab; daily CBC ordered  · Prophylaxis-DVT Px: heparin held due to decrease in platelets; SCDs as tolerated by patient; GI Px: pepcid  · Full code status  · Dr. Ezekiel Prabhakar discussed with wife at bedside on 7/17; wife does not live with patient for 16 years but still  legally - he discussed various management options including no CPR or vent support/breathing tube. Wife wishes full code. · It appears Dr Mica Serrato is planning Trilogy niv; patient is non-compliant and usually advanced dementia patients cannot tolerate BIPAP or Trilogy; he likely will need nursing home placement. · Possible transfer to SNF/Rehab once final status on wife's appeal available  Quality Care: PPI, DVT prophylaxis, HOB elevated, Infection control all reviewed and addressed.   PAIN AND SEDATION: none   · Skin/Wound: no active issues  · Nutrition: oral diet as tolerated - supervised  · Prophylaxis: DVT and GI Prophylaxis reviewed  · Restraints: none  · PT/OT eval and treat: as needed  · Lines/Tubes: PIVs  ADVANCE DIRECTIVE: Full Code  Palliative medicine consulted.      LOS: 8 days Expected length of stay 9 days  Discharge plan pending wife   PCP: MD Madeleine Chao  7/24/2018  1100

## 2018-07-24 NOTE — PROGRESS NOTES
Problem: Self Care Deficits Care Plan (Adult)  Goal: *Acute Goals and Plan of Care (Insert Text)  Initial Occupational Therapy Goals (7/17/2018) 3DAY TRIAL Within 7 day(s):    1. Patient will perform grooming standing at sink with Supervision x 2-3 minutes for increased independence with ADLs. 2. Patient will perform UB dressing with setup/Jeimy for increased independence with ADLs. 3. Patient will perform LB dressing with setup/Jeimy for increased independence with ADLs. 4. Patient will perform all aspects of toileting with Supervision for increased independence in ADLs. Outcome: Not Met  Occupational Therapy TREATMENT/discharge    Patient: Sherin Dhillon (69 y.o. male)  Date: 7/24/2018  Diagnosis: CO2 narcosis  Respiratory distress  Hypoxia  Altered mental status Acute on chronic respiratory failure with hypoxia and hypercapnia (HCC)       Precautions: Fall, Aspiration (Mech/Thin)  Chart, occupational therapy assessment, plan of care, and goals were reviewed. ASSESSMENT:  Patient reporting wanting to \"get out of this bed\". Offered assistance and pt highly distracted and perseverative requiring significant time to complete to EOB. Pt able to assist in reaching for L sock to adjust for comfort. Pt continues to require constant cues and tactile cues to initiate tasks on command, but able to complete spontaneous ADLs when purposeful to pt at the time (AEB pt walking to bathroom and throwing away diaper over evening). Pt perseverative on this OT being racist towards him; attempted to allow RN direct care w/ pt telling RN she is just following the \"white girl\" and letting \"her talk for you. You just agreeing with everything she says\". Difficult to direct patient with pt ranting and tangential with \"Trump\". Allowed pt time to express opinions and pt requesting \"snack machine\". Offered crackers and peanut butter w/ pt thankful and appreciative. Pt w/ slow progress and is purposefully driving to own needs.  Pt w/ poor progress with OT d/t limited insight and easily agitated. Will sign off at this time. Progression toward goals:  []          Improving appropriately and progressing toward goals  []          Improving slowly and progressing toward goals  [x]          Not making progress toward goals and plan of care will be adjusted     PLAN:  Patient will be discharged from occupational therapy at this time. Rationale for discharge:  [] Goals Achieved  [] Plateau Reached  [x] Patient not actively participating in therapy; Pt has shown he is capable of performing ADLs as evidenced by pt getting out of bed on own to toilet self and removing soiled diaper. Pt will require 24 hour supervision and assist for safety. [] Other:  Discharge Recommendations:  Rehab and Home Health  Further Equipment Recommendations for Discharge:  N/A     SUBJECTIVE:   Patient stated When you an old black man, no one gives a shit.     OBJECTIVE DATA SUMMARY:   Cognitive/Behavioral Status:  Neurologic State: Confused  Orientation Level: Disoriented to place, Disoriented to situation, Oriented to person  Cognition: Decreased attention/concentration, Decreased command following, Impulsive, Memory loss, Impaired decision making, Poor safety awareness  Safety/Judgement: Decreased awareness of environment, Decreased awareness of need for assistance, Decreased awareness of need for safety, Lack of insight into deficits    Functional Mobility and Transfers for ADLs:   Bed Mobility:  Supine to Sit: Contact guard assistance; Additional time  Sit to Supine: Contact guard assistance; Additional time     Balance:  Sitting: Intact    ADL Intervention:  Feeding  Drink to Mouth: Contact guard assistance    Grooming  Washing Hands:  (refused wipes)    Lower Body Dressing Assistance  Socks: Maximum assistance; Total assistance (dependent); Proximal stability (cues to attn to task)  Position Performed: Seated edge of bed    Cognitive Retraining  Orientation Retraining: Reorienting;Place;Situation;Time  Problem Solving: Inductive reason; Identifying the task; Identifying the problem;General alternative solution;Deductive reason  Executive Functions: Managing time;Regulating behavior; Executing cognitive plans  Organizing/Sequencing: Breaking task down;Prioritizing  Attention to Task: Distractibility; Single task  Maintains Attention For (Time): 30 seconds (<)  Safety/Judgement: Decreased awareness of environment;Decreased awareness of need for assistance;Decreased awareness of need for safety; Lack of insight into deficits  Cues: Tactile cues provided;Verbal cues provided;Visual cues provided    Pain:  Pre-treatment: 0/10  Post-treatment: 0/10    Activity Tolerance:    Patient able to sit EOB 20 minute(s). Patient able to complete ADLs with frequent rest breaks. Patient limited by cognition/agitation/strength/balance. Patient unsteady     Please refer to the flowsheet for vital signs taken during this treatment. After treatment:   []  Patient left in no apparent distress sitting up in chair  [x]  Patient left in no apparent distress in bed  [x]  Call bell left within reach  [x]  Nursing notified/Madeleine  []  Caregiver present  [x]  Bed alarm activated    Thank you for allowing me to assist in the care of this patient.   Tahira Sanchez, DEANNAR/L  Time Calculation: 44 mins

## 2018-07-24 NOTE — PROGRESS NOTES
DC plan: C with dc date depending on Appeal from [facility]      Patient continues to have the option to go into facilities that can accomodates the bipap and then transition of trilogy. At this time the appeal is still being processed under Medicare. CM has called and spoke with Pt's estranged wife Giovani Juarez, Appeal processed was discussed with expectations clearly explained on what would happen once final decision was received both favorable to hospital or if found favorable for the patient. Wife has no additional questions regarding appeal at this time, CM has referred her to call [facility] directly should she have additional questions. During this conversation CM explained that we need to begin preparing that patient may be discharging tomorrow once the finalization of appeal has occurred. We need to know are we making arrangements for one of the many SNF's in the area or is wife taking him home with 24 hour assistance. Per Giovani Juarez she will be taking him home and available to assist him. CM has asked what DME needs patient has and she states he has no needs. CM  Questions if she has received the Trilogy which she states \"No\", but that [facility] will deliver when he returns home, CMS staff will confirm [facility] has everything in place to deliver Trilogy to home Wednesday vs Thursday and that there will be no delay in receiving this DME. CM again reminds wife, that at a facility he will have 24 hour care and assistance and the expectation is that she will be available and or hire someone to assist. [address] offered for [facility] assistance to include a nurse, PT/OT for strengthening, Nurse aid for ADL care, 1st choice is [facility], 2nd choice is [facility]. CMS referral placed.        Care Management Interventions  Palliative Care Criteria Met (RRAT>21 & CHF Dx)?: Yes  Palliative Consult Recommended?: Yes  Transition of Care Consult (CM Consult): SNF  Physical Therapy Consult: Yes  Occupational Therapy Consult: Yes  Speech Therapy Consult: Yes  Current Support Network: Own Home  Plan discussed with Pt/Family/Caregiver: Yes  Freedom of Choice Offered: Yes  Discharge Location  Discharge Placement: Skilled nursing facility VS [place] [address]

## 2018-07-24 NOTE — PROGRESS NOTES
Hospitalist Progress Note-critical care note Patient: Felipe Mercedes MRN: [id number]  CSN: [id number] YOB: 1945  Age: 74 y.o. Sex: male DOA: 7/16/2018 LOS:  LOS: 8 days Chief complaint: acute on chronic respiratory failure ,acute encephalopathy, hypernatremia , co2 narcosis Assessment/Plan Hospital Problems  Never Reviewed Codes Class Noted POA Altered mental status ICD-10-CM: R41.82 
ICD-9-CM: 780.97  7/16/2018 Unknown Respiratory distress ICD-10-CM: R06.03 
ICD-9-CM: 786.09  7/16/2018 Unknown Hypoxia ICD-10-CM: R09.02 
ICD-9-CM: 799.02  7/16/2018 Unknown CO2 narcosis ICD-10-CM: R06.89 
ICD-9-CM: 786.09  7/16/2018 Unknown Hypernatremia ICD-10-CM: E87.0 ICD-9-CM: 276.0  7/16/2018 Unknown COPD (chronic obstructive pulmonary disease) (HCC) ICD-10-CM: J44.9 ICD-9-CM: 402  7/12/2018 Yes RUPERTO (obstructive sleep apnea) ICD-10-CM: G47.33 
ICD-9-CM: 327.23  7/12/2018 Yes Dementia ICD-10-CM: F03.90 ICD-9-CM: 294.20  7/12/2018 Yes HTN (hypertension) ICD-10-CM: I10 
ICD-9-CM: 401.9  7/12/2018 Yes * (Principal)Acute on chronic respiratory failure with hypoxia and hypercapnia (HCC) ICD-10-CM: J96.21, J96.22 
ICD-9-CM: 518.84, 786.09, 799.02  7/3/2018 Yes  
   
 CLL (chronic lymphocytic leukemia) (HCC) ICD-10-CM: C91.90 ICD-9-CM: 204.10  7/3/2018 Yes Acute encephalopathy ICD-10-CM: G93.40 ICD-9-CM: 348.30  7/3/2018 Yes Acute on chronic respiratory failure with hypercapnia and hypoxia Used  bipap last night , will continue bipap at night and prn  
Continu, breath tx   
On po steroid  
  
 emphysema lung 
 breathing tx and supportive treatment  
   
  
Acute encephalopathy due to hypercapnia -co 2  Narcosis plus demetia   
Alert and oriented On  Olanzapine Ct head no acute issue Ativan and haloidal prn   
  
 
Htn Coreg/norvasc 
   
cll , leukocytosis -remained at the baseline   
  
  
ckd 3 Around  his base line Hypernatremia Resolved Palliative care on board Subjective: I feel ok, why my wife will not let me leave here? Nurse: using bipap last night Review of systems: 
General: no fever Duaine Booze Lung: no sob/wheezing Heart: no chest pain/palpitation Gi :no n/v . Vital signs/Intake and Output: 
Visit Vitals  /74  Pulse 77  Temp 97.6 °F (36.4 °C)  Resp 18  Wt 84.3 kg (185 lb 13.6 oz)  SpO2 100%  BMI 30 kg/m2 Current Shift:  07/24 0701 - 07/24 1900 In: -  
Out: 400 [Urine:400] Last three shifts:  07/22 1901 - 07/24 0700 In: 0 [P.O.:880] Out: - Physical Exam: 
genderal:  Alert and oriented ,  cooperative, no acute  distress Head:  Normocephalic, without obvious abnormality, atraumatic. Eyes:  Conjunctivae/corneas clear, sclera anicteric, PERRL, EOMs intact. Nose: Nares normal. No drainage or sinus tenderness. bipap mask noted Throat: Lips, mucosa, and tongue normal. .  
Neck: Supple, symmetrical, trachea midline, no adenopathy.  
     
Lungs:   Decrease bs bilaterally   
     
Heart:  Regular rate and rhythm, S1, S2 normal, + murmur, no click, rub or gallop. Abdomen: Soft, non-tender. Bowel sounds normal. No masses,  No organomegaly. Extremities: Extremities normal, atraumatic, no cyanosis or edema Neuro : no acute neuro deficit Labs: Results:  
   
Chemistry Recent Labs  
   07/24/18 0458 07/23/18 
 0421  07/22/18 
 0501 GLU  88  106*  99 NA  142  142  146*  
K  4.1  4.0  3.9 CL  101  102  103 CO2  40*  40*  41*  
BUN  27*  32*  34* CREA  1.38*  1.48*  1.44* CA  9.5  9.4  9.0 AGAP  1*  0*  2*  
BUCR  20  22*  24* CBC w/Diff Recent Labs  
   07/24/18 0458 07/23/18 
 0421  07/22/18 
 0501 WBC  16.4*  16.6*  17.2*  
RBC  5.11  4.98  5.01  
HGB  12.5*  12.0*  12.1*  
HCT  41.3  40.4  41.2 PLT  110*  118*  121* GRANS  36*  34*  29* LYMPH  58*  59*  64* EOS 2  0  0 Cardiac Enzymes No results for input(s): CPK, CKND1, LOREN in the last 72 hours. No lab exists for component: [place] Coagulation No results for input(s): PTP, INR, APTT in the last 72 hours. No lab exists for component: INREXT, INREXT Lipid Panel No results found for: CHOL, CHOLPOCT, CHOLX, CHLST, CHOLV, 917144, HDL, LDL, LDLC, DLDLP, 081698, VLDLC, VLDL, TGLX, TRIGL, TRIGP, TGLPOCT, CHHD, CHHDX  
BNP No results for input(s): BNPP in the last 72 hours. Liver Enzymes No results for input(s): TP, ALB, TBIL, AP, SGOT, GPT in the last 72 hours. No lab exists for component: DBIL Thyroid Studies No results found for: T4, T3U, TSH, TSHEXT, TSHEXT Procedures/imaging: see electronic medical records for all procedures/Xrays and details which were not copied into this note but were reviewed prior to creation of Plan Jina Mejia MD

## 2018-07-24 NOTE — PROGRESS NOTES
1778 Bedside  shift change report given to Abbie Clayton RN (oncoming nurse) by Claudia zapien). Report included the following information SBAR, Kardex, ED Summary, Intake/Output, MAR, Accordion, Recent Results, Med Rec Status, Cardiac Rhythm NSR and Alarm Parameters . Assumed care of patient at this time.

## 2018-07-25 VITALS
OXYGEN SATURATION: 96 % | SYSTOLIC BLOOD PRESSURE: 112 MMHG | RESPIRATION RATE: 25 BRPM | WEIGHT: 185.85 LBS | TEMPERATURE: 97.8 F | HEART RATE: 81 BPM | DIASTOLIC BLOOD PRESSURE: 76 MMHG | BODY MASS INDEX: 30 KG/M2

## 2018-07-25 LAB
ATRIAL RATE: 90 BPM
CALCULATED P AXIS, ECG09: 63 DEGREES
CALCULATED R AXIS, ECG10: 16 DEGREES
CALCULATED T AXIS, ECG11: 69 DEGREES
DIAGNOSIS, 93000: NORMAL
GLUCOSE BLD STRIP.AUTO-MCNC: 73 MG/DL (ref 70–110)
GLUCOSE BLD STRIP.AUTO-MCNC: 84 MG/DL (ref 70–110)
MAGNESIUM SERPL-MCNC: 2 MG/DL (ref 1.6–2.6)
P-R INTERVAL, ECG05: 156 MS
Q-T INTERVAL, ECG07: 334 MS
QRS DURATION, ECG06: 74 MS
QTC CALCULATION (BEZET), ECG08: 408 MS
VENTRICULAR RATE, ECG03: 90 BPM

## 2018-07-25 PROCEDURE — 74011250637 HC RX REV CODE- 250/637: Performed by: INTERNAL MEDICINE

## 2018-07-25 PROCEDURE — 92526 ORAL FUNCTION THERAPY: CPT

## 2018-07-25 PROCEDURE — 82962 GLUCOSE BLOOD TEST: CPT

## 2018-07-25 PROCEDURE — 94660 CPAP INITIATION&MGMT: CPT

## 2018-07-25 PROCEDURE — 74011000250 HC RX REV CODE- 250: Performed by: INTERNAL MEDICINE

## 2018-07-25 PROCEDURE — 74011250637 HC RX REV CODE- 250/637: Performed by: HOSPITALIST

## 2018-07-25 PROCEDURE — 77010033678 HC OXYGEN DAILY

## 2018-07-25 PROCEDURE — 94640 AIRWAY INHALATION TREATMENT: CPT

## 2018-07-25 PROCEDURE — 74011636637 HC RX REV CODE- 636/637: Performed by: INTERNAL MEDICINE

## 2018-07-25 PROCEDURE — 83735 ASSAY OF MAGNESIUM: CPT | Performed by: HOSPITALIST

## 2018-07-25 PROCEDURE — 36415 COLL VENOUS BLD VENIPUNCTURE: CPT | Performed by: HOSPITALIST

## 2018-07-25 RX ORDER — PREDNISONE 5 MG/1
TABLET ORAL
Qty: 21 TAB | Refills: 0 | Status: SHIPPED | OUTPATIENT
Start: 2018-07-25 | End: 2018-09-11

## 2018-07-25 RX ORDER — OLANZAPINE 2.5 MG/1
1.25 TABLET ORAL 2 TIMES DAILY
Qty: 30 TAB | Refills: 0 | Status: SHIPPED | OUTPATIENT
Start: 2018-07-25

## 2018-07-25 RX ORDER — HYDRALAZINE HYDROCHLORIDE 25 MG/1
25 TABLET, FILM COATED ORAL 3 TIMES DAILY
Qty: 90 TAB | Refills: 0 | Status: SHIPPED | OUTPATIENT
Start: 2018-07-25 | End: 2018-09-11

## 2018-07-25 RX ORDER — AMLODIPINE BESYLATE 10 MG/1
10 TABLET ORAL DAILY
Qty: 30 TAB | Refills: 0 | Status: SHIPPED | OUTPATIENT
Start: 2018-07-25

## 2018-07-25 RX ADMIN — IPRATROPIUM BROMIDE AND ALBUTEROL SULFATE 3 ML: .5; 3 SOLUTION RESPIRATORY (INHALATION) at 05:00

## 2018-07-25 RX ADMIN — OLANZAPINE 1.25 MG: 2.5 TABLET ORAL at 08:59

## 2018-07-25 RX ADMIN — ASPIRIN 81 MG 81 MG: 81 TABLET ORAL at 08:59

## 2018-07-25 RX ADMIN — AMLODIPINE BESYLATE 5 MG: 5 TABLET ORAL at 08:59

## 2018-07-25 RX ADMIN — IPRATROPIUM BROMIDE AND ALBUTEROL SULFATE 3 ML: .5; 3 SOLUTION RESPIRATORY (INHALATION) at 00:42

## 2018-07-25 RX ADMIN — CARVEDILOL 12.5 MG: 12.5 TABLET, FILM COATED ORAL at 08:59

## 2018-07-25 RX ADMIN — IPRATROPIUM BROMIDE AND ALBUTEROL SULFATE 3 ML: .5; 3 SOLUTION RESPIRATORY (INHALATION) at 07:30

## 2018-07-25 RX ADMIN — PRAVASTATIN SODIUM 20 MG: 20 TABLET ORAL at 08:59

## 2018-07-25 RX ADMIN — BUDESONIDE 500 MCG: 0.5 INHALANT RESPIRATORY (INHALATION) at 07:30

## 2018-07-25 RX ADMIN — PREDNISONE 20 MG: 20 TABLET ORAL at 08:59

## 2018-07-25 RX ADMIN — FAMOTIDINE 20 MG: 20 TABLET ORAL at 08:59

## 2018-07-25 NOTE — PROGRESS NOTES
DC Plan:  Discharge home with [facility]. Trilogy to be delivered to hospital prior to discharge. Spoke with primary RN Nagi El and he confirmed transport has been arranged for 1130am.    6267  Received return call from Hanane Cabrera @ [facility] that trilogy should be delivered to hospital by rep Bill @ 11am.    1100  Pts to bedside. [facility] rep arrived with trilogy to bedside. 82 Wendy Sidhu from [facility] [phone] made aware pts family decided to dc him home    1440  APS report filed with [address] [place] [address] ([facility]) [phone]      Care Management Interventions  PCP Verified by CM: Yes  Palliative Care Criteria Met (RRAT>21 & CHF Dx)?: Yes  Palliative Consult Recommended?: Yes  Transition of Care Consult (CM Consult): [address]: No  Reason Outside [place]: Patient already serviced by other home care/hospice agency ([facility])  Physical Therapy Consult: Yes  Occupational Therapy Consult: Yes  Speech Therapy Consult: Yes  Current Support Network:  Other  Confirm Follow Up Transport: Family  Plan discussed with Pt/Family/Caregiver: Yes  Freedom of Choice Offered: Yes  [place] Resource Information Provided?: Yes  Discharge Location  Discharge Placement: Home with home health

## 2018-07-25 NOTE — DISCHARGE INSTRUCTIONS
Sleep        Altered Mental Status: Care Instructions  Your Care Instructions    Altered mental status is a change in how well your brain is working. As a result, you may be confused, be less alert than usual, or act in odd ways. This may include seeing or hearing things that aren't really there (hallucinations). A mental status change has many possible causes. For example, it may be the result of an infection, an imbalance of chemicals in the body, or a chronic disease such as diabetes or COPD. It can also be caused by things such as a head injury, taking certain medicines, or using alcohol or drugs. The doctor may do tests to look for the cause. These tests may include urine tests, blood tests, and imaging tests such as a CT scan. Sometimes a clear cause isn't found. But tests can help the doctor rule out a serious cause of your symptoms. A change in mental status can be scary. But mental status will often return to normal when the cause is treated. So it is important to get any follow-up testing or treatment the doctor has suggested. The doctor has checked you carefully, but problems can develop later. If you notice any problems or new symptoms, get medical treatment right away. Follow-up care is a key part of your treatment and safety. Be sure to make and go to all appointments, and call your doctor if you are having problems. It's also a good idea to know your test results and keep a list of the medicines you take. How can you care for yourself at home? · Be safe with medicines. Take your medicines exactly as prescribed. Call your doctor if you think you are having a problem with your medicine. · Have another adult stay with you until you are better. This can help keep you safe. Ask that person to watch for signs that your mental status is getting worse. When should you call for help? Call 911 anytime you think you may need emergency care.  For example, call if:    · You passed out (lost consciousness).    Call your doctor now or seek immediate medical care if:    · Your mental status is getting worse.     · You have new symptoms, such as a fever, chills, or shortness of breath.     · You do not feel safe.    Watch closely for changes in your health, and be sure to contact your doctor if:    · You do not get better as expected. Where can you learn more? Go to http://dee-marisel.info/. Enter X622 in the search box to learn more about \"Altered Mental Status: Care Instructions. \"  Current as of: October 9, 2017  Content Version: 11.7  © 9900-4772 [employer]. Care instructions adapted under license by [employer] (which disclaims liability or warranty for this information). If you have questions about a medical condition or this instruction, always ask your healthcare professional. [address] any warranty or liability for your use of this information. Key COPD Medications             predniSONE (STERAPRED) 5 mg dose pack  (Taking) See administration instruction per 5mg dose pack    tiotropium (SPIRIVA WITH HANDIHALER) 18 mcg inhalation capsule Take 1 Cap by inhalation daily. fluticasone/salmeterol (ADVAIR HFA IN) Take  by inhalation. ALBUTEROL IN Take  by inhalation.

## 2018-07-25 NOTE — PROGRESS NOTES
@ 2010 pt taken over awake but drowsy in bed ,denies pain at present, remains on BIPAP. Sitter in with patient. Vital signs stable at present. In no obvious distress. Nursing assessment done and documented in appropriate flow sheets . Nursing management continues. @2200 pt care continues. Sitter remains at bed side SCD remains on bilateral feet . Nursing observation continues. @0000 pt asleep at present ,vital signs stable, remains on BIPAP. flacc 0 nursing care continues. @0200 no changes. @0400 pt care continues. Pt asleep sitter at bedside. Vital signs WNL. Nursing management continues. @0600 no changes pt sitting in recliner, chair alarm on , sitter remains with patient care continues. @7919 Bedside and Verbal shift change report given to Judy Tinajero (oncoming nurse) by Zahra Wyatt (offgoing nurse). Report included the following information SBAR and Kardex.  Alarm parameters reviewed, on and audible Appropriate for patient clinical condition

## 2018-07-25 NOTE — PROGRESS NOTES
[facility]  Pulmonary, Critical Care, and Sleep Medicine    Pulm/CC    Name: Mallory Farias MRN: [id number]   : 1945 Hospital: [facility] [place]   Date: 2018  Room: [facility]     Subjective:   Patient is a 74 y. o.AA male with hx of COPD, home O2, chronic hypercapneic resp failure, dementia, CLL, diast CHF, CKD who was recently in [facility] for hypercapneic resp failure needing BIPAP at high settings. He was subseqeuntly discharged to rehab. He has been sent to ER on  with altered mentation and elevated pCO2, wheezing. On BIPAP 24/12 pressures in ER.    18  Patient awake this am, on room air, SPO2 95% and above, no distress  Used BiPAP at night. Hemodynamically stable. Breathing stable; no significant cough; no vomiting  More awake, walked in room with PT, now sitting in bedside chair  Dementia. Tolerating PO diet well. No fever.     ROS - limited due to dementia at baseline    PSG [facility] Dr Joaquin Carcamo  AHI 34/hr RUPERTO and CSA events; O2 desats  BIPAP titration - snoring and apneas could not be eliminated with 24/20 pressures; central apneas with higher pressures; poorly tolerated overall    Past Medical History:   Diagnosis Date    Chronic kidney disease     stage 3 kidney disease    Chronic obstructive pulmonary disease (HCC)     CLL (chronic lymphocytic leukemia) (HCC)     Delirium     Emphysema lung ([address]     Fall     Frequent hospital admissions     Gout     Hyperlipidemia     Hypoxemia     Memory loss     Noncompliance     Oxygen dependent     Pneumonia     Pulmonary nodules     Respiratory failure (HCC)     Risk for falls     Septic shock (HCC)     Sleep apnea     Tobacco abuse     Vitamin D deficiency       Past Surgical History:   Procedure Laterality Date    HX CATARACT REMOVAL      HX HERNIA REPAIR      HX NEPHROSTOMY        No Known Allergies   Social History   Substance Use Topics    Smoking status: Former Smoker    Smokeless tobacco: Never Used    Alcohol use No      Comment: former ETOH user; stopped 2017        Current Facility-Administered Medications   Medication Dose Route Frequency    predniSONE (DELTASONE) tablet 20 mg  20 mg Oral DAILY WITH BREAKFAST    insulin lispro (HUMALOG) injection   SubCUTAneous AC&HS    amoxicillin-clavulanate (AUGMENTIN) 500-125 mg per tablet 1 Tab  1 Tab Oral Q12H    OLANZapine (ZyPREXA) tablet 1.25 mg  1.25 mg Oral BID    amLODIPine (NORVASC) tablet 5 mg  5 mg Oral DAILY    carvedilol (COREG) tablet 12.5 mg  12.5 mg Oral BID WITH MEALS    aspirin chewable tablet 81 mg  81 mg Oral DAILY    pravastatin (PRAVACHOL) tablet 20 mg  20 mg Oral DAILY    famotidine (PEPCID) tablet 20 mg  20 mg Oral DAILY    albuterol-ipratropium (DUO-NEB) 2.5 MG-0.5 MG/3 ML  3 mL Nebulization Q4H RT    budesonide (PULMICORT) 500 mcg/2 ml nebulizer suspension  500 mcg Nebulization BID RT       Objective:   Vital Signs:    Visit Vitals    /67    Pulse 85    Temp 97.8 °F (36.6 °C)    Resp 24    Wt 84.3 kg (185 lb 13.6 oz)    SpO2 98%    BMI 30 kg/m2       O2 Device: Room air   Temp (24hrs), Av.1 °F (36.7 °C), Min:97.8 °F (36.6 °C), Max:98.7 °F (37.1 °C)       Intake/Output:   Last shift:      701 - 1900  In: 540 [P.O.:540]  Out: 250 [Urine:250]  Last 3 shifts: 1901 -  0700  In: 360 [P.O.:360]  Out: 400 [Urine:400]    Intake/Output Summary (Last 24 hours) at 18 1032  Last data filed at 18 1000   Gross per 24 hour   Intake              900 ml   Output              250 ml   Net              650 ml            Physical Exam:   Gene: Comfortable; on room fredy; acyanotic, awake, demented, in bedside chair  HEENT: pupils not dilated, reactive, no scleral jaundice  Neck: No adenopathy or thyroid swelling  CVS: S1S2 no murmurs; JVD not elevated  RS: Mod air entry bilaterally, decreased BS at bases, no wheezes, no crackles  Abd: soft, non tender, no hepatosplenomegaly, no abd distension  Neuro: limited exam; awake, alert, dementia, following all commands  Extrm: trace bilateral pitting leg edema   Skin: no rash  Lymphatic: no cervical or supraclavicular adenopathy    Telemetry: sinus rhythm      Data review:     Recent Results (from the past 12 hour(s))   MAGNESIUM    Collection Time: 07/25/18  4:48 AM   Result Value Ref Range    Magnesium 2.0 1.6 - 2.6 mg/dL   GLUCOSE, POC    Collection Time: 07/25/18  6:05 AM   Result Value Ref Range    Glucose (POC) 73 70 - 110 mg/dL   GLUCOSE, POC    Collection Time: 07/25/18  7:30 AM   Result Value Ref Range    Glucose (POC) 84 70 - 110 mg/dL             No results for input(s): FIO2I, IFO2, HCO3I, IHCO3, HCOPOC, PCO2I, PCOPOC, IPHI, PHI, PHPOC, PO2I, PO2POC in the last 72 hours. No lab exists for component: IPOC2    All Micro Results     Procedure Component Value Units Date/Time    CULTURE, BLOOD [[id number]] Collected:  07/16/18 0438    Order Status:  Completed Specimen:  Blood from Blood Updated:  07/23/18 0811     Special Requests: NO SPECIAL REQUESTS        Culture result: NO GROWTH 6 DAYS       CULTURE, URINE [[id number]]  (Abnormal) Collected:  07/16/18 1800    Order Status:  Completed Specimen:  Urine from [place] Specimen Updated:  07/19/18 1323     Special Requests: NO SPECIAL REQUESTS        Culture result:         <10,000 COLONIES/mL GRAM NEGATIVE RODS (A)    CULTURE, BLOOD [[id number]] Collected:  07/16/18 1730    Order Status:  Canceled Specimen:  Blood from Blood           Imaging:  [x]I have personally reviewed the patients chest radiographs images and report   CXR 7/20    Results from [place] encounter on 07/16/18   XR CHEST PORT   Narrative Portable chest 7/20/2018. History: Tachypnea. Hypoxia. Technique: A semierect portable radiograph of the chest was obtained. Comparison:  Portable chest x-ray from 7/18/2018. Findings: The cardiac silhouette is enlarged but unchanged in size and  configuration.   The thoracic aorta is tortuous. There is no pulmonary vascular  congestion or edema. The lungs are clear. There is no pneumothorax or pleural  effusion. Impression IMPRESSION:    1. No acute pulmonary disease. Results from [place] encounter on 07/16/18   CT HEAD WO CONT   Narrative EXAM:  CT of the brain without intravenous contrast.    COMPARISON: CT July 3, 2018. REASON FOR EXAM: \"Decreased alertness; facial droop and ams\". DOSE REDUCTION:  One or more dose reduction techniques were used on this CT:  automated exposure control, adjustment of the mAs and/or kVp according to  patient's size, and iterative reconstruction techniques. The specific techniques  utilized on this CT exam have been documented in the patient's electronic  medical record.    _______________    FINDINGS:         IMAGE QUALITY:  The images are mildly degraded by motion artifact. BRAIN AND EXTRA-AXIAL SPACE:               SUBACUTE TERRITORIAL TRANSCORTICAL INFARCTION:  None detected. MASS:  None detected. HEMORRHAGE:  None. SUBDURAL FLUID COLLECTION:  None detected. HYDROCEPHALUS:  None. REMOTE CORTICAL INFARCTION:  None detected. REMOTE CEREBELLAR INFARCTION:  None detected. MISCELLANEOUS:  Non-applicable. STRIVE (STandards for Reporting Vascular changes on nEuroimaging):                     --Lacunes (age-indeterminate) or perivascular spaces of  \"presumed vascular origin\":  None definite. --Yeso of white matter hypodensity of \"presumed vascular  origin\":  Low grade. --Degree of brain atrophy (subjective): Low grade. BURDEN OF CALCIFIC INTRACRANIAL ATHEROSCLEROSIS:  None significant. HEENT:             INCLUDED UPPER ORBITS:  The lenses are replaced bilaterally.            INCLUDED UPPER PARANASAL SINUSES:  Predominantly clear. MASTOID AIR CELLS:  Predominantly clear. BONES:  Unremarkable. SCALP:  Unremarkable.    _______________         Impression IMPRESSION:    Low-grade generalized chronic changes, however, no acute findings. _______________                  Note: Dr. Shanna Sultana discussed the stroke code results with Dr. Bela Kan at [phone] on  7/16/2018. IMPRESSION:   · Metabolic encephalopathy from CO2 narcosis improved, dementia  · Dementia - severity not known, but advanced per prior notes  · COPD with home O2-FEV1 of 1.23 or 55% predicted, normal FEV1%, follows with Dr. Richard Bailey - on Spiriva and advair at home- Recent 6 min walk in May suggested requires home oxygen 2-4 liter  · Acute on chronic hypoxemic and hypercapneic respiratory failure, now on room air  · Acute on chronic diast CHF  · CKD stage 3 with ARF  · HTN  · Severe complex sleep apnea - AHI 34/hr RUPERTO and CSA events; on BIPAP21/17 non compliant - not able to tolerate but best response noted on BIPAP 24/12 in recent hospitalization  · Hx of ETOH abuse (residing in rehab facility)  · Chronic leukocytosis, thrombocytopenia with hx of CLL  · UTI GNR      RECOMMENDATIONS:   · Pulm: compensated respirations; NC O2 daytime as needed- currently on room air and BIPAP while sleeping; prn haldol or xanax to help with BIPAP tolerance/compliance  · Patient poorly compliant with NIPAP outside hospital due to dementia at baseline. Trilogy being set up today prior to DC home. · Duonebs q 4hrly; Budesonide nebs bid; continue prednisone, continue 20 mg daily and taper  · ID - CXR clear; leukocytosis due to CLL; blood cx neg so far; s/p Zithromax x 5 days for possible bacterial bronchitis.  GNR reported in urine cx - treating with Augmentin 500 mg bid x 5 days  · Oral hydration  · Platelets - stable; no active bleeding; negative heparin platelet Ab  · Prophylaxis-DVT Px: heparin held due to decrease in platelets; SCDs as tolerated by patient; GI Px: pepcid  · Full code status  · Dr. Diane Callaway had discussed with wife at bedside on 7/17; wife does not live with patient for 12 years but still  legally - he discussed various management options including no CPR or vent support/breathing tube. Wife wishes full code. · It appears Dr Kendal York has been planning Trilogy niv; patient is non-compliant and usually advanced dementia patients cannot tolerate BIPAP or Trilogy; he likely will need Rehab, nursing home placement. This was arranged by hospital. However, wife taking patient home today. Will defer respective systems problem management to primary and other consultant and follow patient with primary and other medical team  Further recommendations will be based on the patient's response to recommended treatment and results of the investigation ordered. Quality Care: PPI, DVT prophylaxis, HOB elevated, Infection control all reviewed and addressed. PAIN AND SEDATION: none   · Skin/Wound: no active issues  · Nutrition: oral diet as tolerated - supervised  · Prophylaxis: DVT and GI Prophylaxis reviewed  · Restraints: none  · PT/OT eval and treat: as needed  · Lines/Tubes: PIVs  ADVANCE DIRECTIVE: Full Code  Palliative medicine consulted.      Mod complexity decision making was performed in this consultation and evaluation of this patient       Lupe Ovalle MD

## 2018-07-25 NOTE — PROGRESS NOTES
Problem: Pressure Injury - Risk of  Goal: *Prevention of pressure injury  Document Percy Scale and appropriate interventions in the flowsheet. Outcome: Progressing Towards Goal  Pressure Injury Interventions:  Sensory Interventions: Assess changes in LOC, Assess need for specialty bed    Moisture Interventions: Absorbent underpads    Activity Interventions: Pressure redistribution bed/mattress(bed type)    Mobility Interventions: Pressure redistribution bed/mattress (bed type)    Nutrition Interventions: Document food/fluid/supplement intake    Friction and Shear Interventions: HOB 30 degrees or less               Problem: Falls - Risk of  Goal: *Absence of Falls  Document Stan Fall Risk and appropriate interventions in the flowsheet.    Outcome: Progressing Towards Goal  Fall Risk Interventions:  Mobility Interventions: Assess mobility with egress test, Bed/chair exit alarm    Mentation Interventions: Reorient patient, More frequent rounding, Room close to nurse's station, Toileting rounds, Bed/chair exit alarm, Adequate sleep, hydration, pain control, Door open when patient unattended    Medication Interventions: Teach patient to arise slowly, Patient to call before getting OOB, Bed/chair exit alarm    Elimination Interventions: Call light in reach, Bed/chair exit alarm, Toileting schedule/hourly rounds, Urinal in reach (pt will use bedside commode when not confused,dementia pt )

## 2018-07-25 NOTE — DIABETES MGMT
GLYCEMIC CONTROL PROGRESS NOTE:    -discussed in rounds, no known h/o DM  -BG in target range ICU: 140-180 mg/dL  -Prednisone daily, no corrective coverage needed X 48 hours  -responding well to IP regimen, recommend continue    Recent Glucose Results:   Lab Results   Component Value Date/Time    GLUCPOC 84 07/25/2018 07:30 AM    GLUCPOC 73 07/25/2018 06:05 AM    GLUCPOC 93 07/24/2018 09:25 PM     Jatin Fernández RN, MS  Glycemic Control Team  Pager [phone] (M-TH 8:30-5P)  *After Hours pager [phone]

## 2018-07-26 ENCOUNTER — PATIENT OUTREACH (OUTPATIENT)
Dept: CASE MANAGEMENT | Age: 73
End: 2018-07-26

## 2018-07-26 NOTE — PROGRESS NOTES
Hospital Discharge Follow-Up      Date/Time:  2018 1:22 PM    Patient was admitted to [facility] on 18 and discharged on 18 for acute on chronic respiratory failure w/ hypoxia and hypercapnia. The physician discharge summary was available at the time of outreach. Patient was contacted within 2 business days of discharge. Inpatient RRAT score: 28  Was this a readmission? yes   Patient stated reason for the readmission: NA    Nurse Navigator (NN) contacted the patient's wife, Mrs. Sofía Sandoval by telephone to perform post hospital discharge assessment. Verified name and  with Mrs. Sofía Sandoval as identifiers. Provided introduction to self, and explanation of the Nurse Navigator role. Reviewed discharge instructions and red flags with Mrs. Sofía Sandoval who verbalized understanding. Mrs. Sofía Sandoval given an opportunity to ask questions and does not have any further questions or concerns at this time. Mrs. Sofía Sandoval agrees to contact the PCP office for questions related to the patient's healthcare. NN provided contact information for future reference. Disease Specific:   COPD    Summary of patient's top problems:  1. COPD - Mrs. [address] reports that patient continues to have non=productive cough. \"Tolerated Trilogy well last night, and is calmer and moving better today. He slept straight through from 11 PM til 6 AM\". Nasal O2 @ 2 LPM, Mrs. Sofía Sandoval states she puts O2 on him when sats drop but states sats normally recover to 90's in \"a short amount of time\". Home Health orders at discharge: SN, PT, OT, [address]: [facility]  Date of initial visit: 18    Durable Medical Equipment ordered/company: [employer]  Durable Medical Equipment received: Trilogy. Oxygen and supplies    Barriers to care?  None noted at this time    Advance Care Planning:   Does patient have an Advance Directive:  on file     Medication(s):   New Medications at Discharge: yes  Changed Medications at Discharge: yes  Discontinued Medications at Discharge: no    Mrs. Sagrario Douglas understanding of administration of home medications, states she has concerns about Zyprexa and is waiting for return call from Dr. Karyle Ina (neurologist) about whether to continue this medication. There were no barriers to obtaining medications identified at this time. Referral to Pharm D needed: no     Current Outpatient Prescriptions   Medication Sig    OLANZapine (ZYPREXA) 2.5 mg tablet Take 0.5 Tabs by mouth two (2) times a day.  amLODIPine (NORVASC) 10 mg tablet Take 1 Tab by mouth daily.  hydrALAZINE (APRESOLINE) 25 mg tablet Take 1 Tab by mouth three (3) times daily.  predniSONE (STERAPRED) 5 mg dose pack See administration instruction per 5mg dose pack    ALPRAZolam (XANAX) 0.5 mg tablet Take 1 Tab by mouth three (3) times daily as needed for Anxiety. Max Daily Amount: 1.5 mg. Indications: anxiety    carvedilol (COREG) 12.5 mg tablet Take 1 Tab by mouth two (2) times daily (with meals).  docusate sodium (COLACE) 100 mg capsule Take 1 Cap by mouth two (2) times a day for 90 days.  alfuzosin SR (UROXATRAL) 10 mg SR tablet Take 10 mg by mouth daily.  aspirin 81 mg chewable tablet Take 81 mg by mouth daily.  cycloSPORINE (RESTASIS) 0.05 % ophthalmic emulsion Administer 1 Drop to both eyes two (2) times a day.  famotidine (PEPCID) 20 mg tablet Take 20 mg by mouth two (2) times a day.  latanoprost (XALATAN) 0.005 % ophthalmic solution Administer 1 Drop to both eyes nightly.  furosemide (LASIX) 40 mg tablet Take  by mouth daily.  Oxygen     pravastatin sodium (PRAVASTATIN PO) Take  by mouth.  tiotropium (SPIRIVA WITH HANDIHALER) 18 mcg inhalation capsule Take 1 Cap by inhalation daily.  fluticasone/salmeterol (ADVAIR HFA IN) Take  by inhalation.  ALBUTEROL IN Take  by inhalation. No current facility-administered medications for this visit. There are no discontinued medications.     BS follow up appointment(s): No future appointments. Non-[facility] follow up appointment(s): Dr. Padmini Ochoa - PCP - Mrs. Charity Bah has called twice today and left messages, is waiting on return call with date and time of follow up appointment. Dr. Madeline Stanley - pulmonology - 8/27/18      Goals      Attends follow-up appointments as directed.             Dr. Padmini Ochoa - PCP    Dr. Madeline Stanley - pulmonary

## 2018-09-01 ENCOUNTER — APPOINTMENT (OUTPATIENT)
Dept: GENERAL RADIOLOGY | Age: 73
DRG: 208 | End: 2018-09-01
Attending: PHYSICIAN ASSISTANT
Payer: MEDICARE

## 2018-09-01 ENCOUNTER — HOSPITAL ENCOUNTER (INPATIENT)
Age: 73
LOS: 9 days | Discharge: NURSING, REHAB OR LONG-TERM CARE | DRG: 208 | End: 2018-09-11
Attending: EMERGENCY MEDICINE | Admitting: HOSPITALIST
Payer: MEDICARE

## 2018-09-01 DIAGNOSIS — J96.02 ACUTE RESPIRATORY FAILURE WITH HYPOXIA AND HYPERCARBIA (HCC): ICD-10-CM

## 2018-09-01 DIAGNOSIS — J18.9 PNEUMONIA OF RIGHT LOWER LOBE DUE TO INFECTIOUS ORGANISM: ICD-10-CM

## 2018-09-01 DIAGNOSIS — J44.1 COPD EXACERBATION (HCC): ICD-10-CM

## 2018-09-01 DIAGNOSIS — Z79.51 CURRENT CHRONIC USE OF INHALED STEROID: ICD-10-CM

## 2018-09-01 DIAGNOSIS — J96.01 ACUTE RESPIRATORY FAILURE WITH HYPOXIA AND HYPERCARBIA (HCC): ICD-10-CM

## 2018-09-01 DIAGNOSIS — R06.89 CO2 NARCOSIS: Primary | ICD-10-CM

## 2018-09-01 DIAGNOSIS — C91.10 CLL (CHRONIC LYMPHOCYTIC LEUKEMIA) (HCC): ICD-10-CM

## 2018-09-01 DIAGNOSIS — I10 ESSENTIAL HYPERTENSION: ICD-10-CM

## 2018-09-01 DIAGNOSIS — Z79.52 CURRENT CHRONIC USE OF SYSTEMIC STEROIDS: ICD-10-CM

## 2018-09-01 DIAGNOSIS — G47.33 OSA (OBSTRUCTIVE SLEEP APNEA): ICD-10-CM

## 2018-09-01 LAB
ALBUMIN SERPL-MCNC: 3.1 G/DL (ref 3.4–5)
ALBUMIN/GLOB SERPL: 0.8 {RATIO} (ref 0.8–1.7)
ALP SERPL-CCNC: 95 U/L (ref 45–117)
ALT SERPL-CCNC: 20 U/L (ref 16–61)
ANION GAP SERPL CALC-SCNC: ABNORMAL MMOL/L (ref 3–18)
ARTERIAL PATENCY WRIST A: YES
ARTERIAL PATENCY WRIST A: YES
AST SERPL-CCNC: 19 U/L (ref 15–37)
BASE EXCESS BLD CALC-SCNC: 25 MMOL/L
BASE EXCESS BLD CALC-SCNC: 26 MMOL/L
BASOPHILS # BLD: 0 K/UL (ref 0–0.1)
BASOPHILS NFR BLD: 0 % (ref 0–3)
BDY SITE: ABNORMAL
BDY SITE: ABNORMAL
BILIRUB SERPL-MCNC: 0.2 MG/DL (ref 0.2–1)
BNP SERPL-MCNC: 33 PG/ML (ref 0–900)
BODY TEMPERATURE: 98.3
BODY TEMPERATURE: 98.3
BUN SERPL-MCNC: 26 MG/DL (ref 7–18)
BUN/CREAT SERPL: 18 (ref 12–20)
CALCIUM SERPL-MCNC: 8.9 MG/DL (ref 8.5–10.1)
CHLORIDE SERPL-SCNC: 101 MMOL/L (ref 100–108)
CK MB CFR SERPL CALC: 3.1 % (ref 0–4)
CK MB SERPL-MCNC: 1.7 NG/ML (ref 5–25)
CK SERPL-CCNC: 55 U/L (ref 39–308)
CO2 SERPL-SCNC: >45 MMOL/L (ref 21–32)
CREAT SERPL-MCNC: 1.45 MG/DL (ref 0.6–1.3)
DIFFERENTIAL METHOD BLD: ABNORMAL
EOSINOPHIL # BLD: 0 K/UL (ref 0–0.4)
EOSINOPHIL NFR BLD: 0 % (ref 0–5)
ERYTHROCYTE [DISTWIDTH] IN BLOOD BY AUTOMATED COUNT: 16.8 % (ref 11.6–14.5)
GAS FLOW.O2 O2 DELIVERY SYS: ABNORMAL L/MIN
GAS FLOW.O2 O2 DELIVERY SYS: ABNORMAL L/MIN
GAS FLOW.O2 SETTING OXYMISER: 2.5 L/M
GLOBULIN SER CALC-MCNC: 4 G/DL (ref 2–4)
GLUCOSE SERPL-MCNC: 115 MG/DL (ref 74–99)
HCO3 BLD-SCNC: 51.3 MMOL/L (ref 22–26)
HCO3 BLD-SCNC: 52 MMOL/L (ref 22–26)
HCT VFR BLD AUTO: 35.4 % (ref 36–48)
HGB BLD-MCNC: 9.8 G/DL (ref 13–16)
LACTATE SERPL-SCNC: 0.7 MMOL/L (ref 0.4–2)
LYMPHOCYTES # BLD: 4.7 K/UL (ref 0.8–3.5)
LYMPHOCYTES NFR BLD: 25 % (ref 20–51)
MCH RBC QN AUTO: 22.7 PG (ref 24–34)
MCHC RBC AUTO-ENTMCNC: 27.7 G/DL (ref 31–37)
MCV RBC AUTO: 82.1 FL (ref 74–97)
MONOCYTES # BLD: 1.1 K/UL (ref 0–1)
MONOCYTES NFR BLD: 6 % (ref 2–9)
NEUTS SEG # BLD: 13.1 K/UL (ref 1.8–8)
NEUTS SEG NFR BLD: 69 % (ref 42–75)
O2/TOTAL GAS SETTING VFR VENT: 65 %
PCO2 BLD: 114.5 MMHG (ref 35–45)
PCO2 BLD: 92.2 MMHG (ref 35–45)
PEEP RESPIRATORY: 8 CMH2O
PH BLD: 7.26 [PH] (ref 7.35–7.45)
PH BLD: 7.35 [PH] (ref 7.35–7.45)
PIP ISTAT,IPIP: 20
PLATELET # BLD AUTO: 195 K/UL (ref 135–420)
PMV BLD AUTO: 10.2 FL (ref 9.2–11.8)
PO2 BLD: 217 MMHG (ref 80–100)
PO2 BLD: 78 MMHG (ref 80–100)
POTASSIUM SERPL-SCNC: 3.7 MMOL/L (ref 3.5–5.5)
PRESSURE SUPPORT SETTING VENT: 12 CMH2O
PROT SERPL-MCNC: 7.1 G/DL (ref 6.4–8.2)
RBC # BLD AUTO: 4.31 M/UL (ref 4.7–5.5)
RBC MORPH BLD: ABNORMAL
SAO2 % BLD: 100 % (ref 92–97)
SAO2 % BLD: 93 % (ref 92–97)
SERVICE CMNT-IMP: ABNORMAL
SERVICE CMNT-IMP: ABNORMAL
SODIUM SERPL-SCNC: 145 MMOL/L (ref 136–145)
SPECIMEN TYPE: ABNORMAL
SPECIMEN TYPE: ABNORMAL
TOTAL RESP. RATE, ITRR: 16
TOTAL RESP. RATE, ITRR: 19
TROPONIN I SERPL-MCNC: <0.02 NG/ML (ref 0–0.06)
WBC # BLD AUTO: 18.9 K/UL (ref 4.6–13.2)
WBC MORPH BLD: ABNORMAL

## 2018-09-01 PROCEDURE — 80053 COMPREHEN METABOLIC PANEL: CPT | Performed by: PHYSICIAN ASSISTANT

## 2018-09-01 PROCEDURE — 75810000137 HC PLCMT CENT VENOUS CATH

## 2018-09-01 PROCEDURE — 82550 ASSAY OF CK (CPK): CPT | Performed by: PHYSICIAN ASSISTANT

## 2018-09-01 PROCEDURE — 94660 CPAP INITIATION&MGMT: CPT

## 2018-09-01 PROCEDURE — 71045 X-RAY EXAM CHEST 1 VIEW: CPT

## 2018-09-01 PROCEDURE — 74011000250 HC RX REV CODE- 250: Performed by: PHYSICIAN ASSISTANT

## 2018-09-01 PROCEDURE — 77030013687 HC GD NDL BARD -B

## 2018-09-01 PROCEDURE — 83036 HEMOGLOBIN GLYCOSYLATED A1C: CPT | Performed by: HOSPITALIST

## 2018-09-01 PROCEDURE — 85025 COMPLETE CBC W/AUTO DIFF WBC: CPT | Performed by: PHYSICIAN ASSISTANT

## 2018-09-01 PROCEDURE — 99285 EMERGENCY DEPT VISIT HI MDM: CPT

## 2018-09-01 PROCEDURE — 77030013140 HC MSK NEB VYRM -A

## 2018-09-01 PROCEDURE — 02HV33Z INSERTION OF INFUSION DEVICE INTO SUPERIOR VENA CAVA, PERCUTANEOUS APPROACH: ICD-10-PCS | Performed by: EMERGENCY MEDICINE

## 2018-09-01 PROCEDURE — 94762 N-INVAS EAR/PLS OXIMTRY CONT: CPT

## 2018-09-01 PROCEDURE — 36600 WITHDRAWAL OF ARTERIAL BLOOD: CPT

## 2018-09-01 PROCEDURE — 5A1945Z RESPIRATORY VENTILATION, 24-96 CONSECUTIVE HOURS: ICD-10-PCS | Performed by: INTERNAL MEDICINE

## 2018-09-01 PROCEDURE — 83880 ASSAY OF NATRIURETIC PEPTIDE: CPT | Performed by: PHYSICIAN ASSISTANT

## 2018-09-01 PROCEDURE — 96361 HYDRATE IV INFUSION ADD-ON: CPT

## 2018-09-01 PROCEDURE — 94640 AIRWAY INHALATION TREATMENT: CPT

## 2018-09-01 PROCEDURE — C1751 CATH, INF, PER/CENT/MIDLINE: HCPCS

## 2018-09-01 PROCEDURE — 82803 BLOOD GASES ANY COMBINATION: CPT

## 2018-09-01 PROCEDURE — 74011250636 HC RX REV CODE- 250/636: Performed by: PHYSICIAN ASSISTANT

## 2018-09-01 PROCEDURE — 93005 ELECTROCARDIOGRAM TRACING: CPT

## 2018-09-01 PROCEDURE — 83605 ASSAY OF LACTIC ACID: CPT | Performed by: PHYSICIAN ASSISTANT

## 2018-09-01 RX ORDER — FENTANYL CITRATE 50 UG/ML
25 INJECTION, SOLUTION INTRAMUSCULAR; INTRAVENOUS
Status: DISCONTINUED | OUTPATIENT
Start: 2018-09-01 | End: 2018-09-05

## 2018-09-01 RX ORDER — IPRATROPIUM BROMIDE AND ALBUTEROL SULFATE 2.5; .5 MG/3ML; MG/3ML
3 SOLUTION RESPIRATORY (INHALATION)
Status: COMPLETED | OUTPATIENT
Start: 2018-09-01 | End: 2018-09-01

## 2018-09-01 RX ORDER — MIDAZOLAM HYDROCHLORIDE 1 MG/ML
1 INJECTION, SOLUTION INTRAMUSCULAR; INTRAVENOUS
Status: DISCONTINUED | OUTPATIENT
Start: 2018-09-01 | End: 2018-09-05

## 2018-09-01 RX ORDER — ENOXAPARIN SODIUM 100 MG/ML
40 INJECTION SUBCUTANEOUS EVERY 24 HOURS
Status: DISCONTINUED | OUTPATIENT
Start: 2018-09-01 | End: 2018-09-02

## 2018-09-01 RX ORDER — SODIUM CHLORIDE 0.9 % (FLUSH) 0.9 %
5-10 SYRINGE (ML) INJECTION AS NEEDED
Status: DISCONTINUED | OUTPATIENT
Start: 2018-09-01 | End: 2018-09-11 | Stop reason: HOSPADM

## 2018-09-01 RX ORDER — PROPOFOL 10 MG/ML
0-50 VIAL (ML) INTRAVENOUS
Status: DISCONTINUED | OUTPATIENT
Start: 2018-09-01 | End: 2018-09-05

## 2018-09-01 RX ORDER — SODIUM CHLORIDE 0.9 % (FLUSH) 0.9 %
5-10 SYRINGE (ML) INJECTION AS NEEDED
Status: CANCELLED | OUTPATIENT
Start: 2018-09-01

## 2018-09-01 RX ORDER — IPRATROPIUM BROMIDE AND ALBUTEROL SULFATE 2.5; .5 MG/3ML; MG/3ML
3 SOLUTION RESPIRATORY (INHALATION)
Status: DISCONTINUED | OUTPATIENT
Start: 2018-09-02 | End: 2018-09-02

## 2018-09-01 RX ORDER — CHLORHEXIDINE GLUCONATE 1.2 MG/ML
10 RINSE ORAL ONCE
Status: COMPLETED | OUTPATIENT
Start: 2018-09-01 | End: 2018-09-02

## 2018-09-01 RX ORDER — CHLORHEXIDINE GLUCONATE 1.2 MG/ML
10 RINSE ORAL EVERY 12 HOURS
Status: DISCONTINUED | OUTPATIENT
Start: 2018-09-01 | End: 2018-09-05

## 2018-09-01 RX ADMIN — SODIUM CHLORIDE 1000 ML: 900 INJECTION, SOLUTION INTRAVENOUS at 21:51

## 2018-09-01 RX ADMIN — IPRATROPIUM BROMIDE AND ALBUTEROL SULFATE 3 ML: .5; 3 SOLUTION RESPIRATORY (INHALATION) at 20:09

## 2018-09-01 NOTE — ED PROVIDER NOTES
EMERGENCY DEPARTMENT HISTORY AND PHYSICAL EXAM 
 
Date: 9/1/2018 Patient Name: Ronni Ontiveros. History of Presenting Illness Chief Complaint Patient presents with  Shortness of Breath History Provided By: Patient's Wife Chief Complaint: SOB & wheezing Duration: 2-3 Days Timing:  Worsening Associated Symptoms: cough, voice change, leg swelling, and confusion/behavior change Additional History (Context):  
 
6:50 PM 
 
Ronni Ontiveros. is a 74 y.o. male with pertinent PMHx of COPD, PNA, respiratory failure, CKD, HLD, delirium presenting ambulatory to the ED c/o worsening SOB & wheezing x 2-3 days. Pt was having a hard time wearing his Trilogy breathing machine over the last few days, as he has been taking it off every few minutes. The pt has been complaining that \"it isn't fitting right\", but they shaved his beard with no relief. Pt's wife gave him a breathing treatment today and put him on his Trilogy, with no relief of his SOB. Pt's oxygen saturations have been dropping into the 70s, on his baseline home O2. Pt's family states that the pt was recently admitted for respiratory issues. Pt's wife notes associated symptoms of intermittently productive cough, voice change (\"wet sounding\"), leg swelling, and behavior change/confusion (\"looking blankly across the room\"). Pt was seen by Dr. Sharri Santos (his pulmonologist) ~ 8 days ago and was put on Prednisone 60mg/4 days and 40mg/4 days, with his last dose being today. PCP increased his Lasix to 80 mg 2 weeks ago, after c/o leg swelling. Pt had a fall ~2 days ago, but did not have any c/o pain after. Pt has had no fevers/chills. PCP: Parul Contreras MD 
Pt does not smoke tobacco, drink ETOH or do illicit drugs. There are no other complaints, changes, or physical findings at this time. Current Facility-Administered Medications Medication Dose Route Frequency Provider Last Rate Last Dose  propofol (DIPRIVAN) infusion  7 mcg/kg/min IntraVENous TITRATE Rico Cunningham MD   Stopped at 09/02/18 0300  
 methylPREDNISolone (PF) (SOLU-MEDROL) injection 40 mg  40 mg IntraVENous Q6H Corazon Hernandez MD   40 mg at 09/02/18 0540  insulin lispro (HUMALOG) injection   SubCUTAneous Q6H Corazon Hernandez MD   Stopped at 09/02/18 0600  
 glucose chewable tablet 16 g  4 Tab Oral PRN Corazon Hernandez MD      
 glucagon [facility]) injection 1 mg  1 mg IntraMUSCular PRN Corazon Hernandez MD      
 dextrose (D50W) injection syrg 12.5-25 g  25-50 mL IntraVENous PRN Corazon Hernandez MD      
 piperacillin-tazobactam (ZOSYN) 3.375 g in 0.9% sodium chloride (MBP/ADV) 100 mL MBP  3.375 g IntraVENous Q8H Corazon Hernandez  mL/hr at 09/02/18 0432 3.375 g at 09/02/18 7687  levoFLOXacin (LEVAQUIN) 500 mg in D5W IVPB  500 mg IntraVENous Q24H Corazon Hernandez  mL/hr at 09/02/18 0527 500 mg at 09/02/18 0527  
 albuterol-ipratropium (DUO-NEB) 2.5 MG-0.5 MG/3 ML  3 mL Nebulization Q4H RT Corazon Hernandez MD   3 mL at 09/02/18 0727  
 amLODIPine (NORVASC) tablet 10 mg  10 mg Oral DAILY Corazon Hernandez MD      
 aspirin chewable tablet 81 mg  81 mg Oral DAILY Corazon Hernandez MD      
 carvedilol (COREG) tablet 12.5 mg  12.5 mg Oral BID WITH MEALS Corazon Hernandez MD      
 latanoprost (XALATAN) 0.005 % ophthalmic solution 1 Drop  1 Drop Both Eyes QHS Corazon Hernandez MD      
 furosemide (LASIX) injection 20 mg  20 mg IntraVENous BID Corazon Hernandez MD      
 acetaminophen (TYLENOL) suppository 650 mg  650 mg Rectal Q4H PRN Corazon Hernandez MD      
 ondansetron [facility]) injection 4 mg  4 mg IntraVENous Q6H PRN Corazon Hernandez MD      
 [START ON 9/3/2018] vancomycin (VANCOCIN) 1250 mg in  ml infusion  1,250 mg IntraVENous Q24H Corazon Hernandez MD      
 Vancomycin- pharmacy to dose  1 Each Other Rx Dosing/Monitoring Corazon Hernandez MD      
 sodium chloride (NS) flush 5-10 mL  5-10 mL IntraVENous PRN NIKOLAY Bar      
  chlorhexidine (PERIDEX) 0.12 % mouthwash 10 mL  10 mL Oral Q12H David Rodriguez MD      
 enoxaparin (LOVENOX) injection 40 mg  40 mg SubCUTAneous Q24H David Rodriguez MD   40 mg at 09/02/18 1946  pantoprazole (PROTONIX) 40 mg in sodium chloride 0.9% 10 mL injection  40 mg IntraVENous Q24H David Rodriguez MD   40 mg at 09/02/18 0031  
 midazolam (VERSED) injection 1 mg  1 mg IntraVENous Q2H PRN David Rodriguez MD      
 fentaNYL citrate (PF) injection 25 mcg  25 mcg IntraVENous Q4H PRN David Rodriguez MD      
 propofol (DIPRIVAN) infusion  0-50 mcg/kg/min IntraVENous TITRATE David Rodriguez MD 5.5 mL/hr at 09/02/18 1006 10 mcg/kg/min at 09/02/18 1006 Past History Past Medical History: 
Past Medical History:  
Diagnosis Date  Chronic kidney disease   
 stage 3 kidney disease  Chronic obstructive pulmonary disease ([facility]  CLL (chronic lymphocytic leukemia) ([facility]  Delirium  Emphysema lung ([address]  Fall  Frequent hospital admissions  Gout  Hyperlipidemia  Hypoxemia  Memory loss  Noncompliance  Oxygen dependent  Pneumonia  Pulmonary nodules  Respiratory failure ([address]  Risk for falls  Septic shock ([facility]  Sleep apnea  Tobacco abuse  Vitamin D deficiency Past Surgical History: 
Past Surgical History:  
Procedure Laterality Date  HX CATARACT REMOVAL    
 HX HERNIA REPAIR    
 HX NEPHROSTOMY Family History: 
History reviewed. No pertinent family history. Social History: 
Social History Substance Use Topics  Smoking status: Former Smoker  Smokeless tobacco: Never Used  Alcohol use No  
   Comment: former ETOH user; stopped December 2017 Allergies: 
No Known Allergies Review of Systems Review of Systems Constitutional: Negative for chills and fever. Respiratory: Positive for cough, shortness of breath and wheezing. Cardiovascular: Positive for leg swelling. Psychiatric/Behavioral: Positive for confusion and decreased concentration. All other systems reviewed and are negative. Physical Exam  
 
Vitals:  
 09/02/18 0700 09/02/18 0730 09/02/18 0800 09/02/18 5958 BP: 130/76  128/64 Pulse: 98 97 97 Resp: 24 24 24 Temp:    98.6 °F (37 °C) SpO2: 97% 96% 96% Weight:      
Height:      
 
Physical Exam  
Constitutional: He appears well-developed and well-nourished. Obese, somnolent appearance but makes eye contact when asked questions HENT:  
Head: Normocephalic and atraumatic. Eyes: Conjunctivae and EOM are normal. Pupils are equal, round, and reactive to light. Neck: Normal range of motion. Neck supple. Cardiovascular: Normal rate and regular rhythm. Pulmonary/Chest: Tachypnea noted. No respiratory distress. He has wheezes. Abdominal: Soft. Bowel sounds are normal. He exhibits no distension. There is no tenderness. There is no rebound and no guarding. Musculoskeletal: Normal range of motion. He exhibits edema (trace BLE's). He exhibits no tenderness. Skin: Skin is warm and dry. Nursing note and vitals reviewed. Diagnostic Study Results Labs - Recent Results (from the past 12 hour(s)) CULTURE, BLOOD Collection Time: 09/02/18  1:00 AM  
Result Value Ref Range Special Requests: NO SPECIAL REQUESTS Culture result: NO GROWTH AFTER 4 HOURS    
CULTURE, BLOOD Collection Time: 09/02/18  1:50 AM  
Result Value Ref Range Special Requests: NO SPECIAL REQUESTS Culture result: NO GROWTH AFTER 4 HOURS    
POC G3 Collection Time: 09/02/18  3:16 AM  
Result Value Ref Range Device: VENT    
 FIO2 (POC) 40 % pH (POC) 7.371 7.35 - 7.45    
 pCO2 (POC) 79.9 (H) 35.0 - 45.0 MMHG  
 pO2 (POC) 50 (L) 80 - 100 MMHG  
 HCO3 (POC) 46.4 (H) 22 - 26 MMOL/L  
 sO2 (POC) 82 (L) 92 - 97 % Base excess (POC) 21 mmol/L Mode ASSIST CONTROL Tidal volume 360 ml  Set Rate 16 bpm  
 PEEP/CPAP (POC) 5 cmH2O  
 Allens test (POC) YES Total resp. rate 24 Site LEFT RADIAL Patient temp. 98.1 Specimen type (POC) ARTERIAL Performed by Elina Escobar Volume control YES    
GLUCOSE, POC Collection Time: 09/02/18  5:39 AM  
Result Value Ref Range Glucose (POC) 90 70 - 110 mg/dL EKG, 12 LEAD, INITIAL Collection Time: 09/02/18  6:34 AM  
Result Value Ref Range Ventricular Rate 98 BPM  
 Atrial Rate 98 BPM  
 P-R Interval 166 ms  
 QRS Duration 70 ms Q-T Interval 316 ms  
 QTC Calculation (Bezet) 403 ms Calculated P Axis 45 degrees Calculated R Axis -4 degrees Calculated T Axis 50 degrees Diagnosis Normal sinus rhythm Normal ECG When compared with ECG of 16-JUL-2018 13:59, No significant change was found Radiologic Studies -  
XR CHEST PORT Final Result XR CHEST PORT    (Results Pending) XR CHEST PORT    (Results Pending) XR CHEST PORT    (Results Pending) XR CHEST PORT    (Results Pending) XR ABD PORT  1 V    (Results Pending) XR CHEST PORT    (Results Pending) CT Results  (Last 48 hours) None CXR Results  (Last 48 hours) 09/01/18 1918  XR CHEST PORT Final result Impression:  IMPRESSION:  
   
Right lung base atelectasis or infiltrate Narrative:  EXAM: AP portable upright chest  
   
INDICATION: Shortness of breath COMPARISON: July 20, 2018  
   
_______________ FINDINGS:  
   
Heart and mediastinal contours are normal. There is right lung base atelectasis  
and/or infiltrate. There are no effusions or pneumothorax. There is diffuse  
idiopathic skeletal hyperostosis. _______________ Medical Decision Making I am the first provider for this patient. I reviewed the vital signs, available nursing notes, past medical history, past surgical history, family history and social history. Vital Signs-Reviewed the patient's vital signs. Pulse Oximetry Analysis - 95% on 3 lpm via nasal cannula CARDIAC MONITOR NOTE: 
Cardiac Rhythm: NSR Rate: 87 bpm 
 
EKG interpretation: (Preliminary) NSR at 87 bpm. Minimal voltage criteria for LVH. No STEMI. EKG read by Mark Hendrix PA-C at 1315 Records Reviewed: Nursing Notes and Old Medical Records Provider Notes (Medical Decision Making): PROCEDURES: 
Intubation Date/Time: 9/2/2018 12:37 AM 
Performed by: Lala Lamb Authorized by: Lala Lamb  
 
Consent:  
  Consent obtained:  Emergent situation Consent given by:  Spouse Risks discussed:  Aspiration and death (failed intubation, seizure) Pre-procedure details:  
  Patient status:  Unresponsive Mallampati score:  IV 
  Pretreatment meds: etomidate. Paralytics:  Succinylcholine Procedure details:  
  Preoxygenation:  BiPAP 
  CPR in progress: no Intubation method:  Oral 
  Oral intubation technique:  Direct Laryngoscope blade:  Taylor 4 Tube size (mm):  7.5 Tube type:  Cuffed Number of attempts:  2 Ventilation between attempts: no (BiPAP kept sats at 100% during switch from Mac to strait) Cricoid pressure: yes Tube visualized through cords: yes Placement assessment: ETT to teeth:  24 Tube secured with: Adhesive tape and ETT thomas Breath sounds:  Equal and absent over the epigastrium Placement verification: chest rise, condensation, CXR verification, direct visualization, equal breath sounds, ETCO2 detector and tube exhalation CXR findings:  ETT in proper place Post-procedure details:  
  Patient tolerance of procedure: Tolerated well, no immediate complications CENTRAL VENOUS LINE INSERTION Date/Time: 9/2/2018 12:55 AM 
Performed by: Lala Lamb Authorized by: Lala Lamb  
 
Consent:  
  Consent obtained:  Verbal 
  Consent given by:  Spouse Risks discussed:  Infection, bleeding, pneumothorax and arterial puncture Alternatives discussed:  Delayed treatment Pre-procedure details:  
  Hand hygiene: Hand hygiene performed prior to insertion Sterile barrier technique: All elements of maximal sterile technique followed Skin preparation:  2% chlorhexidine and Betadine Skin preparation agent: Skin preparation agent completely dried prior to procedure Sedation:  
  Sedation type: Anxiolysis Anesthesia (see MAR for exact dosages): Anesthesia method:  Local infiltration Local anesthetic:  Lidocaine 1% w/o epi Procedure details: Location:  R subclavian and R femoral (Right SC x 2 without blood draw. Site abandoned) Site selection rationale:  Sterile site: bullneck no IJ attempted Patient position:  Flat Procedural supplies:  Triple lumen Catheter size:  7.5 Fr Landmarks identified: yes Ultrasound guidance: no   
  Number of attempts:  1 Successful placement: yes Post-procedure details: Post-procedure:  Dressing applied and line sutured Assessment:  Blood return through all ports and free fluid flow Patient tolerance of procedure: Tolerated well, no immediate complications Comments: CVL revision needed to changes to consent form (verbal by wife) and the actual provider performing the procedure (myself, not Dr. Alphonso Cooper) MEDICATIONS GIVEN IN THE ED: 
Medications  
sodium chloride (NS) flush 5-10 mL (not administered)  
chlorhexidine (PERIDEX) 0.12 % mouthwash 10 mL ( Oral Canceled Entry 9/1/18 2307)  
enoxaparin (LOVENOX) injection 40 mg (40 mg SubCUTAneous Given 9/2/18 0059) pantoprazole (PROTONIX) 40 mg in sodium chloride 0.9% 10 mL injection (40 mg IntraVENous Given 9/2/18 0031)  
midazolam (VERSED) injection 1 mg (not administered)  
fentaNYL citrate (PF) injection 25 mcg (not administered) propofol (DIPRIVAN) infusion (10 mcg/kg/min × 91.2 kg IntraVENous Rate Change 9/2/18 1006) propofol (DIPRIVAN) infusion (0 mcg/kg/min × 91.2 kg IntraVENous Stopped 9/2/18 0300) methylPREDNISolone (PF) (SOLU-MEDROL) injection 40 mg (40 mg IntraVENous Given 9/2/18 0540) insulin lispro (HUMALOG) injection (0 Units SubCUTAneous Held 9/2/18 0600) glucose chewable tablet 16 g (not administered) glucagon (GLUCAGEN) injection 1 mg (not administered) dextrose (D50W) injection syrg 12.5-25 g (not administered)  
piperacillin-tazobactam (ZOSYN) 3.375 g in 0.9% sodium chloride (MBP/ADV) 100 mL MBP (3.375 g IntraVENous New Bag 9/2/18 0432) levoFLOXacin (LEVAQUIN) 500 mg in D5W IVPB (500 mg IntraVENous New Bag 9/2/18 0527)  
albuterol-ipratropium (DUO-NEB) 2.5 MG-0.5 MG/3 ML (3 mL Nebulization Given 9/2/18 0727) amLODIPine (NORVASC) tablet 10 mg (not administered) aspirin chewable tablet 81 mg (not administered)  
carvedilol (COREG) tablet 12.5 mg (not administered)  
latanoprost (XALATAN) 0.005 % ophthalmic solution 1 Drop (not administered)  
furosemide (LASIX) injection 20 mg (not administered)  
acetaminophen (TYLENOL) suppository 650 mg (not administered)  
ondansetron (ZOFRAN) injection 4 mg (not administered)  
vancomycin (VANCOCIN) 1250 mg in  ml infusion (not administered) Vancomycin- pharmacy to dose (not administered)  
albuterol-ipratropium (DUO-NEB) 2.5 MG-0.5 MG/3 ML (3 mL Nebulization Given 9/1/18 2009) cefTRIAXone (ROCEPHIN) 1 g in sterile water (preservative free) 10 mL IV syringe (1 g IntraVENous Given 9/2/18 0214) vancomycin (VANCOCIN) 1,000 mg in 0.9% sodium chloride (MBP/ADV) 250 mL adv (1,000 mg IntraVENous New Bag 9/2/18 0218)  
sodium chloride 0.9 % bolus infusion 1,000 mL (0 mL IntraVENous IV Completed 9/2/18 0055) Followed by  
sodium chloride 0.9 % bolus infusion 914 mL (0 mL IntraVENous IV Completed 9/2/18 0055) chlorhexidine (PERIDEX) 0.12 % mouthwash 10 mL (10 mL Oral Given 9/2/18 0315)  
etomidate (AMIDATE) 2 mg/mL injection 20 mg (20 mg IntraVENous Given 9/2/18 0014) succinylcholine (ANECTINE) injection 100 mg (100 mg IntraVENous Given 9/2/18 0014) LORazepam (ATIVAN) injection 2 mg (2 mg IntraVENous Given 9/2/18 0059) ED COURSE:  
6:50 PM  
Initial assessment performed. PROGRESS NOTE: 
7:15 PM 
Discussed pt's hx and available diagnostic and imaging results with General Jett DO, the pt's attending. Reviewed care plans and General Jett DO is in agreement with the plan of care. Written by Anais Saavedra [address] [place] [address], ED Scribe, as dictated by Eron Roman PA-C. FACE-TO-FACE PROGRESS NOTE: 
7:20 PM 
75 y/o male with hx of dementia, COPD was brought in by wife for difficulty breathing. Pt is somewhat of an unreliable historian and denies review of systems. On exam he is afebrile and not tachycardic, but is dyspneic and tachypneic and is using accessory muscles for respiration. He is on 3L NC satting 94%. Will evaluate for underlying cardiac etiology vs. COPD exacerbation vs. CHF variant. Will give breathing tx, steroids. Pt will likely require admission. I have personally seen and examined the patient, reviewed the RACHAEL's note and agree with findings and plan. Written by CHANG Dupreeibe, as dictated by General Jett DO. PROGRESS NOTE: 
7:27 PM 
Discussed pt's hx and available diagnostic and imaging results with [employer]. Jennifer Aguilar MD, the pt's attending. Reviewed care plans and [employer]. Jennifer Aguilar MD is in agreement with the plan of care. Written by Anais Saavedra [address] [place] [address], ED Scribe, as dictated by Eron Roman PA-C. FACE-TO-FACE PROGRESS NOTE: 
8:37 PM 
The patient presents with SOB. My exam shows ventilating well on BiPAP. He does have subtle crackles in his right base; heart sounds are normal; eyes no pallor; legs with moderate edema. Imp/plan: Lab work suggests no heart failure. He is on chronic steroids and has CLL therefore WBC not significantly reliable.  Covering for sepsis and PNA but with primary treatment of BiPAP support for CO2 retention. I have personally seen and examined the patient, reviewed the RACHAEL's note and agree with findings and plan. Written by Fausto Shabazz, ED Scribe, as dictated by Manfred Aguilar MD. 
  
PROGRESS NOTE: 
8:54 PM 
Pt and/or family have been updated on their results. Pt and/or pt's family are aware of the plan of care and are in agreement. Pt's wife, Cat Chacko, wants to be called when she knows where are we going. Written by Anais Beltran, ED Scribe, as dictated by Venecia Rodríguez PA-C.   
 
BEDSIDE TRANSFER OF CARE: 
9:34 PM 
Patient care will be transferred from Venecia Rodríguez PA-C to Holley Aguilar MD.  Discussed available diagnostic results and care plan at length at beside. Patient and family made aware of provider change. All questions answered. Patient and family voiced understanding. Written by Isidoro Smith, ED Scribe, as dictated by Venecia Rodríguez PA-C. 
 
10:25 PM Discussed patient's history, exam, and available diagnostics results with Skye Donaldson MD, pulmonary, who agrees with decision to intubate and CL placement. 12:11 AM Obtained permission from pt's wife for CL placement and intubation. Diagnosis and Disposition Critical Care Time:  
9:43 PM 
I have spent 45 minutes of critical care time involved in lab review, consultations with specialist, family decision-making, and documentation. During this entire length of time I was immediately available to the patient. Critical Care: The reason for providing this level of medical care for this critically ill patient was due a critical illness that impaired one or more vital organ systems such that there was a high probability of imminent or life threatening deterioration in the patients condition.  This care involved high complexity decision making to assess, manipulate, and support vital system functions, to treat this degree vital organ system failure and to prevent further life threatening deterioration of the patients condition.  
- Nabeel Jackson PA-C 
 
11:35 PM 
I have spent 120 minutes of critical care time involved in lab review, consultations with specialist, family decision-making, and documentation. During this entire length of time I was immediately available to the patient. Critical Care: The reason for providing this level of medical care for this critically ill patient was due a critical illness that impaired one or more vital organ systems such that there was a high probability of imminent or life threatening deterioration in the patients condition. This care involved high complexity decision making to assess, manipulate, and support vital system functions, to treat this degree vital organ system failure and to prevent further life threatening deterioration of the patients condition. Lulú Ramires MD. Core Measures: 
For Hospitalized Patients: 
 
1. Hospitalization Decision Time: The decision to hospitalize the patient was made by [employer]. Cassia Ramires MD at 10:25 PM on 9/1/2018 2. Aspirin: Aspirin was given 10:26 PM  Patient is being admitted to the hospital by Josefa Mills MD. The results of their tests and reasons for their admission have been discussed with them and/or available family. They convey agreement and understanding for the need to be admitted and for their admission diagnosis. CONDITIONS ON ADMISSION: 
Sepsis is not present at the time of admission. Deep Vein Thrombosis is not present at the time of admission. Thrombosis is not present at the time of admission. Urinary Tract Infection is not present at the time of admission. Pneumonia is present at the time of admission. MRSA is not present at the time of admission. Wound infection is not present at the time of admission. Pressure Ulcer is not present at the time of admission. CLINICAL IMPRESSION: 
1. CO2 narcosis 2. Pneumonia of right lower lobe due to infectious organism ([facility] 3. COPD exacerbation ([facility] 4. Acute respiratory failure with hypoxia and hypercarbia (HCC) 5. RUPERTO (obstructive sleep apnea) 6. Essential hypertension 7. CLL (chronic lymphocytic leukemia) ([facility] 8. Current chronic use of inhaled steroid 9. Current chronic use of systemic steroids PLAN: 
1. ADMIT TO ICU 
 
_______________________________ Attestations: This note is prepared by Jossy Ayala, acting as Scribe for Eron Roman PA-C. Eron Roman PA-C:  The scribe's documentation has been prepared under my direction and personally reviewed by me in its entirety. I confirm that the note above accurately reflects all work, treatment, procedures, and medical decision making performed by me. This note is prepared by Deedee Everett, acting as Scribe for Chantelle. Adeel Juarez MD. [employer]. Adeel Juarez MD:  The scribe's documentation has been prepared under my direction and personally reviewed by me in its entirety. I confirm that the note above accurately reflects all work, treatment, procedures, and medical decision making performed by me. 
_______________________________

## 2018-09-02 ENCOUNTER — APPOINTMENT (OUTPATIENT)
Dept: GENERAL RADIOLOGY | Age: 73
DRG: 208 | End: 2018-09-02
Attending: HOSPITALIST
Payer: MEDICARE

## 2018-09-02 ENCOUNTER — APPOINTMENT (OUTPATIENT)
Dept: GENERAL RADIOLOGY | Age: 73
DRG: 208 | End: 2018-09-02
Attending: EMERGENCY MEDICINE
Payer: MEDICARE

## 2018-09-02 ENCOUNTER — APPOINTMENT (OUTPATIENT)
Dept: NON INVASIVE DIAGNOSTICS | Age: 73
DRG: 208 | End: 2018-09-02
Attending: HOSPITALIST
Payer: MEDICARE

## 2018-09-02 ENCOUNTER — APPOINTMENT (OUTPATIENT)
Dept: GENERAL RADIOLOGY | Age: 73
DRG: 208 | End: 2018-09-02
Attending: INTERNAL MEDICINE
Payer: MEDICARE

## 2018-09-02 PROBLEM — J96.01 ACUTE RESPIRATORY FAILURE WITH HYPOXIA AND HYPERCARBIA (HCC): Status: ACTIVE | Noted: 2018-09-02

## 2018-09-02 PROBLEM — G93.40 ACUTE ENCEPHALOPATHY: Status: RESOLVED | Noted: 2018-07-03 | Resolved: 2018-09-02

## 2018-09-02 PROBLEM — R41.3 MEMORY LOSS: Status: RESOLVED | Noted: 2018-07-03 | Resolved: 2018-09-02

## 2018-09-02 PROBLEM — J96.02 ACUTE RESPIRATORY FAILURE WITH HYPOXIA AND HYPERCARBIA (HCC): Status: ACTIVE | Noted: 2018-09-02

## 2018-09-02 LAB
ANION GAP SERPL CALC-SCNC: 2 MMOL/L (ref 3–18)
ARTERIAL PATENCY WRIST A: ABNORMAL
ARTERIAL PATENCY WRIST A: YES
BASE EXCESS BLD CALC-SCNC: 21 MMOL/L
BASE EXCESS BLDV CALC-SCNC: 19 MMOL/L
BASOPHILS # BLD: 0 K/UL (ref 0–0.1)
BASOPHILS NFR BLD: 0 % (ref 0–2)
BDY SITE: ABNORMAL
BDY SITE: ABNORMAL
BODY TEMPERATURE: 98.1
BUN SERPL-MCNC: 24 MG/DL (ref 7–18)
BUN/CREAT SERPL: 15 (ref 12–20)
CALCIUM SERPL-MCNC: 8.6 MG/DL (ref 8.5–10.1)
CHLORIDE SERPL-SCNC: 103 MMOL/L (ref 100–108)
CO2 SERPL-SCNC: 39 MMOL/L (ref 21–32)
CREAT SERPL-MCNC: 1.58 MG/DL (ref 0.6–1.3)
DIFFERENTIAL METHOD BLD: ABNORMAL
ECHO AO ASC DIAM: 2.77 CM
ECHO AO ROOT DIAM: 2.81 CM
ECHO AV AREA PEAK VELOCITY: 3.4 CM2
ECHO AV AREA VTI: 3 CM2
ECHO AV AREA/BSA PEAK VELOCITY: 1.7 CM2/M2
ECHO AV AREA/BSA VTI: 1.5 CM2/M2
ECHO AV MEAN GRADIENT: 3.8 MMHG
ECHO AV PEAK GRADIENT: 6.1 MMHG
ECHO AV PEAK VELOCITY: 123.09 CM/S
ECHO AV VTI: 24.89 CM
ECHO IVC PROX: 1.96 CM
ECHO LA MAJOR AXIS: 2.97 CM
ECHO LA VOL 4C: 62.62 ML (ref 18–58)
ECHO LA VOLUME INDEX A4C: 31.24 ML/M2
ECHO LV E' SEPTAL VELOCITY: 0.05 CM/S
ECHO LV EDV A2C: 79.2 ML
ECHO LV EDV A4C: 93 ML
ECHO LV EDV BP: 86.3 ML (ref 67–155)
ECHO LV EDV INDEX A4C: 46.4 ML/M2
ECHO LV EDV INDEX BP: 43.1 ML/M2
ECHO LV EDV NDEX A2C: 39.5 ML/M2
ECHO LV EJECTION FRACTION A2C: 67 %
ECHO LV EJECTION FRACTION A4C: 73 %
ECHO LV EJECTION FRACTION BIPLANE: 68.3 % (ref 55–100)
ECHO LV ESV A2C: 25.9 ML
ECHO LV ESV A4C: 25.4 ML
ECHO LV ESV BP: 27.4 ML (ref 22–58)
ECHO LV ESV INDEX A2C: 12.9 ML/M2
ECHO LV ESV INDEX A4C: 12.7 ML/M2
ECHO LV ESV INDEX BP: 13.7 ML/M2
ECHO LV INTERNAL DIMENSION DIASTOLIC: 4.08 CM (ref 4.2–5.9)
ECHO LV INTERNAL DIMENSION SYSTOLIC: 2.74 CM
ECHO LV IVSD: 1.39 CM (ref 0.6–1)
ECHO LV MASS 2D: 247.9 G (ref 88–224)
ECHO LV MASS INDEX 2D: 123.7 G/M2
ECHO LV POSTERIOR WALL DIASTOLIC: 1.37 CM (ref 0.6–1)
ECHO LVOT DIAM: 2.14 CM
ECHO LVOT PEAK GRADIENT: 5.5 MMHG
ECHO LVOT PEAK VELOCITY: 117.03 CM/S
ECHO LVOT VTI: 20.8 CM
ECHO MV A VELOCITY: 89.9 CM/S
ECHO MV AREA PHT: 2.9 CM2
ECHO MV E DECELERATION TIME (DT): 262.3 MS
ECHO MV E VELOCITY: 0.7 CM/S
ECHO MV E/A RATIO: 0.01
ECHO MV E/E' SEPTAL: 14
ECHO MV PRESSURE HALF TIME (PHT): 76.1 MS
ECHO RA AREA 4C: 16.04 CM2
ECHO RV INTERNAL DIMENSION: 3.92 CM
ECHO RV TAPSE: 2.6 CM (ref 1.5–2)
EOSINOPHIL # BLD: 0 K/UL (ref 0–0.4)
EOSINOPHIL NFR BLD: 0 % (ref 0–5)
ERYTHROCYTE [DISTWIDTH] IN BLOOD BY AUTOMATED COUNT: 17.2 % (ref 11.6–14.5)
EST. AVERAGE GLUCOSE BLD GHB EST-MCNC: 137 MG/DL
GAS FLOW.O2 O2 DELIVERY SYS: ABNORMAL L/MIN
GAS FLOW.O2 O2 DELIVERY SYS: ABNORMAL L/MIN
GAS FLOW.O2 SETTING OXYMISER: 16 BPM
GAS FLOW.O2 SETTING OXYMISER: 16 BPM
GLUCOSE BLD STRIP.AUTO-MCNC: 134 MG/DL (ref 70–110)
GLUCOSE BLD STRIP.AUTO-MCNC: 137 MG/DL (ref 70–110)
GLUCOSE BLD STRIP.AUTO-MCNC: 150 MG/DL (ref 70–110)
GLUCOSE BLD STRIP.AUTO-MCNC: 90 MG/DL (ref 70–110)
GLUCOSE SERPL-MCNC: 133 MG/DL (ref 74–99)
HBA1C MFR BLD: 6.4 % (ref 4.5–5.6)
HCO3 BLD-SCNC: 46.4 MMOL/L (ref 22–26)
HCO3 BLDV-SCNC: 41.4 MMOL/L (ref 23–28)
HCT VFR BLD AUTO: 30 % (ref 36–48)
HGB BLD-MCNC: 8.6 G/DL (ref 13–16)
INR PPP: 1.1 (ref 0.8–1.2)
LYMPHOCYTES # BLD: 3.4 K/UL (ref 0.9–3.6)
LYMPHOCYTES NFR BLD: 26 % (ref 21–52)
MCH RBC QN AUTO: 23.1 PG (ref 24–34)
MCHC RBC AUTO-ENTMCNC: 28.7 G/DL (ref 31–37)
MCV RBC AUTO: 80.6 FL (ref 74–97)
MONOCYTES # BLD: 0.9 K/UL (ref 0.05–1.2)
MONOCYTES NFR BLD: 7 % (ref 3–10)
NEUTS SEG # BLD: 8.9 K/UL (ref 1.8–8)
NEUTS SEG NFR BLD: 67 % (ref 40–73)
O2/TOTAL GAS SETTING VFR VENT: 40 %
O2/TOTAL GAS SETTING VFR VENT: 40 %
PCO2 BLD: 79.9 MMHG (ref 35–45)
PCO2 BLDV: 46.9 MMHG (ref 41–51)
PEEP RESPIRATORY: 5 CMH2O
PEEP RESPIRATORY: 5 CMH2O
PH BLD: 7.37 [PH] (ref 7.35–7.45)
PH BLDV: 7.55 [PH] (ref 7.32–7.42)
PLATELET # BLD AUTO: 168 K/UL (ref 135–420)
PMV BLD AUTO: 10.1 FL (ref 9.2–11.8)
PO2 BLD: 50 MMHG (ref 80–100)
PO2 BLDV: 33 MMHG (ref 25–40)
POTASSIUM SERPL-SCNC: 3.6 MMOL/L (ref 3.5–5.5)
PROTHROMBIN TIME: 14 SEC (ref 11.5–15.2)
RBC # BLD AUTO: 3.72 M/UL (ref 4.7–5.5)
SAO2 % BLD: 82 % (ref 92–97)
SAO2 % BLDV: 71 % (ref 65–88)
SERVICE CMNT-IMP: ABNORMAL
SERVICE CMNT-IMP: ABNORMAL
SODIUM SERPL-SCNC: 144 MMOL/L (ref 136–145)
SPECIMEN TYPE: ABNORMAL
SPECIMEN TYPE: ABNORMAL
TOTAL RESP. RATE, ITRR: 21
TOTAL RESP. RATE, ITRR: 24
VENTILATION MODE VENT: ABNORMAL
VENTILATION MODE VENT: ABNORMAL
VOLUME CONTROL IVLC: YES
VOLUME CONTROL IVLC: YES
VT SETTING VENT: 360 ML
VT SETTING VENT: 360 ML
WBC # BLD AUTO: 13.1 K/UL (ref 4.6–13.2)

## 2018-09-02 PROCEDURE — 94640 AIRWAY INHALATION TREATMENT: CPT

## 2018-09-02 PROCEDURE — 74011250636 HC RX REV CODE- 250/636: Performed by: PHYSICIAN ASSISTANT

## 2018-09-02 PROCEDURE — 87070 CULTURE OTHR SPECIMN AEROBIC: CPT | Performed by: INTERNAL MEDICINE

## 2018-09-02 PROCEDURE — 94002 VENT MGMT INPAT INIT DAY: CPT

## 2018-09-02 PROCEDURE — 77030013035 HC MSK BPAP/CPAP PHIL -B

## 2018-09-02 PROCEDURE — 77010033678 HC OXYGEN DAILY

## 2018-09-02 PROCEDURE — 87040 BLOOD CULTURE FOR BACTERIA: CPT | Performed by: PHYSICIAN ASSISTANT

## 2018-09-02 PROCEDURE — 80048 BASIC METABOLIC PNL TOTAL CA: CPT | Performed by: INTERNAL MEDICINE

## 2018-09-02 PROCEDURE — 96365 THER/PROPH/DIAG IV INF INIT: CPT

## 2018-09-02 PROCEDURE — 31500 INSERT EMERGENCY AIRWAY: CPT

## 2018-09-02 PROCEDURE — 96375 TX/PRO/DX INJ NEW DRUG ADDON: CPT

## 2018-09-02 PROCEDURE — C9113 INJ PANTOPRAZOLE SODIUM, VIA: HCPCS | Performed by: INTERNAL MEDICINE

## 2018-09-02 PROCEDURE — 93005 ELECTROCARDIOGRAM TRACING: CPT

## 2018-09-02 PROCEDURE — 74011250636 HC RX REV CODE- 250/636: Performed by: INTERNAL MEDICINE

## 2018-09-02 PROCEDURE — 87077 CULTURE AEROBIC IDENTIFY: CPT | Performed by: PHYSICIAN ASSISTANT

## 2018-09-02 PROCEDURE — C8929 TTE W OR WO FOL WCON,DOPPLER: HCPCS

## 2018-09-02 PROCEDURE — 77030032490 HC SLV COMPR SCD KNE COVD -B

## 2018-09-02 PROCEDURE — 74011250637 HC RX REV CODE- 250/637: Performed by: HOSPITALIST

## 2018-09-02 PROCEDURE — 87186 SC STD MICRODIL/AGAR DIL: CPT | Performed by: PHYSICIAN ASSISTANT

## 2018-09-02 PROCEDURE — 74011250636 HC RX REV CODE- 250/636: Performed by: HOSPITALIST

## 2018-09-02 PROCEDURE — 85610 PROTHROMBIN TIME: CPT | Performed by: INTERNAL MEDICINE

## 2018-09-02 PROCEDURE — 74011000258 HC RX REV CODE- 258: Performed by: HOSPITALIST

## 2018-09-02 PROCEDURE — 82962 GLUCOSE BLOOD TEST: CPT

## 2018-09-02 PROCEDURE — 85025 COMPLETE CBC W/AUTO DIFF WBC: CPT | Performed by: INTERNAL MEDICINE

## 2018-09-02 PROCEDURE — 74011000250 HC RX REV CODE- 250: Performed by: INTERNAL MEDICINE

## 2018-09-02 PROCEDURE — 74011250636 HC RX REV CODE- 250/636: Performed by: EMERGENCY MEDICINE

## 2018-09-02 PROCEDURE — 0BH17EZ INSERTION OF ENDOTRACHEAL AIRWAY INTO TRACHEA, VIA NATURAL OR ARTIFICIAL OPENING: ICD-10-PCS | Performed by: EMERGENCY MEDICINE

## 2018-09-02 PROCEDURE — 65610000006 HC RM INTENSIVE CARE

## 2018-09-02 PROCEDURE — 74011000250 HC RX REV CODE- 250: Performed by: PHYSICIAN ASSISTANT

## 2018-09-02 PROCEDURE — 74011250637 HC RX REV CODE- 250/637: Performed by: INTERNAL MEDICINE

## 2018-09-02 PROCEDURE — 71045 X-RAY EXAM CHEST 1 VIEW: CPT

## 2018-09-02 PROCEDURE — 36592 COLLECT BLOOD FROM PICC: CPT

## 2018-09-02 PROCEDURE — 74018 RADEX ABDOMEN 1 VIEW: CPT

## 2018-09-02 PROCEDURE — 74011000250 HC RX REV CODE- 250: Performed by: HOSPITALIST

## 2018-09-02 PROCEDURE — 74011000250 HC RX REV CODE- 250: Performed by: EMERGENCY MEDICINE

## 2018-09-02 PROCEDURE — 96372 THER/PROPH/DIAG INJ SC/IM: CPT

## 2018-09-02 PROCEDURE — 36600 WITHDRAWAL OF ARTERIAL BLOOD: CPT

## 2018-09-02 PROCEDURE — 82803 BLOOD GASES ANY COMBINATION: CPT

## 2018-09-02 RX ORDER — VANCOMYCIN HYDROCHLORIDE
1250 EVERY 24 HOURS
Status: DISCONTINUED | OUTPATIENT
Start: 2018-09-03 | End: 2018-09-04

## 2018-09-02 RX ORDER — PROPOFOL 10 MG/ML
7 VIAL (ML) INTRAVENOUS
Status: DISCONTINUED | OUTPATIENT
Start: 2018-09-02 | End: 2018-09-04

## 2018-09-02 RX ORDER — ONDANSETRON 2 MG/ML
4 INJECTION INTRAMUSCULAR; INTRAVENOUS
Status: DISCONTINUED | OUTPATIENT
Start: 2018-09-02 | End: 2018-09-11 | Stop reason: HOSPADM

## 2018-09-02 RX ORDER — IPRATROPIUM BROMIDE AND ALBUTEROL SULFATE 2.5; .5 MG/3ML; MG/3ML
3 SOLUTION RESPIRATORY (INHALATION)
Status: DISCONTINUED | OUTPATIENT
Start: 2018-09-02 | End: 2018-09-06

## 2018-09-02 RX ORDER — INSULIN LISPRO 100 [IU]/ML
INJECTION, SOLUTION INTRAVENOUS; SUBCUTANEOUS EVERY 6 HOURS
Status: DISCONTINUED | OUTPATIENT
Start: 2018-09-02 | End: 2018-09-09

## 2018-09-02 RX ORDER — AMLODIPINE BESYLATE 5 MG/1
10 TABLET ORAL DAILY
Status: DISCONTINUED | OUTPATIENT
Start: 2018-09-02 | End: 2018-09-11 | Stop reason: HOSPADM

## 2018-09-02 RX ORDER — LORAZEPAM 2 MG/ML
2 INJECTION INTRAMUSCULAR ONCE
Status: COMPLETED | OUTPATIENT
Start: 2018-09-02 | End: 2018-09-02

## 2018-09-02 RX ORDER — FUROSEMIDE 10 MG/ML
20 INJECTION INTRAMUSCULAR; INTRAVENOUS 2 TIMES DAILY
Status: DISCONTINUED | OUTPATIENT
Start: 2018-09-02 | End: 2018-09-06

## 2018-09-02 RX ORDER — CARVEDILOL 12.5 MG/1
12.5 TABLET ORAL 2 TIMES DAILY WITH MEALS
Status: DISCONTINUED | OUTPATIENT
Start: 2018-09-02 | End: 2018-09-11 | Stop reason: HOSPADM

## 2018-09-02 RX ORDER — LATANOPROST 50 UG/ML
1 SOLUTION/ DROPS OPHTHALMIC
Status: DISCONTINUED | OUTPATIENT
Start: 2018-09-02 | End: 2018-09-11 | Stop reason: HOSPADM

## 2018-09-02 RX ORDER — GUAIFENESIN 100 MG/5ML
81 LIQUID (ML) ORAL DAILY
Status: DISCONTINUED | OUTPATIENT
Start: 2018-09-02 | End: 2018-09-11 | Stop reason: HOSPADM

## 2018-09-02 RX ORDER — MAGNESIUM SULFATE 100 %
4 CRYSTALS MISCELLANEOUS AS NEEDED
Status: DISCONTINUED | OUTPATIENT
Start: 2018-09-02 | End: 2018-09-11 | Stop reason: HOSPADM

## 2018-09-02 RX ORDER — ACETAMINOPHEN 650 MG/1
650 SUPPOSITORY RECTAL
Status: DISCONTINUED | OUTPATIENT
Start: 2018-09-02 | End: 2018-09-11 | Stop reason: HOSPADM

## 2018-09-02 RX ORDER — SUCCINYLCHOLINE CHLORIDE 20 MG/ML
100 INJECTION INTRAMUSCULAR; INTRAVENOUS
Status: COMPLETED | OUTPATIENT
Start: 2018-09-02 | End: 2018-09-02

## 2018-09-02 RX ORDER — DEXTROSE 50 % IN WATER (D50W) INTRAVENOUS SYRINGE
25-50 AS NEEDED
Status: DISCONTINUED | OUTPATIENT
Start: 2018-09-02 | End: 2018-09-11 | Stop reason: HOSPADM

## 2018-09-02 RX ORDER — ETOMIDATE 2 MG/ML
20 INJECTION INTRAVENOUS ONCE
Status: COMPLETED | OUTPATIENT
Start: 2018-09-02 | End: 2018-09-02

## 2018-09-02 RX ORDER — LEVOFLOXACIN 5 MG/ML
500 INJECTION, SOLUTION INTRAVENOUS EVERY 24 HOURS
Status: COMPLETED | OUTPATIENT
Start: 2018-09-02 | End: 2018-09-06

## 2018-09-02 RX ADMIN — AMLODIPINE BESYLATE 10 MG: 5 TABLET ORAL at 11:47

## 2018-09-02 RX ADMIN — METHYLPREDNISOLONE SODIUM SUCCINATE 40 MG: 40 INJECTION, POWDER, FOR SOLUTION INTRAMUSCULAR; INTRAVENOUS at 05:40

## 2018-09-02 RX ADMIN — CARVEDILOL 12.5 MG: 12.5 TABLET, FILM COATED ORAL at 17:41

## 2018-09-02 RX ADMIN — SODIUM CHLORIDE 40 MG: 9 INJECTION INTRAMUSCULAR; INTRAVENOUS; SUBCUTANEOUS at 00:31

## 2018-09-02 RX ADMIN — IPRATROPIUM BROMIDE AND ALBUTEROL SULFATE 3 ML: .5; 3 SOLUTION RESPIRATORY (INHALATION) at 15:50

## 2018-09-02 RX ADMIN — PIPERACILLIN SODIUM,TAZOBACTAM SODIUM 3.38 G: 3; .375 INJECTION, POWDER, FOR SOLUTION INTRAVENOUS at 11:47

## 2018-09-02 RX ADMIN — CHLORHEXIDINE GLUCONATE 10 ML: 1.2 RINSE ORAL at 21:08

## 2018-09-02 RX ADMIN — SUCCINYLCHOLINE CHLORIDE 100 MG: 20 INJECTION, SOLUTION INTRAMUSCULAR; INTRAVENOUS at 00:14

## 2018-09-02 RX ADMIN — ENOXAPARIN SODIUM 40 MG: 40 INJECTION, SOLUTION INTRAVENOUS; SUBCUTANEOUS at 00:59

## 2018-09-02 RX ADMIN — SODIUM CHLORIDE 914 ML: 900 INJECTION, SOLUTION INTRAVENOUS at 00:09

## 2018-09-02 RX ADMIN — SODIUM CHLORIDE 1000 MG: 900 INJECTION, SOLUTION INTRAVENOUS at 02:18

## 2018-09-02 RX ADMIN — FUROSEMIDE 20 MG: 10 INJECTION, SOLUTION INTRAMUSCULAR; INTRAVENOUS at 11:47

## 2018-09-02 RX ADMIN — IPRATROPIUM BROMIDE AND ALBUTEROL SULFATE 3 ML: .5; 3 SOLUTION RESPIRATORY (INHALATION) at 03:43

## 2018-09-02 RX ADMIN — IPRATROPIUM BROMIDE AND ALBUTEROL SULFATE 3 ML: .5; 3 SOLUTION RESPIRATORY (INHALATION) at 11:24

## 2018-09-02 RX ADMIN — METHYLPREDNISOLONE SODIUM SUCCINATE 40 MG: 40 INJECTION, POWDER, FOR SOLUTION INTRAMUSCULAR; INTRAVENOUS at 21:08

## 2018-09-02 RX ADMIN — PIPERACILLIN SODIUM,TAZOBACTAM SODIUM 3.38 G: 3; .375 INJECTION, POWDER, FOR SOLUTION INTRAVENOUS at 04:32

## 2018-09-02 RX ADMIN — LEVOFLOXACIN 500 MG: 5 INJECTION, SOLUTION INTRAVENOUS at 05:27

## 2018-09-02 RX ADMIN — LORAZEPAM 2 MG: 2 INJECTION INTRAMUSCULAR; INTRAVENOUS at 00:59

## 2018-09-02 RX ADMIN — ETOMIDATE 20 MG: 2 INJECTION, SOLUTION INTRAVENOUS at 00:14

## 2018-09-02 RX ADMIN — IPRATROPIUM BROMIDE AND ALBUTEROL SULFATE 3 ML: .5; 3 SOLUTION RESPIRATORY (INHALATION) at 07:27

## 2018-09-02 RX ADMIN — PERFLUTREN 1 ML: 6.52 INJECTION, SUSPENSION INTRAVENOUS at 15:09

## 2018-09-02 RX ADMIN — IPRATROPIUM BROMIDE AND ALBUTEROL SULFATE 3 ML: .5; 3 SOLUTION RESPIRATORY (INHALATION) at 20:09

## 2018-09-02 RX ADMIN — CARVEDILOL 12.5 MG: 12.5 TABLET, FILM COATED ORAL at 11:47

## 2018-09-02 RX ADMIN — FUROSEMIDE 20 MG: 10 INJECTION, SOLUTION INTRAMUSCULAR; INTRAVENOUS at 21:08

## 2018-09-02 RX ADMIN — LATANOPROST 1 DROP: 50 SOLUTION OPHTHALMIC at 21:12

## 2018-09-02 RX ADMIN — PIPERACILLIN SODIUM,TAZOBACTAM SODIUM 3.38 G: 3; .375 INJECTION, POWDER, FOR SOLUTION INTRAVENOUS at 19:40

## 2018-09-02 RX ADMIN — PROPOFOL 7 MCG/KG/MIN: 10 INJECTION, EMULSION INTRAVENOUS at 01:32

## 2018-09-02 RX ADMIN — CHLORHEXIDINE GLUCONATE 10 ML: 1.2 RINSE ORAL at 03:15

## 2018-09-02 RX ADMIN — WATER 1 G: 1 INJECTION INTRAMUSCULAR; INTRAVENOUS; SUBCUTANEOUS at 02:14

## 2018-09-02 RX ADMIN — ASPIRIN 81 MG: 81 TABLET, CHEWABLE ORAL at 11:47

## 2018-09-02 RX ADMIN — CHLORHEXIDINE GLUCONATE 10 ML: 1.2 RINSE ORAL at 11:47

## 2018-09-02 RX ADMIN — PROPOFOL 15 MCG/KG/MIN: 10 INJECTION, EMULSION INTRAVENOUS at 13:35

## 2018-09-02 NOTE — ED NOTES
Able to collect 1 culture from pt, unable to obtain more blood at this time. Pt iv access inadequate, provider aware and placement of central line to facilitate septic workup/treatment was requested. ivf infusing per order.

## 2018-09-02 NOTE — ED NOTES
Pt intubated at thsi time, 4.0 meyers blade used \" 
7.5 et tube, 24 cm at lips Positive capnography Placement confirmed via pcxr Meds:  20 mg etomidate, 100 mg succ

## 2018-09-02 NOTE — PROGRESS NOTES
Patient is a 74 y. o.AA male with hx of COPD, home O2, chronic hypercapneic resp failure, dementia, CLL, diastolic CHF, HTN, CKD, severe complex sleep apnea who has recurrent hospitalization for hypercarbic respiratory failure. PSG [facility] 7/2/18 Dr Margaret Weber AHI 34/hr RUPERTO and CSA events; O2 desats BIPAP titration - snoring and apneas could not be eliminated with 24/20 pressures; central apneas with higher pressures; poorly tolerated overall Presented to ER with similar complaints of dyspnea, now AMS in ER, worsening co2 retention and worsening abg despite of bipap use. cxr showed basilar atelectasis vs infiltrate. Chronic leucocytosis likely due to CLL. D/w ER Dr. Yogi Ramirez, recommended to intubate the patient as his abg, mental status is worsening despite of bipap use. Broad spectrum abx. Bronchodilator and steroids. Central line to be placed in ER. Admit to icu. Ventilator and sedation order and other basic orders placed. Full consult to follow in am. Call us with any questions.   
 
Harman Solano MD 9/1/2018 11:04 PM

## 2018-09-02 NOTE — H&P
[facility] HISTORY AND PHYSICAL Name:MELLY Arevalo 
MR#: [id number] : 1945 ACCOUNT #: [de-identified] ADMIT DATE: 2018 ADMITTING DIAGNOSES:   
1. Acute on chronic hypercapnic and hypoxic respiratory failure, now intubated. 2.  Chronic lymphocytic leukemia. 3.  Chronic obstructive pulmonary disease. 4.  Complicated obstructive sleep apnea. 5.  Dementia. 6.  Chronic kidney disease stage III. 7.  History of gout. 8.  Chronic oxygen dependence. 9.  History of tobacco use and vitamin D deficiency. HISTORY OF PRESENT ILLNESS:  Looks like the patient was last in the hospital at the end of July for respiratory distress at that point as well. He has an extensive history of COPD and obstructive sleep apnea. He is followed by Dr. Bradley Lee at [facility], and he is supposed to be on BiPAP at night. He does have some dementia as well and after last stay, he was set up with a home Trilogy device. With this current hospitalization, the patient came into the emergency room with worsening shortness of breath and wheezing for about 3 days. He was having a hard time wearing the Trilogy machine. He had been taking it off regularly. The patient's wife was giving him breathing treatments, but he was not getting better despite his oxygen as well. He was having a worsening cough, mental status changes. He had recently been put on prednisone by his pulmonologist about 8 days prior. His Lasix dose has been increased. He had apparently had a fall at home so nonetheless, when he came in, he was not in great shape. Pulmonology was contacted. They recommended proceeding with intubation due to his severe complex sleep apnea and hypercapnic acute on chronic respiratory failure. Thus he was intubated and sent over to the intensive care unit. He is not able to give any medical history at this point in time. PAST MEDICAL HISTORY:  As noted, chronic respiratory failure on oxygen, obstructive sleep apnea, CLL, dementia, hypertension, stage III chronic kidney disease. MEDICATIONS:  Meds that he is on at home, Uroxatral, Xanax, Norvasc, aspirin, Coreg, Restasis eyedrops, Colace, Pepcid, Advair, Lasix, hydralazine, Xalatan eyedrops, Zyprexa, Spiriva. ALLERGIES:  NO MEDICATION ALLERGIES LISTED. PAST SURGICAL HISTORY:  Cataract removal, hernia repair, nephrostomy. FAMILY HISTORY:  I do not know his family history nor can he provide that. SOCIAL HISTORY:  He lives at home with his wife still. There is a history of smoking in the past.  He does not currently smoke. He stopped apparently in 2017. At that time, I believe he stopped alcohol use as well. REVIEW OF SYSTEMS:  Not able to be obtained due to his altered mental status and intubated status. PHYSICAL EXAMINATION:   
GENERAL:  The patient is 74years old. He is an obese black male who is currently sedated and intubated. VITAL SIGNS:  His temperature is 98.1, pulse 98. Blood pressure is 149/87. Respiratory rate is 24. SpO2 is 98%. HEENT:  Sclerae are anicteric. Conjunctivae pink. NECK:  Supple. No thyromegaly, lymphadenopathy. LUNGS:  Clear anteriorly. Diminished breath sounds at the bases. He is moving air in both lung fields. CARDIAC:  Regular rate and rhythm. No murmur, rub, or gallop. ABDOMEN:  Obese, soft, nontender. GENITOURINARY:  Normal male genitalia with a Shanks catheter now in place. EXTREMITIES:  Lower extremities 1+ edema. Distal pulses palpable. The feet are warm. NEUROLOGICAL:  There are no neurologic changes seen as far as muscle fasciculations or seizure activity. LABORATORY DATA:  White count 18.9, H and H 9.8 and 35.4. His platelets are 401. His neutrophils are 69. Sodium, potassium, chloride were normal.  Bicarb was greater than 45, BUN 26, creatinine 1.45.   That is consistent with his levels from prior including July. Cardiac markers were normal.  Lactic acid was within normal limits. LFTs were also normal.  Chest x-ray report pending from this morning. No infiltrate noted from the emergency room. No EKG formally on the chart. Will make sure we have one of those. ASSESSMENT:   
1. Acute on chronic hypercapnic respiratory failure. PO2 was 50 at 3 o'clock this morning. Prior to that it was 217, but his pCO2 when he came in was 92, then 114 after BiPAP therapy, and that is when he was intubated and then subsequently has come down to 79.9 at this point. His pH was 7.264 at 9 o'clock last evening. It is now 7.371.     
2.  Chronic obstructive pulmonary disease. 3.  Dementia. 4.  Chronic lymphocytic leukemia. 5.  Chronic respiratory failure, on oxygen at home. 6.  Obstructive sleep apnea, on a Trilogy device at home. 7.  History of hypertension. 8.ckd PLAN:  continue intubated status, DuoNeb every 4 hours, IV steroids every 6 hours, monitor his blood sugars every 6 hours, broad spectrum antibiotics to include Levaquin, Zosyn, and vancomycin, consult pharmacy for vancomycin dosing, and get his EKG results. He is on Lasix at home. Will keep that going IV twice daily. He has Lovenox for DVT prophylaxis. Vent protocol per critical care pulmonology, who is aware of his admission. A Shanks catheter was placed by the nurse. OG tube was placed as well. A follow-up KUB has been ordered. A chest x-ray for this morning has been ordered. He is n.p.o. and sequential compression devices have been also added. He has daily CBC and metabolic panel ordered. I do not see an echocardiogram resulted last time. Will go ahead and get an echocardiogram to assess his ejection fraction for respiratory failure unless there is a present one in his outside records, and I see one from 2017 that showed an EF of 70% with mild diastolic dysfunction.   Will go ahead and update that since that is at least 6 months old at this point in time, and we anticipate the patient to be in the hospital for at least a 4-5 day period considering acute on chronic respiratory failure requiring intubation. Again n.p.o. status for now, monitoring blood sugars on steroids, DuoNeb regularly, oral care, IV steroids, IV Protonix for stress ulcer prophylaxis, continuing Zosyn, vancomycin, and Levaquin for broad spectrum antibiotic coverage. Will order p.r.n. medications to include Tylenol suppositories as needed and continue his ICU level care at this point in time. Critical care pulmonology aware of the consult as well. MD LEONELA Leggett/ 
D: 09/02/2018 04:31 T: 09/02/2018 05:09 
JOB #: [id number]

## 2018-09-02 NOTE — ROUTINE PROCESS
TRANSFER - IN REPORT: 
 
Verbal report received from Kevin Ko RN (name) on Sherin Khan.  being received from ED(unit) for routine progression of care Report consisted of patients Situation, Background, Assessment and  
Recommendations(SBAR). Information from the following report(s) SBAR, Kardex, Intake/Output, MAR and Recent Results was reviewed with the receiving nurse. Opportunity for questions and clarification was provided.

## 2018-09-02 NOTE — CONSULTS
[facility]                  Pulmonary, Critical Care, and Sleep Medicine     Name: Xiomara Taylor MRN: [id number]   : 1945 Hospital: [facility] [place]    Date: 2018        [facility]                                              Consult requesting physician: Dr. Paolo Pineda  Reason for Consult: acute on chronic respiratory failure    IMPRESSION:   · Acute on chronic hypoxemic and hypercapneic respiratory failure,, failed bipap, intubated 66  · Metabolic encephalopathy from CO2 narcosis, dementia  · COPD with home O2-FEV1 of 1.23 or 55% predicted, normal FEV1%, follows with Dr. Zoey Spears - on Spiriva and advair at home- Recent 6 min walk in May suggested requires home oxygen 2-4 liter  · Acute on chronic diastolic CHF  · CKD stage 3 with ARF  · HTN  · Severe complex sleep apnea - AHI 34/hr RUPERTO and CSA events; on BIPAP2 non compliant - not able to tolerate but best response noted on BIPAP  in recent hospitalization  · Hx of ETOH abuse in past (residing in rehab facility)  · Chronic leukocytosis, thrombocytopenia with hx of CLL  · Anemia   · Dementia - severity not known, but advanced per prior notes    PSG [facility] Dr Vania Hendricks  AHI 34/hr RUPERTO and CSA events; O2 desats  BIPAP titration - snoring and apneas could not be eliminated with 24/20 pressures; central apneas with higher pressures; poorly tolerated overall        RECOMMENDATIONS:   Respiratory: failed bipap, intubated in er on . abg now improving. Sedated on vent. If ok, sbt in am.   Ventilator bundle & Sedation protocol followed. Daily sedation holiday, assessment for readiness for SBT and then re-titrate if required. Chlorhexidine mouth washes. Keep SPO2 >=92%. HOB 30 degree elevation all the time. Aggressive pulmonary toileting. Aspiration precautions. CVS: BP stable. C/w lasix. ID:  cxr b/l infiltrates vs chf. C/w broad spectrum abx, follow cx and narrow quickly.    Sepsis bundle and protocol followed. Hematology/Oncology: chronic CLL with leucocytosis, thrombocytopenia, anemia. Bleeding at the right femoral central line, hold lovenox and compression dressing, d/w RN  Renal: monitor renal function on lasix. GI/: start trickle feed   Endocrine: Maintain blood glucose 140-180. humalog ssi  Neurology: sedated on vent. ams in er due to co2 retention with baseline dementia  Skin/Wound: no acute issues  Electrolytes: Replace electrolytes per ICU electrolyte replacement protocol. IVF: none  Nutrition: start trickle feed  Prophylaxis: DVT Prophylaxis with scd, hold lovenox due to central line site bleeding. GI Prophylaxis with protonix. Restraints: wrist soft- for patient interfering with medical therapy/management and patient safety. Lines/Tubes: PIV  Right femoral cental line placed in ER 9/1/18    Will defer respective systems problem management to primary and other respective consultant and follow patient in ICU with primary and other medical team.  Further recommendations will be based on the patient's response to recommended treatment and results of the investigation ordered. Quality Care: PPI, DVT prophylaxis, HOB elevated, Infection control all reviewed and addressed. Care of plan d/w RN, RT,  MDR. High complexity decision making was performed during the evaluation of this patient at high risk for decompensation with multiple organ involvement. Total critical care time spent rendering care exclusive of procedures: 35 minutes. Subjective/History of Present Illness:     Opal Fuelling. has been seen and evaluated in ICU room 106. Patient is a 74 y.o. male with hx of COPD, home O2, chronic hypercapneic resp failure, dementia, CLL, diastolic CHF, HTN, CKD, severe complex sleep apnea who has recurrent hospitalization for hypercarbic respiratory failure.      Presented to ER with similar complaints of dyspnea, now AMS in ER, worsening co2 retention and worsening abg despite of bipap use. Intubated in ER. No ett secretions. cxr showed basilar atelectasis vs infiltrate. Chronic leucocytosis likely due to CLL. The patient is critically ill and can not provide additional history due to Ventilated. History taken from EMR and ER    Review of Systems:  Review of systems not obtained due to patient factors. No Known Allergies   Past Medical History:   Diagnosis Date    Chronic kidney disease     stage 3 kidney disease    Chronic obstructive pulmonary disease (HCC)     CLL (chronic lymphocytic leukemia) (HCC)     Delirium     Emphysema lung ([address]     Fall     Frequent hospital admissions     Gout     Hyperlipidemia     Hypoxemia     Memory loss     Noncompliance     Oxygen dependent     Pneumonia     Pulmonary nodules     Respiratory failure (HCC)     Risk for falls     Septic shock (HCC)     Sleep apnea     Tobacco abuse     Vitamin D deficiency       Past Surgical History:   Procedure Laterality Date    HX CATARACT REMOVAL      HX HERNIA REPAIR      HX NEPHROSTOMY        Social History   Substance Use Topics    Smoking status: Former Smoker    Smokeless tobacco: Never Used    Alcohol use No      Comment: former ETOH user; stopped December 2017      History reviewed. No pertinent family history. Prior to Admission medications    Medication Sig Start Date End Date Taking? Authorizing Provider   OLANZapine (ZYPREXA) 2.5 mg tablet Take 0.5 Tabs by mouth two (2) times a day. 7/25/18   Michelle German MD   amLODIPine (NORVASC) 10 mg tablet Take 1 Tab by mouth daily. 7/25/18   Michelle German MD   hydrALAZINE (APRESOLINE) 25 mg tablet Take 1 Tab by mouth three (3) times daily. 7/25/18   Michelle German MD   predniSONE (STERAPRED) 5 mg dose pack See administration instruction per 5mg dose pack 7/25/18   Michelle German MD   ALPRAZolam Fátima Abo) 0.5 mg tablet Take 1 Tab by mouth three (3) times daily as needed for Anxiety. Max Daily Amount: 1.5 mg.  Indications: anxiety 7/20/18   Yaneth Baker Williams Mendoza MD   carvedilol (COREG) 12.5 mg tablet Take 1 Tab by mouth two (2) times daily (with meals). 7/12/18   Flaco Estes MD   docusate sodium (COLACE) 100 mg capsule Take 1 Cap by mouth two (2) times a day for 90 days. 7/12/18 10/10/18  Flaco Estes MD   alfuzosin SR (UROXATRAL) 10 mg SR tablet Take 10 mg by mouth daily. Ronni Hernandez MD   aspirin 81 mg chewable tablet Take 81 mg by mouth daily. Ronni Hernandez MD   cycloSPORINE (RESTASIS) 0.05 % ophthalmic emulsion Administer 1 Drop to both eyes two (2) times a day. Ronni Hernandez MD   famotidine (PEPCID) 20 mg tablet Take 20 mg by mouth two (2) times a day. Ronni Hernandez MD   latanoprost (XALATAN) 0.005 % ophthalmic solution Administer 1 Drop to both eyes nightly. Ronni Hernandez MD   furosemide (LASIX) 40 mg tablet Take  by mouth daily. Ronni Hernandez MD   Oxygen     Ronni Hernandez MD   pravastatin sodium (PRAVASTATIN PO) Take  by mouth. Ronni Hernandez MD   tiotropium (SPIRIVA WITH HANDIHALER) 18 mcg inhalation capsule Take 1 Cap by inhalation daily. Ronni Hernandez MD   fluticasone/salmeterol (ADVAIR HFA IN) Take  by inhalation. Ronni Hernandez MD   ALBUTEROL IN Take  by inhalation.     Ronni Hernandez MD     Current Facility-Administered Medications   Medication Dose Route Frequency    propofol (DIPRIVAN) infusion  7 mcg/kg/min IntraVENous TITRATE    methylPREDNISolone (PF) (SOLU-MEDROL) injection 40 mg  40 mg IntraVENous Q6H    insulin lispro (HUMALOG) injection   SubCUTAneous Q6H    piperacillin-tazobactam (ZOSYN) 3.375 g in 0.9% sodium chloride (MBP/ADV) 100 mL MBP  3.375 g IntraVENous Q8H    levoFLOXacin (LEVAQUIN) 500 mg in D5W IVPB  500 mg IntraVENous Q24H    albuterol-ipratropium (DUO-NEB) 2.5 MG-0.5 MG/3 ML  3 mL Nebulization Q4H RT    amLODIPine (NORVASC) tablet 10 mg  10 mg Oral DAILY    aspirin chewable tablet 81 mg  81 mg Oral DAILY    carvedilol (COREG) tablet 12.5 mg  12.5 mg Oral BID WITH MEALS    latanoprost (XALATAN) 0.005 % ophthalmic solution 1 Drop  1 Drop Both Eyes QHS    furosemide (LASIX) injection 20 mg  20 mg IntraVENous BID    [START ON 9/3/2018] vancomycin (VANCOCIN) 1250 mg in  ml infusion  1,250 mg IntraVENous Q24H    Vancomycin- pharmacy to dose  1 Each Other Rx Dosing/Monitoring    chlorhexidine (PERIDEX) 0.12 % mouthwash 10 mL  10 mL Oral Q12H    enoxaparin (LOVENOX) injection 40 mg  40 mg SubCUTAneous Q24H    pantoprazole (PROTONIX) 40 mg in sodium chloride 0.9% 10 mL injection  40 mg IntraVENous Q24H    propofol (DIPRIVAN) infusion  0-50 mcg/kg/min IntraVENous TITRATE         Objective:   Vital Signs:    Visit Vitals    /66    Pulse 93    Temp 98.6 °F (37 °C)    Resp 23    Ht 5' 6\" (1.676 m)    Wt 91.2 kg (201 lb)    SpO2 97%    BMI 32.44 kg/m2       O2 Device: Ventilator   O2 Flow Rate (L/min): 3 l/min   Temp (24hrs), Av.2 °F (36.8 °C), Min:97.8 °F (36.6 °C), Max:98.6 °F (37 °C)       Intake/Output:   Last shift:      701 -  190  In: 0   Out: 150 [Urine:150]    Last 3 shifts:  190 -  0700  In: 0   Out: 500 [Urine:500]      Intake/Output Summary (Last 24 hours) at 18 1122  Last data filed at 18 0800   Gross per 24 hour   Intake                0 ml   Output              650 ml   Net             -650 ml       Last 3 Recorded Weights in this Encounter    18 1826   Weight: 91.2 kg (201 lb)       Ventilator Settings:  Mode Rate Tidal Volume Pressure FiO2 PEEP   Assist control   360 ml    55 % 5 cm H20     Peak airway pressure: 17 cm H2O    Plateau pressure:     Tidal volume:    Minute ventilation: 9.29 l/min   SPO2 98       Physical Exam:     General/Neurology: intubated sedated. Head:   Normocephalic, without obvious abnormality, atraumatic. Eye:    no scleral icterus, no pallor, no cyanosis. Nose:   No discharge  Throat:  ETT  Neck:   Supple, symmetric. No lymphadenopathy. Trachea midline  Lung: Moderate air entry bilateral equal. No rales. No rhonchi.  No wheezing. No stridors. No prolongded expiration. No accessory muscle use. Heart:   Regular rate & rhythm. S1 S2 present. No murmur. No JVD. Abdomen:  Soft. NT. ND. +BS. Obese. Extremities:  No pedal edema. No cyanosis. No clubbing. Pulses: 2+ and symmetric in DP. Capillary refill: normal  Lymphatic:  No cervical or supraclavicular palpable lymphadenopathy. Skin:   Color, texture, turgor normal. No rashes or lesions. Data:       Recent Results (from the past 24 hour(s))   CBC WITH AUTOMATED DIFF    Collection Time: 09/01/18  7:50 PM   Result Value Ref Range    WBC 18.9 (H) 4.6 - 13.2 K/uL    RBC 4.31 (L) 4.70 - 5.50 M/uL    HGB 9.8 (L) 13.0 - 16.0 g/dL    HCT 35.4 (L) 36.0 - 48.0 %    MCV 82.1 74.0 - 97.0 FL    MCH 22.7 (L) 24.0 - 34.0 PG    MCHC 27.7 (L) 31.0 - 37.0 g/dL    RDW 16.8 (H) 11.6 - 14.5 %    PLATELET 632 353 - 354 K/uL    MPV 10.2 9.2 - 11.8 FL    NEUTROPHILS 69 42 - 75 %    LYMPHOCYTES 25 20 - 51 %    MONOCYTES 6 2 - 9 %    EOSINOPHILS 0 0 - 5 %    BASOPHILS 0 0 - 3 %    ABS. NEUTROPHILS 13.1 (H) 1.8 - 8.0 K/UL    ABS. LYMPHOCYTES 4.7 (H) 0.8 - 3.5 K/UL    ABS. MONOCYTES 1.1 (H) 0 - 1.0 K/UL    ABS. EOSINOPHILS 0.0 0.0 - 0.4 K/UL    ABS. BASOPHILS 0.0 0.0 - 0.1 K/UL    RBC COMMENTS POLYCHROMASIA  1+        WBC COMMENTS SMUDGE CELLS SEEN      DF MANUAL     METABOLIC PANEL, COMPREHENSIVE    Collection Time: 09/01/18  7:50 PM   Result Value Ref Range    Sodium 145 136 - 145 mmol/L    Potassium 3.7 3.5 - 5.5 mmol/L    Chloride 101 100 - 108 mmol/L    CO2 >45 (HH) 21 - 32 mmol/L    Anion gap Cannot be calculated 3.0 - 18 mmol/L    Glucose 115 (H) 74 - 99 mg/dL    BUN 26 (H) 7.0 - 18 MG/DL    Creatinine 1.45 (H) 0.6 - 1.3 MG/DL    BUN/Creatinine ratio 18 12 - 20      GFR est AA 58 (L) >60 ml/min/1.73m2    GFR est non-AA 48 (L) >60 ml/min/1.73m2    Calcium 8.9 8.5 - 10.1 MG/DL    Bilirubin, total 0.2 0.2 - 1.0 MG/DL    ALT (SGPT) 20 16 - 61 U/L    AST (SGOT) 19 15 - 37 U/L    Alk.  phosphatase 95 45 - 117 U/L    Protein, total 7.1 6.4 - 8.2 g/dL    Albumin 3.1 (L) 3.4 - 5.0 g/dL    Globulin 4.0 2.0 - 4.0 g/dL    A-G Ratio 0.8 0.8 - 1.7     CARDIAC PANEL,(CK, CKMB & TROPONIN)    Collection Time: 09/01/18  7:50 PM   Result Value Ref Range    CK 55 39 - 308 U/L    CK - MB 1.7 <3.6 ng/ml    CK-MB Index 3.1 0.0 - 4.0 %    Troponin-I, Qt. <0.02 0.00 - 0.06 NG/ML   NT-PRO BNP    Collection Time: 09/01/18  7:50 PM   Result Value Ref Range    NT pro-BNP 33 0 - 900 PG/ML   LACTIC ACID    Collection Time: 09/01/18  7:50 PM   Result Value Ref Range    Lactic acid 0.7 0.4 - 2.0 MMOL/L   HEMOGLOBIN A1C WITH EAG    Collection Time: 09/01/18  7:50 PM   Result Value Ref Range    Hemoglobin A1c 6.4 (H) 4.5 - 5.6 %    Est. average glucose 137 mg/dL   POC G3    Collection Time: 09/01/18  7:54 PM   Result Value Ref Range    Device: NASAL CANNULA      Flow rate (POC) 2.5 L/M    pH (POC) 7.353 7.35 - 7.45      pCO2 (POC) 92.2 (H) 35.0 - 45.0 MMHG    pO2 (POC) 78 (L) 80 - 100 MMHG    HCO3 (POC) 51.3 (H) 22 - 26 MMOL/L    sO2 (POC) 93 92 - 97 %    Base excess (POC) 26 mmol/L    Allens test (POC) YES      Total resp. rate 19      Site RIGHT RADIAL      Patient temp. 98.3      Specimen type (POC) ARTERIAL      Performed by Jennie Hodge    POC G3    Collection Time: 09/01/18  9:09 PM   Result Value Ref Range    Device: BIPAP      FIO2 (POC) 65 %    pH (POC) 7.264 (L) 7.35 - 7.45      pCO2 (POC) 114.5 (H) 35.0 - 45.0 MMHG    pO2 (POC) 217 (H) 80 - 100 MMHG    HCO3 (POC) 52.0 (H) 22 - 26 MMOL/L    sO2 (POC) 100 (H) 92 - 97 %    Base excess (POC) 25 mmol/L    PEEP/CPAP (POC) 8 cmH2O    PIP (POC) 20      Pressure support 12 cmH2O    Allens test (POC) YES      Total resp.  rate 16      Site RIGHT RADIAL      Patient temp. 98.3      Specimen type (POC) ARTERIAL      Performed by Jennie Burn    CULTURE, BLOOD    Collection Time: 09/02/18  1:00 AM   Result Value Ref Range    Special Requests: NO SPECIAL REQUESTS      Culture result: NO GROWTH AFTER 4 HOURS     CULTURE, BLOOD    Collection Time: 09/02/18  1:50 AM   Result Value Ref Range    Special Requests: NO SPECIAL REQUESTS      Culture result: NO GROWTH AFTER 4 HOURS     POC G3    Collection Time: 09/02/18  3:16 AM   Result Value Ref Range    Device: VENT      FIO2 (POC) 40 %    pH (POC) 7.371 7.35 - 7.45      pCO2 (POC) 79.9 (H) 35.0 - 45.0 MMHG    pO2 (POC) 50 (L) 80 - 100 MMHG    HCO3 (POC) 46.4 (H) 22 - 26 MMOL/L    sO2 (POC) 82 (L) 92 - 97 %    Base excess (POC) 21 mmol/L    Mode ASSIST CONTROL      Tidal volume 360 ml    Set Rate 16 bpm    PEEP/CPAP (POC) 5 cmH2O    Allens test (POC) YES      Total resp.  rate 24      Site LEFT RADIAL      Patient temp. 98.1      Specimen type (POC) ARTERIAL      Performed by Memory Margaret     Volume control YES     GLUCOSE, POC    Collection Time: 09/02/18  5:39 AM   Result Value Ref Range    Glucose (POC) 90 70 - 110 mg/dL   EKG, 12 LEAD, INITIAL    Collection Time: 09/02/18  6:34 AM   Result Value Ref Range    Ventricular Rate 98 BPM    Atrial Rate 98 BPM    P-R Interval 166 ms    QRS Duration 70 ms    Q-T Interval 316 ms    QTC Calculation (Bezet) 403 ms    Calculated P Axis 45 degrees    Calculated R Axis -4 degrees    Calculated T Axis 50 degrees    Diagnosis       Normal sinus rhythm  Normal ECG  When compared with ECG of 16-JUL-2018 13:59,  No significant change was found           Chemistry Recent Labs      09/01/18 1950   GLU  115*   NA  145   K  3.7   CL  101   CO2  >45*   BUN  26*   CREA  1.45*   CA  8.9   AGAP  Cannot be calculated   BUCR  18   AP  95   TP  7.1   ALB  3.1*   GLOB  4.0   AGRAT  0.8        Lactic Acid Lactic acid   Date Value Ref Range Status   09/01/2018 0.7 0.4 - 2.0 MMOL/L Final     Recent Labs      09/01/18   1950   LAC  0.7        Liver Enzymes Protein, total   Date Value Ref Range Status   09/01/2018 7.1 6.4 - 8.2 g/dL Final     Albumin   Date Value Ref Range Status   09/01/2018 3.1 (L) 3.4 - 5.0 g/dL Final Globulin   Date Value Ref Range Status   09/01/2018 4.0 2.0 - 4.0 g/dL Final     A-G Ratio   Date Value Ref Range Status   09/01/2018 0.8 0.8 - 1.7   Final     AST (SGOT)   Date Value Ref Range Status   09/01/2018 19 15 - 37 U/L Final     Alk. phosphatase   Date Value Ref Range Status   09/01/2018 95 45 - 117 U/L Final     Recent Labs      09/01/18   1950   TP  7.1   ALB  3.1*   GLOB  4.0   AGRAT  0.8   SGOT  19   AP  95        CBC w/Diff Recent Labs      09/01/18 1950   WBC  18.9*   RBC  4.31*   HGB  9.8*   HCT  35.4*   PLT  195   GRANS  69   LYMPH  25   EOS  0        Cardiac Enzymes Lab Results   Component Value Date/Time    CPK 55 09/01/2018 07:50 PM    CKMB 1.7 09/01/2018 07:50 PM    CKND1 3.1 09/01/2018 07:50 PM    TROIQ <0.02 09/01/2018 07:50 PM        BNP No results found for: BNP, BNPP, XBNPT     Coagulation No results for input(s): PTP, INR, APTT in the last 72 hours.     No lab exists for component: INREXT      Thyroid  No results found for: T4, T3U, TSH, TSHEXT    No results found for: T4     Urinalysis Lab Results   Component Value Date/Time    Color YELLOW 07/16/2018 06:00 PM    Appearance CLEAR 07/16/2018 06:00 PM    Specific gravity 1.024 07/16/2018 06:00 PM    pH (UA) 5.0 07/16/2018 06:00 PM    Protein 300 (A) 07/16/2018 06:00 PM    Glucose NEGATIVE  07/16/2018 06:00 PM    Ketone NEGATIVE  07/16/2018 06:00 PM    Bilirubin NEGATIVE  07/16/2018 06:00 PM    Urobilinogen 0.2 07/16/2018 06:00 PM    Nitrites NEGATIVE  07/16/2018 06:00 PM    Leukocyte Esterase NEGATIVE  07/16/2018 06:00 PM    Epithelial cells FEW 07/16/2018 06:00 PM    Bacteria 1+ (A) 07/16/2018 06:00 PM    WBC 0 to 3 07/16/2018 06:00 PM    RBC 0 to 3 07/16/2018 06:00 PM        Micro  Recent Labs      09/02/18   0150  09/02/18 0100   CULT  NO GROWTH AFTER 4 HOURS  NO GROWTH AFTER 4 HOURS     Recent Labs      09/02/18   0150  09/02/18 0100   CULT  NO GROWTH AFTER 4 HOURS  NO GROWTH AFTER 4 HOURS        ABG Recent Labs 09/02/18   0316  09/01/18 2109  09/01/18   1954   PHI  7.371  7.264*  7.353   PCO2I  79.9*  114.5*  92.2*   PO2I  50*  217*  78*   HCO3I  46.4*  52.0*  51.3*   FIO2I  40  65   --         PFT       Ultrasound       LE Doppler       ECHO       CT (Most Recent) (CT chest reviewed by me)   Results from [place] encounter on 07/16/18   CT HEAD WO CONT   Narrative EXAM:  CT of the brain without intravenous contrast.    COMPARISON: CT July 3, 2018. REASON FOR EXAM: \"Decreased alertness; facial droop and ams\". DOSE REDUCTION:  One or more dose reduction techniques were used on this CT:  automated exposure control, adjustment of the mAs and/or kVp according to  patient's size, and iterative reconstruction techniques. The specific techniques  utilized on this CT exam have been documented in the patient's electronic  medical record.    _______________    FINDINGS:         IMAGE QUALITY:  The images are mildly degraded by motion artifact. BRAIN AND EXTRA-AXIAL SPACE:               SUBACUTE TERRITORIAL TRANSCORTICAL INFARCTION:  None detected. MASS:  None detected. HEMORRHAGE:  None. SUBDURAL FLUID COLLECTION:  None detected. HYDROCEPHALUS:  None. REMOTE CORTICAL INFARCTION:  None detected. REMOTE CEREBELLAR INFARCTION:  None detected. MISCELLANEOUS:  Non-applicable. STRIVE (STandards for Reporting Vascular changes on nEuroimaging):                     --L[place]unes (age-indeterminate) or perivascular spaces of  \"presumed vascular origin\":  None definite. --[place] of white matter hypodensity of \"presumed vascular  origin\":  Low grade. --Degree of brain atrophy (subjective): Low grade. BURDEN OF CALCIFIC INTRACRANIAL ATHEROSCLEROSIS:  None significant. HEENT:             INCLUDED UPPER ORBITS:  The lenses are replaced bilaterally.            INCLUDED UPPER PARANASAL SINUSES:  Predominantly clear. MASTOID AIR CELLS:  Predominantly clear. BONES:  Unremarkable. SCALP:  Unremarkable.    _______________         Impression IMPRESSION:    Low-grade generalized chronic changes, however, no acute findings. _______________                  Note: Dr. Jacklin Halsted discussed the stroke code results with Dr. Miriam Rivera at [phone] on  7/16/2018. XR (Most Recent). CXR  reviewed by me and compared with previous CXR   Results from Hospital Encounter encounter on 09/01/18   XR CHEST PORT   Narrative EXAM: AP portable upright chest    INDICATION: Shortness of breath    COMPARISON: July 20, 2018    _______________    FINDINGS:    Heart and mediastinal contours are normal. There is right lung base atelectasis  and/or infiltrate. There are no effusions or pneumothorax. There is diffuse  idiopathic skeletal hyperostosis.     _______________         Impression IMPRESSION:    Right lung base atelectasis or infiltrate             Harman Solano MD  9/2/2018

## 2018-09-02 NOTE — PROGRESS NOTES
Bedside and Verbal shift change report given to Rhona Umaña RN (oncoming nurse) by Safia Chanel RN (offgoing nurse). Report included the following information SBAR, ED Summary, Recent Results and Cardiac Rhythm NSR/ST. 0800: Assessment completed. Patient on ventilator. Opens eyes to voice. Is able to follow commands of squeezing hands and moving toes. PERRLA. ETT 24 @ lip and placed on device and positioned to the left. Lung sounds coarse and diminished in bases. Cast tube hooked up to suction. OG tube in place. Abdominal x-ray not yet read so placement not able to be verified at this time. Air is auscultated when pushed through tube however. Medications on hold until verification from x-ray. Abdomen round and soft. Bowel sounds active. Shanks in place draining clear yellow fluid. Restraints in place for safety. Mouth care completed. Femoral line in place with propofol running through line. Ring removed from patient's hand due to risk of swelling. Placed in clear bag with name tag applied in place in personal belonging bag. 1145: In to reassess patient. No changes from previous. Groin site check. Blood noted on gown and dressing saturated with blood. Dr. Jessica Elizondo made aware. Orders to place pressure to area with compression tape or bag and change dressing. Call if bleeding starts again. IV fluid bag placed over site for compression. Area soft with no sign of hematoma. Dressing changed. 1300: Site reassessed. Minimal bleeding noted around site. Area remains soft. No sign of hematoma. 1400: Site reassessed and bleeding noted. Most of bandaged area with blood. Dr. Jessica Elizondo paged. 1420: Orders from Dr. Jessica Elizondo to maintain pressure to area. Draw PT/INR. If elevated will consider giving FFP. Labs drawn and sent out. Dressing changed. 1530: Wife at bedside. Updated on condition and plan of care. 1545: Results from PT/INR called to Dr. Jessica Elizondo.  Also informed him of drop to H&H. Orders for a VBG to be completed. Also asked to verify tube feeding set up. Orders for jevity 1.2 starting @ 10 mL/hr and advance to 20mL/hr.  
 
1600: VBG completed. Reassessment completed. No changes to previous. 1615: Ring given to wife. Dr. Dorene Toussaint paged for results. [phone]: Wife with all of patient's belongings. States she will be back in tomorrow. Dr. Dorene Toussaint returned call. Update on VBG. Stated it looks as if the incision may be bigger than the line because it is constantly oozing blood from around site. Orders to maintain pressure and change dressing as needed. Area remains soft to touch. 1730: Patient started on tube feed trickle @ 10mL/hr of Jevity 1.2. Central line dressing changed. 1930: Bedside and Verbal shift change report given to Marilyn Morley RN (oncoming nurse) by Negra Phillips RN (offgoing nurse). Report included the following information SBAR, ED Summary, Recent Results and Cardiac Rhythm NSR.

## 2018-09-02 NOTE — PROGRESS NOTES
Patient arrive on unit via stretcher on ventilator with propofol infusing. Patient was given CHG bath and completed linen change. Patient skin intact no sign of open areas. Dr. Adam Ross rounded on patient. Received order from Dr. Ravinder Brown for restraints and muller catheter which was placed by two nurses. Chest x-ray, KUB, and 12 Lead EKG completed as ordered. Right femoral central line had some clotted blood, dressing was changed, right groin site soft, no sign of hematoma noted. Bedside and Verbal shift change report given to NEGRO Espinoza RN (oncoming nurse) Report included the following information SBAR, Kardex, Intake/Output, MAR, Recent Results and Alarm Parameters .

## 2018-09-02 NOTE — ROUTINE PROCESS
TRANSFER - OUT REPORT: 
 
Verbal report given to RN Kym Carmichael on [facility].  being transferred to   for routine progression of care Report consisted of patients Situation, Background, Assessment and  
Recommendations(SBAR). Information from the following report(s) SBAR and MAR was reviewed with the receiving nurse. Lines:  
Triple Lumen 09/02/18 Right Femoral (Active) Site Assessment Clean, dry, & intact 9/2/2018  1:49 AM  
Dressing Status Clean, dry, & intact 9/2/2018  1:49 AM  
Proximal Hub Color/Line Status Black 9/2/2018  1:49 AM  
Positive Blood Return (Medial Site) Yes 9/2/2018  1:49 AM  
Medial Hub Color/Line Status Blue 9/2/2018  1:49 AM  
Positive Blood Return (Lateral Site) Yes 9/2/2018  1:49 AM  
Distal Hub Color/Line Status Brown 9/2/2018  1:49 AM  
Positive Blood Return (Site #3) Yes 9/2/2018  1:49 AM  
   
Peripheral IV 09/01/18 Right Hand (Active) Opportunity for questions and clarification was provided. Patient transported with: 
 Monitor Registered Nurse

## 2018-09-02 NOTE — PROGRESS NOTES
Patient admitted this AM by Dr Akbar Tolbert for hypoxic respiratory failure. Patient continues to be in serious condition on ventilator support but is hemodynamically stable. Orders reviewed; no present changes.

## 2018-09-02 NOTE — PROGRESS NOTES
Hospitalist Progress Note Patient: Melody Harley. MRN: [id number]  [facility]: [id number] YOB: 1945  Age: 74 y.o. Sex: male DOA: 9/1/2018 LOS:  LOS: 0 days Patient seen in ICU, I was not made aware of his transfer over from ER He is intubated and sedated and not able to give history The nurse has placed a muller, OG tube and restraints I will place a formal H&P-see dictated H&P I have placed  orders as well 
 pulmonology was called from the ER Patient Active Problem List  
Diagnosis Code  Hypercapnia R06.89  
 Acute on chronic respiratory failure with hypoxia and hypercapnia (HCC) J96.21, J96.22  
 CLL (chronic lymphocytic leukemia) ([facility]) C91.90  Emphysema lung ([address] J43.9  Elevated serum creatinine R79.89  
 COPD (chronic obstructive pulmonary disease) ([facility]) J44.9  RUPERTO (obstructive sleep apnea) G47.33  
 Stage 3 chronic kidney disease N18.3  Dementia F03.90  
 HTN (hypertension) I10  
 Altered mental status R41.82  Respiratory distress R06.03  
 Hypoxia R09.02  
 CO2 narcosis R06.89  
 Hypernatremia E87.0  Acute respiratory failure with hypoxia and hypercarbia (HCC) J96.01, J96.02

## 2018-09-02 NOTE — PROGRESS NOTES
Pharmacy Dosing Services:Vancomycin Consult for Vancomycin Dosing by Pharmacy by Dr. Feliciano Sarah Consult provided for this 74y.o. year old male , for indication of URI. Day of Therapy 1 Ht Readings from Last 1 Encounters:  
09/01/18 167.6 cm (66\") Wt Readings from Last 1 Encounters:  
09/01/18 91.2 kg (201 lb) Other Current Antibiotics Zosyn 3.375gm IV q 8 hr/levaquin 500 mg IV q 24 hr  
Significant Cultures pedning from micro lab Serum Creatinine Lab Results Component Value Date/Time Creatinine 1.45 (H) 09/01/2018 07:50 PM  
  
Creatinine Clearance Estimated Creatinine Clearance: 47.3 mL/min (based on Cr of 1.45). BUN Lab Results Component Value Date/Time BUN 26 (H) 09/01/2018 07:50 PM  
  
WBC Lab Results Component Value Date/Time WBC 18.9 (H) 09/01/2018 07:50 PM  
  
H/H Lab Results Component Value Date/Time HGB 9.8 (L) 09/01/2018 07:50 PM  
  
Platelets Lab Results Component Value Date/Time PLATELET 887 98/11/0427 07:50 PM  
  
Temp 98.1 °F (36.7 °C) Start Vancomycin therapy, with loading dose of 1000mg given in ER @ 0200 [id number] Follow with maintenance dose of 1250(mg) at 0200/ [id number] (time/date), every 24 hours (frequency). Dose calculated to approximate a therapeutic trough of 15-20mcg/mL. Pharmacy to follow daily and will make changes to dose and/or frequency based on clinical status. Estimated Pharmacokinetic Parameters (based on population kinetics) Vd: 64 L (0.7 L/kg) Erik: 0.033 hr-1 (T1/2 = 21 hrs) Dosing Recommendations Vancomycin dose: 1250 mg IV Q24hrs (infused over 1 hr) Estimated peak: 35.2 mcg/mL Estimated trough: 16.5 mcg/mL Estimated AUC:CARLOS: 594 mcg*hr/mL (assumed CARLOS 1 mcg/mL) A/P:  
1. Recommend vancomycin 1250 mg IV Q24hrs (14 mg/kg) 2. Consider a vancomycin trough level prior to the 4th dose. 3. Please monitor renal function (urine output, BUN/SCr).  Dose adjustments may be necessary with a significant change in renal function.   
 
  
 
Pharmacist Onelia Garcia PHARMD

## 2018-09-03 ENCOUNTER — APPOINTMENT (OUTPATIENT)
Dept: GENERAL RADIOLOGY | Age: 73
DRG: 208 | End: 2018-09-03
Attending: INTERNAL MEDICINE
Payer: MEDICARE

## 2018-09-03 LAB
ANION GAP SERPL CALC-SCNC: 6 MMOL/L (ref 3–18)
ARTERIAL PATENCY WRIST A: YES
ARTERIAL PATENCY WRIST A: YES
ATRIAL RATE: 87 BPM
ATRIAL RATE: 98 BPM
BASE EXCESS BLD CALC-SCNC: 12 MMOL/L
BASE EXCESS BLD CALC-SCNC: 14 MMOL/L
BASOPHILS # BLD: 0 K/UL (ref 0–0.1)
BASOPHILS NFR BLD: 0 % (ref 0–2)
BDY SITE: ABNORMAL
BDY SITE: ABNORMAL
BODY TEMPERATURE: 99.1
BUN SERPL-MCNC: 27 MG/DL (ref 7–18)
BUN/CREAT SERPL: 15 (ref 12–20)
CALCIUM SERPL-MCNC: 8.9 MG/DL (ref 8.5–10.1)
CALCULATED P AXIS, ECG09: 45 DEGREES
CALCULATED P AXIS, ECG09: 51 DEGREES
CALCULATED R AXIS, ECG10: -4 DEGREES
CALCULATED R AXIS, ECG10: 0 DEGREES
CALCULATED T AXIS, ECG11: 50 DEGREES
CALCULATED T AXIS, ECG11: 62 DEGREES
CHLORIDE SERPL-SCNC: 103 MMOL/L (ref 100–108)
CO2 SERPL-SCNC: 35 MMOL/L (ref 21–32)
CREAT SERPL-MCNC: 1.78 MG/DL (ref 0.6–1.3)
DIAGNOSIS, 93000: NORMAL
DIAGNOSIS, 93000: NORMAL
DIFFERENTIAL METHOD BLD: ABNORMAL
EOSINOPHIL # BLD: 0 K/UL (ref 0–0.4)
EOSINOPHIL NFR BLD: 0 % (ref 0–5)
ERYTHROCYTE [DISTWIDTH] IN BLOOD BY AUTOMATED COUNT: 17.4 % (ref 11.6–14.5)
GAS FLOW.O2 O2 DELIVERY SYS: ABNORMAL L/MIN
GAS FLOW.O2 O2 DELIVERY SYS: ABNORMAL L/MIN
GAS FLOW.O2 SETTING OXYMISER: 12 BPM
GAS FLOW.O2 SETTING OXYMISER: 16 BPM
GLUCOSE BLD STRIP.AUTO-MCNC: 139 MG/DL (ref 70–110)
GLUCOSE BLD STRIP.AUTO-MCNC: 173 MG/DL (ref 70–110)
GLUCOSE BLD STRIP.AUTO-MCNC: 174 MG/DL (ref 70–110)
GLUCOSE SERPL-MCNC: 149 MG/DL (ref 74–99)
HCO3 BLD-SCNC: 34.8 MMOL/L (ref 22–26)
HCO3 BLD-SCNC: 36.6 MMOL/L (ref 22–26)
HCT VFR BLD AUTO: 29.7 % (ref 36–48)
HGB BLD-MCNC: 8.7 G/DL (ref 13–16)
LYMPHOCYTES # BLD: 3.6 K/UL (ref 0.9–3.6)
LYMPHOCYTES NFR BLD: 27 % (ref 21–52)
MAGNESIUM SERPL-MCNC: 2 MG/DL (ref 1.6–2.6)
MCH RBC QN AUTO: 22.8 PG (ref 24–34)
MCHC RBC AUTO-ENTMCNC: 29.3 G/DL (ref 31–37)
MCV RBC AUTO: 78 FL (ref 74–97)
MONOCYTES # BLD: 0.8 K/UL (ref 0.05–1.2)
MONOCYTES NFR BLD: 6 % (ref 3–10)
NEUTS SEG # BLD: 9 K/UL (ref 1.8–8)
NEUTS SEG NFR BLD: 67 % (ref 40–73)
O2/TOTAL GAS SETTING VFR VENT: 40 %
O2/TOTAL GAS SETTING VFR VENT: 40 %
P-R INTERVAL, ECG05: 166 MS
P-R INTERVAL, ECG05: 168 MS
PCO2 BLD: 39.9 MMHG (ref 35–45)
PCO2 BLD: 44.5 MMHG (ref 35–45)
PEEP RESPIRATORY: 5 CMH2O
PEEP RESPIRATORY: 5 CMH2O
PH BLD: 7.52 [PH] (ref 7.35–7.45)
PH BLD: 7.55 [PH] (ref 7.35–7.45)
PHOSPHATE SERPL-MCNC: 2.8 MG/DL (ref 2.5–4.9)
PLATELET # BLD AUTO: 184 K/UL (ref 135–420)
PMV BLD AUTO: 10.9 FL (ref 9.2–11.8)
PO2 BLD: 60 MMHG (ref 80–100)
PO2 BLD: 68 MMHG (ref 80–100)
POTASSIUM SERPL-SCNC: 3.4 MMOL/L (ref 3.5–5.5)
POTASSIUM SERPL-SCNC: 3.5 MMOL/L (ref 3.5–5.5)
Q-T INTERVAL, ECG07: 316 MS
Q-T INTERVAL, ECG07: 338 MS
QRS DURATION, ECG06: 70 MS
QRS DURATION, ECG06: 80 MS
QTC CALCULATION (BEZET), ECG08: 403 MS
QTC CALCULATION (BEZET), ECG08: 406 MS
RBC # BLD AUTO: 3.81 M/UL (ref 4.7–5.5)
SAO2 % BLD: 93 % (ref 92–97)
SAO2 % BLD: 95 % (ref 92–97)
SERVICE CMNT-IMP: ABNORMAL
SERVICE CMNT-IMP: ABNORMAL
SODIUM SERPL-SCNC: 144 MMOL/L (ref 136–145)
SPECIMEN TYPE: ABNORMAL
SPECIMEN TYPE: ABNORMAL
TOTAL RESP. RATE, ITRR: 20
VENTILATION MODE VENT: ABNORMAL
VENTILATION MODE VENT: ABNORMAL
VENTRICULAR RATE, ECG03: 87 BPM
VENTRICULAR RATE, ECG03: 98 BPM
VOLUME CONTROL IVLC: YES
VT SETTING VENT: 360 ML
VT SETTING VENT: 360 ML
WBC # BLD AUTO: 13.4 K/UL (ref 4.6–13.2)

## 2018-09-03 PROCEDURE — 74011250636 HC RX REV CODE- 250/636: Performed by: INTERNAL MEDICINE

## 2018-09-03 PROCEDURE — 94003 VENT MGMT INPAT SUBQ DAY: CPT

## 2018-09-03 PROCEDURE — 74011250636 HC RX REV CODE- 250/636: Performed by: HOSPITALIST

## 2018-09-03 PROCEDURE — 74011636637 HC RX REV CODE- 636/637: Performed by: HOSPITALIST

## 2018-09-03 PROCEDURE — 77010033678 HC OXYGEN DAILY

## 2018-09-03 PROCEDURE — 74011000250 HC RX REV CODE- 250: Performed by: HOSPITALIST

## 2018-09-03 PROCEDURE — 74011000258 HC RX REV CODE- 258: Performed by: HOSPITALIST

## 2018-09-03 PROCEDURE — 74011000250 HC RX REV CODE- 250: Performed by: INTERNAL MEDICINE

## 2018-09-03 PROCEDURE — 71045 X-RAY EXAM CHEST 1 VIEW: CPT

## 2018-09-03 PROCEDURE — 82962 GLUCOSE BLOOD TEST: CPT

## 2018-09-03 PROCEDURE — 65610000006 HC RM INTENSIVE CARE

## 2018-09-03 PROCEDURE — 83735 ASSAY OF MAGNESIUM: CPT | Performed by: INTERNAL MEDICINE

## 2018-09-03 PROCEDURE — 85025 COMPLETE CBC W/AUTO DIFF WBC: CPT | Performed by: INTERNAL MEDICINE

## 2018-09-03 PROCEDURE — 74011250637 HC RX REV CODE- 250/637: Performed by: HOSPITALIST

## 2018-09-03 PROCEDURE — 36415 COLL VENOUS BLD VENIPUNCTURE: CPT | Performed by: HOSPITALIST

## 2018-09-03 PROCEDURE — 84100 ASSAY OF PHOSPHORUS: CPT | Performed by: INTERNAL MEDICINE

## 2018-09-03 PROCEDURE — 84132 ASSAY OF SERUM POTASSIUM: CPT | Performed by: INTERNAL MEDICINE

## 2018-09-03 PROCEDURE — C9113 INJ PANTOPRAZOLE SODIUM, VIA: HCPCS | Performed by: INTERNAL MEDICINE

## 2018-09-03 PROCEDURE — 82803 BLOOD GASES ANY COMBINATION: CPT

## 2018-09-03 PROCEDURE — 94640 AIRWAY INHALATION TREATMENT: CPT

## 2018-09-03 PROCEDURE — 36591 DRAW BLOOD OFF VENOUS DEVICE: CPT

## 2018-09-03 PROCEDURE — 87040 BLOOD CULTURE FOR BACTERIA: CPT | Performed by: HOSPITALIST

## 2018-09-03 PROCEDURE — 74011250637 HC RX REV CODE- 250/637: Performed by: INTERNAL MEDICINE

## 2018-09-03 PROCEDURE — 36600 WITHDRAWAL OF ARTERIAL BLOOD: CPT

## 2018-09-03 RX ORDER — POTASSIUM CHLORIDE 20MEQ/15ML
20 LIQUID (ML) ORAL
Status: COMPLETED | OUTPATIENT
Start: 2018-09-03 | End: 2018-09-03

## 2018-09-03 RX ORDER — ENOXAPARIN SODIUM 100 MG/ML
40 INJECTION SUBCUTANEOUS EVERY 24 HOURS
Status: DISCONTINUED | OUTPATIENT
Start: 2018-09-03 | End: 2018-09-11 | Stop reason: HOSPADM

## 2018-09-03 RX ORDER — POTASSIUM CHLORIDE 7.45 MG/ML
10 INJECTION INTRAVENOUS
Status: COMPLETED | OUTPATIENT
Start: 2018-09-03 | End: 2018-09-03

## 2018-09-03 RX ADMIN — IPRATROPIUM BROMIDE AND ALBUTEROL SULFATE 3 ML: .5; 3 SOLUTION RESPIRATORY (INHALATION) at 00:26

## 2018-09-03 RX ADMIN — CARVEDILOL 12.5 MG: 12.5 TABLET, FILM COATED ORAL at 08:29

## 2018-09-03 RX ADMIN — METHYLPREDNISOLONE SODIUM SUCCINATE 40 MG: 40 INJECTION, POWDER, FOR SOLUTION INTRAMUSCULAR; INTRAVENOUS at 20:21

## 2018-09-03 RX ADMIN — CHLORHEXIDINE GLUCONATE 10 ML: 1.2 RINSE ORAL at 20:21

## 2018-09-03 RX ADMIN — CARVEDILOL 12.5 MG: 12.5 TABLET, FILM COATED ORAL at 18:34

## 2018-09-03 RX ADMIN — CHLORHEXIDINE GLUCONATE 10 ML: 1.2 RINSE ORAL at 08:30

## 2018-09-03 RX ADMIN — IPRATROPIUM BROMIDE AND ALBUTEROL SULFATE 3 ML: .5; 3 SOLUTION RESPIRATORY (INHALATION) at 11:30

## 2018-09-03 RX ADMIN — SODIUM CHLORIDE 40 MG: 9 INJECTION INTRAMUSCULAR; INTRAVENOUS; SUBCUTANEOUS at 00:00

## 2018-09-03 RX ADMIN — METHYLPREDNISOLONE SODIUM SUCCINATE 40 MG: 40 INJECTION, POWDER, FOR SOLUTION INTRAMUSCULAR; INTRAVENOUS at 08:29

## 2018-09-03 RX ADMIN — IPRATROPIUM BROMIDE AND ALBUTEROL SULFATE 3 ML: .5; 3 SOLUTION RESPIRATORY (INHALATION) at 04:57

## 2018-09-03 RX ADMIN — POTASSIUM CHLORIDE 10 MEQ: 10 INJECTION, SOLUTION INTRAVENOUS at 16:51

## 2018-09-03 RX ADMIN — PIPERACILLIN SODIUM,TAZOBACTAM SODIUM 3.38 G: 3; .375 INJECTION, POWDER, FOR SOLUTION INTRAVENOUS at 12:17

## 2018-09-03 RX ADMIN — FUROSEMIDE 20 MG: 10 INJECTION, SOLUTION INTRAMUSCULAR; INTRAVENOUS at 20:21

## 2018-09-03 RX ADMIN — PIPERACILLIN SODIUM,TAZOBACTAM SODIUM 3.38 G: 3; .375 INJECTION, POWDER, FOR SOLUTION INTRAVENOUS at 03:57

## 2018-09-03 RX ADMIN — FUROSEMIDE 20 MG: 10 INJECTION, SOLUTION INTRAMUSCULAR; INTRAVENOUS at 10:25

## 2018-09-03 RX ADMIN — INSULIN LISPRO 2 UNITS: 100 INJECTION, SOLUTION INTRAVENOUS; SUBCUTANEOUS at 00:15

## 2018-09-03 RX ADMIN — LATANOPROST 1 DROP: 50 SOLUTION OPHTHALMIC at 21:04

## 2018-09-03 RX ADMIN — PROPOFOL 7 MCG/KG/MIN: 10 INJECTION, EMULSION INTRAVENOUS at 12:24

## 2018-09-03 RX ADMIN — INSULIN LISPRO 2 UNITS: 100 INJECTION, SOLUTION INTRAVENOUS; SUBCUTANEOUS at 18:34

## 2018-09-03 RX ADMIN — SODIUM CHLORIDE 40 MG: 9 INJECTION INTRAMUSCULAR; INTRAVENOUS; SUBCUTANEOUS at 23:53

## 2018-09-03 RX ADMIN — AMLODIPINE BESYLATE 10 MG: 5 TABLET ORAL at 08:29

## 2018-09-03 RX ADMIN — ASPIRIN 81 MG: 81 TABLET, CHEWABLE ORAL at 08:29

## 2018-09-03 RX ADMIN — LEVOFLOXACIN 500 MG: 5 INJECTION, SOLUTION INTRAVENOUS at 03:57

## 2018-09-03 RX ADMIN — IPRATROPIUM BROMIDE AND ALBUTEROL SULFATE 3 ML: .5; 3 SOLUTION RESPIRATORY (INHALATION) at 20:11

## 2018-09-03 RX ADMIN — POTASSIUM CHLORIDE 10 MEQ: 10 INJECTION, SOLUTION INTRAVENOUS at 15:18

## 2018-09-03 RX ADMIN — IPRATROPIUM BROMIDE AND ALBUTEROL SULFATE 3 ML: .5; 3 SOLUTION RESPIRATORY (INHALATION) at 07:39

## 2018-09-03 RX ADMIN — POTASSIUM CHLORIDE 20 MEQ: 20 SOLUTION ORAL at 23:53

## 2018-09-03 RX ADMIN — POTASSIUM CHLORIDE 10 MEQ: 10 INJECTION, SOLUTION INTRAVENOUS at 13:59

## 2018-09-03 RX ADMIN — ENOXAPARIN SODIUM 40 MG: 40 INJECTION, SOLUTION INTRAVENOUS; SUBCUTANEOUS at 12:16

## 2018-09-03 RX ADMIN — INSULIN LISPRO 2 UNITS: 100 INJECTION, SOLUTION INTRAVENOUS; SUBCUTANEOUS at 06:17

## 2018-09-03 RX ADMIN — PROPOFOL 15 MCG/KG/MIN: 10 INJECTION, EMULSION INTRAVENOUS at 00:00

## 2018-09-03 RX ADMIN — IPRATROPIUM BROMIDE AND ALBUTEROL SULFATE 3 ML: .5; 3 SOLUTION RESPIRATORY (INHALATION) at 15:32

## 2018-09-03 RX ADMIN — VANCOMYCIN HYDROCHLORIDE 1250 MG: 10 INJECTION, POWDER, LYOPHILIZED, FOR SOLUTION INTRAVENOUS at 02:15

## 2018-09-03 RX ADMIN — PIPERACILLIN SODIUM,TAZOBACTAM SODIUM 2.25 G: 2; .25 INJECTION, POWDER, FOR SOLUTION INTRAVENOUS at 18:34

## 2018-09-03 NOTE — PROGRESS NOTES
Hospitalist Progress Note Patient: Mona Faster. MRN: [id number]  CSN: [id number] YOB: 1945  Age: 74 y.o. Sex: male DOA: 9/1/2018 LOS:  LOS: 1 day Positive blood cultures called-on appropriate IV antibiotics until further ID noted Repeat set ordered

## 2018-09-03 NOTE — PROGRESS NOTES
Pharmacy Dosing Services: 
 
Pharmacist Renal Dosing Progress Note for Zosyn Physician: Cara Pineda The following medication: Zosyn was automatically dose-adjusted per THE [facility] P&T Committee Protocol, with respect to renal function. Consult provided for this   74 y.o. , male , for the indication of URI. Pt Weight:  
Wt Readings from Last 1 Encounters:  
09/03/18 93.3 kg (205 lb 11 oz) Previous Regimen 3.375 gm IV every 8 hours Serum Creatinine Lab Results Component Value Date/Time Creatinine 1.78 (H) 09/03/2018 03:30 AM  
   
Creatinine Clearance Estimated Creatinine Clearance: 38.3 mL/min (based on Cr of 1.78). BUN Lab Results Component Value Date/Time BUN 27 (H) 09/03/2018 03:30 AM  
   
Dosage changed to:  2.25 gm IV every 8 hours Pharmacy to continue to monitor patient daily. Will make dosage adjustments based upon changing renal function. Signed Elmore Harada, PHARMD. Contact information:[phone]

## 2018-09-03 NOTE — PROGRESS NOTES
Problem: Pressure Injury - Risk of 
Goal: *Prevention of pressure injury Document Percy Scale and appropriate interventions in the flowsheet. Outcome: Progressing Towards Goal 
Pressure Injury Interventions: 
Sensory Interventions: Keep linens dry and wrinkle-free, Maintain/enhance activity level Moisture Interventions: Absorbent underpads, Internal/External urinary devices Activity Interventions: Pressure redistribution bed/mattress(bed type) Mobility Interventions: HOB 30 degrees or less, Pressure redistribution bed/mattress (bed type), Turn and reposition approx. every two hours(pillow and wedges) Nutrition Interventions: Document food/fluid/supplement intake, Discuss nutritional consult with provider Friction and Shear Interventions: HOB 30 degrees or less, Minimize layers

## 2018-09-03 NOTE — PROGRESS NOTES
Bedside and Verbal shift change report given to Elizabeth Vanegas RN (oncoming nurse) by Nguyen Khan RN (offgoing nurse). Report included the following information SBAR, Recent Results and Cardiac Rhythm NSR. 
 
0708: Propofol gtt stopped for sedation vacation and anticipated SBT. 0715: Morning assessment completed. Patient remains on ventilator. Sedation turned off. Follows commands appropriately and nods head appropriately to questions. NSR on monitor. Lung sounds coarse. ETT 24 @ lip. Vent settings as followed: -40%-5.0. Tolerating well. Suctioned with scant yellow secretions returned. Mouth care completed. Bowel sounds active. OG tube in place. Placement checked with air and auscultation. 20mL residual returned. Tube feeding Jevity 1.2 running @ 10mL/hr. Shanks in place draining clear yellow urine. Shanks care completed. SCD's in place. Face cleaned. Restraints remain in place. 7501Ancil Gay., RN restarted patient's propofol. Concerned with patient being off sedation although patient is tolerating well and so no signs of distress or anxiety. Medication scanned and awaiting for confirmation to wean sedation. 1986: OK for sedation to be off if patient is tolerating well. Sedation stopped. 1000: Wife called. Updated her on patient condition. Asked that MD call her once he has made rounds. 1030: Patient switched over to Nepro tube feeding per dietary recommendation. Started @ 15mL/hr with goal of 20 and to increase by 5mL q6h.   
 
1045: Dr. Britta Kennedy in to see patient. ABG results reviewed from that morning. Patient CO2 below his baseline level. Ventilator settings adjusted by Dr. Britta Kennedy and plans to recheck ABG. Alerted MD that wife would like to be called once rounds were made this morning. 1140: ABG redrawn and patient placed on SBT by RT per Dr. Britta Kennedy. Plans to allow SBT x30 minutes but will not extubate until tomorrow due to CO2 levels. Tube feeding stopped while on SBT. 1200: Reassessment completed. Patient tolerating SBT well. Awake and alert and calm without sedation. Patient having to be reeducated on restraints and to not attempt to pull on tube. Patient nods head in understanding but points to restraints and attempts to reach tube after. 1215: Patient placed back on VC ventilator settings. 1224: Patient becoming more restless and agitated. Placed patient back on propofol at lower dose of 7mcg/hr. Patient restarted back on Lovenox per Dr. Jessica Elizondo. Tube feedings restarted. 1322: Patient continuing to bite ETT. RT and this RN in with patient who is attempting to reach tube with hands and is sliding himself down in bed. Propofol increased to 15mcg/hr. Will maintain until patient is more comfortable and then will decrease as tolerated. [phone] [id number] [phone] 21: Patient increasingly agitated. Attempting to reach for tube and move legs out of bed. Propofol increased to 25mcg. Patient tolerating well and SBP stable. 1400: Wife in at bedside. Updated on condition. 1600: Reassessment completed. No changes from previous. Wife at bedside. 1650: Patient moderately sedated. Propofol decreased to 15mcg. Tolerating well. 
 
1700: Children in at bedside. 1835: Patient remaining calm on propofol. Decreased to 8mcg. Bedside and Verbal shift change report given to LORENA Reilly (oncoming nurse) by Rhona Umaña RN (offgoing nurse). Report included the following information SBAR, ED Summary, Recent Results and Cardiac Rhythm nsr. Billy Garg

## 2018-09-03 NOTE — PROGRESS NOTES
1950  Pt is resting quietly in bed, cycling on ventilator, VSS with temp 99.6. In wrist restraints. Right femoral central line verified with off-going RN, small amt of bloody drainage noted under tape. 1 l IV bag noted on groin. Pt is drowsy on Diprivan, opens eyes spontaneously, follows commands by squeezing hands. Shanks intact for clear yellow urine. NAD. 2030  Complete bath given with CHG wipes. Pt on bed rotation mode. 2130  Discussed tube feeding with dietitian on phone, will leave current bag of Jevity 1.2 run out, new order expected in AM. 
 
2215  Received positive blood culture results from lab, called to Dr Akbar Tolbert, no new orders received. 2350  Resting quietly in NAD, cont to cycle well on vent, VSS. 0030  Some agitation noted, restlessness. Putting legs out of bed, increased Diprivan to 20 mcg. No other change in status, VSS. 0330  Has been resting quietly now. Central line dressing changed to right groin. No active bleeding noted but small clot from entry site, will leave in place for now. Pt christian well. Blood drawn from line without diff. 0530  New orders noted, one set of blood cultures drawn from central line without difficulties. Pt awakens easily, remains drowsy. Radiology in for CXR. 
 
0710  {BSI BEDSIDE_VERBAL_Bedside shift change report given to D. Birdie Hatchet, RN (oncoming nurse) by CHEMA Howe RN(offgoing nurse). Report included the following information SBAR, Kardex, Intake/Output, MAR, Recent Results, Med Rec Status, Cardiac Rhythm SR and Alarm Parameters .

## 2018-09-03 NOTE — PROGRESS NOTES
INITIAL NUTRITION ASSESSMENT 
  
RECOMMENDATIONS/PLAN:  
Order daily Phos and Mg labs Recc switching to Nepro@ 20ml/hr to provide 800kcal 36g PRO 322ml H20 74g CHO 43g Fat Propofol @ 8.2ml/h provides 216kcal  
Total: 1016kcal 36g PRO 322ml H20 74g CHO 43g Fat Recc starting @ 15ml/hr an increasing by 5ml/hr Q6 Recc switching to Nepro due to renal labs Will continue to monitor and adjust  
Will meet estimated needs on current tube feed reccs w/ propofol rate Monitor labs/lytes, tube feed tolerance, skin integrity, wt, fluid status, BM 
 
REASON FOR ASSESSMENT:  
 
 [x] Management of Tube Feeding NUTRITION ASSESSMENT:  
Client History: 74 yrs old Male admitted with acute on chronic hypercapnic and hypoxic resp failure on vent PMHx: CKD stage III, COPD, CLL, delirium, fall, emphysema lung, frequent hospital admissions, gout, hyperlipidemia, hypoxemia, memory loss, noncompliance, oxygen dependent, PNA, pulmonary nodules, respiratory failure, risk or falls, septic shock, sleep apnea, tobacco abuse, vit d deficiency Cultural/Sikh Food Preferences: None Identified FOOD/NUTRITION HISTORY Diet History: pt is on vent unable to assess at this time Food Allergies:  [x] NKFA Pertinent PTA Medications: colace, pepcid, lasix, pravastatin, NUTRITION INTAKE Diet Order:  Trickle Feeds Average PO Intake:      
Patient Vitals for the past 100 hrs: 
 % Diet Eaten 09/02/18 1600 0 % 09/02/18 1200 0 % 09/02/18 0800 0 % Pertinent Medications:  [x] Reviewed; lasix, propofol, methylpredisone, protonixElectrolyte Replacement Protocol: []K  []Mg  []PO4 Insulin:  [x] SSI  [] Pre-meal   []  Basal   [] Drip  [] None Pt expected to meet estimated nutrient needs through next review:          []  Yes     [x] No; trickle feeds do not meet estimated needs ANTHROPOMETRICS Height: 5' 6\" (167.6 cm)       Weight: 91.2 kg (201 lb) BMI: 32.5 kg/m^2  -  obese (30%-39.9% BMI) Weight change: no signficant wt loss noted since last admission Comparison to Reference Standards: IBW: 142 lbs      %IBW: 142%      AdjBW: 71.3kg NUTRITION-FOCUSED PHYSICAL ASSESSMENT Skin: No PU. GI: No BM 
 
BIOCHEMICAL DATA & MEDICAL TESTS Pertinent Labs:  [x] Reviewed; NUTRITION PRESCRIPTION Calories: 1001-1274kcal/day based on 11-14 Protein: 73-91 g/day based on 0.8-1.0 g/kg CHO: 125-159 g/day based on 50% of total energy Fluid: 5602-9541 ml/day based on 1 kcal/ml NUTRITION DIAGNOSES:  
1. Inadequate oral intake related to intubation as evidence by need for nutrition support NUTRITION INTERVENTIONS:  
INTERVENTIONS:        GOALS: 
1. EN: Nepro@ 20ml/hr to provide 800kcal 36g PRO 322ml H20 74g CHO 43g Fat 1. Meet aristeo tube feeds reccs  by next review 3 days LEARNING NEEDS (Diet, Supplementation, Food/Nutrient-Drug Interaction):  
[x] None Identified 
[] Inpatient education provided/documented   
[] Identified and patient:  [] Declined     [] Was not appropriate/indicated NUTRITION MONITORING /EVALUATION:  
Adjust EN/PN as appropriate Monitor wt Monitor renal labs, electrolytes, fluid status 
 
[] Participated in Interdisciplinary Rounds 
[x] [facility] Reviewed/Documented DISCHARGE NUTRITION RECOMMENDATIONS ADDRESSED:  
  
  [x] To be determined closer to discharge NUTRITION RISK:     [x]  At risk                     []  Not currently at risk Will follow-up per policy. Rex Cranker, [address]

## 2018-09-04 ENCOUNTER — APPOINTMENT (OUTPATIENT)
Dept: GENERAL RADIOLOGY | Age: 73
DRG: 208 | End: 2018-09-04
Attending: INTERNAL MEDICINE
Payer: MEDICARE

## 2018-09-04 LAB
ANION GAP SERPL CALC-SCNC: 6 MMOL/L (ref 3–18)
ARTERIAL PATENCY WRIST A: YES
BACTERIA SPEC CULT: ABNORMAL
BACTERIA SPEC CULT: ABNORMAL
BASE EXCESS BLD CALC-SCNC: 10 MMOL/L
BASE EXCESS BLD CALC-SCNC: 11 MMOL/L
BASE EXCESS BLD CALC-SCNC: 13 MMOL/L
BASOPHILS # BLD: 0 K/UL (ref 0–0.1)
BASOPHILS NFR BLD: 0 % (ref 0–2)
BDY SITE: ABNORMAL
BODY TEMPERATURE: 98.6
BODY TEMPERATURE: 98.6
BUN SERPL-MCNC: 29 MG/DL (ref 7–18)
BUN/CREAT SERPL: 18 (ref 12–20)
CA-I SERPL-SCNC: 1.16 MMOL/L (ref 1.12–1.32)
CALCIUM SERPL-MCNC: 9.1 MG/DL (ref 8.5–10.1)
CHLORIDE SERPL-SCNC: 104 MMOL/L (ref 100–108)
CO2 SERPL-SCNC: 32 MMOL/L (ref 21–32)
CREAT SERPL-MCNC: 1.62 MG/DL (ref 0.6–1.3)
DIFFERENTIAL METHOD BLD: ABNORMAL
EOSINOPHIL # BLD: 0 K/UL (ref 0–0.4)
EOSINOPHIL NFR BLD: 0 % (ref 0–5)
ERYTHROCYTE [DISTWIDTH] IN BLOOD BY AUTOMATED COUNT: 17.4 % (ref 11.6–14.5)
GAS FLOW.O2 O2 DELIVERY SYS: ABNORMAL L/MIN
GAS FLOW.O2 SETTING OXYMISER: 10 BPM
GAS FLOW.O2 SETTING OXYMISER: 11 BPM
GLUCOSE BLD STRIP.AUTO-MCNC: 116 MG/DL (ref 70–110)
GLUCOSE BLD STRIP.AUTO-MCNC: 140 MG/DL (ref 70–110)
GLUCOSE BLD STRIP.AUTO-MCNC: 147 MG/DL (ref 70–110)
GLUCOSE BLD STRIP.AUTO-MCNC: 151 MG/DL (ref 70–110)
GLUCOSE BLD STRIP.AUTO-MCNC: 165 MG/DL (ref 70–110)
GLUCOSE SERPL-MCNC: 153 MG/DL (ref 74–99)
GRAM STN SPEC: ABNORMAL
HCO3 BLD-SCNC: 33.3 MMOL/L (ref 22–26)
HCO3 BLD-SCNC: 34.7 MMOL/L (ref 22–26)
HCO3 BLD-SCNC: 36.7 MMOL/L (ref 22–26)
HCT VFR BLD AUTO: 30.6 % (ref 36–48)
HGB BLD-MCNC: 9.2 G/DL (ref 13–16)
LYMPHOCYTES # BLD: 4.1 K/UL (ref 0.9–3.6)
LYMPHOCYTES NFR BLD: 26 % (ref 21–52)
MAGNESIUM SERPL-MCNC: 2.1 MG/DL (ref 1.6–2.6)
MCH RBC QN AUTO: 22.7 PG (ref 24–34)
MCHC RBC AUTO-ENTMCNC: 30.1 G/DL (ref 31–37)
MCV RBC AUTO: 75.6 FL (ref 74–97)
MONOCYTES # BLD: 1.4 K/UL (ref 0.05–1.2)
MONOCYTES NFR BLD: 9 % (ref 3–10)
NEUTS SEG # BLD: 10 K/UL (ref 1.8–8)
NEUTS SEG NFR BLD: 65 % (ref 40–73)
O2/TOTAL GAS SETTING VFR VENT: 40 %
PCO2 BLD: 42.2 MMHG (ref 35–45)
PCO2 BLD: 46.5 MMHG (ref 35–45)
PCO2 BLD: 48.7 MMHG (ref 35–45)
PEEP RESPIRATORY: 5 CMH2O
PH BLD: 7.48 [PH] (ref 7.35–7.45)
PH BLD: 7.49 [PH] (ref 7.35–7.45)
PH BLD: 7.5 [PH] (ref 7.35–7.45)
PHOSPHATE SERPL-MCNC: 4.4 MG/DL (ref 2.5–4.9)
PLATELET # BLD AUTO: 166 K/UL (ref 135–420)
PMV BLD AUTO: 10.1 FL (ref 9.2–11.8)
PO2 BLD: 83 MMHG (ref 80–100)
PO2 BLD: 86 MMHG (ref 80–100)
PO2 BLD: 87 MMHG (ref 80–100)
POTASSIUM SERPL-SCNC: 3.6 MMOL/L (ref 3.5–5.5)
POTASSIUM SERPL-SCNC: 3.7 MMOL/L (ref 3.5–5.5)
POTASSIUM SERPL-SCNC: 3.7 MMOL/L (ref 3.5–5.5)
PRESSURE SUPPORT SETTING VENT: 5 CMH2O
RBC # BLD AUTO: 4.05 M/UL (ref 4.7–5.5)
SAO2 % BLD: 97 % (ref 92–97)
SERVICE CMNT-IMP: ABNORMAL
SODIUM SERPL-SCNC: 142 MMOL/L (ref 136–145)
SPECIMEN TYPE: ABNORMAL
TOTAL RESP. RATE, ITRR: 18
TOTAL RESP. RATE, ITRR: 22
TOTAL RESP. RATE, ITRR: 22
VENTILATION MODE VENT: ABNORMAL
VT SETTING VENT: 340 ML
VT SETTING VENT: 360 ML
WBC # BLD AUTO: 15.6 K/UL (ref 4.6–13.2)

## 2018-09-04 PROCEDURE — 36415 COLL VENOUS BLD VENIPUNCTURE: CPT | Performed by: INTERNAL MEDICINE

## 2018-09-04 PROCEDURE — 65610000006 HC RM INTENSIVE CARE

## 2018-09-04 PROCEDURE — 82803 BLOOD GASES ANY COMBINATION: CPT

## 2018-09-04 PROCEDURE — 74011636637 HC RX REV CODE- 636/637: Performed by: HOSPITALIST

## 2018-09-04 PROCEDURE — 94003 VENT MGMT INPAT SUBQ DAY: CPT

## 2018-09-04 PROCEDURE — C9113 INJ PANTOPRAZOLE SODIUM, VIA: HCPCS | Performed by: INTERNAL MEDICINE

## 2018-09-04 PROCEDURE — 74011000250 HC RX REV CODE- 250: Performed by: HOSPITALIST

## 2018-09-04 PROCEDURE — 84132 ASSAY OF SERUM POTASSIUM: CPT | Performed by: INTERNAL MEDICINE

## 2018-09-04 PROCEDURE — 82962 GLUCOSE BLOOD TEST: CPT

## 2018-09-04 PROCEDURE — 74011000258 HC RX REV CODE- 258: Performed by: HOSPITALIST

## 2018-09-04 PROCEDURE — 94640 AIRWAY INHALATION TREATMENT: CPT

## 2018-09-04 PROCEDURE — 85025 COMPLETE CBC W/AUTO DIFF WBC: CPT | Performed by: INTERNAL MEDICINE

## 2018-09-04 PROCEDURE — 74011250637 HC RX REV CODE- 250/637: Performed by: HOSPITALIST

## 2018-09-04 PROCEDURE — 74011250637 HC RX REV CODE- 250/637: Performed by: INTERNAL MEDICINE

## 2018-09-04 PROCEDURE — 71045 X-RAY EXAM CHEST 1 VIEW: CPT

## 2018-09-04 PROCEDURE — 84100 ASSAY OF PHOSPHORUS: CPT | Performed by: INTERNAL MEDICINE

## 2018-09-04 PROCEDURE — 83735 ASSAY OF MAGNESIUM: CPT | Performed by: INTERNAL MEDICINE

## 2018-09-04 PROCEDURE — 82330 ASSAY OF CALCIUM: CPT | Performed by: INTERNAL MEDICINE

## 2018-09-04 PROCEDURE — 74011250636 HC RX REV CODE- 250/636: Performed by: INTERNAL MEDICINE

## 2018-09-04 PROCEDURE — 36600 WITHDRAWAL OF ARTERIAL BLOOD: CPT

## 2018-09-04 PROCEDURE — 77010033678 HC OXYGEN DAILY

## 2018-09-04 PROCEDURE — 74011250636 HC RX REV CODE- 250/636: Performed by: HOSPITALIST

## 2018-09-04 RX ORDER — POTASSIUM CHLORIDE 20MEQ/15ML
20 LIQUID (ML) ORAL ONCE
Status: COMPLETED | OUTPATIENT
Start: 2018-09-05 | End: 2018-09-04

## 2018-09-04 RX ORDER — POTASSIUM CHLORIDE 7.45 MG/ML
10 INJECTION INTRAVENOUS ONCE
Status: COMPLETED | OUTPATIENT
Start: 2018-09-04 | End: 2018-09-04

## 2018-09-04 RX ADMIN — INSULIN LISPRO 2 UNITS: 100 INJECTION, SOLUTION INTRAVENOUS; SUBCUTANEOUS at 00:17

## 2018-09-04 RX ADMIN — IPRATROPIUM BROMIDE AND ALBUTEROL SULFATE 3 ML: .5; 3 SOLUTION RESPIRATORY (INHALATION) at 00:44

## 2018-09-04 RX ADMIN — LEVOFLOXACIN 500 MG: 5 INJECTION, SOLUTION INTRAVENOUS at 05:22

## 2018-09-04 RX ADMIN — INSULIN LISPRO 2 UNITS: 100 INJECTION, SOLUTION INTRAVENOUS; SUBCUTANEOUS at 06:55

## 2018-09-04 RX ADMIN — PIPERACILLIN SODIUM,TAZOBACTAM SODIUM 2.25 G: 2; .25 INJECTION, POWDER, FOR SOLUTION INTRAVENOUS at 00:57

## 2018-09-04 RX ADMIN — PROPOFOL 20 MCG/KG/MIN: 10 INJECTION, EMULSION INTRAVENOUS at 18:36

## 2018-09-04 RX ADMIN — PROPOFOL 20 MCG/KG/MIN: 10 INJECTION, EMULSION INTRAVENOUS at 08:29

## 2018-09-04 RX ADMIN — CARVEDILOL 12.5 MG: 12.5 TABLET, FILM COATED ORAL at 08:02

## 2018-09-04 RX ADMIN — PIPERACILLIN SODIUM,TAZOBACTAM SODIUM 2.25 G: 2; .25 INJECTION, POWDER, FOR SOLUTION INTRAVENOUS at 06:56

## 2018-09-04 RX ADMIN — POTASSIUM CHLORIDE 10 MEQ: 10 INJECTION, SOLUTION INTRAVENOUS at 15:59

## 2018-09-04 RX ADMIN — CHLORHEXIDINE GLUCONATE 10 ML: 1.2 RINSE ORAL at 08:02

## 2018-09-04 RX ADMIN — METHYLPREDNISOLONE SODIUM SUCCINATE 40 MG: 40 INJECTION, POWDER, FOR SOLUTION INTRAMUSCULAR; INTRAVENOUS at 08:02

## 2018-09-04 RX ADMIN — VANCOMYCIN HYDROCHLORIDE 1250 MG: 10 INJECTION, POWDER, LYOPHILIZED, FOR SOLUTION INTRAVENOUS at 01:37

## 2018-09-04 RX ADMIN — FUROSEMIDE 20 MG: 10 INJECTION, SOLUTION INTRAMUSCULAR; INTRAVENOUS at 21:58

## 2018-09-04 RX ADMIN — CHLORHEXIDINE GLUCONATE 10 ML: 1.2 RINSE ORAL at 21:57

## 2018-09-04 RX ADMIN — IPRATROPIUM BROMIDE AND ALBUTEROL SULFATE 3 ML: .5; 3 SOLUTION RESPIRATORY (INHALATION) at 19:56

## 2018-09-04 RX ADMIN — FUROSEMIDE 20 MG: 10 INJECTION, SOLUTION INTRAMUSCULAR; INTRAVENOUS at 08:02

## 2018-09-04 RX ADMIN — PROPOFOL 15 MCG/KG/MIN: 10 INJECTION, EMULSION INTRAVENOUS at 00:00

## 2018-09-04 RX ADMIN — IPRATROPIUM BROMIDE AND ALBUTEROL SULFATE 3 ML: .5; 3 SOLUTION RESPIRATORY (INHALATION) at 11:37

## 2018-09-04 RX ADMIN — AMLODIPINE BESYLATE 10 MG: 5 TABLET ORAL at 08:02

## 2018-09-04 RX ADMIN — POTASSIUM CHLORIDE 10 MEQ: 10 INJECTION, SOLUTION INTRAVENOUS at 08:29

## 2018-09-04 RX ADMIN — POTASSIUM CHLORIDE 20 MEQ: 20 SOLUTION ORAL at 23:56

## 2018-09-04 RX ADMIN — LATANOPROST 1 DROP: 50 SOLUTION OPHTHALMIC at 21:57

## 2018-09-04 RX ADMIN — IPRATROPIUM BROMIDE AND ALBUTEROL SULFATE 3 ML: .5; 3 SOLUTION RESPIRATORY (INHALATION) at 15:52

## 2018-09-04 RX ADMIN — IPRATROPIUM BROMIDE AND ALBUTEROL SULFATE 3 ML: .5; 3 SOLUTION RESPIRATORY (INHALATION) at 07:23

## 2018-09-04 RX ADMIN — ASPIRIN 81 MG: 81 TABLET, CHEWABLE ORAL at 08:02

## 2018-09-04 RX ADMIN — CARVEDILOL 12.5 MG: 12.5 TABLET, FILM COATED ORAL at 17:58

## 2018-09-04 RX ADMIN — IPRATROPIUM BROMIDE AND ALBUTEROL SULFATE 3 ML: .5; 3 SOLUTION RESPIRATORY (INHALATION) at 03:36

## 2018-09-04 RX ADMIN — SODIUM CHLORIDE 40 MG: 9 INJECTION INTRAMUSCULAR; INTRAVENOUS; SUBCUTANEOUS at 23:56

## 2018-09-04 RX ADMIN — ENOXAPARIN SODIUM 40 MG: 40 INJECTION, SOLUTION INTRAVENOUS; SUBCUTANEOUS at 12:05

## 2018-09-04 NOTE — PROGRESS NOTES
[facility] Pulmonary, Critical Care, and Sleep Medicine Name: Jenny Baltazar MRN: [id number] : 1945 Hospital: [facility] [place] Date: 2018 PCCM Note Consult requesting physician: Dr. Irving Landry Reason for Consult: acute on chronic respiratory failure IMPRESSION:  
· Acute on chronic hypoxemic and hypercapneic respiratory failure, failed bipap, intubated 18 · Metabolic encephalopathy from CO2 narcosis, dementia · COPD with home O2-FEV1 of 1.23 or 55% predicted, normal FEV1%, follows with Dr. Dea Duenas - on Spiriva and advair at home- Recent 6 min walk in May suggested requires home oxygen 2-4 liter · Acute on chronic diastolic CHF · CKD stage 3 with ARF 
· HTN 
· Severe complex sleep apnea - AHI 34/hr RUPERTO and CSA events; on BIPAP2 non compliant - not able to tolerate but best response noted on BIPAP / in recent hospitalization · Hx of ETOH abuse in past (residing in rehab facility) · Chronic leukocytosis, thrombocytopenia with hx of CLL · Anemia · Dementia - severity not known, but advanced per prior notes [facility] 18 Dr Rosetta Elizondo AHI 34/hr RUPERTO and CSA events; O2 desats BIPAP titration - snoring and apneas could not be eliminated with 24/20 pressures; central apneas with higher pressures; poorly tolerated overall LVEF 66-70%, grade 1 DD on echo 18. RECOMMENDATIONS:  
Respiratory: failed bipap, intubated in ER on . ABG showing persistent alkalosis despite of reduced MV on 9/3/18. Reduced Vt to 340 and RR to 10 now. Repeat ABG in 2 hrs. D/w RT. Alert awake and following simple commands. SBT today depending on repeat ABG. D/w RT at bedside. Ventilator bundle & Sedation protocol followed. Daily sedation holiday, assessment for readiness for SBT and then re-titrate if required. Chlorhexidine mouth washes. Keep SPO2 >=92%. HOB 30 degree elevation all the time. Aggressive pulmonary toileting. Aspiration precautions. CVS: BP stable. C/w lasix. ID:  cxr b/l infiltrates vs chf.  
Sputum cx mod candida. Blood cx ngtd. Wbc improving. No fever. D/c zosyn and vanco.  
C/w levaquin. Sepsis bundle and protocol followed. Hematology/Oncology: chronic CLL with leucocytosis, thrombocytopenia, anemia. No bleeding at femoral central line despite of restarted lovenox. Renal: monitor renal function on lasix. Stable/improving creat. GI/: tolerating TF Endocrine: Maintain blood glucose 140-180. humalog ssi Neurology: sedated on vent. ams in er due to co2 retention with baseline dementia Skin/Wound: no acute issues Electrolytes: Replace electrolytes per ICU electrolyte replacement protocol. IVF: none Nutrition: start trickle feed Prophylaxis: DVT Prophylaxis with scd, hold lovenox due to central line site bleeding. GI Prophylaxis with protonix. Restraints: wrist soft- for patient interfering with medical therapy/management and patient safety. Lines/Tubes: PIV Right femoral cental line placed in ER 9/1/18 Will defer respective systems problem management to primary and other respective consultant and follow patient in ICU with primary and other medical team. 
Further recommendations will be based on the patient's response to recommended treatment and results of the investigation ordered. Quality Care: PPI, DVT prophylaxis, HOB elevated, Infection control all reviewed and addressed. Care of plan d/w RN, RT, MDR. High complexity decision making was performed during the evaluation of this patient at high risk for decompensation with multiple organ involvement. Total critical care time spent rendering care exclusive of procedures: 36 minutes.  
  
 
 
Subjective/History of Present Illness:  
 
Patient is a 74 y.o. male with hx of COPD, home O2, chronic hypercapneic resp failure, dementia, CLL, diastolic CHF, HTN, CKD, severe complex sleep apnea who has recurrent hospitalization for hypercarbic respiratory failure. 9/4/18: On vent Off sedation following simple commands No fever. Wbc down trending. No ett secretions 
abd as mentioned above Right femoral central line- no bleeding No other overnight issue No Known Allergies Past Medical History:  
Diagnosis Date  Chronic kidney disease   
 stage 3 kidney disease  Chronic obstructive pulmonary disease [facility]  CLL (chronic lymphocytic leukemia) ([facility]  Delirium  Emphysema lung ([facility]  Essential hypertension  Fall  Frequent hospital admissions  Gout  Hyperlipidemia  Hypoxemia  Memory loss  Noncompliance  Oxygen dependent  Pneumonia  Pulmonary nodules  Respiratory failure ([facility] [address] [facility]  Risk for falls  Septic shock ([facility] [facility]  Sleep apnea  Tobacco abuse  Vitamin D deficiency Past Surgical History:  
Procedure Laterality Date  HX CATARACT REMOVAL    
 HX HERNIA REPAIR    
 HX NEPHROSTOMY Social History Substance Use Topics  Smoking status: Former Smoker  Smokeless tobacco: Never Used  Alcohol use No  
   Comment: former ETOH user; stopped December 2017 History reviewed. No pertinent family history. Prior to Admission medications Medication Sig Start Date End Date Taking? Authorizing Provider OLANZapine (ZYPREXA) 2.5 mg tablet Take 0.5 Tabs by mouth two (2) times a day. 7/25/18   Praful Lang MD  
amLODIPine (NORVASC) 10 mg tablet Take 1 Tab by mouth daily. 7/25/18   Praful Lang MD  
hydrALAZINE (APRESOLINE) 25 mg tablet Take 1 Tab by mouth three (3) times daily.  7/25/18   Praful Lang MD  
predniSONE (STERAPRED) 5 mg dose pack See administration instruction per 5mg dose pack 7/25/18   Praful Lang MD  
ALPRAZolam Anna Erickson) 0.5 mg tablet Take 1 Tab by mouth three (3) times daily as needed for Anxiety. Max Daily Amount: 1.5 mg. Indications: anxiety 7/20/18   Breanna Garcia MD  
carvedilol (COREG) 12.5 mg tablet Take 1 Tab by mouth two (2) times daily (with meals). 7/12/18   Zainab Wilson MD  
docusate sodium (COLACE) 100 mg capsule Take 1 Cap by mouth two (2) times a day for 90 days. 7/12/18 10/10/18  Zainab Wilson MD  
alfuzosin SR (UROXATRAL) 10 mg SR tablet Take 10 mg by mouth daily. Ronni Hernandez MD  
aspirin 81 mg chewable tablet Take 81 mg by mouth daily. Ronni Hernandez MD  
cycloSPORINE (RESTASIS) 0.05 % ophthalmic emulsion Administer 1 Drop to both eyes two (2) times a day. Ronni Hernandez MD  
famotidine (PEPCID) 20 mg tablet Take 20 mg by mouth two (2) times a day. Ronni Hernandez MD  
latanoprost (XALATAN) 0.005 % ophthalmic solution Administer 1 Drop to both eyes nightly. Ronni Hernandez MD  
furosemide (LASIX) 40 mg tablet Take  by mouth daily. Ronni Hernandez MD  
Oxygen     Ronni Hernandez MD  
pravastatin sodium (PRAVASTATIN PO) Take  by mouth. Ronni Hernandez MD  
tiotropium (SPIRIVA WITH HANDIHALER) 18 mcg inhalation capsule Take 1 Cap by inhalation daily. Ronni Hernandez MD  
fluticasone/salmeterol (ADVAIR HFA IN) Take  by inhalation. Ronni Hernandez MD  
ALBUTEROL IN Take  by inhalation. Ronni Hernandez MD  
 
Current Facility-Administered Medications Medication Dose Route Frequency  enoxaparin (LOVENOX) injection 40 mg  40 mg SubCUTAneous Q24H  piperacillin-tazobactam (ZOSYN) 2.25 g in 0.9% sodium chloride (MBP/ADV) 50 mL MBP  2.25 g IntraVENous Q6H  
 propofol (DIPRIVAN) infusion  7 mcg/kg/min IntraVENous TITRATE  insulin lispro (HUMALOG) injection   SubCUTAneous Q6H  
 levoFLOXacin (LEVAQUIN) 500 mg in D5W IVPB  500 mg IntraVENous Q24H  
 albuterol-ipratropium (DUO-NEB) 2.5 MG-0.5 MG/3 ML  3 mL Nebulization Q4H RT  
 amLODIPine (NORVASC) tablet 10 mg  10 mg Oral DAILY  aspirin chewable tablet 81 mg  81 mg Oral DAILY  carvedilol (COREG) tablet 12.5 mg  12.5 mg Oral BID WITH MEALS  latanoprost (XALATAN) 0.005 % ophthalmic solution 1 Drop  1 Drop Both Eyes QHS  furosemide (LASIX) injection 20 mg  20 mg IntraVENous BID  vancomycin (VANCOCIN) 1250 mg in  ml infusion  1,250 mg IntraVENous Q24H  Vancomycin- pharmacy to dose  1 Each Other Rx Dosing/Monitoring  methylPREDNISolone (PF) (SOLU-MEDROL) injection 40 mg  40 mg IntraVENous Q12H  chlorhexidine (PERIDEX) 0.12 % mouthwash 10 mL  10 mL Oral Q12H  pantoprazole (PROTONIX) 40 mg in sodium chloride 0.9% 10 mL injection  40 mg IntraVENous Q24H  propofol (DIPRIVAN) infusion  0-50 mcg/kg/min IntraVENous TITRATE Objective:  
Vital Signs:   
Visit Vitals  /78  Pulse 92  Temp 97.5 °F (36.4 °C)  Resp 26  
 Ht 5' 6\" (1.676 m)  Wt 93.3 kg (205 lb 11 oz)  SpO2 100%  BMI 33.2 kg/m2 O2 Device: Endotracheal tube, Ventilator O2 Flow Rate (L/min): 3 l/min Temp (24hrs), Av.7 °F (37.1 °C), Min:97.5 °F (36.4 °C), Max:99.5 °F (37.5 °C) Intake/Output:  
Last shift:        
 
Last 3 shifts:  1901 -  0700 In: 1117.3 [I.V.:982.3] Out: 5250 [FCIPV:8075] Intake/Output Summary (Last 24 hours) at 18 Last data filed at 18 9992 Gross per 24 hour Intake           281.97 ml Output             3400 ml Net         -3118.03 ml Last 3 Recorded Weights in this Encounter 18 1826 18 1427 18 0327 Weight: 91.2 kg (201 lb) 91.2 kg (201 lb) 93.3 kg (205 lb 11 oz) Ventilator Settings: 
Mode Rate Tidal Volume Pressure FiO2 PEEP Assist control   340 ml (decreased by Dr. Farhad Pittman)    40 % 5 cm H20 Peak airway pressure: 15 cm H2O Plateau pressure:    
Tidal volume:   
Minute ventilation: 10.41 l/min SPO2 98 Physical Exam:  
 
General/Neurology: intubated, awake and following simple commands. Obese. Head:   Normocephalic, without obvious abnormality, atraumatic. Eye:    no scleral icterus, no pallor, no cyanosis. Throat:  ETT Neck:   Supple, symmetric. No lymphadenopathy. Trachea midline Lung: Moderate air entry bilateral equal. No rales. No rhonchi. No wheezing. No stridors. No prolongded expiration. No accessory muscle use. Heart:   Regular rate & rhythm. S1 S2 present. No murmur. No JVD. Abdomen:  Soft. NT. ND. +BS. Obese. Extremities:  No pedal edema. No cyanosis. No clubbing. Pulses: 2+ and symmetric in DP. Capillary refill: normal 
Lymphatic:  No cervical or supraclavicular palpable lymphadenopathy. Skin:   Color, texture, turgor normal. No rashes or lesions. Data:  
   
Recent Results (from the past 24 hour(s)) POC G3 Collection Time: 09/03/18 11:41 AM  
Result Value Ref Range Device: VENT    
 FIO2 (POC) 40 % pH (POC) 7.549 (H) 7.35 - 7.45    
 pCO2 (POC) 39.9 35.0 - 45.0 MMHG  
 pO2 (POC) 68 (L) 80 - 100 MMHG  
 HCO3 (POC) 34.8 (H) 22 - 26 MMOL/L  
 sO2 (POC) 95 92 - 97 % Base excess (POC) 12 mmol/L Mode ASSIST CONTROL Tidal volume 360 ml Set Rate 12 bpm  
 PEEP/CPAP (POC) 5 cmH2O Allens test (POC) YES Site RIGHT RADIAL Specimen type (POC) ARTERIAL Performed by Alisha Marcos GLUCOSE, POC Collection Time: 09/03/18 11:47 AM  
Result Value Ref Range Glucose (POC) 139 (H) 70 - 110 mg/dL GLUCOSE, POC Collection Time: 09/03/18  5:46 PM  
Result Value Ref Range Glucose (POC) 174 (H) 70 - 110 mg/dL POTASSIUM Collection Time: 09/03/18  9:40 PM  
Result Value Ref Range Potassium 3.5 3.5 - 5.5 mmol/L  
GLUCOSE, POC Collection Time: 09/04/18 12:09 AM  
Result Value Ref Range Glucose (POC) 151 (H) 70 - 110 mg/dL GLUCOSE, POC Collection Time: 09/04/18  5:28 AM  
Result Value Ref Range Glucose (POC) 165 (H) 70 - 110 mg/dL METABOLIC PANEL, BASIC Collection Time: 09/04/18  6:55 AM  
Result Value Ref Range Sodium 142 136 - 145 mmol/L Potassium 3.7 3.5 - 5.5 mmol/L Chloride 104 100 - 108 mmol/L  
 CO2 32 21 - 32 mmol/L Anion gap 6 3.0 - 18 mmol/L Glucose 153 (H) 74 - 99 mg/dL BUN 29 (H) 7.0 - 18 MG/DL Creatinine 1.62 (H) 0.6 - 1.3 MG/DL  
 BUN/Creatinine ratio 18 12 - 20 GFR est AA 51 (L) >60 ml/min/1.73m2 GFR est non-AA 42 (L) >60 ml/min/1.73m2 Calcium 9.1 8.5 - 10.1 MG/DL  
PHOSPHORUS Collection Time: 09/04/18  6:55 AM  
Result Value Ref Range Phosphorus 4.4 2.5 - 4.9 MG/DL MAGNESIUM Collection Time: 09/04/18  6:55 AM  
Result Value Ref Range Magnesium 2.1 1.6 - 2.6 mg/dL CALCIUM, IONIZED Collection Time: 09/04/18  6:55 AM  
Result Value Ref Range Ionized Calcium 1.16 1.12 - 1.32 MMOL/L  
POC G3 Collection Time: 09/04/18  7:21 AM  
Result Value Ref Range Device: VENT    
 FIO2 (POC) 40 % pH (POC) 7.505 (H) 7.35 - 7.45    
 pCO2 (POC) 42.2 35.0 - 45.0 MMHG  
 pO2 (POC) 83 80 - 100 MMHG  
 HCO3 (POC) 33.3 (H) 22 - 26 MMOL/L  
 sO2 (POC) 97 92 - 97 % Base excess (POC) 10 mmol/L Mode ASSIST CONTROL Tidal volume 360 ml Set Rate 11 bpm  
 PEEP/CPAP (POC) 5.0 cmH2O Allens test (POC) YES Total resp. rate 22 Site LEFT RADIAL Patient temp. 98.6 Specimen type (POC) ARTERIAL Performed by Nayana Flowers Chemistry Recent Labs  
   09/04/18 
 0655  09/03/18 
 2140  09/03/18 
 0330  09/02/18 
 1420  09/01/18 
 1950 GLU  153*   --   149*  133*  115* NA  142   --   144  144  145  
K  3.7  3.5  3.4*  3.6  3.7 CL  104   --   103  103  101 CO2  32   --   35*  39*  >45* BUN  29*   --   27*  24*  26* CREA  1.62*   --   1.78*  1.58*  1.45* CA  9.1   --   8.9  8.6  8.9 MG  2.1   --   2.0   --    --   
PHOS  4.4   --   2.8   --    --   
AGAP  6   --   6  2*  Cannot be calculated BUCR  18   --   15  15  18 AP   --    --    --    --   95  
TP   --    --    --    --   7.1 ALB   --    --    --    --   3.1*  
 GLOB   --    --    --    --   4.0 AGRAT   --    --    --    --   0.8 Lactic Acid Lactic acid Date Value Ref Range Status 09/01/2018 0.7 0.4 - 2.0 MMOL/L Final  
 
Recent Labs  
   09/01/18 
 1950 LAC  0.7 Liver Enzymes Protein, total  
Date Value Ref Range Status 09/01/2018 7.1 6.4 - 8.2 g/dL Final  
 
Albumin Date Value Ref Range Status 09/01/2018 3.1 (L) 3.4 - 5.0 g/dL Final  
 
Globulin Date Value Ref Range Status 09/01/2018 4.0 2.0 - 4.0 g/dL Final  
 
A-G Ratio Date Value Ref Range Status 09/01/2018 0.8 0.8 - 1.7   Final  
 
AST (SGOT) Date Value Ref Range Status 09/01/2018 19 15 - 37 U/L Final  
 
Alk. phosphatase Date Value Ref Range Status 09/01/2018 95 45 - 117 U/L Final  
 
Recent Labs  
   09/01/18 
 1950 TP  7.1 ALB  3.1*  
GLOB  4.0 AGRAT  0.8 SGOT  19 AP  95 CBC w/Diff Recent Labs  
   09/03/18 
 0330  09/02/18 
 1420  09/01/18 
 1950 WBC  13.4*  13.1  18.9*  
RBC  3.81*  3.72*  4.31* HGB  8.7*  8.6*  9.8* HCT  29.7*  30.0*  35.4*  
PLT  184  168  195 GRANS  67  67  69 LYMPH  27  26  25 EOS  0  0  0 Cardiac Enzymes No results found for: CPK, CK, CKMMB, CKMB, RCK3, CKMBT, CKNDX, CKND1, LOREN, TROPT, TROIQ, APRIL, TROPT, TNIPOC, BNP, BNPP  
 
BNP No results found for: BNP, BNPP, XBNPT Coagulation Recent Labs  
   09/02/18 
 1420 PTP  14.0 INR  1.1 Thyroid  No results found for: T4, T3U, TSH, TSHEXT, TSHEXT No results found for: T4  
 
Urinalysis Lab Results Component Value Date/Time  Color YELLOW 07/16/2018 06:00 PM  
 Appearance CLEAR 07/16/2018 06:00 PM  
 Specific gravity 1.024 07/16/2018 06:00 PM  
 pH (UA) 5.0 07/16/2018 06:00 PM  
 Protein 300 (A) 07/16/2018 06:00 PM  
 Glucose NEGATIVE  07/16/2018 06:00 PM  
 Ketone NEGATIVE  07/16/2018 06:00 PM  
 Bilirubin NEGATIVE  07/16/2018 06:00 PM  
 Urobilinogen 0.2 07/16/2018 06:00 PM  
 Nitrites NEGATIVE  07/16/2018 06:00 PM  
 Leukocyte Esterase NEGATIVE  07/16/2018 06:00 PM  
 Epithelial cells FEW 07/16/2018 06:00 PM  
 Bacteria 1+ (A) 07/16/2018 06:00 PM  
 WBC 0 to 3 07/16/2018 06:00 PM  
 RBC 0 to 3 07/16/2018 06:00 PM  
  
 
Micro  Recent Labs  
   09/03/18 
 0625  09/03/18 
 0605  09/02/18 
 0150 CULT  NO GROWTH AFTER 23 HOURS  NO GROWTH 1 DAY  CULTURE IN PROGRESS,FURTHER UPDATES TO FOLLOW  Sent to [facility] for ID/Susceptibility if indicated Recent Labs  
   09/03/18 
 0625  09/03/18 
 0605  09/02/18 
 0150  09/02/18 
 0130  09/02/18 
 0100 CULT  NO GROWTH AFTER 23 HOURS  NO GROWTH 1 DAY  CULTURE IN PROGRESS,FURTHER UPDATES TO FOLLOW  Sent to [facility] for ID/Susceptibility if indicated  MODERATE NAYAN ALBICANS*  FEW NORMAL RESPIRATORY JAIRON  NO GROWTH 2 DAYS  
  
 
ABG Recent Labs  
   09/04/18 
 0721  09/03/18 
 1141  09/03/18 
 0519 PHI  7.505*  7.549*  7.523* PCO2I  42.2  39.9  44.5 PO2I  83  68*  60* HCO3I  33.3*  34.8*  36.6*  
FIO2I  40  40  40 PFT Ultrasound LE Doppler ECHO 9/2/18 · Technically difficult study due to patient's body habitus and procedure performed with the patient in a supine position. Definity contrast was given to enhance imaging. · Estimated left ventricular ejection fraction is 66 - 70%. Biplane method used to measure ejection fraction. Left ventricular moderate concentric hypertrophy. Normal left ventricular wall motion, no regional wall motion abnormality noted. Mild (grade 1) left ventricular diastolic dysfunction. · There is a moderate left pleural effusion. CT (Most Recent) (CT chest reviewed by me) Results from Hospital Encounter encounter on 07/16/18 CT HEAD WO CONT Narrative EXAM:  CT of the brain without intravenous contrast. 
 
COMPARISON: CT July 3, 2018. REASON FOR EXAM: \"Decreased alertness; facial droop and ams\".  
 
DOSE REDUCTION:  One or more dose reduction techniques were used on this CT: 
 automated exposure control, adjustment of the mAs and/or kVp according to 
patient's size, and iterative reconstruction techniques. The specific techniques 
utilized on this CT exam have been documented in the patient's electronic 
medical record. 
 
_______________ FINDINGS:  
 
    IMAGE QUALITY:  The images are mildly degraded by motion artifact. BRAIN AND EXTRA-AXIAL SPACE:   
 
         SUBACUTE TERRITORIAL TRANSCORTICAL INFARCTION:  None detected. MASS:  None detected. HEMORRHAGE:  None. SUBDURAL FLUID COLLECTION:  None detected. HYDROCEPHALUS:  None. REMOTE CORTICAL INFARCTION:  None detected. REMOTE CEREBELLAR INFARCTION:  None detected. MISCELLANEOUS:  Non-applicable. STRIVE (STandards for Reporting Vascular changes on nEuroimaging): 
                   --[place] (age-indeterminate) or perivascular spaces of 
\"presumed vascular origin\":  None definite. --[place] of white matter hypodensity of \"presumed vascular 
origin\":  Low grade. --Degree of brain atrophy (subjective): Low grade. BURDEN OF CALCIFIC INTRACRANIAL ATHEROSCLEROSIS:  None significant. HEENT: 
 
         INCLUDED UPPER ORBITS:  The lenses are replaced bilaterally. INCLUDED UPPER PARANASAL SINUSES:  Predominantly clear. MASTOID AIR CELLS:  Predominantly clear. BONES:  Unremarkable. SCALP:  Unremarkable. 
 
_______________ Impression IMPRESSION: 
 
Low-grade generalized chronic changes, however, no acute findings. _______________ Note: Dr. Augusta Barr discussed the stroke code results with Dr. Pennie Merchant at [phone] on 
7/16/2018. XR (Most Recent). CXR  reviewed by me and compared with previous CXR Results from Hospital Encounter encounter on 09/01/18 XR ABD PORT  1 V 
 Narrative Abdominal radiograph, single frontal view. INDICATION: OG tube placement. COMPARISON: None. FINDINGS: A nasogastric tube is in place with the tip looped in the left upper 
quadrant, side-port distal to the GE junction. Overlying monitor leads are 
noted. Lungs are underexpanded with bibasilar atelectasis, worse on the left. Positive bowel gas. Impression IMPRESSION: Nasogastric tube in satisfactory position. Modesto Dill MD 
9/4/2018

## 2018-09-04 NOTE — PROGRESS NOTES
abg still alkalosis, slightly better. sbt now and then do abg 
Depending on pt status and abg, will decide extubation D/w RT, BRNY Ramirez MD 9/4/2018 10:30 AM

## 2018-09-04 NOTE — PROGRESS NOTES
Reason for Admission:   SOB and wheezing RRAT Score:    32 Resources/supports as identified by patient/family:    
             
Top Challenges facing patient (as identified by patient/family and CM):  TBD Finances/Medication cost?       
           
Transportation? Support system or lack thereof? Living arrangements? Self-care/ADLs/Cognition? Current Advanced Directive/Advance Care Plan: On chart Plan for utilizing home health:     
                   
Likelihood of readmission:  
              
Transition of Care Plan:      Chart reviewed and attended IDR's ,per ED noted pt came to ED with c/o SOB and wheezing for 2-3 days ,lives at home with wife, cm did attempt to visit pt to [facility] d/c planning pt was vented at that time, noted per resp note pt failed SBT and placed back  On full support today,cm will revisit pt tomorrow for follow up.

## 2018-09-04 NOTE — DIABETES MGMT
GLYCEMIC CONTROL PROGRESS NOTE: 
 
-discussed in rounds, no known h/o DM, HbA1c meets criteria for prediabetes 
-POCT + - Humalog Normal Insulin Sensitivity Corrective Coverage orders in place recommend continue 
-BG in target range ICU: 140-160 mg/dL 
-TDD = 6 units - Humalog Normal Insulin Sensitivity Corrective Coverage -IV steroids 40 mg Q 12 hours Recent Glucose Results:  
Lab Results Component Value Date/Time  (H) 09/04/2018 06:55 AM  
 GLUCPOC 165 (H) 09/04/2018 05:28 AM  
 GLUCPOC 151 (H) 09/04/2018 12:09 AM  
 GLUCPOC 174 (H) 09/03/2018 05:46 PM  
 
 
 
Kristina Reese RN, MS 
Glycemic Control Team 
Pager [phone] (M-TH 8:30-5P) *After Hours pager [phone]

## 2018-09-04 NOTE — PROGRESS NOTES
Hospitalist Progress Note Patient: Shannan Ferreira. MRN: [id number]  CSN: [id number] YOB: 1945  Age: 74 y.o. Sex: male DOA: 9/1/2018 LOS:  LOS: 2 days Assessment/Plan Patient Active Problem List  
Diagnosis Code  Hypercapnia R06.89  
 Acute on chronic respiratory failure with hypoxia and hypercapnia ([facility]) J96.21, J96.22  
 CLL (chronic lymphocytic leukemia) ([facility]) C91.90  Emphysema lung ([address] J43.9  Elevated serum creatinine R79.89  
 COPD (chronic obstructive pulmonary disease) ([facility]) J44.9  RUPERTO (obstructive sleep apnea) G47.33  
 Stage 3 chronic kidney disease N18.3  Dementia F03.90  
 HTN (hypertension) I10  
 Altered mental status R41.82  Respiratory distress R06.03  
 Hypoxia R09.02  
 CO2 narcosis R06.89  
 Hypernatremia E87.0  Acute respiratory failure with hypoxia and hypercarbia ([facility]) J96.01, J96.02  
  
 
75 yo male with history of chronic respiratory failure, COPD, RUPERTO, CKD admitted for SOB. Patient intubated and sedated. CRITICAL CARE PLAN Resp - See vent orders, VAP bundle. HOB>30 degrees. Bronchodilators. Daily CXR. Acute on chronic respiratory failure - secondary to COPD, diastolic CHF, RUPERTO. Continue on steroids, inhaled BD. Complex RUPERTO - non compliant with BiPAP. ID - CXR with infiltrate vs edema On levaquin CVS - Monitor HD. Acute on chronic diastolic CHF - continue with lasix. Heme/onc - Follow H&H, plts. CLL Renal - Trend BUN, Cr, follow I/O, muller in place. Check and replace Mg, K, phos. CKD - stage 3, Endocrine -  Follow FSG, on SSI Neuro/ Pain/ Sedation -  Sedation bundle. GI - NPO for now. OG tube, tube feeding. Prophylaxis - DVT: SCD, GI: protonix 55 minutes of critical care time spent in the direct evaluation and treatment of this high risk patient.  The reason for providing this level of medical care for this critically ill patient was due a critical illness that impaired one or more vital organ systems such that there was a high probability of imminent or life threatening deterioration in the patients condition. This care involved high complexity decision making to assess, manipulate, and support vital system functions, to treat this degreee vital organ system failure and to prevent further life threatening deterioration of the patients condition. Disposition : TBD Physical Exam: 
General: As above   
HEENT: NC, Atraumatic. Pupils pinpoint, anicteric sclerae. Lungs: CTA Bilaterally. No Wheezing/Rhonchi/Rales. Heart:  Regular  rhythm,  No murmur, No Rubs, No Gallops Abdomen: Soft, Non distended, Non tender.  +Bowel sounds, Extremities: No c/c/e Vital signs/Intake and Output: 
Visit Vitals  /68  Pulse 77  Temp (!) 100.5 °F (38.1 °C)  Resp 14  
 Ht 5' 6\" (1.676 m)  Wt 93.3 kg (205 lb 11 oz)  SpO2 100%  BMI 33.2 kg/m2 Current Shift:  09/04 0701 - 09/04 1900 In: 342.4 [I.V.:322.4] Out: 1800 [Urine:1800] Last three shifts:  09/02 1901 - 09/04 0700 In: 1117.3 [I.V.:982.3] Out: 5250 [JSE:6257] Labs: Results:  
   
Chemistry Recent Labs  
   09/04/18 
 1253  09/04/18 
 0655  09/03/18 
 2140  09/03/18 
 0330  09/02/18 
 1420  09/01/18 
 1950 GLU   --   153*   --   149*  133*  115* NA   --   142   --   144  144  145  
K  3.7  3.7  3.5  3.4*  3.6  3.7 CL   --   104   --   103  103  101 CO2   --   32   --   35*  39*  >45* BUN   --   29*   --   27*  24*  26* CREA   --   1.62*   --   1.78*  1.58*  1.45* CA   --   9.1   --   8.9  8.6  8.9 AGAP   --   6   --   6  2*  Cannot be calculated BUCR   --   18   --   28  46  80 AP   --    --    --    --    --   95  
TP   --    --    --    --    --   7.1 ALB   --    --    --    --    --   3.1*  
GLOB   --    --    --    --    --   4.0 AGRAT   --    --    --    --    --   0.8 CBC w/Lizzie Guthrie Labs  
   09/04/18 
 0655  09/03/18 [phone]  09/02/18 
 1420 WBC  15.6*  13.4*  13.1 RBC  4.05*  3.81*  3.72* HGB  9.2*  8.7*  8.6* HCT  30.6*  29.7*  30.0*  
PLT  166  184  168 GRANS  65  67  67 LYMPH  26  27  26 EOS  0  0  0 Cardiac Enzymes Recent Labs  
   09/01/18 
 1950 CPK  55 CKND1  3.1 Coagulation Recent Labs  
   09/02/18 
 1420 PTP  14.0 INR  1.1 Lipid Panel No results found for: CHOL, CHOLPOCT, CHOLX, CHLST, CHOLV, 956390, HDL, LDL, LDLC, DLDLP, 406894, VLDLC, VLDL, TGLX, TRIGL, TRIGP, TGLPOCT, CHHD, CHHDX  
BNP No results for input(s): BNPP in the last 72 hours. Liver Enzymes Recent Labs  
   09/01/18 
 1950 TP  7.1 ALB  3.1* AP  95 SGOT  19 Thyroid Studies No results found for: T4, T3U, TSH, TSHEXT Procedures/imaging: see electronic medical records for all procedures/Xrays and details which were not copied into this note but were reviewed prior to creation of Plan

## 2018-09-04 NOTE — PROGRESS NOTES
Problem: Pressure Injury - Risk of 
Goal: *Prevention of pressure injury Document Percy Scale and appropriate interventions in the flowsheet. Outcome: Progressing Towards Goal 
Pressure Injury Interventions: 
Sensory Interventions: Assess changes in LOC Moisture Interventions: Absorbent underpads, Apply protective barrier, creams and emollients, Check for incontinence Q2 hours and as needed, Limit adult briefs, Maintain skin hydration (lotion/cream), Minimize layers, Moisture barrier Activity Interventions: Pressure redistribution bed/mattress(bed type) Mobility Interventions: HOB 30 degrees or less, Float heels, Pressure redistribution bed/mattress (bed type) Nutrition Interventions: Document food/fluid/supplement intake Friction and Shear Interventions: Apply protective barrier, creams and emollients, Feet elevated on foot rest, HOB 30 degrees or less

## 2018-09-04 NOTE — PROGRESS NOTES
Hospitalist Progress Note Patient: Aurora Ortiz. MRN: [id number]  CSN: [id number] YOB: 1945  Age: 74 y.o. Sex: male DOA: 9/1/2018 LOS:  LOS: 2 days Chief Complaint: resp failure. Assessment/Plan Disposition : 
Patient Active Problem List  
Diagnosis Code  Hypercapnia R06.89  
 Acute on chronic respiratory failure with hypoxia and hypercapnia ([facility]) J96.21, J96.22  
 CLL (chronic lymphocytic leukemia) ([facility]) C91.90  Emphysema lung ([address] J43.9  Elevated serum creatinine R79.89  
 COPD (chronic obstructive pulmonary disease) ([facility]) J44.9  RUPERTO (obstructive sleep apnea) G47.33  
 Stage 3 chronic kidney disease N18.3  Dementia F03.90  
 HTN (hypertension) I10  
 Altered mental status R41.82  Respiratory distress R06.03  
 Hypoxia R09.02  
 CO2 narcosis R06.89  
 Hypernatremia E87.0  Acute respiratory failure with hypoxia and hypercarbia ([facility]) J96.01, J96.02  
 
1. Acute on chronic hypercapnic and hypoxic respiratory failure, now intubated. 2.  Chronic lymphocytic leukemia. 3.  Chronic obstructive pulmonary disease. 4.  Complicated obstructive sleep apnea. 5.  Dementia. 6.  Chronic kidney disease stage III. 7.  History of gout. 8.  Chronic oxygen dependence. 9.  History of tobacco use and vitamin D deficiency 10. Bacteremia. Continue cre in the icu. Iv abx. Vent. Pulm CC consulting. Monitor labs. DVT/GI px. Subjective: Intubated in icu. Review of systems: 
 
Intubated. Vital signs/Intake and Output: 
Visit Vitals  /65  Pulse 84  Temp 99.5 °F (37.5 °C)  Resp 19  
 Ht 5' 6\" (1.676 m)  Wt 93.3 kg (205 lb 11 oz)  SpO2 100%  BMI 33.2 kg/m2 Current Shift:  09/03 1901 - 09/04 0700 In: -  
Out: [place] Last three shifts:  09/02 0701 - 09/03 1900 In: 1309.6 [I.V.:1209.6] Out: Odette Shall [PQNRO:[id number]] Exam: 
 
General: Well developed, alert, NAD, OX3 
 Head/Neck: intubated. CVS:Regular rate and rhythm, no M/R/G, S1/S2 heard, no thrill Lungs:Clear to auscultation bilaterally, no wheezes, rhonchi, or rales Abdomen: Soft, bs + Extremities: some edema. Labs: Results:  
   
Chemistry Recent Labs  
   09/03/18 2140 09/03/18 0330 09/02/18 1420 09/01/18 1950 GLU   --   149*  133*  115* NA   --   144  144  145  
K  3.5  3.4*  3.6  3.7 CL   --   103  103  101 CO2   --   35*  39*  >45* BUN   --   27*  24*  26* CREA   --   1.78*  1.58*  1.45* CA   --   8.9  8.6  8.9 AGAP   --   6  2*  Cannot be calculated BUCR   --   66  19  94 AP   --    --    --   95  
TP   --    --    --   7.1 ALB   --    --    --   3.1*  
GLOB   --    --    --   4.0 AGRAT   --    --    --   0.8 CBC w/Diff Recent Labs  
   09/03/18 0330 09/02/18 1420 09/01/18 1950 WBC  13.4*  13.1  18.9*  
RBC  3.81*  3.72*  4.31* HGB  8.7*  8.6*  9.8* HCT  29.7*  30.0*  35.4*  
PLT  184  168  195 GRANS  67  67  69 LYMPH  27  26  25 EOS  0  0  0 Cardiac Enzymes Recent Labs  
   09/01/18 1950 CPK  55 CKND1  3.1 Coagulation Recent Labs  
   09/02/18 1420 PTP  14.0 INR  1.1 Lipid Panel No results found for: CHOL, CHOLPOCT, CHOLX, CHLST, CHOLV, 929862, HDL, LDL, LDLC, DLDLP, 916362, VLDLC, VLDL, TGLX, TRIGL, TRIGP, TGLPOCT, CHHD, CHHDX  
BNP No results for input(s): BNPP in the last 72 hours. Liver Enzymes Recent Labs  
   09/01/18 1950 TP  7.1 ALB  3.1* AP  95 SGOT  19 Thyroid Studies No results found for: T4, T3U, TSH, TSHEXT Procedures/imaging: see electronic medical records for all procedures/Xrays and details which were not copied into this note but were reviewed prior to creation of Plan Leigh Betancur MD

## 2018-09-04 NOTE — PROGRESS NOTES
1920 - Bedside and Verbal shift change report given to Steph Stafford RN (oncoming nurse) by Mariana Gongora RN (offgoing nurse). Report included the following information SBAR, Kardex, Procedure Summary, Intake/Output, Accordion, Recent Results, Med Rec Status and Cardiac Rhythm NSR. Propofol at 8mcg/kg/min. 2000 - Pt assessed and vitals obtained. No acute distress, denies pain. Pt alert and vented, tolerating vent well, follows commands, and attempts to communicate with hand gestures. Somewhat restless but able to be calmed. Coarse lung sounds. Tube feedings infusing through OG tube, pt denies nausea, minimal residuals. Shanks draining clear yellow urine. Pt bathed, repositioned, turned, skin intact with no signs of injury. TL femoral line visualized clean dry and intact, infusing without issue. Pt more restless and frustrated after repositioning and bathing, propofol increased to 10mcg/kg/min. Restraints in place and no signs of injury. Will continue to monitor, see EMR for full details. 2100 - Pt seen to have TL Femoral line in hand. Pt repositioned and lines hidden from pt. Pt restless and flails hands at times. Propofol increased to 15mcg/kg/min. 0000 - Pt reassessed and vitals obtained. No acute distress, pt denies pain. Central line dressing is leaking and oozing blood. Redressed. Will continue to monitor, see EMR for full details. 0400 - Pt reassessed and vitals obtained. Pt is much unchanged from previous assessments, minimal drainage around central line. Will continue to monitor, see EMR for full details. 0725 - Bedside and Verbal shift change report given to Froilan Bravo RN (oncoming nurse) by Steph Stafford RN (offgoing nurse). Report included the following information SBAR, Kardex, Procedure Summary, Intake/Output, Accordion, Recent Results, Med Rec Status and Cardiac Rhythm NSR. Propofol at 20mcg/kg/min.

## 2018-09-05 ENCOUNTER — APPOINTMENT (OUTPATIENT)
Dept: GENERAL RADIOLOGY | Age: 73
DRG: 208 | End: 2018-09-05
Attending: INTERNAL MEDICINE
Payer: MEDICARE

## 2018-09-05 LAB
ARTERIAL PATENCY WRIST A: YES
ARTERIAL PATENCY WRIST A: YES
BASE EXCESS BLD CALC-SCNC: 10 MMOL/L
BASE EXCESS BLD CALC-SCNC: 11 MMOL/L
BDY SITE: ABNORMAL
BDY SITE: ABNORMAL
BODY TEMPERATURE: 98.5
CA-I SERPL-SCNC: 1.16 MMOL/L (ref 1.12–1.32)
GAS FLOW.O2 O2 DELIVERY SYS: ABNORMAL L/MIN
GAS FLOW.O2 O2 DELIVERY SYS: ABNORMAL L/MIN
GAS FLOW.O2 SETTING OXYMISER: 10 BPM
GLUCOSE BLD STRIP.AUTO-MCNC: 106 MG/DL (ref 70–110)
GLUCOSE BLD STRIP.AUTO-MCNC: 107 MG/DL (ref 70–110)
GLUCOSE BLD STRIP.AUTO-MCNC: 112 MG/DL (ref 70–110)
GLUCOSE BLD STRIP.AUTO-MCNC: 117 MG/DL (ref 70–110)
GLUCOSE BLD STRIP.AUTO-MCNC: 130 MG/DL (ref 70–110)
HCO3 BLD-SCNC: 33.7 MMOL/L (ref 22–26)
HCO3 BLD-SCNC: 34.4 MMOL/L (ref 22–26)
MAGNESIUM SERPL-MCNC: 2.1 MG/DL (ref 1.6–2.6)
O2/TOTAL GAS SETTING VFR VENT: 24 %
O2/TOTAL GAS SETTING VFR VENT: 40 %
PCO2 BLD: 46.8 MMHG (ref 35–45)
PCO2 BLD: 48.2 MMHG (ref 35–45)
PEEP RESPIRATORY: 12 CMH2O
PEEP RESPIRATORY: 5 CMH2O
PH BLD: 7.46 [PH] (ref 7.35–7.45)
PH BLD: 7.46 [PH] (ref 7.35–7.45)
PHOSPHATE SERPL-MCNC: 4.8 MG/DL (ref 2.5–4.9)
PIP ISTAT,IPIP: 24
PO2 BLD: 88 MMHG (ref 80–100)
PO2 BLD: 91 MMHG (ref 80–100)
POTASSIUM SERPL-SCNC: 3.5 MMOL/L (ref 3.5–5.5)
POTASSIUM SERPL-SCNC: 4.4 MMOL/L (ref 3.5–5.5)
SAO2 % BLD: 97 % (ref 92–97)
SAO2 % BLD: 97 % (ref 92–97)
SERVICE CMNT-IMP: ABNORMAL
SERVICE CMNT-IMP: ABNORMAL
SPECIMEN TYPE: ABNORMAL
SPECIMEN TYPE: ABNORMAL
SPONTANEOUS TIMED, IST: YES
TOTAL RESP. RATE, ITRR: 17
TOTAL RESP. RATE, ITRR: 31
VENTILATION MODE VENT: ABNORMAL
VOLUME CONTROL IVLC: YES
VT SETTING VENT: 340 ML

## 2018-09-05 PROCEDURE — 82330 ASSAY OF CALCIUM: CPT | Performed by: INTERNAL MEDICINE

## 2018-09-05 PROCEDURE — 77010033678 HC OXYGEN DAILY

## 2018-09-05 PROCEDURE — 36415 COLL VENOUS BLD VENIPUNCTURE: CPT | Performed by: INTERNAL MEDICINE

## 2018-09-05 PROCEDURE — 74011250636 HC RX REV CODE- 250/636: Performed by: INTERNAL MEDICINE

## 2018-09-05 PROCEDURE — C9113 INJ PANTOPRAZOLE SODIUM, VIA: HCPCS | Performed by: INTERNAL MEDICINE

## 2018-09-05 PROCEDURE — 65610000006 HC RM INTENSIVE CARE

## 2018-09-05 PROCEDURE — 94660 CPAP INITIATION&MGMT: CPT

## 2018-09-05 PROCEDURE — 74011250637 HC RX REV CODE- 250/637: Performed by: INTERNAL MEDICINE

## 2018-09-05 PROCEDURE — 77030013052 HC MSK CPAP/BIPAP PHIL -B

## 2018-09-05 PROCEDURE — 71045 X-RAY EXAM CHEST 1 VIEW: CPT

## 2018-09-05 PROCEDURE — 82962 GLUCOSE BLOOD TEST: CPT

## 2018-09-05 PROCEDURE — 74011250637 HC RX REV CODE- 250/637: Performed by: HOSPITALIST

## 2018-09-05 PROCEDURE — 74011250636 HC RX REV CODE- 250/636: Performed by: HOSPITALIST

## 2018-09-05 PROCEDURE — 82803 BLOOD GASES ANY COMBINATION: CPT

## 2018-09-05 PROCEDURE — 84132 ASSAY OF SERUM POTASSIUM: CPT | Performed by: INTERNAL MEDICINE

## 2018-09-05 PROCEDURE — 83735 ASSAY OF MAGNESIUM: CPT | Performed by: INTERNAL MEDICINE

## 2018-09-05 PROCEDURE — 84100 ASSAY OF PHOSPHORUS: CPT | Performed by: INTERNAL MEDICINE

## 2018-09-05 PROCEDURE — 74011000250 HC RX REV CODE- 250: Performed by: HOSPITALIST

## 2018-09-05 PROCEDURE — 94003 VENT MGMT INPAT SUBQ DAY: CPT

## 2018-09-05 PROCEDURE — 36600 WITHDRAWAL OF ARTERIAL BLOOD: CPT

## 2018-09-05 PROCEDURE — 94640 AIRWAY INHALATION TREATMENT: CPT

## 2018-09-05 PROCEDURE — 87040 BLOOD CULTURE FOR BACTERIA: CPT | Performed by: INTERNAL MEDICINE

## 2018-09-05 RX ORDER — POTASSIUM CHLORIDE 20MEQ/15ML
20 LIQUID (ML) ORAL DAILY
Status: DISCONTINUED | OUTPATIENT
Start: 2018-09-05 | End: 2018-09-05

## 2018-09-05 RX ORDER — POTASSIUM CHLORIDE 20MEQ/15ML
20 LIQUID (ML) ORAL
Status: COMPLETED | OUTPATIENT
Start: 2018-09-05 | End: 2018-09-05

## 2018-09-05 RX ORDER — VANCOMYCIN/0.9 % SOD CHLORIDE 1.5G/250ML
1500 PLASTIC BAG, INJECTION (ML) INTRAVENOUS EVERY 24 HOURS
Status: DISCONTINUED | OUTPATIENT
Start: 2018-09-05 | End: 2018-09-05

## 2018-09-05 RX ADMIN — ASPIRIN 81 MG: 81 TABLET, CHEWABLE ORAL at 08:43

## 2018-09-05 RX ADMIN — POTASSIUM CHLORIDE 20 MEQ: 20 SOLUTION ORAL at 08:43

## 2018-09-05 RX ADMIN — PROPOFOL 20 MCG/KG/MIN: 10 INJECTION, EMULSION INTRAVENOUS at 00:57

## 2018-09-05 RX ADMIN — AMLODIPINE BESYLATE 10 MG: 5 TABLET ORAL at 08:43

## 2018-09-05 RX ADMIN — CARVEDILOL 12.5 MG: 12.5 TABLET, FILM COATED ORAL at 08:43

## 2018-09-05 RX ADMIN — IPRATROPIUM BROMIDE AND ALBUTEROL SULFATE 3 ML: .5; 3 SOLUTION RESPIRATORY (INHALATION) at 07:47

## 2018-09-05 RX ADMIN — LATANOPROST 1 DROP: 50 SOLUTION OPHTHALMIC at 22:19

## 2018-09-05 RX ADMIN — IPRATROPIUM BROMIDE AND ALBUTEROL SULFATE 3 ML: .5; 3 SOLUTION RESPIRATORY (INHALATION) at 19:31

## 2018-09-05 RX ADMIN — IPRATROPIUM BROMIDE AND ALBUTEROL SULFATE 3 ML: .5; 3 SOLUTION RESPIRATORY (INHALATION) at 05:06

## 2018-09-05 RX ADMIN — ENOXAPARIN SODIUM 40 MG: 40 INJECTION, SOLUTION INTRAVENOUS; SUBCUTANEOUS at 12:45

## 2018-09-05 RX ADMIN — IPRATROPIUM BROMIDE AND ALBUTEROL SULFATE 3 ML: .5; 3 SOLUTION RESPIRATORY (INHALATION) at 15:00

## 2018-09-05 RX ADMIN — IPRATROPIUM BROMIDE AND ALBUTEROL SULFATE 3 ML: .5; 3 SOLUTION RESPIRATORY (INHALATION) at 11:12

## 2018-09-05 RX ADMIN — FUROSEMIDE 20 MG: 10 INJECTION, SOLUTION INTRAMUSCULAR; INTRAVENOUS at 08:42

## 2018-09-05 RX ADMIN — LEVOFLOXACIN 500 MG: 5 INJECTION, SOLUTION INTRAVENOUS at 03:54

## 2018-09-05 RX ADMIN — SODIUM CHLORIDE 40 MG: 9 INJECTION INTRAMUSCULAR; INTRAVENOUS; SUBCUTANEOUS at 22:19

## 2018-09-05 RX ADMIN — CHLORHEXIDINE GLUCONATE 10 ML: 1.2 RINSE ORAL at 08:42

## 2018-09-05 RX ADMIN — IPRATROPIUM BROMIDE AND ALBUTEROL SULFATE 3 ML: .5; 3 SOLUTION RESPIRATORY (INHALATION) at 00:49

## 2018-09-05 RX ADMIN — METHYLPREDNISOLONE SODIUM SUCCINATE 40 MG: 40 INJECTION, POWDER, FOR SOLUTION INTRAMUSCULAR; INTRAVENOUS at 08:43

## 2018-09-05 RX ADMIN — FUROSEMIDE 20 MG: 10 INJECTION, SOLUTION INTRAMUSCULAR; INTRAVENOUS at 22:19

## 2018-09-05 NOTE — PROGRESS NOTES
Problem: Falls - Risk of 
Goal: *Absence of Falls Document Kenova Tevin Fall Risk and appropriate interventions in the flowsheet. Outcome: Progressing Towards Goal 
Fall Risk Interventions: 
  
 
Mentation Interventions: Bed/chair exit alarm Medication Interventions: Bed/chair exit alarm Elimination Interventions: Bed/chair exit alarm, Call light in reach, Toileting schedule/hourly rounds Problem: Pressure Injury - Risk of 
Goal: *Prevention of pressure injury Document Percy Scale and appropriate interventions in the flowsheet. Outcome: Progressing Towards Goal 
Pressure Injury Interventions: 
Sensory Interventions: Minimize linen layers, Keep linens dry and wrinkle-free Moisture Interventions: Absorbent underpads, Apply protective barrier, creams and emollients Activity Interventions: Pressure redistribution bed/mattress(bed type) Mobility Interventions: Pressure redistribution bed/mattress (bed type) Nutrition Interventions: Discuss nutritional consult with provider Friction and Shear Interventions: Minimize layers

## 2018-09-05 NOTE — PROGRESS NOTES
Occupational Therapy Evaluation/Treatment Attempt Chart reviewed. Attempted Occupational Therapy Evaluation/Treatment, however, patient unable to be seen due to: 
[]  Nausea/vomiting 
[]  Eating 
[]  Pain 
[]  Patient too lethargic 
[]  Off Unit for testing/procedure 
[]  Dialysis treatment in progress  
[]  Telemetry Results [x]  Other: Pt extubated this date and on bipap. Pt well-known to this OT from previous admissions and greatly limited by cognition d/t non-compliance w/ respiratory deficits. Will assess when stable if pt requires OT services as pt minimally participatory w/ poor carryover of instruction and adaptive strategies. Will f/u later as patient's schedule allows.   
Thank you for this referral. 
Tahira Sanchez, OTR/L

## 2018-09-05 NOTE — DIABETES MGMT
GLYCEMIC CONTROL PROGRESS NOTE: 
 
-discussed in rounds, no known h/o DM, HbA1c meets criteria for prediabetes 
-POCT Q6 hours + - Humalog Normal Insulin Sensitivity Corrective Coverage orders in place recommend continue 
-BG in target range ICU: 140-160 mg/dL 
-TDD = 4 units - Humalog Normal Insulin Sensitivity Corrective Coverage -IV steroids decreased to 30 mg Q 24 hours Recent Glucose Results:  
Lab Results Component Value Date/Time GLUCPOC 106 09/05/2018 05:48 AM  
 GLUCPOC 117 (H) 09/05/2018 05:15 AM  
 GLUCPOC 116 (H) 09/04/2018 11:17 PM  
 
 
 
Negra Dozier RN, MS 
Glycemic Control Team 
Pager [phone] (M-TH 8:30-5P) *After Hours pager [phone]

## 2018-09-05 NOTE — PROGRESS NOTES
Bedside and Verbal shift change report given to Birgit Nogueira, RN (oncoming nurse) by Carrol Collet, RN (offgoing nurse). Report included the following information SBAR, Recent Results and Cardiac Rhythm NSR. 
 
0750: Propofol turned off and feeding stopped for SBT. Patient placed on SBT by RT.  
 
0800: Morning assessment completed. Patient awake and following commands. PERRLA. NSR on monitor. ETT 24 @ lip. Patient on SBT currently. Sedation medication off. Shanks in place draining clear yellow fluid. OG tube in place. Tube feeding currently on hold for SBT. Patient has Nepro ordered at 20mL/hr. Bowel sounds active. Femoral line in place and clean and dry. SCDs in place. Mouth care completed. Shanks care completed. Restraints in place and retied while off of sedation. 0829: Dr. Snow Cervantes in to see patient. Orders to extubate to Bi-PAP. RT called and notified. 0915: Patient suctioned prior to extubation by RT. Pea suctioned in from in-line suction. Dr. Snow Cervantes made aware by RT. SLP evaluation placed. 0920: Patient extubated to Bi-PAP. Tolerated well. OG tube and restraints removed as well. 4186: Wife called. Updated wife on extubation and Bi-PAP and on patient tolerance. 1200: Reassessment completed. Patient now on Bi-PAP. Tolerating well and keeping on. Able to answer all questions. Oriented to self and time only. Lung sounds remains coarse but patient is maintaining oxygen @ 100% on Bi-PAP. SLP to see patient later today. 1317: Patient attempted to use bedpan. Unable to. Shanks removed per MD order and patient given urinal. This RN tried to obtain PIV. Unsuccessful at this time. 1337Charles Cammie in to attempt PIV.  
 
1358: Patient taken off of Bi-PAP by RT. Dr. Snow Cervantes stated patient can come off for an hour at a time to rest.  
 
1459: Patient placed back on bi-pap. Tolerating well.  
 
1600: Reassessment completed. No changes to previous. 1744: Patient femoral line removed. Pressure held for 10 minutes. No bleeding noted. Gauze left in place with Tegaderm covering. Patient also asked to come off of bi-pap for a while. Patient tolerating well. 1815: Patient placed back on Bi-Pap. O2 beginning to drop into 80's.  
 
1830: Patient recovering well going back on Bi-PAP. Bedside and Verbal shift change report given to Nikki Harris RN (oncoming nurse) by Rachell Duong RN (offgoing nurse). Report included the following information SBAR, ED Summary, Recent Results and Cardiac Rhythm NSR.

## 2018-09-05 NOTE — PROGRESS NOTES
conducted a Follow up consultation and Spiritual Assessment for Melody Sterling, who is a 74 y. o.,male. Patient had just come off the vent and was tired but able to nod to me. He has good support from family. The  provided the following Interventions: 
Continued the relationship of care and support. Listened empathically. Offered prayer and assurance of continued prayer on patients behalf. Chart reviewed. The following outcomes were achieved: 
Patient expressed gratitude for Spiritual Care visit. Patient was reviewed in ICU Interdisciplinary Rounds. Assessment: 
There are no further spiritual or Evangelical issues which require Spiritual Care Services intervention at this time. Plan: 
Chaplains will continue to follow and will provide pastoral care as needed or requested.  recommends bedside caregivers page the  on duty if patient shows signs of acute spiritual or emotional distress. [address] [place] [address]. Board Certified [address] [place] [address] Road [phone] - Office

## 2018-09-05 NOTE — INTERDISCIPLINARY ROUNDS
CRITICAL CARE INTERDISCIPLINARY ROUNDS Patient Information: 
 
Name:   Zakia Pinzon. Age:   74 y.o. Admission Date:   9/1/2018 Critical Care Day: 3 Surgery Date:  NA Attending Provider:   Nichelle Hernandez MD 
 
Surgeon:   EMILY  
 
Consultant(s):   Pulmonology Critical Care Physician:  Walter Jones Code Status: Full Code Problem List:    
Patient Active Problem List  
Diagnosis Code  Hypercapnia R06.89  
 Acute on chronic respiratory failure with hypoxia and hypercapnia ([facility]) J96.21, J96.22  
 CLL (chronic lymphocytic leukemia) ([facility]) C91.90  Emphysema lung ([address] J43.9  Elevated serum creatinine R79.89  
 COPD (chronic obstructive pulmonary disease) ([facility]) J44.9  RUPERTO (obstructive sleep apnea) G47.33  
 Stage 3 chronic kidney disease N18.3  Dementia F03.90  
 HTN (hypertension) I10  
 Altered mental status R41.82  Respiratory distress R06.03  
 Hypoxia R09.02  
 CO2 narcosis R06.89  
 Hypernatremia E87.0  Acute respiratory failure with hypoxia and hypercarbia ([facility]) J96.01, J96.02  
 
 
Principal Problem:  Acute on chronic respiratory failure with hypoxia and hypercapnia ([facility]) During rounds the following quality care indicators and evidence based practices were addressed :  DVT Prophylaxis, PUD Prophylaxis, Nutritional Status and Critical Care Interventions Airways, Drains and Lines Shanks Day3 ([facility] YES) ; Central Line Day:3 Isolation:NA; Antibiotic Stewardship: Levaquin; Probiotics Necessary: NA 
 
Glycemic Control:  
Recent Labs  
   09/04/18 
 0655  09/03/18 
 0330  09/02/18 
 1420 GLU  153*  149*  133*  
; 
Recent Labs  
   09/05/18 
 0516  09/04/18 
 1231  09/04/18 
 0930 PHI  7.461*  7.480*  7.485* PCO2I  48.2*  46.5*  48.7* PO2I  88  86  87 Adjustments None Acute MI/PCI:   Not applicable Door to Balloon: Admission Time: [address] Heart Failure:    Not applicable SCIP Measures for other Surgeries:   Not applicable CHG Bath Daily on All OrthoNA Pneumonia:    Not applicable Interdisciplinary team rounds were held with the following team membersDiabetes Treatment Specialist, Infection Control, Nursing, Nutrition, Pastoral Care, Pharmacy, Physician and Respiratory Therapy. Plan of care discussed. Goals of Care/ Recommendations: Remove muller and central line. Insert PIV. ABG to see if patient can come off of bi-PAP. No vancomycin to be ordered. See clinical pathway and/or care plan for interventions and desired outcomes. Critical Care Discharge Status:  Yellow

## 2018-09-05 NOTE — PROGRESS NOTES
[facility] Pulmonary, Critical Care, and Sleep Medicine Name: Elia Abebe. MRN: [id number] : 1945 Hospital: [facility] [place] Date: 2018 [facility] Note Consult requesting physician: Dr. Alba Lafleur Reason for Consult: acute on chronic respiratory failure IMPRESSION:  
· Acute on chronic hypoxemic and hypercapneic respiratory failure, failed bipap, intubated 18, extubated 18 · Blood cx positive strep viridans: ?bacteremia vs skin contaminant · Metabolic encephalopathy from CO2 narcosis, dementia · COPD with home O2-FEV1 of 1.23 or 55% predicted, normal FEV1%, follows with Dr. Renee Almodovar - on Spiriva and advair at home- Recent 6 min walk in May suggested requires home oxygen 2-4 liter · Acute on chronic diastolic CHF · CKD stage 3 with ARF 
· HTN 
· Severe complex sleep apnea - AHI 34/hr RUPERTO and CSA events; on BIPAP2 non compliant - not able to tolerate but best response noted on BIPAP  in recent hospitalization · Hx of ETOH abuse in past (residing in rehab facility) · Chronic leukocytosis, thrombocytopenia with hx of CLL · Anemia · Dementia - severity not known, but advanced per prior notes [facility] 18 Dr Saul Pierson AHI 34/hr RUPERTO and CSA events; O2 desats BIPAP titration - snoring and apneas could not be eliminated with 24/20 pressures; central apneas with higher pressures; poorly tolerated overall LVEF 66-70%, grade 1 DD on echo 18. RECOMMENDATIONS:  
Respiratory: failed bipap, intubated in ER on . Persistent alkalosis despite of multiple attempt to correct it. Awake and following all commands. No ett secretions. Tolerating SBT and doing better with more Vt to high 300's to mid 400's. Due to old age and rapidly getting deconditioned, will have to take chance and extubate to bipap.  Use the previous setting of bipap 24/14 that he was tolerating. Before extubation, RT suctioned out peas from the ETT. Will have ST eval.  
Alert awake and following simple commands. D/w RT at bedside. Keep SPO2 >=92%. HOB 30 degree elevation all the time. Aggressive pulmonary toileting. Aspiration precautions. CVS: BP stable. C/w lasix. ID:  cxr b/l infiltrates vs chf.  
Sputum cx mod candida, normal maynor. Blood cx 9/2/18 1/2 positive for strep viridans, likely skin contaminant. Repeat blood cx NGTD. Low grade temp on 9/4. WBC increasing could be due to steroids/CLL. D/c zosyn and vanco on 9/4/18 C/w levaquin. Follow repeat blood cx results. Sepsis bundle and protocol followed. Hematology/Oncology: chronic CLL with leucocytosis, thrombocytopenia, anemia. No bleeding at femoral central line despite of restarted lovenox. Renal: monitor renal function on lasix. Stable/improving creat. GI/: tolerating TF Endocrine: Maintain blood glucose 140-180. humalog ssi Neurology: mental status back to his baseline. AMS in ER due to co2 retention with baseline dementia Skin/Wound: no acute issues Electrolytes: Replace electrolytes per ICU electrolyte replacement protocol. IVF: none Nutrition: NPO until seen by ST. Prophylaxis: DVT Prophylaxis with scd, lovenox. GI Prophylaxis with protonix. Restraints: removed. Lines/Tubes: PIV Right femoral cental line placed in ER 9/1/18, d/c 9/5/18 Shanks 9/2/18-9-5/18 Will defer respective systems problem management to primary and other respective consultant and follow patient in ICU with primary and other medical team. 
Further recommendations will be based on the patient's response to recommended treatment and results of the investigation ordered. Quality Care: PPI, DVT prophylaxis, HOB elevated, Infection control all reviewed and addressed. Care of plan d/w RN, RT, MDR. High complexity decision making was performed during the evaluation of this patient at high risk for decompensation with multiple organ involvement. Total critical care time spent rendering care exclusive of procedures: 46 minutes. Subjective/History of Present Illness:  
 
Patient is a 74 y.o. male with hx of COPD, home O2, chronic hypercapneic resp failure, dementia, CLL, diastolic CHF, HTN, CKD, severe complex sleep apnea who has recurrent hospitalization for hypercarbic respiratory failure. 9/5/18: On vent. Off sedation. Following all commands. Doing better on SBT with improved Vt. Extubated on bipap. Doing well after extubation. Before extubation, peas suctioned out by RT from ETT. Low grade temp yesterday 1/2 blood cx from 9/2 positive likely skin contaminant. Right femoral central line- no bleeding No other overnight issue No Known Allergies Past Medical History:  
Diagnosis Date  Chronic kidney disease   
 stage 3 kidney disease  Chronic obstructive pulmonary disease ([address]  CLL (chronic lymphocytic leukemia) ([address]  Delirium  Emphysema lung ([address]  Essential hypertension  Fall  Frequent hospital admissions  Gout  Hyperlipidemia  Hypoxemia  Memory loss  Noncompliance  Oxygen dependent  Pneumonia  Pulmonary nodules  Respiratory failure ([address]  Risk for falls  Septic shock ([address]  Sleep apnea  Tobacco abuse  Vitamin D deficiency Past Surgical History:  
Procedure Laterality Date  HX CATARACT REMOVAL    
 HX HERNIA REPAIR    
 HX NEPHROSTOMY Social History Substance Use Topics  Smoking status: Former Smoker  Smokeless tobacco: Never Used  Alcohol use No  
   Comment: former ETOH user; stopped December 2017 History reviewed. No pertinent family history. Prior to Admission medications Medication Sig Start Date End Date Taking? Authorizing Provider OLANZapine (ZYPREXA) 2.5 mg tablet Take 0.5 Tabs by mouth two (2) times a day. 7/25/18   Belinda Kate MD  
amLODIPine (NORVASC) 10 mg tablet Take 1 Tab by mouth daily. 7/25/18   Belinda Kate MD  
hydrALAZINE (APRESOLINE) 25 mg tablet Take 1 Tab by mouth three (3) times daily. 7/25/18   Belinda Kate MD  
predniSONE (STERAPRED) 5 mg dose pack See administration instruction per 5mg dose pack 7/25/18   Belinda Kate MD  
ALPRAZolam Sony Re) 0.5 mg tablet Take 1 Tab by mouth three (3) times daily as needed for Anxiety. Max Daily Amount: 1.5 mg. Indications: anxiety 7/20/18   Belinda Kate MD  
carvedilol (COREG) 12.5 mg tablet Take 1 Tab by mouth two (2) times daily (with meals). 7/12/18   Uzma Winn MD  
docusate sodium (COLACE) 100 mg capsule Take 1 Cap by mouth two (2) times a day for 90 days. 7/12/18 10/10/18  Uzma Winn MD  
alfuzosin SR (UROXATRAL) 10 mg SR tablet Take 10 mg by mouth daily. Ronni Hernandez MD  
aspirin 81 mg chewable tablet Take 81 mg by mouth daily. Ronni Hernandez MD  
cycloSPORINE (RESTASIS) 0.05 % ophthalmic emulsion Administer 1 Drop to both eyes two (2) times a day. Ronni Hernandez MD  
famotidine (PEPCID) 20 mg tablet Take 20 mg by mouth two (2) times a day. Ronni Hernandez MD  
latanoprost (XALATAN) 0.005 % ophthalmic solution Administer 1 Drop to both eyes nightly. Ronni Hernandez MD  
furosemide (LASIX) 40 mg tablet Take  by mouth daily. Ronni Hernandez MD  
Oxygen     Ronni Hernandez MD  
pravastatin sodium (PRAVASTATIN PO) Take  by mouth. Ronni Hernandez MD  
tiotropium (SPIRIVA WITH HANDIHALER) 18 mcg inhalation capsule Take 1 Cap by inhalation daily. Ronni Hernandez MD  
fluticasone/salmeterol (ADVAIR HFA IN) Take  by inhalation. Ronni Hernandez MD  
ALBUTEROL IN Take  by inhalation. Ronni Hernandez MD  
 
Current Facility-Administered Medications Medication Dose Route Frequency  methylPREDNISolone (PF) (SOLU-MEDROL) injection 40 mg  40 mg IntraVENous Q24H  
 enoxaparin (LOVENOX) injection 40 mg  40 mg SubCUTAneous Q24H  
 insulin lispro (HUMALOG) injection   SubCUTAneous Q6H  
  levoFLOXacin (LEVAQUIN) 500 mg in D5W IVPB  500 mg IntraVENous Q24H  
 albuterol-ipratropium (DUO-NEB) 2.5 MG-0.5 MG/3 ML  3 mL Nebulization Q4H RT  
 amLODIPine (NORVASC) tablet 10 mg  10 mg Oral DAILY  aspirin chewable tablet 81 mg  81 mg Oral DAILY  carvedilol (COREG) tablet 12.5 mg  12.5 mg Oral BID WITH MEALS  latanoprost (XALATAN) 0.005 % ophthalmic solution 1 Drop  1 Drop Both Eyes QHS  furosemide (LASIX) injection 20 mg  20 mg IntraVENous BID  chlorhexidine (PERIDEX) 0.12 % mouthwash 10 mL  10 mL Oral Q12H  pantoprazole (PROTONIX) 40 mg in sodium chloride 0.9% 10 mL injection  40 mg IntraVENous Q24H  propofol (DIPRIVAN) infusion  0-50 mcg/kg/min IntraVENous TITRATE Objective:  
Vital Signs:   
Visit Vitals  /74  Pulse 79  Temp 98.5 °F (36.9 °C)  Resp 30  
 Ht 5' 6\" (1.676 m)  Wt 93.3 kg (205 lb 11 oz)  SpO2 100%  BMI 33.2 kg/m2 O2 Device: BIPAP  
O2 Flow Rate (L/min): 3 l/min Temp (24hrs), Av.8 °F (37.1 °C), Min:98.4 °F (36.9 °C), Max:100.5 °F (38.1 °C) Intake/Output:  
Last shift:        
 
Last 3 shifts:  1901 -  0700 In: 860.4 [I.V.:422.4] Out: Alondra Vega [[id number]] Intake/Output Summary (Last 24 hours) at 18 1007 Last data filed at 18 0800 Gross per 24 hour Intake           569.13 ml Output             3275 ml Net         -2705.87 ml Last 3 Recorded Weights in this Encounter 18 1826 18 1427 18 0327 Weight: 91.2 kg (201 lb) 91.2 kg (201 lb) 93.3 kg (205 lb 11 oz) Ventilator Settings: 
Mode Rate Tidal Volume Pressure FiO2 PEEP  
CPAP, Pressure support   340 ml  5 cm H2O 24 % (decreased Sats 100%) 5 cm H20 Peak airway pressure: 15 cm H2O Plateau pressure:    
Tidal volume:   
Minute ventilation: 15.2 l/min SPO2 98 Physical Exam:  
 
General/Neurology: obese. intubated, awake and following simple commands. After extubation on bipap. NAD. Head:   Normocephalic, without obvious abnormality, atraumatic. Eye:    no scleral icterus, no pallor, no cyanosis. Neck:   Supple, symmetric. No lymphadenopathy. Trachea midline Lung: Moderate air entry bilateral equal. No rales. No rhonchi. No wheezing. No stridors. No prolongded expiration. No accessory muscle use. Heart:   Regular rate & rhythm. S1 S2 present. No murmur. No JVD. Abdomen:  Soft. NT. ND. +BS. Obese. Extremities:  No pedal edema. No cyanosis. No clubbing. Pulses: 2+ and symmetric in DP. Capillary refill: normal 
Lymphatic:  No cervical or supraclavicular palpable lymphadenopathy. Skin:   Color, texture, turgor normal. No rashes or lesions. Data:  
   
Recent Results (from the past 24 hour(s)) GLUCOSE, POC Collection Time: 09/04/18 12:04 PM  
Result Value Ref Range Glucose (POC) 140 (H) 70 - 110 mg/dL POC G3 Collection Time: 09/04/18 12:31 PM  
Result Value Ref Range Device: VENT    
 FIO2 (POC) 40 % pH (POC) 7.480 (H) 7.35 - 7.45    
 pCO2 (POC) 46.5 (H) 35.0 - 45.0 MMHG  
 pO2 (POC) 86 80 - 100 MMHG  
 HCO3 (POC) 34.7 (H) 22 - 26 MMOL/L  
 sO2 (POC) 97 92 - 97 % Base excess (POC) 11 mmol/L Mode CPAP/SPON    
 PEEP/CPAP (POC) 5.0 cmH2O Pressure support 5 cmH2O Allens test (POC) YES Total resp. rate 22 Site LEFT RADIAL Patient temp. 98.6 Specimen type (POC) ARTERIAL Performed by Ashia Adan POTASSIUM Collection Time: 09/04/18 12:53 PM  
Result Value Ref Range Potassium 3.7 3.5 - 5.5 mmol/L  
GLUCOSE, POC Collection Time: 09/04/18  5:57 PM  
Result Value Ref Range Glucose (POC) 147 (H) 70 - 110 mg/dL POTASSIUM Collection Time: 09/04/18 10:30 PM  
Result Value Ref Range Potassium 3.6 3.5 - 5.5 mmol/L  
GLUCOSE, POC Collection Time: 09/04/18 11:17 PM  
Result Value Ref Range Glucose (POC) 116 (H) 70 - 110 mg/dL PHOSPHORUS Collection Time: 09/05/18  4:00 AM  
Result Value Ref Range Phosphorus 4.8 2.5 - 4.9 MG/DL MAGNESIUM Collection Time: 09/05/18  4:00 AM  
Result Value Ref Range Magnesium 2.1 1.6 - 2.6 mg/dL CALCIUM, IONIZED Collection Time: 09/05/18  4:00 AM  
Result Value Ref Range Ionized Calcium 1.16 1.12 - 1.32 MMOL/L  
POTASSIUM Collection Time: 09/05/18  4:00 AM  
Result Value Ref Range Potassium 3.5 3.5 - 5.5 mmol/L  
GLUCOSE, POC Collection Time: 09/05/18  5:15 AM  
Result Value Ref Range Glucose (POC) 117 (H) 70 - 110 mg/dL POC G3 Collection Time: 09/05/18  5:16 AM  
Result Value Ref Range Device: VENT    
 FIO2 (POC) 40 % pH (POC) 7.461 (H) 7.35 - 7.45    
 pCO2 (POC) 48.2 (H) 35.0 - 45.0 MMHG  
 pO2 (POC) 88 80 - 100 MMHG  
 HCO3 (POC) 34.4 (H) 22 - 26 MMOL/L  
 sO2 (POC) 97 92 - 97 % Base excess (POC) 11 mmol/L Mode ASSIST CONTROL Tidal volume 340 ml Set Rate 10 bpm  
 PEEP/CPAP (POC) 5 cmH2O Allens test (POC) YES Total resp. rate 17 Site LEFT RADIAL Patient temp. 98.5 Specimen type (POC) ARTERIAL Performed by Paula Aquino Volume control YES    
GLUCOSE, POC Collection Time: 09/05/18  5:48 AM  
Result Value Ref Range Glucose (POC) 106 70 - 110 mg/dL Chemistry Recent Labs  
   09/05/18 
 0400  09/04/18 
 2230  09/04/18 
 1253  09/04/18 
 0655   09/03/18 
 0330  09/02/18 
 1420 GLU   --    --    --   153*   --   149*  133* NA   --    --    --   142   --   144  144  
K  3.5  3.6  3.7  3.7   < >  3.4*  3.6 CL   --    --    --   104   --   103  103 CO2   --    --    --   32   --   35*  39* BUN   --    --    --   29*   --   27*  24* CREA   --    --    --   1.62*   --   1.78*  1.58* CA   --    --    --   9.1   --   8.9  8.6 MG  2.1   --    --   2.1   --   2.0   --   
PHOS  4.8   --    --   4.4   --   2.8   --   
AGAP   --    --    --   6   --   6  2*  
BUCR   --    --    --   18   --   15  15  
 < > = values in this interval not displayed. Lactic Acid Lactic acid Date Value Ref Range Status 09/01/2018 0.7 0.4 - 2.0 MMOL/L Final  
 
No results for input(s): LAC in the last 72 hours. Liver Enzymes Protein, total  
Date Value Ref Range Status 09/01/2018 7.1 6.4 - 8.2 g/dL Final  
 
Albumin Date Value Ref Range Status 09/01/2018 3.1 (L) 3.4 - 5.0 g/dL Final  
 
Globulin Date Value Ref Range Status 09/01/2018 4.0 2.0 - 4.0 g/dL Final  
 
A-G Ratio Date Value Ref Range Status 09/01/2018 0.8 0.8 - 1.7   Final  
 
AST (SGOT) Date Value Ref Range Status 09/01/2018 19 15 - 37 U/L Final  
 
Alk. phosphatase Date Value Ref Range Status 09/01/2018 95 45 - 117 U/L Final  
 
No results for input(s): TP, ALB, GLOB, AGRAT, SGOT, GPT, AP, TBIL in the last 72 hours. No lab exists for component: DBIL  
 
CBC w/Diff Recent Labs  
   09/04/18 
 0655  09/03/18 
 0330  09/02/18 
 1420 WBC  15.6*  13.4*  13.1 RBC  4.05*  3.81*  3.72* HGB  9.2*  8.7*  8.6* HCT  30.6*  29.7*  30.0*  
PLT  166  184  168 GRANS  65  67  67 LYMPH  26  27  26 EOS  0  0  0 Cardiac Enzymes No results found for: CPK, CK, CKMMB, CKMB, RCK3, CKMBT, CKNDX, CKND1, LOREN, TROPT, TROIQ, APRIL, TROPT, TNIPOC, BNP, BNPP  
 
BNP No results found for: BNP, BNPP, XBNPT Coagulation Recent Labs  
   09/02/18 
 1420 PTP  14.0 INR  1.1 Thyroid  No results found for: T4, T3U, TSH, TSHEXT, TSHEXT No results found for: T4  
 
Urinalysis Lab Results Component Value Date/Time  Color YELLOW 07/16/2018 06:00 PM  
 Appearance CLEAR 07/16/2018 06:00 PM  
 Specific gravity 1.024 07/16/2018 06:00 PM  
 pH (UA) 5.0 07/16/2018 06:00 PM  
 Protein 300 (A) 07/16/2018 06:00 PM  
 Glucose NEGATIVE  07/16/2018 06:00 PM  
 Ketone NEGATIVE  07/16/2018 06:00 PM  
 Bilirubin NEGATIVE  07/16/2018 06:00 PM  
 Urobilinogen 0.2 07/16/2018 06:00 PM  
 Nitrites NEGATIVE  07/16/2018 06:00 PM  
 Leukocyte Esterase NEGATIVE  07/16/2018 06:00 PM  
 Epithelial cells FEW 07/16/2018 06:00 PM  
 Bacteria 1+ (A) 07/16/2018 06:00 PM  
 WBC 0 to 3 07/16/2018 06:00 PM  
 RBC 0 to 3 07/16/2018 06:00 PM  
  
 
Micro  Recent Labs  
   09/03/18 
 0625  09/03/18 
 0152 CULT  NO GROWTH 2 DAYS  NO GROWTH 2 DAYS Recent Labs  
   09/03/18 
 2286  09/03/18 
 5799 CULT  NO GROWTH 2 DAYS  NO GROWTH 2 DAYS  
  
 
ABG Recent Labs  
   09/05/18 
 0516  09/04/18 
 1231  09/04/18 
 0930 PHI  7.461*  7.480*  7.485* PCO2I  48.2*  46.5*  48.7* PO2I  88  86  87 HCO3I  34.4*  34.7*  36.7*  
FIO2I  40  40  40 PFT Ultrasound LE Doppler ECHO 9/2/18 · Technically difficult study due to patient's body habitus and procedure performed with the patient in a supine position. Definity contrast was given to enhance imaging. · Estimated left ventricular ejection fraction is 66 - 70%. Biplane method used to measure ejection fraction. Left ventricular moderate concentric hypertrophy. Normal left ventricular wall motion, no regional wall motion abnormality noted. Mild (grade 1) left ventricular diastolic dysfunction. · There is a moderate left pleural effusion. CT (Most Recent) (CT chest reviewed by me) Results from Hospital Encounter encounter on 07/16/18 CT HEAD WO CONT Narrative EXAM:  CT of the brain without intravenous contrast. 
 
COMPARISON: CT July 3, 2018. REASON FOR EXAM: \"Decreased alertness; facial droop and ams\". DOSE REDUCTION:  One or more dose reduction techniques were used on this CT: 
automated exposure control, adjustment of the mAs and/or kVp according to 
patient's size, and iterative reconstruction techniques. The specific techniques 
utilized on this CT exam have been documented in the patient's electronic 
medical record. 
 
_______________ FINDINGS:  
 
    IMAGE QUALITY:  The images are mildly degraded by motion artifact. BRAIN AND EXTRA-AXIAL SPACE:   
 
         SUBACUTE TERRITORIAL TRANSCORTICAL INFARCTION:  None detected. MASS:  None detected. HEMORRHAGE:  None. SUBDURAL FLUID COLLECTION:  None detected. HYDROCEPHALUS:  None. REMOTE CORTICAL INFARCTION:  None detected. REMOTE CEREBELLAR INFARCTION:  None detected. MISCELLANEOUS:  Non-applicable. STRIVE (STandards for Reporting Vascular changes on nEuroimaging): 
                   --[place] (age-indeterminate) or perivascular spaces of 
\"presumed vascular origin\":  None definite. --[place] of white matter hypodensity of \"presumed vascular 
origin\":  Low grade. --Degree of brain atrophy (subjective): Low grade. BURDEN OF CALCIFIC INTRACRANIAL ATHEROSCLEROSIS:  None significant. HEENT: 
 
         INCLUDED UPPER ORBITS:  The lenses are replaced bilaterally. INCLUDED UPPER PARANASAL SINUSES:  Predominantly clear. MASTOID AIR CELLS:  Predominantly clear. BONES:  Unremarkable. SCALP:  Unremarkable. 
 
_______________ Impression IMPRESSION: 
 
Low-grade generalized chronic changes, however, no acute findings. _______________ Note: Dr. Gurpreet Penn discussed the stroke code results with Dr. Mesfin Farley at [phone] on 
7/16/2018. XR (Most Recent). CXR  reviewed by me and compared with previous CXR Results from Hospital Encounter encounter on 09/01/18 XR CHEST PORT Narrative EXAM: Chest portable INDICATION: Respiratory failure. Intubated COMPARISON: 9/4/2018. Exams dating back to 7/20/2018. 
 
_______________ FINDINGS: 
 
AP portable chest film was performed. Endotracheal tube and NG tube continue be 
present in good position. Bilateral lower lung field opacities continue to be present and are probably not 
significantly changed. These is been stable over past several exams but are new 
when compared to 9/1/2018.  Cannot rule out mild stable blunting of the 
 costophrenic angles. Heart is at the upper limits of normal. Pulmonary 
vascularity is normal. No evidence of pneumothorax. 
 
_______________ Impression IMPRESSION: 
 
 
1. Support lines and tubes in good position. 2. Stable chest with bilateral lower lobe areas of atelectasis and/or infiltrate 
with possible small effusions. Dipti Tomas MD 
9/5/2018

## 2018-09-05 NOTE — PROGRESS NOTES
Cal[place]arris by RN for gram + cocci in clusters in blood cx. Dr. Amanda Stewart seeing. Plan: Rx - vanco. 
Repeat bcx.

## 2018-09-05 NOTE — PROGRESS NOTES
Speech Therapy Note: 
 
0983: SLP orders received. Patient currently on BiPAP. Per RN, Cathi Smith, pt anticipated to remain on BiPAP for rest of today. SLP will f/u later this day or next day as medically indicated.  
 
1515: Re-attempted swallow evaluation this PM. Patient remains on BIPAP. SLP will re-attempt next day. Thank you for this referral.  
 
Leslie Pugh M.S., CF-SLP Speech Language Pathologist

## 2018-09-05 NOTE — PROGRESS NOTES
1925 - Bedside and Verbal shift change report given to Elvin Blanc RN (oncoming nurse) by Clevester Jeans RN (offgoing nurse). Report included the following information SBAR, Kardex, Procedure Summary, MAR, Accordion, Recent Results, Med Rec Status and Cardiac Rhythm NSR. Propofol at 20mcg/kg/min  
2000 - Pt assessed and vitals obtained. Pt in no acute distress, denies general pain, follows commands but is calm and sedated on vent. Lung sounds coarse, thick yellow secretions suctioned out of ET tube. Tube feeds infusing thorugh OG tube with minimal residuals, pt denies nausea, bowel sounds are active, pt passing gas. Muller patent draining clear yellow urine. TR Femoral line is draining/oozing blood around insertion site, dressing is wet and clot is present. Will continue to monitor, see EMR for full details. 2230 - Pt bathed, turned, medicated, repositioned, muller care completed, and central line dressing changed. Large clots around Biopatch and suture wing removed to visualize site. Area cleaned, biopactch and 4x4s from dressing kit applied to add pressure. 0000 - Pt reassessed and vitals obtained. Pt much unchanged since previous assessment. Central line dressing seems to be holding well, no drainage at this time. 0400 - Pt assessed and vitals obtained. Pt continues to rest comfortably at this time. Dressing on central line continues to be clean dry intact at this time. Tube feeding set changed at this time. Will continue to monitor Rahel Escobar - Dr Ronaldo Branch at the bedside to see pt for the day - no new orders received at this time. 0700 - Reduced propofol in preparation for SBT. 0701 - Critical result of positive blood cultures in aerobic bottle growing gram positive cocci in clusters. Paged Dr Monalisa English.  
7942 - Dr Monalisa English called, repeated back critical result. MD made aware of antibiotics pt is on. MD states he will review chart and take care of critical. Orders received. 0720 - Bedside and Verbal shift change report given to Tadeo Ortega RN (oncoming nurse) by Ismael Alcala RN (offgoing nurse). Report included the following information SBAR, Kardex, Intake/Output, MAR, Recent Results, Med Rec Status and Cardiac Rhythm NSR. Propofol at 20mcg/kg/Min

## 2018-09-05 NOTE — PROGRESS NOTES
Hospitalist Progress Note Patient: Jung Reynolds MRN: [id number]  CSN: [id number] YOB: 1945  Age: 74 y.o. Sex: male DOA: 9/1/2018 LOS:  LOS: 3 days Assessment/Plan Patient Active Problem List  
Diagnosis Code  Hypercapnia R06.89  
 Acute on chronic respiratory failure with hypoxia and hypercapnia ([facility]) J96.21, J96.22  
 CLL (chronic lymphocytic leukemia) ([facility]) C91.90  Emphysema lung ([address] J43.9  Elevated serum creatinine R79.89  
 COPD (chronic obstructive pulmonary disease) ([facility]) J44.9  RUPERTO (obstructive sleep apnea) G47.33  
 Stage 3 chronic kidney disease N18.3  Dementia F03.90  
 HTN (hypertension) I10  
 Altered mental status R41.82  Respiratory distress R06.03  
 Hypoxia R09.02  
 CO2 narcosis R06.89  
 Hypernatremia E87.0  Acute respiratory failure with hypoxia and hypercarbia ([facility]) J96.01, J96.02  
  
 
75 yo male with history of chronic respiratory failure, COPD, RUPERTO, CKD admitted for SOB. Patient intubated and sedated. CRITICAL CARE PLAN Resp - See vent orders, VAP bundle. HOB>30 degrees. Bronchodilators. Daily CXR. Acute on chronic respiratory failure - secondary to COPD, diastolic CHF, RUPERTO. Continue on steroids, inhaled BD. Complex RUPERTO - non compliant with BiPAP. SBT and extubation per pulmonary. ID - CXR with infiltrate vs edema On levaquin Bacteremia - blood cultures 1/2 GPC in chains and clusters. Started on vancomycin 
resp cultures with candida Follow repeat blood cultures. CVS - Monitor HD. Acute on chronic diastolic CHF - continue with lasix. Heme/onc - Follow H&H, plts. CLL Renal - Trend BUN, Cr, follow I/O, muller in place. Check and replace Mg, K, phos. CKD - stage 3, Endocrine -  Follow FSG, on SSI Neuro/ Pain/ Sedation -  Sedation bundle. GI - NPO for now. OG tube, tube feeding. Prophylaxis - DVT: SCD, GI: protonix 45 minutes of critical care time spent in the direct evaluation and treatment of this high risk patient. The reason for providing this level of medical care for this critically ill patient was due a critical illness that impaired one or more vital organ systems such that there was a high probability of imminent or life threatening deterioration in the patients condition. This care involved high complexity decision making to assess, manipulate, and support vital system functions, to treat this degreee vital organ system failure and to prevent further life threatening deterioration of the patients condition. Disposition : TBD Physical Exam: 
General: As above   
HEENT: NC, Atraumatic. Pupils pinpoint, anicteric sclerae. Lungs: CTA Bilaterally. No Wheezing/Rhonchi/Rales. Heart:  Regular  rhythm,  No murmur, No Rubs, No Gallops Abdomen: Soft, Non distended, Non tender.  +Bowel sounds, Extremities: No c/c/e Vital signs/Intake and Output: 
Visit Vitals  /66  Pulse 83  Temp 98.5 °F (36.9 °C)  Resp 22  
 Ht 5' 6\" (1.676 m)  Wt 93.3 kg (205 lb 11 oz)  SpO2 100%  BMI 33.2 kg/m2 Current Shift:    
Last three shifts:  09/03 1901 - 09/05 0700 In: 860.4 [I.V.:422.4] Out: Michael Mcgee [[id number]] Labs: Results:  
   
Chemistry Recent Labs  
   09/05/18 
 0400  09/04/18 
 2230  09/04/18 
 1253  09/04/18 
 0655   09/03/18 
 0330  09/02/18 
 1420 GLU   --    --    --   153*   --   149*  133* NA   --    --    --   142   --   144  144  
K  3.5  3.6  3.7  3.7   < >  3.4*  3.6 CL   --    --    --   104   --   103  103 CO2   --    --    --   32   --   35*  39* BUN   --    --    --   29*   --   27*  24* CREA   --    --    --   1.62*   --   1.78*  1.58* CA   --    --    --   9.1   --   8.9  8.6 AGAP   --    --    --   6   --   6  2*  
BUCR   --    --    --   18   --   15  15  
 < > = values in this interval not displayed. CBC w/Diff Recent Labs 09/04/18 
 0655  09/03/18 
 0330  09/02/18 
 1420 WBC  15.6*  13.4*  13.1 RBC  4.05*  3.81*  3.72* HGB  9.2*  8.7*  8.6* HCT  30.6*  29.7*  30.0*  
PLT  166  184  168 GRANS  65  67  67 LYMPH  26  27  26 EOS  0  0  0 Cardiac Enzymes No results for input(s): CPK, CKND1, LOREN in the last 72 hours. No lab exists for component: Dionne Ventura Coagulation Recent Labs  
   09/02/18 
 1420 PTP  14.0 INR  1.1 Lipid Panel No results found for: CHOL, CHOLPOCT, CHOLX, CHLST, CHOLV, 826220, HDL, LDL, LDLC, DLDLP, 557617, VLDLC, VLDL, TGLX, TRIGL, TRIGP, TGLPOCT, CHHD, CHHDX  
BNP No results for input(s): BNPP in the last 72 hours. Liver Enzymes No results for input(s): TP, ALB, TBIL, AP, SGOT, GPT in the last 72 hours. No lab exists for component: DBIL Thyroid Studies No results found for: T4, T3U, TSH, TSHEXT, TSHEXT Procedures/imaging: see electronic medical records for all procedures/Xrays and details which were not copied into this note but were reviewed prior to creation of Plan

## 2018-09-05 NOTE — PROGRESS NOTES
NUTRITION FOLLOW-UP 
 
RECOMMENDATIONS/PLAN:  
- Continue w/ POC Monitor labs/lytes, tube feed tolerance, skin integrity, wt, fluid status, BM 
 
NUTRITION ASSESSMENT:  
Client Update: 74 yrs old Male with SOB-intubated in ICU, gram + cocci in blood. FOOD/NUTRITION INTAKE Diet Order:  Nepro@ 20ml/hr to provide 800kcal 36g PRO 322ml H20 74g CHO 43g Fat Food Allergies: NKFA Average PO Intake:     
Patient Vitals for the past 100 hrs: 
 % Diet Eaten 09/02/18 1600 0 % 09/02/18 1200 0 % 09/02/18 0800 0 % Pertinent Medications:  [x] Reviewed; lasix, methylprednisolone, protonix, KCl, propofol, Electrolyte Replacement Protocol: [x]K [x]Mg [x]PO4 Insulin:  [x]SSI  []Pre-meal   []Basal    []Drip  []None Cultural/Sabianist Food Preferences: None Identified BIOCHEMICAL DATA & MEDICAL TESTS Pertinent Labs:  [x] Reviewed; ANTHROPOMETRICS Height: 5' 6\" (167.6 cm)       Weight: 93.3 kg (205 lb 11 oz) BMI: 33.2 kg/m^2 obese (30%-39.9% BMI) Adm Weight: 201 lbs                Weight change: +4lbs Adjusted Body Weight:71.3kg NUTRITION-FOCUSED PHYSICAL ASSESSMENT Skin: No PU    
GI: +BM 9/2/18 NUTRITION PRESCRIPTION Calories: 1001-1274kcal/day based on 11-14 Protein: 73-91 g/day based on 0.8-1.0 g/kg CHO: 125-159 g/day based on 50% of total energy Fluid: 4526-1473 ml/day based on 1 kcal/ml NUTRITION DIAGNOSES:  
1. Inadequate oral intake related to intubation as evidence by need for nutrition support 
  
NUTRITION INTERVENTIONS:  
INTERVENTIONS:                                                                                                                                                                         GOALS: 
1. EN: Continue w/ tube feeds 
  1. Continue to meet needs w/ tube feeds reccs  by next review 3 days LEARNING NEEDS (Diet, Supplementation, Food/Nutrient-Drug Interaction): 
 [x] None Identified   [] Education provided/documented      Identified and patient: [] Declined   [] Was not appropriate/indicated NUTRITION MONITORING /EVALUATION:  
Adjust EN/PN as appropriate Monitor wt Monitor renal labs, electrolytes, fluid status Previous Recommendations Implemented: yes Previous Goals Met:  yes -tube feeds at goal on hold for SBT at the moment  
   
[] Participated in Interdisciplinary Rounds   
[x] [address] [place] [address] Reviewed DISCHARGE NUTRITION RECOMMENDATIONS ADDRESSED:  
  
  [x] To be determined closer to discharge NUTRITION RISK:           [x] At risk                        [] Not currently at risk Will follow-up per policy. Ed Hirsch 9

## 2018-09-05 NOTE — PROGRESS NOTES
Prior to extubation pt suctioned and green pea suctioned out of ET tube. RN and Dr. Ravinder Brown made aware. Pt extubated to Bipap 24/12 28% rate 8.

## 2018-09-05 NOTE — PROGRESS NOTES
Pt taken off Bipap and placed on 1 l/m nc for a break-HR 87 RR 25 and sats 97%. No distress noted RN aware.

## 2018-09-06 LAB
ANION GAP SERPL CALC-SCNC: 8 MMOL/L (ref 3–18)
BASOPHILS # BLD: 0 K/UL (ref 0–0.1)
BASOPHILS NFR BLD: 0 % (ref 0–2)
BUN SERPL-MCNC: 45 MG/DL (ref 7–18)
BUN/CREAT SERPL: 27 (ref 12–20)
CA-I SERPL-SCNC: 1.19 MMOL/L (ref 1.12–1.32)
CALCIUM SERPL-MCNC: 9.2 MG/DL (ref 8.5–10.1)
CHLORIDE SERPL-SCNC: 107 MMOL/L (ref 100–108)
CO2 SERPL-SCNC: 31 MMOL/L (ref 21–32)
CREAT SERPL-MCNC: 1.67 MG/DL (ref 0.6–1.3)
DIFFERENTIAL METHOD BLD: ABNORMAL
EOSINOPHIL # BLD: 0 K/UL (ref 0–0.4)
EOSINOPHIL NFR BLD: 0 % (ref 0–5)
ERYTHROCYTE [DISTWIDTH] IN BLOOD BY AUTOMATED COUNT: 17.8 % (ref 11.6–14.5)
GLUCOSE BLD STRIP.AUTO-MCNC: 102 MG/DL (ref 70–110)
GLUCOSE BLD STRIP.AUTO-MCNC: 133 MG/DL (ref 70–110)
GLUCOSE BLD STRIP.AUTO-MCNC: 156 MG/DL (ref 70–110)
GLUCOSE SERPL-MCNC: 92 MG/DL (ref 74–99)
HCT VFR BLD AUTO: 34.8 % (ref 36–48)
HGB BLD-MCNC: 10.8 G/DL (ref 13–16)
LYMPHOCYTES # BLD: 7.3 K/UL (ref 0.9–3.6)
LYMPHOCYTES NFR BLD: 47 % (ref 21–52)
MAGNESIUM SERPL-MCNC: 2.2 MG/DL (ref 1.6–2.6)
MCH RBC QN AUTO: 23.4 PG (ref 24–34)
MCHC RBC AUTO-ENTMCNC: 31 G/DL (ref 31–37)
MCV RBC AUTO: 75.5 FL (ref 74–97)
MONOCYTES # BLD: 1.7 K/UL (ref 0.05–1.2)
MONOCYTES NFR BLD: 11 % (ref 3–10)
NEUTS SEG # BLD: 6.6 K/UL (ref 1.8–8)
NEUTS SEG NFR BLD: 42 % (ref 40–73)
PHOSPHATE SERPL-MCNC: 4.4 MG/DL (ref 2.5–4.9)
PLATELET # BLD AUTO: 165 K/UL (ref 135–420)
PMV BLD AUTO: 10.2 FL (ref 9.2–11.8)
POTASSIUM SERPL-SCNC: 3.6 MMOL/L (ref 3.5–5.5)
POTASSIUM SERPL-SCNC: 4.4 MMOL/L (ref 3.5–5.5)
RBC # BLD AUTO: 4.61 M/UL (ref 4.7–5.5)
RBC MORPH BLD: ABNORMAL
SODIUM SERPL-SCNC: 146 MMOL/L (ref 136–145)
WBC # BLD AUTO: 15.6 K/UL (ref 4.6–13.2)

## 2018-09-06 PROCEDURE — 84132 ASSAY OF SERUM POTASSIUM: CPT | Performed by: INTERNAL MEDICINE

## 2018-09-06 PROCEDURE — 92610 EVALUATE SWALLOWING FUNCTION: CPT

## 2018-09-06 PROCEDURE — 97167 OT EVAL HIGH COMPLEX 60 MIN: CPT

## 2018-09-06 PROCEDURE — 97535 SELF CARE MNGMENT TRAINING: CPT

## 2018-09-06 PROCEDURE — 97162 PT EVAL MOD COMPLEX 30 MIN: CPT

## 2018-09-06 PROCEDURE — 36415 COLL VENOUS BLD VENIPUNCTURE: CPT | Performed by: INTERNAL MEDICINE

## 2018-09-06 PROCEDURE — 74011250636 HC RX REV CODE- 250/636: Performed by: HOSPITALIST

## 2018-09-06 PROCEDURE — 82330 ASSAY OF CALCIUM: CPT | Performed by: INTERNAL MEDICINE

## 2018-09-06 PROCEDURE — 83735 ASSAY OF MAGNESIUM: CPT | Performed by: INTERNAL MEDICINE

## 2018-09-06 PROCEDURE — 85025 COMPLETE CBC W/AUTO DIFF WBC: CPT | Performed by: INTERNAL MEDICINE

## 2018-09-06 PROCEDURE — 82962 GLUCOSE BLOOD TEST: CPT

## 2018-09-06 PROCEDURE — 94640 AIRWAY INHALATION TREATMENT: CPT

## 2018-09-06 PROCEDURE — 74011250636 HC RX REV CODE- 250/636: Performed by: INTERNAL MEDICINE

## 2018-09-06 PROCEDURE — 94660 CPAP INITIATION&MGMT: CPT

## 2018-09-06 PROCEDURE — 74011000250 HC RX REV CODE- 250: Performed by: HOSPITALIST

## 2018-09-06 PROCEDURE — 74011250637 HC RX REV CODE- 250/637: Performed by: HOSPITALIST

## 2018-09-06 PROCEDURE — 97116 GAIT TRAINING THERAPY: CPT

## 2018-09-06 PROCEDURE — 65610000006 HC RM INTENSIVE CARE

## 2018-09-06 PROCEDURE — 77030013140 HC MSK NEB VYRM -A

## 2018-09-06 PROCEDURE — 74011636637 HC RX REV CODE- 636/637: Performed by: HOSPITALIST

## 2018-09-06 PROCEDURE — 92526 ORAL FUNCTION THERAPY: CPT

## 2018-09-06 PROCEDURE — 84100 ASSAY OF PHOSPHORUS: CPT | Performed by: INTERNAL MEDICINE

## 2018-09-06 PROCEDURE — 77010033678 HC OXYGEN DAILY

## 2018-09-06 RX ORDER — POTASSIUM CHLORIDE 7.45 MG/ML
10 INJECTION INTRAVENOUS
Status: COMPLETED | OUTPATIENT
Start: 2018-09-06 | End: 2018-09-06

## 2018-09-06 RX ORDER — IPRATROPIUM BROMIDE AND ALBUTEROL SULFATE 2.5; .5 MG/3ML; MG/3ML
3 SOLUTION RESPIRATORY (INHALATION)
Status: DISCONTINUED | OUTPATIENT
Start: 2018-09-06 | End: 2018-09-11 | Stop reason: HOSPADM

## 2018-09-06 RX ORDER — FAMOTIDINE 20 MG/1
20 TABLET, FILM COATED ORAL 2 TIMES DAILY
Status: DISCONTINUED | OUTPATIENT
Start: 2018-09-06 | End: 2018-09-11 | Stop reason: HOSPADM

## 2018-09-06 RX ADMIN — FAMOTIDINE 20 MG: 20 TABLET ORAL at 21:04

## 2018-09-06 RX ADMIN — IPRATROPIUM BROMIDE AND ALBUTEROL SULFATE 3 ML: .5; 3 SOLUTION RESPIRATORY (INHALATION) at 04:58

## 2018-09-06 RX ADMIN — IPRATROPIUM BROMIDE AND ALBUTEROL SULFATE 3 ML: .5; 3 SOLUTION RESPIRATORY (INHALATION) at 07:02

## 2018-09-06 RX ADMIN — FAMOTIDINE 20 MG: 20 TABLET ORAL at 08:43

## 2018-09-06 RX ADMIN — POTASSIUM CHLORIDE 10 MEQ: 10 INJECTION, SOLUTION INTRAVENOUS at 12:52

## 2018-09-06 RX ADMIN — CARVEDILOL 12.5 MG: 12.5 TABLET, FILM COATED ORAL at 08:43

## 2018-09-06 RX ADMIN — IPRATROPIUM BROMIDE AND ALBUTEROL SULFATE 3 ML: .5; 3 SOLUTION RESPIRATORY (INHALATION) at 00:49

## 2018-09-06 RX ADMIN — POTASSIUM CHLORIDE 10 MEQ: 10 INJECTION, SOLUTION INTRAVENOUS at 11:37

## 2018-09-06 RX ADMIN — INSULIN LISPRO 2 UNITS: 100 INJECTION, SOLUTION INTRAVENOUS; SUBCUTANEOUS at 11:54

## 2018-09-06 RX ADMIN — METHYLPREDNISOLONE SODIUM SUCCINATE 30 MG: 40 INJECTION, POWDER, FOR SOLUTION INTRAMUSCULAR; INTRAVENOUS at 08:43

## 2018-09-06 RX ADMIN — CARVEDILOL 12.5 MG: 12.5 TABLET, FILM COATED ORAL at 17:32

## 2018-09-06 RX ADMIN — ENOXAPARIN SODIUM 40 MG: 40 INJECTION, SOLUTION INTRAVENOUS; SUBCUTANEOUS at 11:37

## 2018-09-06 RX ADMIN — FUROSEMIDE 20 MG: 10 INJECTION, SOLUTION INTRAMUSCULAR; INTRAVENOUS at 08:42

## 2018-09-06 RX ADMIN — AMLODIPINE BESYLATE 10 MG: 5 TABLET ORAL at 08:43

## 2018-09-06 RX ADMIN — ASPIRIN 81 MG: 81 TABLET, CHEWABLE ORAL at 08:43

## 2018-09-06 RX ADMIN — LEVOFLOXACIN 500 MG: 5 INJECTION, SOLUTION INTRAVENOUS at 03:29

## 2018-09-06 RX ADMIN — LATANOPROST 1 DROP: 50 SOLUTION OPHTHALMIC at 21:04

## 2018-09-06 NOTE — PROGRESS NOTES
After having difficulty with excess leaking around the mask with the current parameters the mode was changed to AVAPS with a target VT of 500ml and a minimum rate of 14. A pressure range of 25 over 12 also set. PIP ranged from 21 to 25 during the night with target VT.

## 2018-09-06 NOTE — PROGRESS NOTES
Problem: Dysphagia (Adult) Goal: *Acute Goals and Plan of Care (Insert Text) Recommendations: 
Diet: Mechanical soft, thin liquid Meds: Per patient preference Aspiration Precautions Oral Care TID Other: Single sips/bites, alternate solid/liquid Goals:  Patient will: 1. Tolerate PO trials with 0 s/s overt distress in 4/5 trials 2. Utilize compensatory swallow strategies/maneuvers (decrease bite/sip, size/rate, alt. liq/sol) with min cues in 4/5 trials. Outcome: Progressing Towards Goal 
Speech-LAnguage Pathology bedside swallow evaluation and therapy Patient: Mona Rangel. (67 y.o. male) Date: 9/6/2018 Primary Diagnosis: Acute respiratory failure with hypoxia and hypercarbia ([facility]) Precautions: Aspiration, falls ASSESSMENT : 
Based on the objective data described below, the patient presents with mild oropharyngeal dysphagia in the setting of respiratory distress and AMS. Per RN, pt with food material suctioned from airway. Pt A&O to self. Command following intact, reduced attention/concentration. Oral-motor exam revealed structures grossly intact for mastication and deglutition. Puree and solid trials; pt demo mildly delayed mastication, otherwise functional swallow. 0 overt s/sx of aspiration. Recommend mechanical soft, chopped meat/thin liquid diet with strict aspiration precautions, supervision with PO, single sips/bites. D/w RN, Connee Najjar, as well as Dr. Andres Payton. ST will follow as indicated. Patient will benefit from skilled intervention to address the above impairments. Patients rehabilitation potential is considered to be Fair Factors which may influence rehabilitation potential include:  
[]            None noted [x]            Mental ability/status [x]            Medical condition [x]            Home/family situation and support systems [x]            Safety awareness 
[]            Pain tolerance/management []            Other: PLAN : 
 Recommendations and Planned Interventions: As above. Frequency/Duration: Patient will be followed by speech-language pathology 1-2 times per day/4-7 days per week to address goals. Discharge Recommendations: To Be Determined SUBJECTIVE:  
Patient stated I sometimes cough a little bit, but it's not frequent.  OBJECTIVE:  
 
Past Medical History:  
Diagnosis Date  Chronic kidney disease   
 stage 3 kidney disease  Chronic obstructive pulmonary disease ([facility]  CLL (chronic lymphocytic leukemia) ([facility]  Delirium  [facility]  Essential hypertension  Fall  Frequent hospital admissions  Gout  Hyperlipidemia  Hypoxemia  Memory loss  Noncompliance  Oxygen dependent  Pneumonia  Pulmonary nodules  Respiratory failure ([facility]  Risk for falls  Septic shock ([facility]  Sleep apnea  Tobacco abuse  Vitamin D deficiency Past Surgical History:  
Procedure Laterality Date  HX CATARACT REMOVAL    
 HX HERNIA REPAIR    
 HX NEPHROSTOMY Prior Level of Function/Home Situation:  
Home Situation Home Environment: Apartment One/Two Story Residence: One story Living Alone: Yes Support Systems: Spouse/Significant Other/Partner Patient Expects to be Discharged to[de-identified] Private residence Current DME Used/Available at Home: Oxygen, portable Diet prior to admission: Regular Current Diet:  NPO Barriers to Learning/Limitations: yes;  sensory deficits-vision/hearing/speech and altered mental status (i.e.Sedation, Confusion) Compensate with: visual, verbal, tactile, kinesthetic cues/model Cognitive and Communication Status: 
Neurologic State: Alert, Confused Orientation Level: Appropriate for age, Oriented to person, Oriented to time Cognition: Follows commands Perception: Appears intact Perseveration: No perseveration noted Safety/Judgement: Lack of insight into deficits Oral Assessment: 
Oral Assessment Labial: Decreased rate Dentition: Limited, Natural 
Oral Hygiene:  (Good) Lingual: Decreased rate Velum: No impairment Mandible: No impairment Gag Reflex: Other (comment) (Not tested) P.O. Trials: 
Patient Position:  (HOB at 39) Vocal quality prior to P.O.: Back tone focus, Fatigue Consistency Presented: Ice chips, Mechanical soft, Puree, Solid, Thin liquid How Presented: Self-fed/presented, SLP-fed/presented, Spoon, Straw, Successive swallows Bolus Acceptance: No impairment Bolus Formation/Control: Impaired Type of Impairment: Delayed Propulsion: Delayed (# of seconds) Oral Residue: None Initiation of Swallow: Delayed (# of seconds) Laryngeal Elevation: Functional 
Aspiration Signs/Symptoms: None Pharyngeal Phase Characteristics: Double swallow, Easily fatigued Effective Modifications: Alternate liquids/solids, Straw, Spoon, Small sips and bites Cues for Modifications: Minimal-moderate Oral Phase Severity: Mild Pharyngeal Phase Severity : Mild Pain: 
Pre-treatment Pain Scale 1: Numeric (0 - 10) Pain Intensity 1: 0 Post-treatment: 0 After treatment:  
[]            Patient left in no apparent distress sitting up in chair 
[x]            Patient left in no apparent distress in bed 
[x]            Call bell left within reach [x]            Nursing notified 
[]            Caregiver present 
[]            Bed alarm activated COMMUNICATION/EDUCATION:  
[x]           The patients plan of care including recommendations, planned interventions,       
    and recommended diet changes were discussed with: Registered Nurse and Certified Nursing Assistant/Patient Care Technician. []            Posted safety precautions in patient's room. [x]            Patient/family have participated as able in goal setting and plan of care. []            Patient/family agree to work toward stated goals and plan of care.  
[]            Patient understands intent and goals of therapy, but is neutral about his/her                       participation. []            Patient is unable to participate in goal setting and plan of care. Thank you for this referral. 
Ana Maria Vizcarra Degree, M.Ed, [id number] [facility] Time Calculation: 20 mins

## 2018-09-06 NOTE — PROGRESS NOTES
Problem: Self Care Deficits Care Plan (Adult) Goal: *Acute Goals and Plan of Care (Insert Text) Initial Occupational Therapy Goals (9/6/2018) Within 7 day(s): 1. Patient will perform grooming standing at sink with Supervision x 4-5 minutes & 1-2 verbal cues for attention to task for increased independence with ADLs. 2. Patient will perform UB dressing with setup & 1-2 verbal cues for attention to task for increased independence with ADLs. 3. Patient will perform LB dressing with setup/Jeimy & 1-2 verbal cues for attention to task for increased independence with ADLs. 4. Patient will perform all aspects of toileting with Supervision & 1-2 verbal cues for attention to task for increased independence in ADLs 5. Patient will independently apply energy conservation techniques with 1 verbal cue(s) for increased independence with ADLs. 6. Patient will utilize good body mechanics during ADLs with 1 verbal cue(s). 7. Patient will independently manage O2 tubing to safely ambulate to/from bathroom & 1-2 verbal cues for attention to task. Outcome: Progressing Towards Goal 
Occupational Therapy EVALUATION Patient: Nitin Fall (67 y.o. male) Date: 9/6/2018 Primary Diagnosis: Acute respiratory failure with hypoxia and hypercarbia (HCC) Precautions:  Aspiration, Fall ASSESSMENT : 
Based on the objective data described below, the patient presents with decreased functional strength, decreased functional balance, decreased overall activity tolerance limiting independence with ADLs. Pt well-known to this OT from multiple previous admissions. Pt confused and tends to do things when he wants to. Initially pt not wanting to get OOB, but easily coaxed. Pt noted to be smiling w/ significantly improved affect from previous admissions. Pt joking with staff and walking towards women staff members.  Pt requiring significant cognitive and visual assist for ADLs as well as assist d/t unfamiliar environment. 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 Pt would benefit from continued OT services to maximize independence and safety to return home w/ supervision. Education: Patient instructed on home safety, body mechanics for optimal respiratory effort, Energy Conservation/Work Simplification Techniques, adaptive strategies and adaptive dressing techniques including clothing modifications with patient verbalizing understanding at this time. Patient will benefit from skilled intervention to address the above impairments. Patients rehabilitation potential is considered to be Good Factors which may influence rehabilitation potential include:  
[]             None noted [x]             Mental ability/status [x]             Medical condition [x]             Home/family situation and support systems [x]             Safety awareness []             Pain tolerance/management 
[]             Other: PLAN : 
Recommendations and Planned Interventions: 
[x]               Self Care Training                  [x]        Therapeutic Activities [x]               Functional Mobility Training    [x]        Cognitive Retraining 
[x]               Therapeutic Exercises           [x]        Endurance Activities [x]               Balance Training                   [x]        Neuromuscular Re-Education [x]               Visual/Perceptual Training     [x]   Home Safety Training 
[x]               Patient Education                 [x]        Family Training/Education []               Other (comment): Frequency/Duration: Patient will be followed by occupational therapy 1-2 times per day/2-4 days per week to address goals. Discharge Recommendations: Rehab and Home Health Further Equipment Recommendations for Discharge: To Be Determined (TBD) at next level of care SUBJECTIVE:  
 Patient stated You ladies are bullshitting me. ... Yeah, I'm a bullshitter, but I haven't seen ladies be as good of bullshitters.  (joking w/ staff when asked to do things, but pt stating with a smile on his face and giggling vs previous admissions when pt was more agitated and irritable). OBJECTIVE DATA SUMMARY:  
 
Past Medical History:  
Diagnosis Date  Chronic kidney disease   
 stage 3 kidney disease  Chronic obstructive pulmonary disease ([facility]  CLL (chronic lymphocytic leukemia) ([facility]  Delirium  Emphysema lung ([facility]  Essential hypertension  Fall  Frequent hospital admissions  Gout  Hyperlipidemia  Hypoxemia  Memory loss  Noncompliance  Oxygen dependent  Pneumonia  Pulmonary nodules  Respiratory failure ([facility]  Risk for falls  Septic shock ([facility]  Sleep apnea  Tobacco abuse  Vitamin D deficiency Past Surgical History:  
Procedure Laterality Date  HX CATARACT REMOVAL    
 HX HERNIA REPAIR    
 HX NEPHROSTOMY Barriers to Learning/Limitations: yes;  cognitive, sensory deficits-vision/hearing/speech and physical 
Compensate with: visual, verbal, tactile, kinesthetic cues/model Prior Level of Function/Home Situation: Pt w/ limited support but on previous admissions, family declines Rehab d/t loss of income from patient's benefits. Pt must have 24 hour supervision for safety d/t cognition and severe visual deficits! (Recommend APS follow pt/family) Home Situation Home Environment: Apartment One/Two Story Residence: One story Living Alone: Yes Support Systems: Spouse/Significant Other/Partner Patient Expects to be Discharged to[de-identified] Private residence Current DME Used/Available at Home: Oxygen, portable 
[x]  Right hand dominant   []  Left hand dominant Cognitive/Behavioral Status: 
Neurologic State: Alert;Confused Orientation Level: Oriented to person;Disoriented to place; Disoriented to situation;Disoriented to time Cognition: Decreased attention/concentration;Decreased command following; Impulsive;Memory loss;Poor safety awareness Safety/Judgement: Decreased awareness of environment;Decreased awareness of need for assistance;Decreased awareness of need for safety; Lack of insight into deficits Skin: appears grossly intact Edema: No significant edema noted Vision/Perceptual:   
Tracking: Unable to hold eye position out of midline Saccades: Unable to test secondary to decreased visual attention Convergence: Unable to test secondary due to decreased visual attention Visual Fields: Difficulty detecting stimulus in right upper quadrant; Difficulty detecting stimulus in right lower quadrant; Difficulty detecting stimulus  in right lateral quadrant; Difficulty detecting stimulus in left upper quadrant; Difficulty detecting stimulus in left lower quadrant; Difficulty detecting stimulus  in left lateral quadrant Acuity: Impaired near vision; Impaired far vision Coordination: 
Coordination: Generally decreased, functional 
Fine Motor Skills-Upper: Left Intact; Right Intact Gross Motor Skills-Upper: Left Intact; Right Intact Balance: 
Sitting: Intact Standing: Intact; With support Strength: 
Strength: Generally decreased, functional 
Tone & Sensation: 
Tone: Normal 
Sensation: Intact Range of Motion: 
AROM: Generally decreased, functional 
PROM: Generally decreased, functional 
 
Functional Mobility and Transfers for ADLs: 
Bed Mobility: 
Supine to Sit: Contact guard assistance Sit to Supine: Minimum assistance Scooting: Stand-by assistance Transfers: 
Sit to Stand: Supervision;Contact guard assistance Bed to Chair: Minimum assistance Toilet Transfer : Minimum assistance ADL Assessment:  
Feeding: Minimum assistance;Contact guard assistance (d/t impulsivity & pocketing) Oral Facial Hygiene/Grooming: Minimum assistance; Moderate assistance Bathing: Moderate assistance;Maximum assistance Upper Body Dressing: Moderate assistance Lower Body Dressing: Moderate assistance Toileting: Moderate assistance ADL Intervention: 
Feeding Feeding Assistance: Contact guard assistance;Minimum assistance Container Management: Moderate assistance Cutting Food: Moderate assistance Utensil Management: Contact guard assistance Food to Mouth: Contact guard assistance Drink to Mouth: Contact guard assistance Cues: Tactile cues provided;Verbal cues provided;Visual cues provided Grooming Washing Hands: Supervision/set-up (seated with wipes) Lower Body Dressing Assistance Socks: Maximum assistance; Total assistance (dependent) Position Performed: Long sitting on bed Cognitive Retraining Orientation Retraining: Reorienting;Place;Situation;Time 
Problem Solving: Inductive reason; Identifying the task; Identifying the problem;General alternative solution;Deductive reason Executive Functions: Executing cognitive plans;Managing time;Regulating behavior Organizing/Sequencing: Breaking task down;Prioritizing Attention to Task: Distractibility (HIGHLY distractible (but improved from last admission)) Maintains Attention For (Time): 30 seconds (<) Following Commands: Follows one step commands/directions Safety/Judgement: Decreased awareness of environment;Decreased awareness of need for assistance;Decreased awareness of need for safety; Lack of insight into deficits Cues: Tactile cues provided;Verbal cues provided;Visual cues provided Pain: 
Pre-treatment: 0/10 Post-treatment: 0/10 Activity Tolerance:  
Patient able to stand 1-2 minute(s). Patient able to complete ADLs with intermittent rest breaks. Patient limited by cognition/vision/strength /balance/respiratory status. Patient unsteady Please refer to the flowsheet for vital signs taken during this treatment. After treatment:  
[] Patient left in no apparent distress sitting up in chair [x] Patient left in no apparent distress in bed 
[x] Call bell left within reach [x] Nursing notified/Rickey 
[] Caregiver present 
[] Bed alarm activated COMMUNICATION/EDUCATION:  
[x] Home safety education was provided and the patient/caregiver indicated understanding. [x] Patient/family have participated as able in goal setting and plan of care. [x] Patient/family agree to work toward stated goals and plan of care. [] Patient understands intent and goals of therapy, but is neutral about his/her participation. [] Patient is unable to participate in goal setting and plan of care. Thank you for this referral. 
Tahira Sanchez, OTR/L Time Calculation: 25 mins G-Codes (GP) Self Care  Current  CL= 60-79%  Goal  CJ= 20-39% The severity rating is based on the professional judgement & direct observation of Level of Assistance required for Functional Mobility and ADLs. Eval Complexity: History: HIGH Complexity : Extensive review of history including physical, cognitive and psychosocial history ; Examination: HIGH Complexity : 5 or more performance deficits relating to physical, cognitive , or psychosocial skils that result in activity limitations and / or participation restrictions; Decision Making:HIGH Complexity : Patient presents with comorbidities that affect occupational performance. Signifigant modification of tasks or assistance (eg, physical or verbal) with assessment (s) is necessary to enable patient to complete evaluation

## 2018-09-06 NOTE — PROGRESS NOTES
conducted a Follow up consultation and Spiritual Assessment for Lyudmila Mireles, who is a 74 y. o.,male. Patient was mad at me this morning when he wanted to get out of bed and head home for lunch. This afternoon his wife was at the bedside and he said I was \"OK now - but I still want to go home. \" The  provided the following Interventions: 
Continued the relationship of care and support. Listened empathically. Offered assurance of continued prayer on patients behalf. Chart reviewed. The following outcomes were achieved: Wife expressed gratitude for Spiritual Care visit. Assessment: 
There are no further spiritual or Alevism issues which require Spiritual Care Services intervention at this time. Plan: 
Chaplains will continue to follow and will provide pastoral care as needed or requested.  recommends bedside caregivers page the  on duty if patient shows signs of acute spiritual or emotional distress. [address] [place], [place]. Board Certified [employer] [phone] - Office

## 2018-09-06 NOTE — PROGRESS NOTES
Problem: Dysphagia (Adult) Goal: *Acute Goals and Plan of Care (Insert Text) Outcome: Progressing Towards Goal 
Recommendations: 
Diet: Mechanical soft, thin liquid Meds: Per patient preference Aspiration Precautions Oral Care TID Other: Single sips/bites, alternate solid/liquid Goals:  Patient will: 1. Tolerate PO trials with 0 s/s overt distress in 4/5 trials 2. Utilize compensatory swallow strategies/maneuvers (decrease bite/sip, size/rate, alt. liq/sol) with min cues in 4/5 trials. 33

## 2018-09-06 NOTE — PROGRESS NOTES
[facility] Pulmonary, Critical Care, and Sleep Medicine Name: Susan Mercado. MRN: [id number] : 1945 Hospital: [facility] [place] Date: 2018 [facility] Note IMPRESSION:  
· Acute on chronic hypoxemic and hypercapneic respiratory failure, failed bipap, intubated 18, extubated 18 · Blood cx positive strep viridans: ?bacteremia vs skin contaminant · Metabolic encephalopathy from CO2 narcosis, dementia · COPD with home O2-FEV1 of 1.23 or 55% predicted, normal FEV1%, follows with Dr. Ashanti Nathan - on Spiriva and advair at home- Recent 6 min walk in May suggested requires home oxygen 2-4 liter · Acute on chronic diastolic CHF · CKD stage 3 with ARF 
· HTN 
· Severe complex sleep apnea - AHI 34/hr RUPERTO and CSA events; on BIPAP2 non compliant - not able to tolerate but best response noted on BIPAP  in recent hospitalization · Hx of ETOH abuse in past (residing in rehab facility) · Chronic leukocytosis, thrombocytopenia with hx of CLL · Anemia · Dementia - severity not known, but advanced per prior notes [facility] 18 Dr Melody Barber AHI 34/hr RUPERTO and CSA events; O2 desats BIPAP titration - snoring and apneas could not be eliminated with 24/20 pressures; central apneas with higher pressures; poorly tolerated overall LVEF 66-70%, grade 1 DD on echo 18. RECOMMENDATIONS:  
Respiratory:  
Extubated to bipap on 18. Did well on bipap last night. At baseline mental status. Denies any respiratroy complaints. C/w bipap 24/ at night and prn. D/c O2 and use it only if spo2 <92%, since o2 will cause co2 retention. Passed swallow eval with mechanichal soft diet Keep SPO2 >=92%. HOB 30 degree elevation all the time. Aggressive pulmonary toileting. Aspiration precautions. CVS: BP stable. D/c lasix 20 bid as Na, Creat, BUN increasing. No more leg edema, resolved. ID:  cxr b/l infiltrates vs chf.  
Sputum cx mod candida (colonized), normal maynor. Blood cx 9/2/18 1/2 positive for strep viridans, likely skin contaminant. Repeat blood cx NGTD. Low grade temp on 9/4. WBC increasing could be due to steroids/CLL. D/c zosyn and vanco on 9/4/18 Completed levaquin for 5 days. Monitor off Abx. Follow repeat blood cx results. Hematology/Oncology: chronic CLL with leucocytosis, thrombocytopenia, anemia. Renal: monitor renal function, increasing, d/c lasix. GI/: passed ST eval for mechanical soft diet, ordered Endocrine: Maintain blood glucose. humalog ssi Neurology: mental status back to his baseline. He gets easliy co2 retention, if any AMS< check ABG and put back on bipap Skin/Wound: no acute issues Electrolytes: Replace electrolytes per ICU electrolyte replacement protocol. IVF: none Nutrition: see emilia Prophylaxis: DVT Prophylaxis with scd, lovenox. GI Prophylaxis with protonix. Restraints: removed. Lines/Tubes: PIV Right femoral cental line placed in ER 9/1/18, d/c'ed 9/5/18 Shanks 9/2/18-9-5/18 Will defer respective systems problem management to primary and other respective consultant and follow patient in ICU with primary and other medical team. 
Further recommendations will be based on the patient's response to recommended treatment and results of the investigation ordered. Quality Care: PPI, DVT prophylaxis, HOB elevated, Infection control all reviewed and addressed. Care of plan d/w RN, RT, MDR. High complexity decision making was performed during the evaluation of this patient at high risk for decompensation with multiple organ involvement. Total critical care time spent rendering care exclusive of procedures: 33  minutes. Transfer to tele if bed needed.    
 
 
Subjective/History of Present Illness:  
 
Patient is a 74 y.o. male with hx of COPD, home O2, chronic hypercapneic resp failure, dementia, CLL, diastolic CHF, HTN, CKD, severe complex sleep apnea who has recurrent hospitalization for hypercarbic respiratory failure. 9/6/18: 
Successfully extubated to bipap yesterday. Used bipap last night. Mental status at his baseline demented. No fever chills cough dyspnea Leg edema resolved No overnight issues. No Known Allergies Past Medical History:  
Diagnosis Date  Chronic kidney disease   
 stage 3 kidney disease  Chronic obstructive pulmonary disease ([facility]  CLL (chronic lymphocytic leukemia) ([facility]  Delirium  Emphysema lung ([facility]  Essential hypertension  Fall  Frequent hospital admissions  Gout  Hyperlipidemia  Hypoxemia  Memory loss  Noncompliance  Oxygen dependent  Pneumonia  Pulmonary nodules  Respiratory failure ([facility]  Risk for falls  Septic shock ([facility]  Sleep apnea  Tobacco abuse  Vitamin D deficiency Past Surgical History:  
Procedure Laterality Date  HX CATARACT REMOVAL    
 HX HERNIA REPAIR    
 HX NEPHROSTOMY Social History Substance Use Topics  Smoking status: Former Smoker  Smokeless tobacco: Never Used  Alcohol use No  
   Comment: former ETOH user; stopped December 2017 History reviewed. No pertinent family history. Prior to Admission medications Medication Sig Start Date End Date Taking? Authorizing Provider OLANZapine (ZYPREXA) 2.5 mg tablet Take 0.5 Tabs by mouth two (2) times a day. 7/25/18   Yanni Young MD  
amLODIPine (NORVASC) 10 mg tablet Take 1 Tab by mouth daily. 7/25/18   Yanni Young MD  
hydrALAZINE (APRESOLINE) 25 mg tablet Take 1 Tab by mouth three (3) times daily.  7/25/18   Yanni Young MD  
predniSONE (STERAPRED) 5 mg dose pack See administration instruction per 5mg dose pack 7/25/18   Yanni Young MD  
ALPRAZolam Sherrilyn Tri) 0.5 mg tablet Take 1 Tab by mouth three (3) times daily as needed for Anxiety. Max Daily Amount: 1.5 mg. Indications: anxiety 7/20/18   Una Nguyen MD  
carvedilol (COREG) 12.5 mg tablet Take 1 Tab by mouth two (2) times daily (with meals). 7/12/18   Lea Orourke MD  
docusate sodium (COLACE) 100 mg capsule Take 1 Cap by mouth two (2) times a day for 90 days. 7/12/18 10/10/18  Lea Orourke MD  
alfuzosin SR (UROXATRAL) 10 mg SR tablet Take 10 mg by mouth daily. Ronni Hernandez MD  
aspirin 81 mg chewable tablet Take 81 mg by mouth daily. Ronni Hernandez MD  
cycloSPORINE (RESTASIS) 0.05 % ophthalmic emulsion Administer 1 Drop to both eyes two (2) times a day. Ronni Hernandez MD  
famotidine (PEPCID) 20 mg tablet Take 20 mg by mouth two (2) times a day. Ronni Hernandez MD  
latanoprost (XALATAN) 0.005 % ophthalmic solution Administer 1 Drop to both eyes nightly. Ronni Hernandez MD  
furosemide (LASIX) 40 mg tablet Take  by mouth daily. Ronni Hernandez MD  
Oxygen     Ronni Hernandez MD  
pravastatin sodium (PRAVASTATIN PO) Take  by mouth. Ronni Hernandez MD  
tiotropium (SPIRIVA WITH HANDIHALER) 18 mcg inhalation capsule Take 1 Cap by inhalation daily. Ronni Hernandez MD  
fluticasone/salmeterol (ADVAIR HFA IN) Take  by inhalation. Ronni Hernandez MD  
ALBUTEROL IN Take  by inhalation. Ronni Hernandez MD  
 
Current Facility-Administered Medications Medication Dose Route Frequency  famotidine (PEPCID) tablet 20 mg  20 mg Oral BID  potassium chloride 10 mEq in 100 ml IVPB  10 mEq IntraVENous Q1H  
 methylPREDNISolone (PF) (SOLU-MEDROL) injection 30 mg  30 mg IntraVENous Q24H  
 enoxaparin (LOVENOX) injection 40 mg  40 mg SubCUTAneous Q24H  
 insulin lispro (HUMALOG) injection   SubCUTAneous Q6H  
 amLODIPine (NORVASC) tablet 10 mg  10 mg Oral DAILY  aspirin chewable tablet 81 mg  81 mg Oral DAILY  carvedilol (COREG) tablet 12.5 mg  12.5 mg Oral BID WITH MEALS  latanoprost (XALATAN) 0.005 % ophthalmic solution 1 Drop  1 Drop Both Eyes QHS  furosemide (LASIX) injection 20 mg  20 mg IntraVENous BID Objective:  
Vital Signs:   
Visit Vitals  /59  Pulse 81  Temp 97.5 °F (36.4 °C)  Resp 21  
 Ht 5' 6\" (1.676 m)  Wt 93.3 kg (205 lb 11 oz)  SpO2 100%  BMI 33.2 kg/m2 O2 Device: Room air O2 Flow Rate (L/min): 3 l/min Temp (24hrs), Av.9 °F (36.6 °C), Min:97.5 °F (36.4 °C), Max:98.6 °F (37 °C) Intake/Output:  
Last shift:        
 
Last 3 shifts:  1901 -  0700 In: 64.6 [I.V.:4.6] Out: 1575 [[facility]:4609] Intake/Output Summary (Last 24 hours) at 18 1026 Last data filed at 18 0000 Gross per 24 hour Intake             4.58 ml Output              100 ml Net           -95.42 ml Last 3 Recorded Weights in this Encounter 18 1826 18 1427 18 0327 Weight: 91.2 kg (201 lb) 91.2 kg (201 lb) 93.3 kg (205 lb 11 oz) Ventilator Settings: 
Mode Rate Tidal Volume Pressure FiO2 PEEP  
CPAP, Pressure support   340 ml  5 cm H2O 24 % 5 cm H20 Peak airway pressure: 15 cm H2O Plateau pressure:    
Tidal volume:   
Minute ventilation: 11.1 l/min SPO2 98 Physical Exam:  
 
General/Neurology: obese. Aao, NAD. Head:   Normocephalic, without obvious abnormality, atraumatic. Lung: Moderate air entry bilateral equal. No rales. No rhonchi. No wheezing. No stridors. No prolongded expiration. No accessory muscle use. Heart:   Regular rate & rhythm. S1 S2 present. No murmur. No JVD. Abdomen:  Soft. NT. ND. +BS. Obese. Extremities:  No pedal edema. No cyanosis. No clubbing. Pulses: 2+ and symmetric in DP. Capillary refill: normal 
Skin:   Color, texture, turgor normal. No rashes or lesions. Data:  
   
Recent Results (from the past 24 hour(s)) POC G3 Collection Time: 18 11:34 AM  
Result Value Ref Range Device: BIPAP    
 FIO2 (POC) 24 % pH (POC) 7.465 (H) 7.35 - 7.45    
 pCO2 (POC) 46.8 (H) 35.0 - 45.0 MMHG pO2 (POC) 91 80 - 100 MMHG  
 HCO3 (POC) 33.7 (H) 22 - 26 MMOL/L  
 sO2 (POC) 97 92 - 97 % Base excess (POC) 10 mmol/L  
 PEEP/CPAP (POC) 12 cmH2O  
 PIP (POC) 24 Allens test (POC) YES Total resp. rate 31 Site LEFT RADIAL Specimen type (POC) ARTERIAL Performed by Rona Harrington Spontaneous timed YES    
GLUCOSE, POC Collection Time: 09/05/18 11:41 AM  
Result Value Ref Range Glucose (POC) 112 (H) 70 - 110 mg/dL GLUCOSE, POC Collection Time: 09/05/18  6:19 PM  
Result Value Ref Range Glucose (POC) 130 (H) 70 - 110 mg/dL POTASSIUM Collection Time: 09/05/18  8:45 PM  
Result Value Ref Range Potassium 4.4 3.5 - 5.5 mmol/L  
GLUCOSE, POC Collection Time: 09/05/18 11:29 PM  
Result Value Ref Range Glucose (POC) 107 70 - 110 mg/dL GLUCOSE, POC Collection Time: 09/06/18  5:33 AM  
Result Value Ref Range Glucose (POC) 102 70 - 110 mg/dL PHOSPHORUS Collection Time: 09/06/18  6:44 AM  
Result Value Ref Range Phosphorus 4.4 2.5 - 4.9 MG/DL MAGNESIUM Collection Time: 09/06/18  6:44 AM  
Result Value Ref Range Magnesium 2.2 1.6 - 2.6 mg/dL METABOLIC PANEL, BASIC Collection Time: 09/06/18  6:44 AM  
Result Value Ref Range Sodium 146 (H) 136 - 145 mmol/L Potassium 3.6 3.5 - 5.5 mmol/L Chloride 107 100 - 108 mmol/L  
 CO2 31 21 - 32 mmol/L Anion gap 8 3.0 - 18 mmol/L Glucose 92 74 - 99 mg/dL BUN 45 (H) 7.0 - 18 MG/DL Creatinine 1.67 (H) 0.6 - 1.3 MG/DL  
 BUN/Creatinine ratio 27 (H) 12 - 20 GFR est AA 49 (L) >60 ml/min/1.73m2 GFR est non-AA 40 (L) >60 ml/min/1.73m2 Calcium 9.2 8.5 - 10.1 MG/DL  
CBC WITH AUTOMATED DIFF Collection Time: 09/06/18  6:44 AM  
Result Value Ref Range WBC 15.6 (H) 4.6 - 13.2 K/uL  
 RBC 4.61 (L) 4.70 - 5.50 M/uL  
 HGB 10.8 (L) 13.0 - 16.0 g/dL HCT 34.8 (L) 36.0 - 48.0 % MCV 75.5 74.0 - 97.0 FL  
 MCH 23.4 (L) 24.0 - 34.0 PG  
 MCHC 31.0 31.0 - 37.0 g/dL RDW 17.8 (H) 11.6 - 14.5 % PLATELET 676 907 - 123 K/uL MPV 10.2 9.2 - 11.8 FL  
 NEUTROPHILS 42 40 - 73 % LYMPHOCYTES 47 21 - 52 % MONOCYTES 11 (H) 3 - 10 % EOSINOPHILS 0 0 - 5 % BASOPHILS 0 0 - 2 %  
 ABS. NEUTROPHILS 6.6 1.8 - 8.0 K/UL  
 ABS. LYMPHOCYTES 7.3 (H) 0.9 - 3.6 K/UL  
 ABS. MONOCYTES 1.7 (H) 0.05 - 1.2 K/UL  
 ABS. EOSINOPHILS 0.0 0.0 - 0.4 K/UL  
 ABS. BASOPHILS 0.0 0.0 - 0.1 K/UL  
 RBC COMMENTS NORMOCYTIC, NORMOCHROMIC    
 DF AUTOMATED Chemistry Recent Labs  
   09/06/18 
 9495  09/05/18 2045  09/05/18 
 0400   09/04/18 
 5452 GLU  92   --    --    --   153* NA  146*   --    --    --   142  
K  3.6  4.4  3.5   < >  3.7 CL  107   --    --    --   104 CO2  31   --    --    --   32 BUN  45*   --    --    --   29* CREA  1.67*   --    --    --   1.62* CA  9.2   --    --    --   9.1 MG  2.2   --   2.1   --   2.1 PHOS  4.4   --   4.8   --   4.4 AGAP  8   --    --    --   6  
BUCR  27*   --    --    --   18  
 < > = values in this interval not displayed. Lactic Acid Lactic acid Date Value Ref Range Status 09/01/2018 0.7 0.4 - 2.0 MMOL/L Final  
 
No results for input(s): LAC in the last 72 hours. Liver Enzymes Protein, total  
Date Value Ref Range Status 09/01/2018 7.1 6.4 - 8.2 g/dL Final  
 
Albumin Date Value Ref Range Status 09/01/2018 3.1 (L) 3.4 - 5.0 g/dL Final  
 
Globulin Date Value Ref Range Status 09/01/2018 4.0 2.0 - 4.0 g/dL Final  
 
A-G Ratio Date Value Ref Range Status 09/01/2018 0.8 0.8 - 1.7   Final  
 
AST (SGOT) Date Value Ref Range Status 09/01/2018 19 15 - 37 U/L Final  
 
Alk. phosphatase Date Value Ref Range Status 09/01/2018 95 45 - 117 U/L Final  
 
No results for input(s): TP, ALB, GLOB, AGRAT, SGOT, GPT, AP, TBIL in the last 72 hours. No lab exists for component: DBIL  
 
CBC w/Diff Recent Labs  
   09/06/18 
 0644  09/04/18 
 6289 WBC  15.6*  15.6*  
RBC  4.61*  4.05* HGB  10.8*  9.2* HCT  34.8*  30.6* PLT  165  166 GRANS  42  65 LYMPH  47  26 EOS  0  0 Cardiac Enzymes No results found for: CPK, CK, CKMMB, CKMB, RCK3, CKMBT, CKNDX, CKND1, LOREN, TROPT, TROIQ, APRIL, TROPT, TNIPOC, BNP, BNPP  
 
BNP No results found for: BNP, BNPP, XBNPT Coagulation No results for input(s): PTP, INR, APTT in the last 72 hours. No lab exists for component: INREXT, INREXT Thyroid  No results found for: T4, T3U, TSH, TSHEXT, TSHEXT No results found for: T4  
 
Urinalysis Lab Results Component Value Date/Time Color YELLOW 07/16/2018 06:00 PM  
 Appearance CLEAR 07/16/2018 06:00 PM  
 Specific gravity 1.024 07/16/2018 06:00 PM  
 pH (UA) 5.0 07/16/2018 06:00 PM  
 Protein 300 (A) 07/16/2018 06:00 PM  
 Glucose NEGATIVE  07/16/2018 06:00 PM  
 Ketone NEGATIVE  07/16/2018 06:00 PM  
 Bilirubin NEGATIVE  07/16/2018 06:00 PM  
 Urobilinogen 0.2 07/16/2018 06:00 PM  
 Nitrites NEGATIVE  07/16/2018 06:00 PM  
 Leukocyte Esterase NEGATIVE  07/16/2018 06:00 PM  
 Epithelial cells FEW 07/16/2018 06:00 PM  
 Bacteria 1+ (A) 07/16/2018 06:00 PM  
 WBC 0 to 3 07/16/2018 06:00 PM  
 RBC 0 to 3 07/16/2018 06:00 PM  
  
 
Micro  Recent Labs  
   09/05/18 
 6734 CULT  NO GROWTH AFTER 21 HOURS Recent Labs  
   09/05/18 
 7335 CULT  NO GROWTH AFTER 21 HOURS  
  
 
ABG Recent Labs  
   09/05/18 
 1134  09/05/18 
 0516  09/04/18 
 1231 PHI  7.465*  7.461*  7.480* PCO2I  46.8*  48.2*  46.5*  
PO2I  91  88  86 HCO3I  33.7*  34.4*  34.7*  
FIO2I  24  40  40 PFT Ultrasound LE Doppler ECHO 9/2/18 · Technically difficult study due to patient's body habitus and procedure performed with the patient in a supine position. Definity contrast was given to enhance imaging. · Estimated left ventricular ejection fraction is 66 - 70%. Biplane method used to measure ejection fraction.  Left ventricular moderate concentric hypertrophy. Normal left ventricular wall motion, no regional wall motion abnormality noted. Mild (grade 1) left ventricular diastolic dysfunction. · There is a moderate left pleural effusion. CT (Most Recent) (CT chest reviewed by me) Results from Hospital Encounter encounter on 07/16/18 CT HEAD WO CONT Narrative EXAM:  CT of the brain without intravenous contrast. 
 
COMPARISON: CT July 3, 2018. REASON FOR EXAM: \"Decreased alertness; facial droop and ams\". DOSE REDUCTION:  One or more dose reduction techniques were used on this CT: 
automated exposure control, adjustment of the mAs and/or kVp according to 
patient's size, and iterative reconstruction techniques. The specific techniques 
utilized on this CT exam have been documented in the patient's electronic 
medical record. 
 
_______________ FINDINGS:  
 
    IMAGE QUALITY:  The images are mildly degraded by motion artifact. BRAIN AND EXTRA-AXIAL SPACE:   
 
         SUBACUTE TERRITORIAL TRANSCORTICAL INFARCTION:  None detected. MASS:  None detected. HEMORRHAGE:  None. SUBDURAL FLUID COLLECTION:  None detected. HYDROCEPHALUS:  None. REMOTE CORTICAL INFARCTION:  None detected. REMOTE CEREBELLAR INFARCTION:  None detected. MISCELLANEOUS:  Non-applicable. STRIVE (STandards for Reporting Vascular changes on nEuroimaging): 
                   --Lacunes (age-indeterminate) or perivascular spaces of 
\"presumed vascular origin\":  None definite. --Oolitic of white matter hypodensity of \"presumed vascular 
origin\":  Low grade. --Degree of brain atrophy (subjective): Low grade. BURDEN OF CALCIFIC INTRACRANIAL ATHEROSCLEROSIS:  None significant. HEENT: 
 
         INCLUDED UPPER ORBITS:  The lenses are replaced bilaterally. INCLUDED UPPER PARANASAL SINUSES:  Predominantly clear. MASTOID AIR CELLS:  Predominantly clear. BONES:  Unremarkable. SCALP:  Unremarkable. 
 
_______________ Impression IMPRESSION: 
 
Low-grade generalized chronic changes, however, no acute findings. _______________ Note: Dr. Lupe Alatorre discussed the stroke code results with Dr. Hali Miller at [phone] on 
7/16/2018. XR (Most Recent). CXR  reviewed by me and compared with previous CXR Results from Hospital Encounter encounter on 09/01/18 XR CHEST PORT Narrative EXAM: Chest portable INDICATION: Respiratory failure. Intubated COMPARISON: 9/4/2018. Exams dating back to 7/20/2018. 
 
_______________ FINDINGS: 
 
AP portable chest film was performed. Endotracheal tube and NG tube continue be 
present in good position. Bilateral lower lung field opacities continue to be present and are probably not 
significantly changed. These is been stable over past several exams but are new 
when compared to 9/1/2018. Cannot rule out mild stable blunting of the 
costophrenic angles. Heart is at the upper limits of normal. Pulmonary 
vascularity is normal. No evidence of pneumothorax. 
 
_______________ Impression IMPRESSION: 
 
 
1. Support lines and tubes in good position. 2. Stable chest with bilateral lower lobe areas of atelectasis and/or infiltrate 
with possible small effusions. Cherelle Kang MD 
9/6/2018

## 2018-09-06 NOTE — PROGRESS NOTES
@5061 pt taken over awake alert oriented to self, place and time, denies pain at present. Remains on bipap ,incontinent of urine , incontinent care administered . Assessment done and documented in appropriate flow sheets. Rt groin dressing dry intact, SCD's to bilateral feet well tolerated. Nursing management continues. @2130 RT at bedside, relative at bedside. No new changes care continues. @2378 pt incontinent of urine incontinent care administered , no new developments , continues to deny pain , nursing observation continues. @0130 pt asleep easily aroused vital signs WNL nursing management continues. @0330 no changes. @0500 pt resting comfortably, incontinent of urine , incontinent care administered nursing care continues. @3862 Bedside and Verbal shift change report given to Yue Bradford (oncoming nurse) by Sravan Shay (offgoing nurse). Report included the following information SBAR, Kardex, Intake/Output, MAR, Recent Results, Med Rec Status and Alarm Parameters . Alarm parameters reviewed, on and audible Appropriate for patient clinical condition

## 2018-09-06 NOTE — PROGRESS NOTES
Hospitalist Progress Note Patient: Sherin Khan. MRN: [id number]  CSN: [id number] YOB: 1945  Age: 74 y.o. Sex: male DOA: 9/1/2018 LOS:  LOS: 4 days Chief Complaint: Ac resp failure Assessment/Plan Acute on chronic respiratory failure - secondary to COPD, diastolic CHF, RUPERTO. Continue on steroids, inhaled BD. Change nebs to PRN, on bipap at night, extuibated Complex RUPERTO - non compliant with BiPAP at home 
 
  
ID - CXR with infiltrate vs edema On levaquin-now completed Bacteremia - blood cultures 1/2 GPC in chains and clusters. Likely contaminant 
 
resp cultures with candida Follow repeat blood cultures. These are negative to date 
  
Acute on chronic diastolic CHF - continue with lasix.  
  
CLL 
  
Renal 
CKD - stage 3 
  
Endocrine -  Follow FSG, on SSI 
  
Speech swallow eval today Completing ICU level care PT consulted Labs reviewed DVT proph-lovenox 
  
 
Disposition : 
Patient Active Problem List  
Diagnosis Code  Hypercapnia R06.89  
 Acute on chronic respiratory failure with hypoxia and hypercapnia ([facility]) J96.21, J96.22  
 CLL (chronic lymphocytic leukemia) ([facility]) C91.90  Emphysema lung ([facility] J43.9  Elevated serum creatinine R79.89  
 COPD (chronic obstructive pulmonary disease) ([facility]) J44.9  RUPERTO (obstructive sleep apnea) G47.33  
 Stage 3 chronic kidney disease N18.3  Dementia F03.90  
 HTN (hypertension) I10  
 Altered mental status R41.82  Respiratory distress R06.03  
 Hypoxia R09.02  
 CO2 narcosis R06.89  
 Hypernatremia E87.0  Acute respiratory failure with hypoxia and hypercarbia ([facility]) J96.01, J96.02 Subjective: No overnight events No new issues Denies pain, SOB Review of systems: 
 
Constitutional: denies fevers, chills, myalgias Respiratory: denies SOB, cough Gastrointestinal: denies nausea, vomiting, diarrhea Vital signs/Intake and Output: 
Visit Vitals  BP (!) 146/94  Pulse 78  Temp 97.8 °F (36.6 °C)  Resp 16  
 Ht 5' 6\" (1.676 m)  Wt 93.3 kg (205 lb 11 oz)  SpO2 100%  BMI 33.2 kg/m2 Current Shift:    
Last three shifts:  09/04 1901 - 09/06 0700 In: 64.6 [I.V.:4.6] Out: 1575 [YZAEA:7201] Exam: 
 
General: elderly BM, NAD, ox2, weakened Head/Neck: NCAT, supple, No masses, No lymphadenopathy CVS:Regular rate and rhythm, no M/R/G, S1/S2 heard, no thrill Lungs:Clear to auscultation bilaterally, no wheezes, rhonchi, or rales Abdomen: Soft, Nontender, No distention, Normal Bowel sounds, No hepatomegaly Extremities: No C/C/E, pulses palpable 2+ Neuro:grossly normal , follows commands Psych:appropriate Labs: Results:  
   
Chemistry Recent Labs  
   09/05/18 2045  09/05/18 
 0400  09/04/18 
 2230   09/04/18 
 1766 GLU   --    --    --    --   153* NA   --    --    --    --   142  
K  4.4  3.5  3.6   < >  3.7 CL   --    --    --    --   104 CO2   --    --    --    --   32 BUN   --    --    --    --   29* CREA   --    --    --    --   1.62* CA   --    --    --    --   9.1 AGAP   --    --    --    --   6  
BUCR   --    --    --    --   18  
 < > = values in this interval not displayed. CBC w/Diff Recent Labs  
   09/04/18 
 8859 WBC  15.6*  
RBC  4.05* HGB  9.2* HCT  30.6* PLT  166 GRANS  65 LYMPH  26 EOS  0 Cardiac Enzymes No results for input(s): CPK, CKND1, LOREN in the last 72 hours. No lab exists for component: Beacher Diones Coagulation No results for input(s): PTP, INR, APTT in the last 72 hours. No lab exists for component: INREXT Lipid Panel No results found for: CHOL, CHOLPOCT, CHOLX, CHLST, CHOLV, 839104, HDL, LDL, LDLC, DLDLP, 655237, VLDLC, VLDL, TGLX, TRIGL, TRIGP, TGLPOCT, CHHD, CHHDX  
BNP No results for input(s): BNPP in the last 72 hours. Liver Enzymes No results for input(s): TP, ALB, TBIL, AP, SGOT, GPT in the last 72 hours.  
 
No lab exists for component: DBIL  
 Thyroid Studies No results found for: T4, T3U, TSH, TSHEXT Procedures/imaging: see electronic medical records for all procedures/Xrays and details which were not copied into this note but were reviewed prior to creation of Plan Venice Rodriguez MD

## 2018-09-07 LAB
ANION GAP SERPL CALC-SCNC: 11 MMOL/L (ref 3–18)
BACTERIA SPEC CULT: ABNORMAL
BACTERIA SPEC CULT: ABNORMAL
BASOPHILS # BLD: 0 K/UL (ref 0–0.1)
BASOPHILS NFR BLD: 0 % (ref 0–3)
BUN SERPL-MCNC: 55 MG/DL (ref 7–18)
BUN/CREAT SERPL: 33 (ref 12–20)
CA-I SERPL-SCNC: 0.97 MMOL/L (ref 1.12–1.32)
CALCIUM SERPL-MCNC: 9.4 MG/DL (ref 8.5–10.1)
CHLORIDE SERPL-SCNC: 104 MMOL/L (ref 100–108)
CO2 SERPL-SCNC: 28 MMOL/L (ref 21–32)
CREAT SERPL-MCNC: 1.68 MG/DL (ref 0.6–1.3)
DIFFERENTIAL METHOD BLD: ABNORMAL
EOSINOPHIL # BLD: 0 K/UL (ref 0–0.4)
EOSINOPHIL NFR BLD: 0 % (ref 0–5)
ERYTHROCYTE [DISTWIDTH] IN BLOOD BY AUTOMATED COUNT: 17.9 % (ref 11.6–14.5)
GLUCOSE BLD STRIP.AUTO-MCNC: 102 MG/DL (ref 70–110)
GLUCOSE BLD STRIP.AUTO-MCNC: 145 MG/DL (ref 70–110)
GLUCOSE BLD STRIP.AUTO-MCNC: 145 MG/DL (ref 70–110)
GLUCOSE BLD STRIP.AUTO-MCNC: 183 MG/DL (ref 70–110)
GLUCOSE BLD STRIP.AUTO-MCNC: 98 MG/DL (ref 70–110)
GLUCOSE SERPL-MCNC: 98 MG/DL (ref 74–99)
GRAM STN SPEC: ABNORMAL
HCT VFR BLD AUTO: 36 % (ref 36–48)
HGB BLD-MCNC: 11 G/DL (ref 13–16)
LYMPHOCYTES # BLD: 7.5 K/UL (ref 0.8–3.5)
LYMPHOCYTES NFR BLD: 47 % (ref 20–51)
MAGNESIUM SERPL-MCNC: 2.4 MG/DL (ref 1.6–2.6)
MCH RBC QN AUTO: 23.5 PG (ref 24–34)
MCHC RBC AUTO-ENTMCNC: 30.6 G/DL (ref 31–37)
MCV RBC AUTO: 76.9 FL (ref 74–97)
MONOCYTES # BLD: 0.6 K/UL (ref 0–1)
MONOCYTES NFR BLD: 4 % (ref 2–9)
NEUTS SEG # BLD: 7.9 K/UL (ref 1.8–8)
NEUTS SEG NFR BLD: 49 % (ref 42–75)
PHOSPHATE SERPL-MCNC: 4.3 MG/DL (ref 2.5–4.9)
PLATELET # BLD AUTO: 159 K/UL (ref 135–420)
PMV BLD AUTO: 10.6 FL (ref 9.2–11.8)
POTASSIUM SERPL-SCNC: 3.3 MMOL/L (ref 3.5–5.5)
POTASSIUM SERPL-SCNC: 4.6 MMOL/L (ref 3.5–5.5)
RBC # BLD AUTO: 4.68 M/UL (ref 4.7–5.5)
RBC MORPH BLD: ABNORMAL
RBC MORPH BLD: ABNORMAL
SERVICE CMNT-IMP: ABNORMAL
SODIUM SERPL-SCNC: 143 MMOL/L (ref 136–145)
WBC # BLD AUTO: 16 K/UL (ref 4.6–13.2)
WBC MORPH BLD: ABNORMAL

## 2018-09-07 PROCEDURE — 74011000250 HC RX REV CODE- 250: Performed by: HOSPITALIST

## 2018-09-07 PROCEDURE — 97530 THERAPEUTIC ACTIVITIES: CPT

## 2018-09-07 PROCEDURE — 74011250636 HC RX REV CODE- 250/636: Performed by: INTERNAL MEDICINE

## 2018-09-07 PROCEDURE — 85025 COMPLETE CBC W/AUTO DIFF WBC: CPT | Performed by: INTERNAL MEDICINE

## 2018-09-07 PROCEDURE — 74011250637 HC RX REV CODE- 250/637: Performed by: HOSPITALIST

## 2018-09-07 PROCEDURE — 94660 CPAP INITIATION&MGMT: CPT

## 2018-09-07 PROCEDURE — 83735 ASSAY OF MAGNESIUM: CPT | Performed by: INTERNAL MEDICINE

## 2018-09-07 PROCEDURE — 74011636637 HC RX REV CODE- 636/637: Performed by: HOSPITALIST

## 2018-09-07 PROCEDURE — 74011250637 HC RX REV CODE- 250/637: Performed by: INTERNAL MEDICINE

## 2018-09-07 PROCEDURE — 65660000000 HC RM CCU STEPDOWN

## 2018-09-07 PROCEDURE — 84100 ASSAY OF PHOSPHORUS: CPT | Performed by: INTERNAL MEDICINE

## 2018-09-07 PROCEDURE — 84132 ASSAY OF SERUM POTASSIUM: CPT | Performed by: INTERNAL MEDICINE

## 2018-09-07 PROCEDURE — 36415 COLL VENOUS BLD VENIPUNCTURE: CPT | Performed by: INTERNAL MEDICINE

## 2018-09-07 PROCEDURE — 82330 ASSAY OF CALCIUM: CPT | Performed by: INTERNAL MEDICINE

## 2018-09-07 PROCEDURE — 82962 GLUCOSE BLOOD TEST: CPT

## 2018-09-07 RX ADMIN — INSULIN LISPRO 2 UNITS: 100 INJECTION, SOLUTION INTRAVENOUS; SUBCUTANEOUS at 12:51

## 2018-09-07 RX ADMIN — AMLODIPINE BESYLATE 10 MG: 5 TABLET ORAL at 09:04

## 2018-09-07 RX ADMIN — CARVEDILOL 12.5 MG: 12.5 TABLET, FILM COATED ORAL at 19:12

## 2018-09-07 RX ADMIN — FAMOTIDINE 20 MG: 20 TABLET ORAL at 21:43

## 2018-09-07 RX ADMIN — ASPIRIN 81 MG: 81 TABLET, CHEWABLE ORAL at 09:04

## 2018-09-07 RX ADMIN — FAMOTIDINE 20 MG: 20 TABLET ORAL at 09:04

## 2018-09-07 RX ADMIN — CARVEDILOL 12.5 MG: 12.5 TABLET, FILM COATED ORAL at 09:04

## 2018-09-07 RX ADMIN — ENOXAPARIN SODIUM 40 MG: 40 INJECTION, SOLUTION INTRAVENOUS; SUBCUTANEOUS at 12:51

## 2018-09-07 RX ADMIN — METHYLPREDNISOLONE SODIUM SUCCINATE 30 MG: 40 INJECTION, POWDER, FOR SOLUTION INTRAMUSCULAR; INTRAVENOUS at 09:05

## 2018-09-07 RX ADMIN — LATANOPROST 1 DROP: 50 SOLUTION OPHTHALMIC at 22:14

## 2018-09-07 RX ADMIN — POTASSIUM CHLORIDE 30 MEQ: 20 TABLET, EXTENDED RELEASE ORAL at 09:03

## 2018-09-07 NOTE — PROGRESS NOTES
[facility] Pulmonary, Critical Care, and Sleep Medicine Name: Elia Abebe. MRN: [id number] : 1945 Hospital: [facility] [place] Date: 2018 [facility] Note IMPRESSION:  
· Acute on chronic hypoxemic and hypercapneic respiratory failure, failed bipap, intubated 18, extubated 18 · Blood cx positive strep viridans: ?bacteremia vs skin contaminant · Metabolic encephalopathy from CO2 narcosis, dementia · COPD with home O2-FEV1 of 1.23 or 55% predicted, normal FEV1%, follows with Dr. Renee Almodovar - on Spiriva and advair at home- Recent 6 min walk in May suggested requires home oxygen 2-4 liter · Acute on chronic diastolic CHF · CKD stage 3 with ARF 
· HTN 
· Severe complex sleep apnea - AHI 34/hr RUPERTO and CSA events; on BIPAP2 non compliant - not able to tolerate but best response noted on BIPAP 24/12 in hospital.  
· On trilogy at home per wife. · Hx of ETOH abuse in past (residing in rehab facility) · Chronic leukocytosis, thrombocytopenia with hx of CLL · Anemia · Dementia - severity not known, but advanced per prior notes [facility] 18 Dr Saul Pierson AHI 34/hr RUPERTO and CSA events; O2 desats BIPAP titration - snoring and apneas could not be eliminated with 24/20 pressures; central apneas with higher pressures; poorly tolerated overall LVEF 66-70%, grade 1 DD on echo 18. RECOMMENDATIONS:  
Respiratory: Tolerated bipap 24/12 last night. Mental status normal 
D/w RT to monitor closely on medical floor and make sure he uses bipap Off O2 and use it only if spo2 <92%, since o2 will cause co2 retention. Passed swallow eval with mechanichal soft diet Keep SPO2 >=92%. HOB 30 degree elevation all the time. Aggressive pulmonary toileting. Aspiration precautions. CVS: BP stable.  D/c'ed lasix 20 bid as Na, Creat, BUN increasing on 9/7/18. No more leg edema, resolved. Restart lasix when labs improved. ID:  cxr b/l infiltrates vs chf. Repeat cxr ordered for tomorrow. Sputum cx mod candida (colonized), normal maynor. Blood cx 9/2/18 1/2 positive for strep viridans, likely skin contaminant. Repeat blood cx NGTD. Low grade temp on 9/4. WBC increasing could be due to steroids/CLL. D/c'ed zosyn and vanco on 9/4/18 Completed levaquin for 5 days. Monitor off Abx. Follow repeat blood cx results. Hematology/Oncology: chronic CLL with leucocytosis, thrombocytopenia, anemia. Renal: off lasix since 9/6/18. Na improving since lasix held, but BUN still increasing. Monitor closely. GI/: passed ST eval for mechanical soft diet, tolerating well. Endocrine: Maintain blood glucose. humalog ssi Neurology: mental status back to his baseline. He gets easliy co2 retention, if any AMS, then check ABG and put back on bipap Skin/Wound: no acute issues Electrolytes: Replace electrolytes per ICU electrolyte replacement protocol. IVF: none Nutrition: see emilia Prophylaxis: DVT Prophylaxis with scd, lovenox. GI Prophylaxis with protonix. Restraints: removed. Lines/Tubes: PIV Right femoral cental line placed in ER 9/1/18, d/c'ed 9/5/18 Shanks 9/2/18-9-5/18 Will defer respective systems problem management to primary and other respective consultant and follow patient in ICU with primary and other medical team. 
Further recommendations will be based on the patient's response to recommended treatment and results of the investigation ordered. Quality Care: PPI, DVT prophylaxis, HOB elevated, Infection control all reviewed and addressed. Care of plan d/w RN, RT, MDR. 
 
D/w wife 9/6/18: updated with all medication condition. She admitted that pt did not use trilogy all night on the prior night of admission otherwise he uses regularly.   
 
Moderate complexity decision making was performed during the evaluation of this patient at high risk for decompensation with multiple organ involvement. Transfer to tele. Will follow. Subjective/History of Present Illness:  
 
Patient is a 74 y.o. male with hx of COPD, home O2, chronic hypercapneic resp failure, dementia, CLL, diastolic CHF, HTN, CKD, severe complex sleep apnea who has recurrent hospitalization for hypercarbic respiratory failure. 9/7/18: Tolerated bipap last night No fever chills cough dyspnea Leg edema resolved No overnight issues. No Known Allergies Past Medical History:  
Diagnosis Date  Chronic kidney disease   
 stage 3 kidney disease  Chronic obstructive pulmonary disease ([address]  CLL (chronic lymphocytic leukemia) [facility]  Delirium  Emphysema lung ([facility]  Essential hypertension  Fall  Frequent hospital admissions  Gout  Hyperlipidemia  Hypoxemia  Memory loss  Noncompliance  Oxygen dependent  Pneumonia  Pulmonary nodules  Respiratory failure ([facility]  Risk for falls  Septic shock ([facility]  Sleep apnea  Tobacco abuse  Vitamin D deficiency Past Surgical History:  
Procedure Laterality Date  HX CATARACT REMOVAL    
 HX HERNIA REPAIR    
 HX NEPHROSTOMY Social History Substance Use Topics  Smoking status: Former Smoker  Smokeless tobacco: Never Used  Alcohol use No  
   Comment: former ETOH user; stopped December 2017 History reviewed. No pertinent family history. Prior to Admission medications Medication Sig Start Date End Date Taking? Authorizing Provider OLANZapine (ZYPREXA) 2.5 mg tablet Take 0.5 Tabs by mouth two (2) times a day. 7/25/18   Maksim Giles MD  
amLODIPine (NORVASC) 10 mg tablet Take 1 Tab by mouth daily. 7/25/18   Maksim Giles MD  
hydrALAZINE (APRESOLINE) 25 mg tablet Take 1 Tab by mouth three (3) times daily.  7/25/18   Maksim Giles MD  
predniSONE (STERAPRED) 5 mg dose pack See administration instruction per 5mg dose pack 7/25/18   Schuyler High MD  
ALPRAZolam Estil Massa) 0.5 mg tablet Take 1 Tab by mouth three (3) times daily as needed for Anxiety. Max Daily Amount: 1.5 mg. Indications: anxiety 7/20/18   Schuyler High MD  
carvedilol (COREG) 12.5 mg tablet Take 1 Tab by mouth two (2) times daily (with meals). 7/12/18   Margarito Hernández MD  
docusate sodium (COLACE) 100 mg capsule Take 1 Cap by mouth two (2) times a day for 90 days. 7/12/18 10/10/18  Margarito Hernández MD  
alfuzosin SR (UROXATRAL) 10 mg SR tablet Take 10 mg by mouth daily. Ronni Hernandez MD  
aspirin 81 mg chewable tablet Take 81 mg by mouth daily. Ronni Hernandez MD  
cycloSPORINE (RESTASIS) 0.05 % ophthalmic emulsion Administer 1 Drop to both eyes two (2) times a day. Ronni Hernandez MD  
famotidine (PEPCID) 20 mg tablet Take 20 mg by mouth two (2) times a day. Ronni Hernandez MD  
latanoprost (XALATAN) 0.005 % ophthalmic solution Administer 1 Drop to both eyes nightly. Ronni Hernandez MD  
furosemide (LASIX) 40 mg tablet Take  by mouth daily. Ronni Hernandez MD  
Oxygen     Ronni Hernandez MD  
pravastatin sodium (PRAVASTATIN PO) Take  by mouth. Ronni Henrandez MD  
tiotropium (SPIRIVA WITH HANDIHALER) 18 mcg inhalation capsule Take 1 Cap by inhalation daily. Ronni Hernandez MD  
fluticasone/salmeterol (ADVAIR HFA IN) Take  by inhalation. Ronni Hernandez MD  
ALBUTEROL IN Take  by inhalation. Ronni Hernandez MD  
 
Current Facility-Administered Medications Medication Dose Route Frequency  famotidine (PEPCID) tablet 20 mg  20 mg Oral BID  methylPREDNISolone (PF) (SOLU-MEDROL) injection 30 mg  30 mg IntraVENous Q24H  
 enoxaparin (LOVENOX) injection 40 mg  40 mg SubCUTAneous Q24H  
 insulin lispro (HUMALOG) injection   SubCUTAneous Q6H  
 amLODIPine (NORVASC) tablet 10 mg  10 mg Oral DAILY  aspirin chewable tablet 81 mg  81 mg Oral DAILY  carvedilol (COREG) tablet 12.5 mg  12.5 mg Oral BID WITH MEALS  
  latanoprost (XALATAN) 0.005 % ophthalmic solution 1 Drop  1 Drop Both Eyes QHS Objective:  
Vital Signs:   
Visit Vitals  /70  Pulse 83  Temp 97.9 °F (36.6 °C)  Resp 14  
 Ht 5' 6\" (1.676 m)  Wt 93.3 kg (205 lb 11 oz)  SpO2 96%  BMI 33.2 kg/m2 O2 Device: Room air O2 Flow Rate (L/min): 3 l/min Temp (24hrs), Av.6 °F (36.4 °C), Min:97.3 °F (36.3 °C), Max:97.9 °F (36.6 °C) Intake/Output:  
Last shift:      701 - 1900 In: 480 [P.O.:480] Out: - Last 3 shifts: 1901 -  07 In: 480 [P.O.:480] Out: 101 [Urine:100] Intake/Output Summary (Last 24 hours) at 18 1055 Last data filed at 18 1007 Gross per 24 hour Intake              960 ml Output                1 ml Net              959 ml Last 3 Recorded Weights in this Encounter 18 1826 18 1427 18 0327 Weight: 91.2 kg (201 lb) 91.2 kg (201 lb) 93.3 kg (205 lb 11 oz) Ventilator Settings: 
Mode Rate Tidal Volume Pressure FiO2 PEEP  
CPAP, Pressure support   340 ml  5 cm H2O 24 % 5 cm H20 Peak airway pressure: 15 cm H2O Plateau pressure:    
Tidal volume:   
Minute ventilation: 9.9 l/min SPO2 98 Physical Exam:  
 
General/Neurology: obese. Aao, NAD. Head:   Normocephalic, without obvious abnormality, atraumatic. Lung: Moderate air entry bilateral equal. No rales. No rhonchi. No wheezing. No stridors. No prolongded expiration. No accessory muscle use. Heart:   Regular rate & rhythm. S1 S2 present. No murmur. No JVD. Abdomen:  Soft. NT. ND. +BS. Obese. Extremities:  No pedal edema. No cyanosis. No clubbing. Pulses: 2+ and symmetric in DP. Capillary refill: normal 
Skin:   Color, texture, turgor normal. No rashes or lesions. Data:  
   
Recent Results (from the past 24 hour(s)) GLUCOSE, POC Collection Time: 18 11:37 AM  
Result Value Ref Range Glucose (POC) 156 (H) 70 - 110 mg/dL GLUCOSE, POC Collection Time: 09/06/18  5:24 PM  
Result Value Ref Range Glucose (POC) 133 (H) 70 - 110 mg/dL POTASSIUM Collection Time: 09/06/18  5:45 PM  
Result Value Ref Range Potassium 4.4 3.5 - 5.5 mmol/L  
GLUCOSE, POC Collection Time: 09/07/18 12:41 AM  
Result Value Ref Range Glucose (POC) 98 70 - 110 mg/dL PHOSPHORUS Collection Time: 09/07/18  4:50 AM  
Result Value Ref Range Phosphorus 4.3 2.5 - 4.9 MG/DL MAGNESIUM Collection Time: 09/07/18  4:50 AM  
Result Value Ref Range Magnesium 2.4 1.6 - 2.6 mg/dL CALCIUM, IONIZED Collection Time: 09/07/18  4:50 AM  
Result Value Ref Range Ionized Calcium 0.97 (L) 1.12 - 6.76 MMOL/L  
METABOLIC PANEL, BASIC Collection Time: 09/07/18  4:50 AM  
Result Value Ref Range Sodium 143 136 - 145 mmol/L Potassium 3.3 (L) 3.5 - 5.5 mmol/L Chloride 104 100 - 108 mmol/L  
 CO2 28 21 - 32 mmol/L Anion gap 11 3.0 - 18 mmol/L Glucose 98 74 - 99 mg/dL BUN 55 (H) 7.0 - 18 MG/DL Creatinine 1.68 (H) 0.6 - 1.3 MG/DL  
 BUN/Creatinine ratio 33 (H) 12 - 20 GFR est AA 49 (L) >60 ml/min/1.73m2 GFR est non-AA 40 (L) >60 ml/min/1.73m2 Calcium 9.4 8.5 - 10.1 MG/DL  
CBC WITH AUTOMATED DIFF Collection Time: 09/07/18  4:50 AM  
Result Value Ref Range WBC 16.0 (H) 4.6 - 13.2 K/uL  
 RBC 4.68 (L) 4.70 - 5.50 M/uL  
 HGB 11.0 (L) 13.0 - 16.0 g/dL HCT 36.0 36.0 - 48.0 % MCV 76.9 74.0 - 97.0 FL  
 MCH 23.5 (L) 24.0 - 34.0 PG  
 MCHC 30.6 (L) 31.0 - 37.0 g/dL  
 RDW 17.9 (H) 11.6 - 14.5 % PLATELET 983 085 - 640 K/uL MPV 10.6 9.2 - 11.8 FL  
 NEUTROPHILS 49 42 - 75 % LYMPHOCYTES 47 20 - 51 % MONOCYTES 4 2 - 9 % EOSINOPHILS 0 0 - 5 % BASOPHILS 0 0 - 3 %  
 ABS. NEUTROPHILS 7.9 1.8 - 8.0 K/UL  
 ABS. LYMPHOCYTES 7.5 (H) 0.8 - 3.5 K/UL  
 ABS. MONOCYTES 0.6 0 - 1.0 K/UL  
 ABS. EOSINOPHILS 0.0 0.0 - 0.4 K/UL  
 ABS.  BASOPHILS 0.0 0.0 - 0.1 K/UL  
 RBC COMMENTS ANISOCYTOSIS 
1+ 
    
 RBC COMMENTS POIKILOCYTOSIS 
1+ WBC COMMENTS REACTIVE LYMPHS    
 DF MANUAL    
GLUCOSE, POC Collection Time: 09/07/18  6:42 AM  
Result Value Ref Range Glucose (POC) 102 70 - 110 mg/dL Chemistry Recent Labs  
   09/07/18 
 0450  09/06/18 
 1745  09/06/18 
 0644   09/05/18 
 0400 GLU  98   --   92   --    --   
NA  143   --   146*   --    --   
K  3.3*  4.4  3.6   < >  3.5 CL  104   --   107   --    --   
CO2  28   --   31   --    --   
BUN  55*   --   45*   --    --   
CREA  1.68*   --   1.67*   --    --   
CA  9.4   --   9.2   --    --   
MG  2.4   --   2.2   --   2.1 PHOS  4.3   --   4.4   --   4.8 AGAP  11   --   8   --    --   
BUCR  33*   --   27*   --    --   
 < > = values in this interval not displayed. Lactic Acid Lactic acid Date Value Ref Range Status 09/01/2018 0.7 0.4 - 2.0 MMOL/L Final  
 
No results for input(s): LAC in the last 72 hours. Liver Enzymes Protein, total  
Date Value Ref Range Status 09/01/2018 7.1 6.4 - 8.2 g/dL Final  
 
Albumin Date Value Ref Range Status 09/01/2018 3.1 (L) 3.4 - 5.0 g/dL Final  
 
Globulin Date Value Ref Range Status 09/01/2018 4.0 2.0 - 4.0 g/dL Final  
 
A-G Ratio Date Value Ref Range Status 09/01/2018 0.8 0.8 - 1.7   Final  
 
AST (SGOT) Date Value Ref Range Status 09/01/2018 19 15 - 37 U/L Final  
 
Alk. phosphatase Date Value Ref Range Status 09/01/2018 95 45 - 117 U/L Final  
 
No results for input(s): TP, ALB, GLOB, AGRAT, SGOT, GPT, AP, TBIL in the last 72 hours. No lab exists for component: DBIL  
 
CBC w/Diff Recent Labs  
   09/07/18 
 0450  09/06/18 
 1404 WBC  16.0*  15.6*  
RBC  4.68*  4.61* HGB  11.0*  10.8* HCT  36.0  34.8*  
PLT  159  165 GRANS  49  42 LYMPH  47  47 EOS  0  0 Cardiac Enzymes No results found for: CPK, CK, CKMMB, CKMB, RCK3, CKMBT, CKNDX, CKND1, LOREN, TROPT, TROIQ, APRIL, TROPT, TNIPOC, BNP, BNPP  
 
BNP No results found for: BNP, BNPP, XBNPT  
 
 Coagulation No results for input(s): PTP, INR, APTT in the last 72 hours. No lab exists for component: INREXT, INREXT Thyroid  No results found for: T4, T3U, TSH, TSHEXT, TSHEXT No results found for: T4  
 
Urinalysis Lab Results Component Value Date/Time Color YELLOW 07/16/2018 06:00 PM  
 Appearance CLEAR 07/16/2018 06:00 PM  
 Specific gravity 1.024 07/16/2018 06:00 PM  
 pH (UA) 5.0 07/16/2018 06:00 PM  
 Protein 300 (A) 07/16/2018 06:00 PM  
 Glucose NEGATIVE  07/16/2018 06:00 PM  
 Ketone NEGATIVE  07/16/2018 06:00 PM  
 Bilirubin NEGATIVE  07/16/2018 06:00 PM  
 Urobilinogen 0.2 07/16/2018 06:00 PM  
 Nitrites NEGATIVE  07/16/2018 06:00 PM  
 Leukocyte Esterase NEGATIVE  07/16/2018 06:00 PM  
 Epithelial cells FEW 07/16/2018 06:00 PM  
 Bacteria 1+ (A) 07/16/2018 06:00 PM  
 WBC 0 to 3 07/16/2018 06:00 PM  
 RBC 0 to 3 07/16/2018 06:00 PM  
  
 
Micro  Recent Labs  
   09/05/18 
 6614 CULT  NO GROWTH 2 DAYS Recent Labs  
   09/05/18 
 0951 CULT  NO GROWTH 2 DAYS  
  
 
ABG Recent Labs  
   09/05/18 
 1134  09/05/18 
 0516  09/04/18 
 1231 PHI  7.465*  7.461*  7.480* PCO2I  46.8*  48.2*  46.5*  
PO2I  91  88  86 HCO3I  33.7*  34.4*  34.7*  
FIO2I  24  40  40 PFT Ultrasound LE Doppler ECHO 9/2/18 · Technically difficult study due to patient's body habitus and procedure performed with the patient in a supine position. Definity contrast was given to enhance imaging. · Estimated left ventricular ejection fraction is 66 - 70%. Biplane method used to measure ejection fraction. Left ventricular moderate concentric hypertrophy. Normal left ventricular wall motion, no regional wall motion abnormality noted. Mild (grade 1) left ventricular diastolic dysfunction. · There is a moderate left pleural effusion. CT (Most Recent) (CT chest reviewed by me) Results from Hospital Encounter encounter on 07/16/18 CT HEAD WO CONT Narrative EXAM:  CT of the brain without intravenous contrast. 
 
COMPARISON: CT July 3, 2018. REASON FOR EXAM: \"Decreased alertness; facial droop and ams\". DOSE REDUCTION:  One or more dose reduction techniques were used on this CT: 
automated exposure control, adjustment of the mAs and/or kVp according to 
patient's size, and iterative reconstruction techniques. The specific techniques 
utilized on this CT exam have been documented in the patient's electronic 
medical record. 
 
_______________ FINDINGS:  
 
    IMAGE QUALITY:  The images are mildly degraded by motion artifact. BRAIN AND EXTRA-AXIAL SPACE:   
 
         SUBACUTE TERRITORIAL TRANSCORTICAL INFARCTION:  None detected. MASS:  None detected. HEMORRHAGE:  None. SUBDURAL FLUID COLLECTION:  None detected. HYDROCEPHALUS:  None. REMOTE CORTICAL INFARCTION:  None detected. REMOTE CEREBELLAR INFARCTION:  None detected. MISCELLANEOUS:  Non-applicable. STRIVE (STandards for Reporting Vascular changes on nEuroimaging): 
                   --Lacunes (age-indeterminate) or perivascular spaces of 
\"presumed vascular origin\":  None definite. --Dennard of white matter hypodensity of \"presumed vascular 
origin\":  Low grade. --Degree of brain atrophy (subjective): Low grade. BURDEN OF CALCIFIC INTRACRANIAL ATHEROSCLEROSIS:  None significant. HEENT: 
 
         INCLUDED UPPER ORBITS:  The lenses are replaced bilaterally. INCLUDED UPPER PARANASAL SINUSES:  Predominantly clear. MASTOID AIR CELLS:  Predominantly clear. BONES:  Unremarkable. SCALP:  Unremarkable. 
 
_______________ Impression IMPRESSION: 
 
Low-grade generalized chronic changes, however, no acute findings. _______________ Note: Dr. Shelley Rojas discussed the stroke code results with Dr. Cheryle Michelle at [phone] on 
7/16/2018. XR (Most Recent). CXR  reviewed by me and compared with previous CXR Results from Hospital Encounter encounter on 09/01/18 XR CHEST PORT Narrative EXAM: Chest portable INDICATION: Respiratory failure. Intubated COMPARISON: 9/4/2018. Exams dating back to 7/20/2018. 
 
_______________ FINDINGS: 
 
AP portable chest film was performed. Endotracheal tube and NG tube continue be 
present in good position. Bilateral lower lung field opacities continue to be present and are probably not 
significantly changed. These is been stable over past several exams but are new 
when compared to 9/1/2018. Cannot rule out mild stable blunting of the 
costophrenic angles. Heart is at the upper limits of normal. Pulmonary 
vascularity is normal. No evidence of pneumothorax. 
 
_______________ Impression IMPRESSION: 
 
 
1. Support lines and tubes in good position. 2. Stable chest with bilateral lower lobe areas of atelectasis and/or infiltrate 
with possible small effusions. Elin Turner MD 
9/7/2018

## 2018-09-07 NOTE — ROUTINE PROCESS
TRANSFER - IN REPORT: 
 
Verbal report received from CHEMA Jimenez R.N.(name) on [employer].  being received from ICU(unit) for routine progression of care Report consisted of patients Situation, Background, Assessment and  
Recommendations(SBAR). Information from the following report(s) SBAR, Kardex, Recent Results and Med Rec Status was reviewed with the receiving nurse. Opportunity for questions and clarification was provided. Assessment completed upon patients arrival to unit and care assumed.

## 2018-09-07 NOTE — PROGRESS NOTES
5672 Bedside shift change report received from Cruz Whitehead RN Report included the following information SBAR, Kardex, Intake/Output, MAR, Recent Results, Cardiac Rhythm SR and Alarm Parameters .  
  
0800 Assessment complete 
  
1200 Reassessment complete 1550 Verbal report called to Waleska Chandler RN. RN Report included the following information SBAR, Kardex, Intake/Output, MAR, Recent Results, Cardiac Rhythm SR. 
 
1600 Reassessment complete. 1625  Patient transported to room 359 via bed with monitor.

## 2018-09-07 NOTE — PROGRESS NOTES
Problem: Mobility Impaired (Adult and Pediatric) Goal: *Acute Goals and Plan of Care (Insert Text) Physical Therapy Goals Initiated 9/7/2018 and to be accomplished within 3-7 day(s) 1. Patient will move from supine to sit and sit to supine  in bed with supervision/set-up. 2.  Patient will transfer from bed to chair and chair to bed with supervision/set-up using the least restrictive device. 3.  Patient will perform sit to stand with supervision/set-up. 4.  Patient will ambulate with supervision/set-up for 150 feet with the least restrictive device. physical Therapy TREATMENT Patient: Fredrik Homans. (67 y.o. male) Date: 9/7/2018 Diagnosis: Acute respiratory failure with hypoxia and hypercarbia (HCC) Acute on chronic respiratory failure with hypoxia and hypercapnia (HCC) Precautions: Aspiration, Fall Chart, physical therapy assessment, plan of care and goals were reviewed. ASSESSMENT: 
Pt initated movement toward EOB into partial sitting however then began to refuse and resisted all mobility to assist to EOB. Max encouragement, distraction and cuing to participate further with no success. Will continue to progress as pt tolerates. Progression toward goals: 
[]      Improving appropriately and progressing toward goals 
[]      Improving slowly and progressing toward goals 
[]      Not making progress toward goals and plan of care will be adjusted PLAN: 
Patient continues to benefit from skilled intervention to address the above impairments. Continue treatment per established plan of care. Discharge Recommendations:  Rehab vs Home Health Further Equipment Recommendations for Discharge:  N/A  
 
SUBJECTIVE:  
Patient stated I am not doing anything.  OBJECTIVE DATA SUMMARY:  
Critical Behavior: 
Neurologic State: Alert Orientation Level: Oriented to person, Oriented to time, Disoriented to situation, Disoriented to place Cognition: Follows commands Safety/Judgement: Decreased awareness of environment, Decreased awareness of need for assistance, Decreased awareness of need for safety, Lack of insight into deficits Functional Mobility Training: 
Bed Mobility: 
Supine to Sit: Moderate assistance Sit to Supine: Moderate assistance Balance: 
Sitting: Impaired Sitting - Static: Fair (occasional) Sitting - Dynamic: Fair (occasional) Pain: 
Pain Scale 1: Numeric (0 - 10) Pain Intensity 1: 0 Activity Tolerance:  
Poor Please refer to the flowsheet for vital signs taken during this treatment. After treatment:  
[] Patient left in no apparent distress sitting up in chair 
[x] Patient left in no apparent distress in bed 
[x] Call bell left within reach [x] Nursing notified 
[] Caregiver present 
[] Bed alarm activated Cliff Duckworth Time Calculation: 10 mins

## 2018-09-07 NOTE — PROGRESS NOTES
NUTRITION FOLLOW-UP 
 
RECOMMENDATIONS/PLAN:  
- Continue to encourage PO intake >75% at most meals Monitor labs/lytes, PO intakes, skin integrity, wt, fluid status, BM 
 
 
NUTRITION ASSESSMENT:  
Client Update: 74 yrs old Male with acute resp failure extubated yesterday, gram + cocci in blood. FOOD/NUTRITION INTAKE Diet Order:  [facility] Soft Food Allergies: NKFA Average PO Intake:     
Patient Vitals for the past 100 hrs: 
 % Diet Eaten 09/06/18 1253 80 % 09/05/18 1200 0 % 09/05/18 0800 0 % Pertinent Medications:  [x] Reviewed;   methylprednisolone, protonix, KCl, pepcid Electrolyte Replacement Protocol: [x]K [x]Mg [x]PO4 Insulin:  [x]SSI  []Pre-meal   []Basal    []Drip  []None Cultural/Zoroastrian Food Preferences: None Identified BIOCHEMICAL DATA & MEDICAL TESTS Pertinent Labs:  [x] Reviewed; K-3.3   ANTHROPOMETRICS Height: 5' 6\" (167.6 cm)       Weight: 93.3 kg (205 lb 11 oz) BMI: 33.2 kg/m^2 obese (30%-39.9% BMI) Adm Weight: 201 lbs                Weight change: +4lbs Adjusted Body Weight: 71.3kg NUTRITION-FOCUSED PHYSICAL ASSESSMENT Skin: No PU     
GI: +BM 9/6/18 NUTRITION PRESCRIPTION Calories: 1001-1274kcal/day based on 11-14 Protein: 73-91 g/day based on 0.8-1.0 g/kg CHO: 125-159 g/day based on 50% of total energy TAHLT: 1840-0763 VW/JTP based on 1 kcal/ml    
 
NUTRITION DIAGNOSES:  
1. Inadequate oral intake related to swallowing difficulties as evidence by need for modified diet NUTRITION INTERVENTIONS:  
INTERVENTIONS:        GOALS: 
1. Other:  Continue to encourage PO intake >75% at most meals 1. Continue to encourage PO intake >75% at most meals 
 by next review 4 days LEARNING NEEDS (Diet, Supplementation, Food/Nutrient-Drug Interaction): 
 [x] None Identified   [] Education provided/documented      Identified and patient: [] Declined   [] Was not appropriate/indicated NUTRITION MONITORING /EVALUATION:  
Follow PO intake Monitor wt Monitor renal labs, electrolytes, fluid status Previous Recommendations Implemented: yes Previous Goals Met:  yes -pt was extubated  
   
[x] Participated in Interdisciplinary Rounds   
[x] [facility] Reviewed DISCHARGE NUTRITION RECOMMENDATIONS ADDRESSED:  
  [x] To be determined closer to discharge NUTRITION RISK:           [x] At risk                        [] Not currently at risk Will follow-up per policy. Hakeem Meneses, Ed 9

## 2018-09-07 NOTE — PROGRESS NOTES
Problem: Mobility Impaired (Adult and Pediatric) Goal: *Acute Goals and Plan of Care (Insert Text) Physical Therapy Goals Initiated 9/7/2018 and to be accomplished within 3-7 day(s) 1. Patient will move from supine to sit and sit to supine  in bed with supervision/set-up. 2.  Patient will transfer from bed to chair and chair to bed with supervision/set-up using the least restrictive device. 3.  Patient will perform sit to stand with supervision/set-up. 4.  Patient will ambulate with supervision/set-up for 150 feet with the least restrictive device. Outcome: Progressing Towards Goal 
physical Therapy EVALUATION Patient: Evi Rutledge (67 y.o. male) Date: 9/6/18 Primary Diagnosis: Acute respiratory failure with hypoxia and hypercarbia (HCC) Precautions:   Aspiration, Fall ASSESSMENT : 
Based on the objective data described below, the patient presents with lower extremity weakness, decreased gait quality and endurance, impaired bed mobility and transfers, and overall limitations in functional mobility. Pt more alert and agreeable than previous admissions. Pt performed supine to sit with CGA, sit to stand with CGA. Patient ambulated 100 feet with RW, GB applied, CGA. Pt tolerated session well as evidenced by no c/o increased pain, no lightheadedness or dizziness. Patient would benefit from skilled inpatient physical therapy to address deficits, progress as tolerated to achieve long term goals and allow safe discharge. Patient will benefit from skilled intervention to address the above impairments. Patients rehabilitation potential is considered to be Good Factors which may influence rehabilitation potential include:  
[]         None noted 
[x]         Mental ability/status [x]         Medical condition 
[]         Home/family situation and support systems 
[]         Safety awareness [x]         Pain tolerance/management 
[]         Other: PLAN : 
 Recommendations and Planned Interventions: 
[x]           Bed Mobility Training             [x]    Neuromuscular Re-Education 
[x]           Transfer Training                   []    Orthotic/Prosthetic Training 
[x]           Gait Training                          []    Modalities [x]           Therapeutic Exercises          []    Edema Management/Control 
[x]           Therapeutic Activities            [x]    Patient and Family Training/Education 
[]           Other (comment): Frequency/Duration: Patient will be followed by physical therapy 1-2 times per day to address goals. Discharge Recommendations: Home Health vs rehab Further Equipment Recommendations for Discharge: N/A  
 
SUBJECTIVE:  
Patient stated I am doing ok.  OBJECTIVE DATA SUMMARY:  
 
Past Medical History:  
Diagnosis Date  Chronic kidney disease   
 stage 3 kidney disease  Chronic obstructive pulmonary disease ([facility]  CLL (chronic lymphocytic leukemia) ([facility]  Delirium  Emphysema lung ([facility]  Essential hypertension  Fall  Frequent hospital admissions  Gout  Hyperlipidemia  Hypoxemia  Memory loss  Noncompliance  Oxygen dependent  Pneumonia  Pulmonary nodules  Respiratory failure ([facility] [address] [facility]  Risk for falls  Septic shock ([facility] [address] [facility]  Sleep apnea  Tobacco abuse  Vitamin D deficiency Past Surgical History:  
Procedure Laterality Date  HX CATARACT REMOVAL    
 HX HERNIA REPAIR    
 HX NEPHROSTOMY Barriers to Learning/Limitations: yes;  cognitive and altered mental status (i.e.Sedation, Confusion) Compensate with: Visual Cues, Verbal Cues and Tactile Cues Prior Level of Function/Home Situation: Amb c/RW Home Situation Home Environment: Apartment One/Two Story Residence: One story Living Alone: Yes Support Systems: Spouse/Significant Other/Partner Patient Expects to be Discharged to[de-identified] Private residence Current DME Used/Available at Home: Oxygen, portable Critical Behavior: 
Neurologic State: Alert Orientation Level: Oriented to person;Oriented to place; Disoriented to situation;Disoriented to time Cognition: Follows commands Psychosocial 
Patient Behaviors: Calm; Cooperative Needs Expressed: Emotional;Educational 
Purposeful Interaction: Yes Pt Identified Daily Priority: Clinical issues (comment) Caritas Process: Nurture loving kindness;Establish trust;Teaching/learning; Attend basic human needs;Create healing environment;Supportive expression Caring Interventions: Reassure; Therapeutic modalities Reassure: Therapeutic listening; Informing; Acceptance; Support family;Quiet presence;Caring rounds Therapeutic Modalities: Deep breathing;Guided Imagery channel ([facility] only); Intentional therapeutic touch Strength:   
Grossly impaired, functional 
Range Of Motion: 
Grossly impaired, functional 
Functional Mobility: 
Bed Mobility: 
Supine to/from sit with CGA Transfers: 
Stand to/from sit with CGA Ambulation/Gait Training: 
 Amb 100 ft c/RW, CGA for safety Pain: 
Pain Scale 1: Numeric (0 - 10) Pain Intensity 1: 0 Activity Tolerance:  
Good Please refer to the flowsheet for vital signs taken during this treatment. After treatment:  
[]         Patient left in no apparent distress sitting up in chair 
[x]         Patient left in no apparent distress in bed 
[x]         Call bell left within reach [x]         Nursing notified 
[]         Caregiver present 
[]         Bed alarm activated COMMUNICATION/EDUCATION:  
[x]         Fall prevention education was provided and the patient/caregiver indicated understanding. [x]         Patient/family have participated as able in goal setting and plan of care. [x]         Patient/family agree to work toward stated goals and plan of care. []         Patient understands intent and goals of therapy, but is neutral about his/her participation. []         Patient is unable to participate in goal setting and plan of care. Thank you for this referral. 
Yu Jocelyne Hernandez Complexity: History: MEDIUM  Complexity : 1-2 comorbidities / personal factors will impact the outcome/ POC Exam:LOW Complexity : 1-2 Standardized tests and measures addressing body structure, function, activity limitation and / or participation in recreation  Presentation: LOW Complexity : Stable, uncomplicated  Clinical Decision Making:Low Complexity amb >30 ft c/RW, CGA for safety Overall Complexity:LOW  Mobility  Current  CK= 40-59%   Goal  CJ= 20-39%. The severity rating is based on the Level of Assistance required for Functional Mobility and ADLs.

## 2018-09-07 NOTE — PROGRESS NOTES
@1900 pt taken over awake alert oriented to self and place, denies pain ,remains on room air spo2 92%. Dressing to rt groin dry and intact, SCD's on bilateral feet. Pt incontinent of urine , incontinent care administered, Vital signs fluctuates. Relatives at bedside. Assessment done and documented in relevant flow sheets. Nursing management continues. @2100 pt incontinent of both urine and stool CHG bath completed care continues. @2300 pt watching TV , requested to speak to wife, wife was called and pt spoke to her on phone. No new developments nursing observation continues. @0130 pt on Bipap sleeping comfortably. @0330 no changes. @0530 pt awake incontinent care administered full CHG Bath administered nursing care continues. @0730 no changes. @0 740 Bedside and Verbal shift change report given to Maria Luisa Xie (oncoming nurse) by Sofía Vidal (offgoing nurse). Report included the following information SBAR, Kardex, Intake/Output, MAR, Recent Results and Med Rec Status. Alarm parameters reviewed, on and audible Appropriate for patient clinical condition

## 2018-09-07 NOTE — PROGRESS NOTES
conducted a Follow up consultation and Spiritual Assessment for Jenny Baltazar, who is a 74 y. o.,male. Patient's son was at the bedside. When I spoke to the patient and re-introduced myself he stated - \"You get out of here and never come back! \"  When I stated that I would be happy to follow his wishes and asked if he needed anything before I left. He replied \"Just get the hell out. \" After I left the son fussed at this dad and then came out of the room and apologized profusely. I told him I wasn't bothered by it but he was very upset at how his dad had spoken to me. Chaplains will not visit again unless request by patient or family. [address] [place] [address]. Board Certified [address] [place] [address] [phone] - Office

## 2018-09-07 NOTE — PROGRESS NOTES
Hospitalist Progress Note Patient: Mich Bang MRN: [id number]  CSN: [id number] YOB: 1945  Age: 74 y.o. Sex: male DOA: 9/1/2018 LOS:  LOS: 5 days Chief Complaint: Ac resp failure Assessment/Plan Acute on chronic respiratory failure - secondary to COPD, diastolic CHF, RUPERTO that's complex-on trilogy at home Continue on steroids, weaned to once daily now,  inhaled BD.  bipap at night Was non compliant with BiPAP at home 
  
   
ID - CXR with infiltrate vs edema 
now completed treatment with levaquin Bacteremia - blood cultures 1/2 GPC in chains and clusters. Likely contaminant 
  
resp cultures with candida Follow repeat blood cultures. These are negative to date 
   
Acute on chronic diastolic CHF - continue with lasix.  
   
CLL-stable wbc count at this time 
   
Renal 
CKD - stage 3 
   
Endocrine -  Follow FSG, on SSI Neuro-mild dementia 
   
Speech swallow completed and diet placed Transfer out to tele unit PT consulted 
  
Labs reviewed-K repeltion DVT proph-lovenox Expect 2-3 more days and possibly rehab on d/c or home with home health Disposition : 
Patient Active Problem List  
Diagnosis Code  Hypercapnia R06.89  
 Acute on chronic respiratory failure with hypoxia and hypercapnia ([facility]) J96.21, J96.22  
 CLL (chronic lymphocytic leukemia) ([facility]) C91.90  Emphysema lung ([facility] [address] J43.9  Elevated serum creatinine R79.89  
 COPD (chronic obstructive pulmonary disease) ([facility]) J44.9  RUPERTO (obstructive sleep apnea) G47.33  
 Stage 3 chronic kidney disease N18.3  Dementia F03.90  
 HTN (hypertension) I10  
 Altered mental status R41.82  Respiratory distress R06.03  
 Hypoxia R09.02  
 CO2 narcosis R06.89  
 Hypernatremia E87.0  Acute respiratory failure with hypoxia and hypercarbia ([facility]) J96.01, J96.02 Subjective: 
 
Denies new issue overnight \"Im fine\" Denies CP, SOB, prod cough Review of systems: Constitutional: denies fevers, chills, myalgias Cardiovascular: denies  palpitations Gastrointestinal: denies nausea, vomiting, diarrhea Vital signs/Intake and Output: 
Visit Vitals  BP (!) 73/52  Pulse 84  Temp 97.5 °F (36.4 °C)  Resp (!) 33  
 Ht 5' 6\" (1.676 m)  Wt 93.3 kg (205 lb 11 oz)  SpO2 92%  BMI 33.2 kg/m2 Current Shift:  09/07 0701 - 09/07 1900 In: 480 [P.O.:480] Out: - Last three shifts:  09/05 1901 - 09/07 0700 In: 480 [P.O.:480] Out: 101 [Urine:100] Exam: 
 
General: eldelry frail BM, alert, NAD, OX2 Head/Neck: NCAT, supple, No masses, No lymphadenopathy CVS:Regular rate and rhythm, no M/R/G, S1/S2 heard, no thrill Lungs:Clear to auscultation bilaterally, no wheezes, rhonchi, or rales Abdomen: Soft, Nontender, No distention, Normal Bowel sounds, No hepatomegaly Extremities: trace ankle edema LE BL 
Neuro:grossly normal , follows commands Psych:appropriate Labs: Results:  
   
Chemistry Recent Labs  
   09/07/18 
 0450  09/06/18 
 1745  09/06/18 
 4538 GLU  98   --   92 NA  143   --   146*  
K  3.3*  4.4  3.6 CL  104   --   107 CO2  28   --   31  
BUN  55*   --   45* CREA  1.68*   --   1.67* CA  9.4   --   9.2 AGAP  11   --   8  
BUCR  33*   --   27* CBC w/Diff Recent Labs  
   09/07/18 
 0450  09/06/18 
 8249 WBC  16.0*  15.6*  
RBC  4.68*  4.61* HGB  11.0*  10.8* HCT  36.0  34.8*  
PLT  159  165 GRANS  49  42 LYMPH  47  47 EOS  0  0 Cardiac Enzymes No results for input(s): CPK, CKND1, LOREN in the last 72 hours. No lab exists for component: Carol Beltran Coagulation No results for input(s): PTP, INR, APTT in the last 72 hours. No lab exists for component: INREXT Lipid Panel No results found for: CHOL, CHOLPOCT, CHOLX, CHLST, CHOLV, 479068, HDL, LDL, LDLC, DLDLP, 495877, VLDLC, VLDL, TGLX, TRIGL, TRIGP, TGLPOCT, CHHD, CHHDX  
BNP No results for input(s): BNPP in the last 72 hours. Liver Enzymes No results for input(s): TP, ALB, TBIL, AP, SGOT, GPT in the last 72 hours. No lab exists for component: DBIL Thyroid Studies No results found for: T4, T3U, TSH, TSHEXT Procedures/imaging: see electronic medical records for all procedures/Xrays and details which were not copied into this note but were reviewed prior to creation of Plan Lynda Samuels MD

## 2018-09-07 NOTE — PROGRESS NOTES
Chart reviewed spoke with pt after ambulating in halls with PT/OT, cm explained role, was able to answer questions mostly appropriate aware of person and place did inform cm he lives at home alone  wife does come over to check on him and takes him to his appointments, pt does require assistance with ADL's and medication management, pt has [facility] with home trilogy thru [employer] and [facility] health services when PT,OT recommendations finalized will need to discuss plan with patients wife Virgilio Ort for safety cm recommends someone be with pt at all times, if wife decides to have pt return home alone cm recommend APS follow up with safety home visit.

## 2018-09-07 NOTE — ROUTINE PROCESS
Bedside and Verbal shift change report given to NATALIYA Weinberg R.N. (oncoming nurse) by KEYLA Becerra R.N. (offgoing nurse). Report included the following information SBAR, Kardex, MAR, Recent Results and Med Rec Status.

## 2018-09-07 NOTE — PROGRESS NOTES
Speech Therapy Note: 
 
Could not progress with ST treatment because patient :  
 
[ ] was unresponsive [X] deferred due to: on BiPAP 
[ ] was off the unit [ ] on hold 
[ ] NPO for procedure SLP will re-attempt treatment later this day or as schedule permits. Juan Taylor M.S., CF-SLP Speech Language Pathologist

## 2018-09-07 NOTE — PROGRESS NOTES
Cm spoke with wife Tab Blackmon via phone conversation discussed d/c planning, wife stated she is agreeable to in patient rehab services stating \"If you can find a place that will accept him \" [facility],wife made aware that if no accepting facilities of her local choices pt will need to return back home, wife agreeable plans to use home health services, pt was last with [facility] home health wife would like to select another agency if needed,cm informed wife that agency list will be placed in patients personal belongings bag for her to [address] [place] [address] wife requested [facility] be faxed to [facility] which she has applied for,at this time pt remains in ICU for care.

## 2018-09-07 NOTE — PROGRESS NOTES
Problem: Falls - Risk of 
Goal: *Absence of Falls Document Lb Tolliver Fall Risk and appropriate interventions in the flowsheet. Outcome: Progressing Towards Goal 
Fall Risk Interventions: 
  
 
Mentation Interventions: Adequate sleep, hydration, pain control Medication Interventions: Bed/chair exit alarm Elimination Interventions: Call light in reach Problem: Pressure Injury - Risk of 
Goal: *Prevention of pressure injury Document Percy Scale and appropriate interventions in the flowsheet. Outcome: Progressing Towards Goal 
Pressure Injury Interventions: 
Sensory Interventions: Assess changes in LOC Moisture Interventions: Absorbent underpads Activity Interventions: Increase time out of bed Mobility Interventions: Float heels Nutrition Interventions: Document food/fluid/supplement intake Friction and Shear Interventions: Apply protective barrier, creams and emollients

## 2018-09-08 ENCOUNTER — APPOINTMENT (OUTPATIENT)
Dept: GENERAL RADIOLOGY | Age: 73
DRG: 208 | End: 2018-09-08
Attending: INTERNAL MEDICINE
Payer: MEDICARE

## 2018-09-08 LAB
ANION GAP SERPL CALC-SCNC: 8 MMOL/L (ref 3–18)
BACTERIA SPEC CULT: NORMAL
BASOPHILS # BLD: 0.1 K/UL (ref 0–0.1)
BASOPHILS NFR BLD: 0 % (ref 0–2)
BUN SERPL-MCNC: 44 MG/DL (ref 7–18)
BUN/CREAT SERPL: 31 (ref 12–20)
CA-I SERPL-SCNC: 1.11 MMOL/L (ref 1.12–1.32)
CALCIUM SERPL-MCNC: 8.9 MG/DL (ref 8.5–10.1)
CHLORIDE SERPL-SCNC: 106 MMOL/L (ref 100–108)
CO2 SERPL-SCNC: 27 MMOL/L (ref 21–32)
CREAT SERPL-MCNC: 1.43 MG/DL (ref 0.6–1.3)
DIFFERENTIAL METHOD BLD: ABNORMAL
EOSINOPHIL # BLD: 0.1 K/UL (ref 0–0.4)
EOSINOPHIL NFR BLD: 0 % (ref 0–5)
ERYTHROCYTE [DISTWIDTH] IN BLOOD BY AUTOMATED COUNT: 17.5 % (ref 11.6–14.5)
GLUCOSE BLD STRIP.AUTO-MCNC: 109 MG/DL (ref 70–110)
GLUCOSE BLD STRIP.AUTO-MCNC: 93 MG/DL (ref 70–110)
GLUCOSE SERPL-MCNC: 90 MG/DL (ref 74–99)
HCT VFR BLD AUTO: 31.9 % (ref 36–48)
HGB BLD-MCNC: 9.6 G/DL (ref 13–16)
LYMPHOCYTES # BLD: 9.3 K/UL (ref 0.9–3.6)
LYMPHOCYTES NFR BLD: 48 % (ref 21–52)
MAGNESIUM SERPL-MCNC: 2.1 MG/DL (ref 1.6–2.6)
MCH RBC QN AUTO: 22.8 PG (ref 24–34)
MCHC RBC AUTO-ENTMCNC: 30.1 G/DL (ref 31–37)
MCV RBC AUTO: 75.8 FL (ref 74–97)
MONOCYTES # BLD: 2 K/UL (ref 0.05–1.2)
MONOCYTES NFR BLD: 10 % (ref 3–10)
NEUTS SEG # BLD: 8.3 K/UL (ref 1.8–8)
NEUTS SEG NFR BLD: 42 % (ref 40–73)
PHOSPHATE SERPL-MCNC: 3.9 MG/DL (ref 2.5–4.9)
PLATELET # BLD AUTO: 140 K/UL (ref 135–420)
PMV BLD AUTO: 10.3 FL (ref 9.2–11.8)
POTASSIUM SERPL-SCNC: 3.9 MMOL/L (ref 3.5–5.5)
RBC # BLD AUTO: 4.21 M/UL (ref 4.7–5.5)
SERVICE CMNT-IMP: NORMAL
SODIUM SERPL-SCNC: 141 MMOL/L (ref 136–145)
WBC # BLD AUTO: 19.7 K/UL (ref 4.6–13.2)

## 2018-09-08 PROCEDURE — 36415 COLL VENOUS BLD VENIPUNCTURE: CPT | Performed by: INTERNAL MEDICINE

## 2018-09-08 PROCEDURE — 85025 COMPLETE CBC W/AUTO DIFF WBC: CPT | Performed by: INTERNAL MEDICINE

## 2018-09-08 PROCEDURE — 65660000000 HC RM CCU STEPDOWN

## 2018-09-08 PROCEDURE — 71045 X-RAY EXAM CHEST 1 VIEW: CPT

## 2018-09-08 PROCEDURE — 97530 THERAPEUTIC ACTIVITIES: CPT

## 2018-09-08 PROCEDURE — 74011250636 HC RX REV CODE- 250/636: Performed by: INTERNAL MEDICINE

## 2018-09-08 PROCEDURE — 83735 ASSAY OF MAGNESIUM: CPT | Performed by: INTERNAL MEDICINE

## 2018-09-08 PROCEDURE — 82962 GLUCOSE BLOOD TEST: CPT

## 2018-09-08 PROCEDURE — 97116 GAIT TRAINING THERAPY: CPT

## 2018-09-08 PROCEDURE — 74011250637 HC RX REV CODE- 250/637: Performed by: HOSPITALIST

## 2018-09-08 PROCEDURE — 77030032490 HC SLV COMPR SCD KNE COVD -B

## 2018-09-08 PROCEDURE — 80048 BASIC METABOLIC PNL TOTAL CA: CPT | Performed by: INTERNAL MEDICINE

## 2018-09-08 PROCEDURE — 77010033678 HC OXYGEN DAILY

## 2018-09-08 PROCEDURE — 84100 ASSAY OF PHOSPHORUS: CPT | Performed by: INTERNAL MEDICINE

## 2018-09-08 PROCEDURE — 82330 ASSAY OF CALCIUM: CPT | Performed by: INTERNAL MEDICINE

## 2018-09-08 RX ADMIN — CARVEDILOL 12.5 MG: 12.5 TABLET, FILM COATED ORAL at 17:18

## 2018-09-08 RX ADMIN — METHYLPREDNISOLONE SODIUM SUCCINATE 20 MG: 40 INJECTION, POWDER, FOR SOLUTION INTRAMUSCULAR; INTRAVENOUS at 10:00

## 2018-09-08 RX ADMIN — FAMOTIDINE 20 MG: 20 TABLET ORAL at 22:34

## 2018-09-08 RX ADMIN — LATANOPROST 1 DROP: 50 SOLUTION OPHTHALMIC at 22:39

## 2018-09-08 RX ADMIN — AMLODIPINE BESYLATE 10 MG: 5 TABLET ORAL at 10:00

## 2018-09-08 RX ADMIN — ENOXAPARIN SODIUM 40 MG: 40 INJECTION, SOLUTION INTRAVENOUS; SUBCUTANEOUS at 12:36

## 2018-09-08 RX ADMIN — CARVEDILOL 12.5 MG: 12.5 TABLET, FILM COATED ORAL at 10:00

## 2018-09-08 RX ADMIN — ASPIRIN 81 MG: 81 TABLET, CHEWABLE ORAL at 09:59

## 2018-09-08 RX ADMIN — FAMOTIDINE 20 MG: 20 TABLET ORAL at 10:00

## 2018-09-08 NOTE — ROUTINE PROCESS
Bedside and Verbal shift change report given to CHEMA Moore RN (oncoming nurse) by Cydney Worthy (offgoing nurse). Report included the following information SBAR, Kardex, Intake/Output and MAR.

## 2018-09-08 NOTE — PROGRESS NOTES
Problem: Mobility Impaired (Adult and Pediatric) Goal: *Acute Goals and Plan of Care (Insert Text) Physical Therapy Goals Initiated 9/7/2018 and to be accomplished within 3-7 day(s) 1. Patient will move from supine to sit and sit to supine  in bed with supervision/set-up. 2.  Patient will transfer from bed to chair and chair to bed with supervision/set-up using the least restrictive device. 3.  Patient will perform sit to stand with supervision/set-up. 4.  Patient will ambulate with supervision/set-up for 150 feet with the least restrictive device. Outcome: Progressing Towards Goal 
physical Therapy TREATMENT Patient: Keira Sandoval (67 y.o. male) Date: 9/8/2018 Diagnosis: Acute respiratory failure with hypoxia and hypercarbia (HCC) Acute on chronic respiratory failure with hypoxia and hypercapnia (HCC) Precautions: Aspiration, Fall Chart, physical therapy assessment, plan of care and goals were reviewed. ASSESSMENT: 
Pt with delayed reaction to attempts to initiate mobility, but able to resume mccain amb. No significant amb dysfunctions observe, but close supervision is required, due to altered mentation. Safe for [facility] supervision. Progression toward goals: 
[]      Improving appropriately and progressing toward goals [x]      Improving slowly and progressing toward goals 
[]      Not making progress toward goals and plan of care will be adjusted PLAN: 
Patient continues to benefit from skilled intervention to address the above impairments. Continue treatment per established plan of care. Discharge Recommendations:  LTCF Further Equipment Recommendations for Discharge:  rolling walker SUBJECTIVE:  
Patient stated Aight.  OBJECTIVE DATA SUMMARY:  
Critical Behavior: 
Neurologic State: Alert, Irritable Orientation Level: Disoriented to place Cognition: Follows commands, Recognition of people/places Safety/Judgement: Decreased awareness of environment, Decreased awareness of need for assistance, Decreased awareness of need for safety, Lack of insight into deficits Functional Mobility Training: 
Bed Mobility: 
Rolling: Contact guard assistance Supine to Sit: Contact guard assistance Sit to Supine: Contact guard assistance Transfers: 
Sit to Stand: Stand-by assistance Stand to Sit: Supervision Balance: 
Sitting: Impaired Sitting - Static: Good (unsupported) Sitting - Dynamic: Fair (occasional) Standing: Impaired; With support Standing - Static: Good;Fair 
Standing - Dynamic : Fair Ambulation/Gait Training: 
Distance (ft): 350 Feet (ft) Assistive Device: Gait belt;Walker, rolling Ambulation - Level of Assistance: Contact guard assistance Gait Abnormalities: Decreased step clearance Base of Support: Widened Speed/Carie: Slow Step Length: Right shortened;Left shortened Pain: 
Pain Scale 1: Visual 
Pain Intensity 1: 0 Pain out: 0 Activity Tolerance:  
Good Please refer to the flowsheet for vital signs taken during this treatment. After treatment:  
[] Patient left in no apparent distress sitting up in chair 
[x] Patient left in no apparent distress in bed 
[x] Call bell left within reach [x] Nursing notified 
[] Caregiver present 
[] Bed alarm activated Pricila Alaniz PTA Time Calculation: 35 mins

## 2018-09-08 NOTE — ROUTINE PROCESS
1900-Bedside shift change report given to Fernando Mascorro RN (oncoming nurse) by Kenan Catalan RN (offgoing nurse). Report included the following information SBAR, Kardex and MAR. Patient in bed alert, Bed alarm on and in lowest position. 2100- Respiratory in room to put patient on c-pap machine, tolerated well. 0000-Bedside shift change report given to Cain Primrose (oncoming nurse) by Fernando Mascorro RN (offgoing nurse). Report included the following information SBAR, Kardex and MAR. Patient awake and alert, bed in lowest position. Bed alarm on.

## 2018-09-08 NOTE — PROGRESS NOTES
[facility] Pulmonary, Critical Care, and Sleep Medicine Name: Sherin Khan. MRN: [id number] : 1945 Hospital: [facility] [place] Date: 2018 [facility] Note IMPRESSION:  
· Acute on chronic hypoxemic and hypercapneic respiratory failure, failed bipap, intubated 18, extubated 18 · Blood cx positive strep viridans: ?bacteremia vs skin contaminant · Metabolic encephalopathy from CO2 narcosis, dementia · COPD with home O2-FEV1 of 1.23 or 55% predicted, normal FEV1%, follows with Dr. Sharath Macedo - on Spiriva and advair at home- Recent 6 min walk in May suggested requires home oxygen 2-4 liter · Acute on chronic diastolic CHF · CKD stage 3 with ARF 
· HTN 
· Severe complex sleep apnea - AHI 34/hr RUPERTO and CSA events; on BIPAP2 non compliant - not able to tolerate but best response noted on BIPAP 24/12 in hospital.  
· On trilogy at home per wife. · Hx of ETOH abuse in past (residing in rehab facility) · Chronic leukocytosis, thrombocytopenia with hx of CLL · Anemia · Dementia - severity not known, but advanced per prior notes [facility] 18 Dr Mavis Duke AHI 34/hr RUPERTO and CSA events; O2 desats BIPAP titration - snoring and apneas could not be eliminated with 24/20 pressures; central apneas with higher pressures; poorly tolerated overall LVEF 66-70%, grade 1 DD on echo 18. RECOMMENDATIONS:  
Respiratory: Tolerated bipap 24/12 last night. Mental status normal 
D/w RT to monitor closely on medical floor and make sure he uses bipap Off O2 and use it only if spo2 <92%, since o2 will cause co2 retention. Passed swallow eval with mechanichal soft diet Keep SPO2 >=92%. HOB 30 degree elevation all the time. Aggressive pulmonary toileting. Aspiration precautions. CVS: BP stable.  D/c'ed lasix 20 bid as Na, Creat, BUN increasing on 9/7/18. No more leg edema, resolved. Restart lasix when labs improved. ID:  cxr b/l infiltrates vs chf. RPT xray marked improvement Sputum cx mod candida (colonized), normal maynor. Blood cx 9/2/18 1/2 positive for strep viridans, likely skin contaminant. Repeat blood cx NGTD. Low grade temp on 9/4. WBC increasing could be due to steroids/CLL. D/c'ed zosyn and vanco on 9/4/18 Completed levaquin for 5 days. Monitor off Abx. Follow repeat blood cx results. Hematology/Oncology: chronic CLL with leucocytosis, thrombocytopenia, anemia. Renal: off lasix since 9/6/18. NA BUN and CR is better Overall doing well post ICU transfer Will see tomorrow as needed Pulmonary will follow on Monday Please call if things change and will follow uo I spent 35 min of time excluding procedure, with face to face evaluation  >50% on complex decision making, coordination of care and counseling patient. Subjective/History of Present Illness:  
 
Patient is a 74 y.o. male with hx of COPD, home O2, chronic hypercapneic resp failure, dementia, CLL, diastolic CHF, HTN, CKD, severe complex sleep apnea who has recurrent hospitalization for hypercarbic respiratory failure. 9/8/18: 
Overall doing well No new issue No cough no sob Sitting up in bed without any issues Tolerated bipap last night No fever chills cough dyspnea Leg edema resolved No overnight issues. Prior to Admission medications Medication Sig Start Date End Date Taking? Authorizing Provider OLANZapine (ZYPREXA) 2.5 mg tablet Take 0.5 Tabs by mouth two (2) times a day. 7/25/18   Elvira Finch MD  
amLODIPine (NORVASC) 10 mg tablet Take 1 Tab by mouth daily. 7/25/18   Elvira Finch MD  
hydrALAZINE (APRESOLINE) 25 mg tablet Take 1 Tab by mouth three (3) times daily.  7/25/18   Elvira Finch MD  
predniSONE (STERAPRED) 5 mg dose pack See administration instruction per 5mg dose pack 7/25/18   Elvira Finch MD  
 ALPRAZolam (XANAX) 0.5 mg tablet Take 1 Tab by mouth three (3) times daily as needed for Anxiety. Max Daily Amount: 1.5 mg. Indications: anxiety 7/20/18   Michelle German MD  
carvedilol (COREG) 12.5 mg tablet Take 1 Tab by mouth two (2) times daily (with meals). 7/12/18   Tracy Ramos MD  
docusate sodium (COLACE) 100 mg capsule Take 1 Cap by mouth two (2) times a day for 90 days. 7/12/18 10/10/18  Tracy Ramos MD  
alfuzosin SR (UROXATRAL) 10 mg SR tablet Take 10 mg by mouth daily. Ronni Hernandez MD  
aspirin 81 mg chewable tablet Take 81 mg by mouth daily. Ronni Hernandez MD  
cycloSPORINE (RESTASIS) 0.05 % ophthalmic emulsion Administer 1 Drop to both eyes two (2) times a day. Ronni Hernandez MD  
famotidine (PEPCID) 20 mg tablet Take 20 mg by mouth two (2) times a day. Ronni Hernandez MD  
latanoprost (XALATAN) 0.005 % ophthalmic solution Administer 1 Drop to both eyes nightly. Ronni Hernandez MD  
furosemide (LASIX) 40 mg tablet Take  by mouth daily. Ronni Hernandez MD  
Oxygen     Ronni Hernandez MD  
pravastatin sodium (PRAVASTATIN PO) Take  by mouth. Ronni Hernandez MD  
tiotropium (SPIRIVA WITH HANDIHALER) 18 mcg inhalation capsule Take 1 Cap by inhalation daily. Ronni Hernandez MD  
fluticasone/salmeterol (ADVAIR HFA IN) Take  by inhalation. Ronni Hernandez MD  
ALBUTEROL IN Take  by inhalation. Ronni Hernandez MD  
 
Current Facility-Administered Medications Medication Dose Route Frequency  methylPREDNISolone (PF) (SOLU-MEDROL) injection 20 mg  20 mg IntraVENous Q24H  
 famotidine (PEPCID) tablet 20 mg  20 mg Oral BID  enoxaparin (LOVENOX) injection 40 mg  40 mg SubCUTAneous Q24H  
 insulin lispro (HUMALOG) injection   SubCUTAneous Q6H  
 amLODIPine (NORVASC) tablet 10 mg  10 mg Oral DAILY  aspirin chewable tablet 81 mg  81 mg Oral DAILY  carvedilol (COREG) tablet 12.5 mg  12.5 mg Oral BID WITH MEALS  latanoprost (XALATAN) 0.005 % ophthalmic solution 1 Drop  1 Drop Both Eyes QHS Objective:  
Vital Signs:   
Visit Vitals  /59 (BP 1 Location: Right arm, BP Patient Position: At rest)  Pulse 75  Temp 98 °F (36.7 °C)  Resp 18  Ht 5' 6\" (1.676 m)  Wt 91.8 kg (202 lb 6.4 oz)  SpO2 100%  BMI 32.67 kg/m2 O2 Device: BIPAP  
O2 Flow Rate (L/min): 3 l/min Temp (24hrs), Av.8 °F (36.6 °C), Min:97.5 °F (36.4 °C), Max:98 °F (36.7 °C) Intake/Output:  
Last shift:       07 - 1900 In: 240 [P.O.:240] Out: 0 Last 3 shifts:  190 -  0700 In: 56 [P.O.:630] Out: 50 [Urine:50] Intake/Output Summary (Last 24 hours) at 18 1427 Last data filed at 18 1337 Gross per 24 hour Intake              390 ml Output               50 ml Net              340 ml Last 3 Recorded Weights in this Encounter 18 1427 18 0327 18 0015 Weight: 91.2 kg (201 lb) 93.3 kg (205 lb 11 oz) 91.8 kg (202 lb 6.4 oz) Ventilator Settings: 
Mode Rate Tidal Volume Pressure FiO2 PEEP  
CPAP, Pressure support   340 ml  5 cm H2O 24 % 5 cm H20 Peak airway pressure: 15 cm H2O Plateau pressure:    
Tidal volume:   
Minute ventilation: 10.6 l/min SPO2 98 Physical Exam:  
 
General/Neurology: obese. Aao, NAD. Head:   Normocephalic, without obvious abnormality, atraumatic. Lung: Moderate air entry bilateral equal. No rales. No rhonchi. No wheezing. No stridors. No prolongded expiration. No accessory muscle use. Heart:   Regular rate & rhythm. S1 S2 present. No murmur. No JVD. Abdomen:  Soft. NT. ND. +BS. Obese. Extremities:  No pedal edema. No cyanosis. No clubbing. Pulses: 2+ and symmetric in DP. Capillary refill: normal 
Skin:   Color, texture, turgor normal. No rashes or lesions. Data:  
   
Recent Results (from the past 24 hour(s)) GLUCOSE, POC Collection Time: 18  7:12 PM  
Result Value Ref Range Glucose (POC) 145 (H) 70 - 110 mg/dL GLUCOSE, POC  
 Collection Time: 09/07/18  9:19 PM  
Result Value Ref Range Glucose (POC) 145 (H) 70 - 110 mg/dL PHOSPHORUS Collection Time: 09/08/18  3:12 AM  
Result Value Ref Range Phosphorus 3.9 2.5 - 4.9 MG/DL MAGNESIUM Collection Time: 09/08/18  3:12 AM  
Result Value Ref Range Magnesium 2.1 1.6 - 2.6 mg/dL CALCIUM, IONIZED Collection Time: 09/08/18  3:12 AM  
Result Value Ref Range Ionized Calcium 1.11 (L) 1.12 - 0.19 MMOL/L  
METABOLIC PANEL, BASIC Collection Time: 09/08/18  3:12 AM  
Result Value Ref Range Sodium 141 136 - 145 mmol/L Potassium 3.9 3.5 - 5.5 mmol/L Chloride 106 100 - 108 mmol/L  
 CO2 27 21 - 32 mmol/L Anion gap 8 3.0 - 18 mmol/L Glucose 90 74 - 99 mg/dL BUN 44 (H) 7.0 - 18 MG/DL Creatinine 1.43 (H) 0.6 - 1.3 MG/DL  
 BUN/Creatinine ratio 31 (H) 12 - 20 GFR est AA 58 (L) >60 ml/min/1.73m2 GFR est non-AA 48 (L) >60 ml/min/1.73m2 Calcium 8.9 8.5 - 10.1 MG/DL  
CBC WITH AUTOMATED DIFF Collection Time: 09/08/18  3:12 AM  
Result Value Ref Range WBC 19.7 (H) 4.6 - 13.2 K/uL  
 RBC 4.21 (L) 4.70 - 5.50 M/uL HGB 9.6 (L) 13.0 - 16.0 g/dL HCT 31.9 (L) 36.0 - 48.0 % MCV 75.8 74.0 - 97.0 FL  
 MCH 22.8 (L) 24.0 - 34.0 PG  
 MCHC 30.1 (L) 31.0 - 37.0 g/dL  
 RDW 17.5 (H) 11.6 - 14.5 % PLATELET 030 604 - 678 K/uL MPV 10.3 9.2 - 11.8 FL  
 NEUTROPHILS 42 40 - 73 % LYMPHOCYTES 48 21 - 52 % MONOCYTES 10 3 - 10 % EOSINOPHILS 0 0 - 5 % BASOPHILS 0 0 - 2 %  
 ABS. NEUTROPHILS 8.3 (H) 1.8 - 8.0 K/UL  
 ABS. LYMPHOCYTES 9.3 (H) 0.9 - 3.6 K/UL  
 ABS. MONOCYTES 2.0 (H) 0.05 - 1.2 K/UL  
 ABS. EOSINOPHILS 0.1 0.0 - 0.4 K/UL  
 ABS. BASOPHILS 0.1 0.0 - 0.1 K/UL  
 DF AUTOMATED    
GLUCOSE, POC Collection Time: 09/08/18  6:10 AM  
Result Value Ref Range Glucose (POC) 93 70 - 110 mg/dL GLUCOSE, POC Collection Time: 09/08/18 12:01 PM  
Result Value Ref Range Glucose (POC) 109 70 - 110 mg/dL Chemistry Recent Labs 09/08/18 
 3059  09/07/18 0361 [id number]  09/07/18 
 0450   09/06/18 
 7719 GLU  90   --   98   --   92 NA  141   --   143   --   146*  
K  3.9  4.6  3.3*   < >  3.6 CL  106   --   104   --   107 CO2  27   --   28   --   31  
BUN  44*   --   55*   --   45* CREA  1.43*   --   1.68*   --   1.67* CA  8.9   --   9.4   --   9.2 MG  2.1   --   2.4   --   2.2 PHOS  3.9   --   4.3   --   4.4 AGAP  8   --   11   --   8  
BUCR  31*   --   33*   --   27*  
 < > = values in this interval not displayed. Lactic Acid Lactic acid Date Value Ref Range Status 09/01/2018 0.7 0.4 - 2.0 MMOL/L Final  
 
No results for input(s): LAC in the last 72 hours. Liver Enzymes Protein, total  
Date Value Ref Range Status 09/01/2018 7.1 6.4 - 8.2 g/dL Final  
 
Albumin Date Value Ref Range Status 09/01/2018 3.1 (L) 3.4 - 5.0 g/dL Final  
 
Globulin Date Value Ref Range Status 09/01/2018 4.0 2.0 - 4.0 g/dL Final  
 
A-G Ratio Date Value Ref Range Status 09/01/2018 0.8 0.8 - 1.7   Final  
 
AST (SGOT) Date Value Ref Range Status 09/01/2018 19 15 - 37 U/L Final  
 
Alk. phosphatase Date Value Ref Range Status 09/01/2018 95 45 - 117 U/L Final  
 
No results for input(s): TP, ALB, GLOB, AGRAT, SGOT, GPT, AP, TBIL in the last 72 hours. No lab exists for component: DBIL  
 
CBC w/Diff Recent Labs  
   09/08/18 
 0312  09/07/18 
 0450  09/06/18 
 5680 WBC  19.7*  16.0*  15.6*  
RBC  4.21*  4.68*  4.61* HGB  9.6*  11.0*  10.8* HCT  31.9*  36.0  34.8*  
PLT  140  159  165 GRANS  42  49  42 LYMPH  48  47  47 EOS  0  0  0 Cardiac Enzymes No results found for: CPK, CK, CKMMB, CKMB, RCK3, CKMBT, CKNDX, CKND1, LOREN, TROPT, TROIQ, APRIL, TROPT, TNIPOC, BNP, BNPP  
 
BNP No results found for: BNP, BNPP, XBNPT Coagulation No results for input(s): PTP, INR, APTT in the last 72 hours.  
 
No lab exists for component: INREXT, INREXT   
 
 Thyroid  No results found for: T4, T3U, TSH, TSHEXT, TSHEXT No results found for: T4  
 
Urinalysis Lab Results Component Value Date/Time Color YELLOW 07/16/2018 06:00 PM  
 Appearance CLEAR 07/16/2018 06:00 PM  
 Specific gravity 1.024 07/16/2018 06:00 PM  
 pH (UA) 5.0 07/16/2018 06:00 PM  
 Protein 300 (A) 07/16/2018 06:00 PM  
 Glucose NEGATIVE  07/16/2018 06:00 PM  
 Ketone NEGATIVE  07/16/2018 06:00 PM  
 Bilirubin NEGATIVE  07/16/2018 06:00 PM  
 Urobilinogen 0.2 07/16/2018 06:00 PM  
 Nitrites NEGATIVE  07/16/2018 06:00 PM  
 Leukocyte Esterase NEGATIVE  07/16/2018 06:00 PM  
 Epithelial cells FEW 07/16/2018 06:00 PM  
 Bacteria 1+ (A) 07/16/2018 06:00 PM  
 WBC 0 to 3 07/16/2018 06:00 PM  
 RBC 0 to 3 07/16/2018 06:00 PM  
  
 
Micro  No results for input(s): SDES, CULT in the last 72 hours. No results for input(s): CULT in the last 72 hours. ABG No results for input(s): PHI, PHI, POC2, PCO2I, PO2, PO2I, HCO3, HCO3I, FIO2, FIO2I in the last 72 hours. PFT Ultrasound LE Doppler ECHO 9/2/18 · Technically difficult study due to patient's body habitus and procedure performed with the patient in a supine position. Definity contrast was given to enhance imaging. · Estimated left ventricular ejection fraction is 66 - 70%. Biplane method used to measure ejection fraction. Left ventricular moderate concentric hypertrophy. Normal left ventricular wall motion, no regional wall motion abnormality noted. Mild (grade 1) left ventricular diastolic dysfunction. · There is a moderate left pleural effusion. CT (Most Recent) (CT chest reviewed by me) Results from Hospital Encounter encounter on 07/16/18 CT HEAD WO CONT Narrative EXAM:  CT of the brain without intravenous contrast. 
 
COMPARISON: CT July 3, 2018. REASON FOR EXAM: \"Decreased alertness; facial droop and ams\".  
 
DOSE REDUCTION:  One or more dose reduction techniques were used on this CT: 
 automated exposure control, adjustment of the mAs and/or kVp according to 
patient's size, and iterative reconstruction techniques. The specific techniques 
utilized on this CT exam have been documented in the patient's electronic 
medical record. 
 
_______________ FINDINGS:  
 
    IMAGE QUALITY:  The images are mildly degraded by motion artifact. BRAIN AND EXTRA-AXIAL SPACE:   
 
         SUBACUTE TERRITORIAL TRANSCORTICAL INFARCTION:  None detected. MASS:  None detected. HEMORRHAGE:  None. SUBDURAL FLUID COLLECTION:  None detected. HYDROCEPHALUS:  None. REMOTE CORTICAL INFARCTION:  None detected. REMOTE CEREBELLAR INFARCTION:  None detected. MISCELLANEOUS:  Non-applicable. STRIVE (STandards for Reporting Vascular changes on nEuroimaging): 
                   --Lacunes (age-indeterminate) or perivascular spaces of 
\"presumed vascular origin\":  None definite. --Beatrice of white matter hypodensity of \"presumed vascular 
origin\":  Low grade. --Degree of brain atrophy (subjective): Low grade. BURDEN OF CALCIFIC INTRACRANIAL ATHEROSCLEROSIS:  None significant. HEENT: 
 
         INCLUDED UPPER ORBITS:  The lenses are replaced bilaterally. INCLUDED UPPER PARANASAL SINUSES:  Predominantly clear. MASTOID AIR CELLS:  Predominantly clear. BONES:  Unremarkable. SCALP:  Unremarkable. 
 
_______________ Impression IMPRESSION: 
 
Low-grade generalized chronic changes, however, no acute findings. _______________ Note: Dr. Hector Paz discussed the stroke code results with Dr. Reese Jimenez at [phone] on 
7/16/2018. XR (Most Recent). CXR  reviewed by me and compared with previous CXR Results from Hospital Encounter encounter on 09/01/18 XR CHEST PORT Narrative Chest, single view Indication: Congestive heart failure Comparison: Several prior chest radiographs, most recently 9/5/2018 Findings:  Portable upright AP view of the chest was obtained. Interval 
endotracheal extubation. Improved aeration in the lung bases is noted 
bilaterally, with mild persistent pulmonary hypoventilation. No focal pneumonic 
consolidation. No pneumothorax or pleural effusion. Stable cardiac size and 
mediastinal contours. No acute osseous abnormality. Impression Impression: Interval extubation with improved aeration of the lung bases. While lung volumes 
remain low, no superimposed acute radiographic abnormality noted. Abelardo Larsen MD 
9/8/2018

## 2018-09-08 NOTE — PROGRESS NOTES
1600:  Assumed care for patient, received bedside report from Adam Herbert RN. Patient sitting in bed quietly resting with no complaints of pain or discomfort at the time. Whiteboard updated, bed at the lowest position with call bell within reach. 1950:  Bedside and Verbal shift change report given to [facility] city, RN (oncoming nurse) by Bianka Tse RN 
 (offgoing nurse).  Report included the following information SBAR, Kardex, ED Summary, Procedure Summary, Intake/Output, MAR, Recent Results and Cardiac Rhythm SR.

## 2018-09-08 NOTE — PROGRESS NOTES
Shift Summary:  Uneventful shift. Patient remained confused,but following commands. Denied SOB. Bipap overnight. Assisted to [facility] for BM x 1. Patient with an additional large bowel movement this a.m. Patient Vitals for the past 12 hrs: 
 Temp Pulse Resp BP SpO2  
09/08/18 0313 98 °F (36.7 °C) 72 18 147/69 97 % 09/08/18 0014 97.9 °F (36.6 °C) 89 18 119/59 99 % 09/07/18 1945 97.7 °F (36.5 °C) 91 20 125/60 96 %

## 2018-09-08 NOTE — PROGRESS NOTES
Hospitalist Progress Note Patient: Ninfa Fonseca MRN: [id number]  CSN: [id number] YOB: 1945  Age: 74 y.o. Sex: male DOA: 9/1/2018 LOS:  LOS: 6 days Assessment/Plan 1. Acute on chronichypoxemic & hypoxic respiratory failure secondary to COPD exacerbation, diastolic CHF, improving 2. Metabolic encephalopahy superimposed on dementia- confusion persists  Unclear of basline 3. JAZMIN/CKD improving 4. HTN controlled 5. CLL w chronic leukocytosis 6. + blood cultures= acquisition contaminant Plan: 
- continue steroids, bipap as tolerated, levaquin 
- monitor leukocytosis 
- cont antihypertensives 
- mobilize w PT/OT 
- awaiting placement Patient Active Problem List  
Diagnosis Code  Hypercapnia R06.89  
 Acute on chronic respiratory failure with hypoxia and hypercapnia ([facility]) J96.21, J96.22  
 CLL (chronic lymphocytic leukemia) ([facility]) C91.90  Emphysema lung ([address] J43.9  Elevated serum creatinine R79.89  
 COPD (chronic obstructive pulmonary disease) ([facility]) J44.9  RUPERTO (obstructive sleep apnea) G47.33  
 Stage 3 chronic kidney disease N18.3  Dementia F03.90  
 HTN (hypertension) I10  
 Altered mental status R41.82  Respiratory distress R06.03  
 Hypoxia R09.02  
 CO2 narcosis R06.89  
 Hypernatremia E87.0  Acute respiratory failure with hypoxia and hypercarbia ([facility]) J96.01, J96.02 Subjective:  
 cc: \" Im fine\" No acute events overnight Wore bipap about 1/2 of night Denies complaints, still confused Remains afebrile overnight REVIEW OF SYSTEMS: 
General: No fevers or chills. Cardiovascular: No chest pain or pressure. No palpitations. Pulmonary: No shortness of breath. Gastrointestinal: No nausea, vomiting. Objective:  
  
 
Vital signs/Intake and Output: 
Visit Vitals  /69 (BP 1 Location: Left arm, BP Patient Position: At rest)  Pulse 72  Temp 98 °F (36.7 °C)  Resp 18   5' 6\" (1.676 m)  Wt 91.8 kg (202 lb 6.4 oz)  SpO2 97%  BMI 32.67 kg/m2 Current Shift:  09/07 1901 - 09/08 0700 In: 150 [P.O.:150] Out: 50 [Urine:50] Last three shifts:  09/06 0701 - 09/07 1900 In: 960 [P.O.:960] Out: 1 Body mass index is 32.67 kg/(m^2). Physical Exam: 
GEN: sleeping, easily arousable EYES: conjunctiva normal, lids with out lesions HEENT: MMM, No thyromegaly, no lymphadenopathy HEART: RRR +S1 +S2, no JVD, pulses 2+ distally LUNGS: CTA B/L no wheezes, rales or rhonchi ABDOMEN: + BS, soft NT/ND no organomegaly,  No rebound EXTREMITIES: No edema cyanosis, cap refill normal 
 SKIN: no rashes or skin breakdown, no nodules, normal turgor Current Facility-Administered Medications Medication Dose Route Frequency  methylPREDNISolone (PF) (SOLU-MEDROL) injection 20 mg  20 mg IntraVENous Q24H  
 albuterol-ipratropium (DUO-NEB) 2.5 MG-0.5 MG/3 ML  3 mL Nebulization Q4H PRN  
 famotidine (PEPCID) tablet 20 mg  20 mg Oral BID  enoxaparin (LOVENOX) injection 40 mg  40 mg SubCUTAneous Q24H  
 ELECTROLYTE REPLACEMENT PROTOCOL - Potassium  1 Each Other PRN  
 ELECTROLYTE REPLACEMENT PROTOCOL - Magnesium  1 Each Other PRN  
 ELECTROLYTE REPLACEMENT PROTOCOL - Phosphorous  1 Each Other PRN  
 ELECTROLYTE REPLACEMENT PROTOCOL - Calcium  1 Each Other PRN  
 insulin lispro (HUMALOG) injection   SubCUTAneous Q6H  
 glucose chewable tablet 16 g  4 Tab Oral PRN  
 glucagon (GLUCAGEN) injection 1 mg  1 mg IntraMUSCular PRN  
 dextrose (D50W) injection syrg 12.5-25 g  25-50 mL IntraVENous PRN  
 amLODIPine (NORVASC) tablet 10 mg  10 mg Oral DAILY  aspirin chewable tablet 81 mg  81 mg Oral DAILY  carvedilol (COREG) tablet 12.5 mg  12.5 mg Oral BID WITH MEALS  latanoprost (XALATAN) 0.005 % ophthalmic solution 1 Drop  1 Drop Both Eyes QHS  acetaminophen (TYLENOL) suppository 650 mg  650 mg Rectal Q4H PRN  
  ondansetron (ZOFRAN) injection 4 mg  4 mg IntraVENous Q6H PRN  
 sodium chloride (NS) flush 5-10 mL  5-10 mL IntraVENous PRN All the patient's labs over the past 24 hours were reviewed both during my initial daily workflow process and at the time notated as \"note time\" in [facility]. (It is not time stamped separately in this workflow.)  Select labs are listed below. Labs: Results:  
   
Chemistry Recent Labs  
   09/08/18 
 0003  09/07/18 0361 [id number]  09/07/18 
 0450   09/06/18 
 1048 GLU  90   --   98   --   92 NA  141   --   143   --   146*  
K  3.9  4.6  3.3*   < >  3.6 CL  106   --   104   --   107 CO2  27   --   28   --   31  
BUN  44*   --   55*   --   45* CREA  1.43*   --   1.68*   --   1.67* CA  8.9   --   9.4   --   9.2 AGAP  8   --   11   --   8  
BUCR  31*   --   33*   --   27*  
 < > = values in this interval not displayed. CBC w/Diff Recent Labs  
   09/08/18 
 0312  09/07/18 
 0450  09/06/18 
 9915 WBC  19.7*  16.0*  15.6*  
RBC  4.21*  4.68*  4.61* HGB  9.6*  11.0*  10.8* HCT  31.9*  36.0  34.8*  
PLT  140  159  165 GRANS  42  49  42 LYMPH  48  47  47 EOS  0  0  0 Procedures/imaging: see electronic medical records for all procedures/Xrays and details which were not copied into this note but were reviewed prior to creation of Plan Marisa Rico, DO Internal Medicine/Geriatrics

## 2018-09-08 NOTE — PROGRESS NOTES
Walking rounds with off shift nurse. Pt is aox. Calm and cooperative. Sleepy but arrousable. No distress on RA. 
0900 AM- declined BF. BUT ACCEPTED liquids w/o s/s of aspirations. 1000 am- medical updates provide when pt.'s wife called in.  
1330 pm- up at the side of the bed for lunch. incontinent of stool/ cally care done. Up to BR with minimal assist. 
No SOB noted while performing minimal activity 1500 PM- RESTING quietly . No distress . On RA.  
1600pm 
Bedside and Verbal shift change report given to BRYN Borges (oncoming nurse) by Jared Kumar RN 
 (offgoing nurse).  Report included the following information SBAR, Kardex, Intake/Output, MAR, Med Rec Status and Cardiac Rhythm SR.

## 2018-09-09 LAB
BACTERIA SPEC CULT: NORMAL
BACTERIA SPEC CULT: NORMAL
CA-I SERPL-SCNC: 1.09 MMOL/L (ref 1.12–1.32)
GLUCOSE BLD STRIP.AUTO-MCNC: 105 MG/DL (ref 70–110)
GLUCOSE BLD STRIP.AUTO-MCNC: 107 MG/DL (ref 70–110)
GLUCOSE BLD STRIP.AUTO-MCNC: 114 MG/DL (ref 70–110)
GLUCOSE BLD STRIP.AUTO-MCNC: 121 MG/DL (ref 70–110)
GLUCOSE BLD STRIP.AUTO-MCNC: 190 MG/DL (ref 70–110)
MAGNESIUM SERPL-MCNC: 2.1 MG/DL (ref 1.6–2.6)
PHOSPHATE SERPL-MCNC: 3.1 MG/DL (ref 2.5–4.9)
POTASSIUM SERPL-SCNC: 4 MMOL/L (ref 3.5–5.5)
SERVICE CMNT-IMP: NORMAL
SERVICE CMNT-IMP: NORMAL

## 2018-09-09 PROCEDURE — 74011250637 HC RX REV CODE- 250/637: Performed by: HOSPITALIST

## 2018-09-09 PROCEDURE — 97116 GAIT TRAINING THERAPY: CPT

## 2018-09-09 PROCEDURE — 84132 ASSAY OF SERUM POTASSIUM: CPT | Performed by: INTERNAL MEDICINE

## 2018-09-09 PROCEDURE — 77010033678 HC OXYGEN DAILY

## 2018-09-09 PROCEDURE — 74011250636 HC RX REV CODE- 250/636: Performed by: INTERNAL MEDICINE

## 2018-09-09 PROCEDURE — 82962 GLUCOSE BLOOD TEST: CPT

## 2018-09-09 PROCEDURE — 65660000000 HC RM CCU STEPDOWN

## 2018-09-09 PROCEDURE — 36415 COLL VENOUS BLD VENIPUNCTURE: CPT | Performed by: INTERNAL MEDICINE

## 2018-09-09 PROCEDURE — 82330 ASSAY OF CALCIUM: CPT | Performed by: INTERNAL MEDICINE

## 2018-09-09 PROCEDURE — 74011636637 HC RX REV CODE- 636/637: Performed by: HOSPITALIST

## 2018-09-09 PROCEDURE — 84100 ASSAY OF PHOSPHORUS: CPT | Performed by: INTERNAL MEDICINE

## 2018-09-09 PROCEDURE — 83735 ASSAY OF MAGNESIUM: CPT | Performed by: INTERNAL MEDICINE

## 2018-09-09 RX ORDER — PREDNISONE 20 MG/1
20 TABLET ORAL
Status: DISCONTINUED | OUTPATIENT
Start: 2018-09-10 | End: 2018-09-11 | Stop reason: HOSPADM

## 2018-09-09 RX ORDER — INSULIN LISPRO 100 [IU]/ML
INJECTION, SOLUTION INTRAVENOUS; SUBCUTANEOUS
Status: DISCONTINUED | OUTPATIENT
Start: 2018-09-09 | End: 2018-09-11 | Stop reason: HOSPADM

## 2018-09-09 RX ADMIN — CARVEDILOL 12.5 MG: 12.5 TABLET, FILM COATED ORAL at 09:19

## 2018-09-09 RX ADMIN — METHYLPREDNISOLONE SODIUM SUCCINATE 20 MG: 40 INJECTION, POWDER, FOR SOLUTION INTRAMUSCULAR; INTRAVENOUS at 09:19

## 2018-09-09 RX ADMIN — FAMOTIDINE 20 MG: 20 TABLET ORAL at 23:39

## 2018-09-09 RX ADMIN — ASPIRIN 81 MG: 81 TABLET, CHEWABLE ORAL at 09:19

## 2018-09-09 RX ADMIN — AMLODIPINE BESYLATE 10 MG: 5 TABLET ORAL at 09:19

## 2018-09-09 RX ADMIN — ENOXAPARIN SODIUM 40 MG: 40 INJECTION, SOLUTION INTRAVENOUS; SUBCUTANEOUS at 12:44

## 2018-09-09 RX ADMIN — LATANOPROST 1 DROP: 50 SOLUTION OPHTHALMIC at 23:39

## 2018-09-09 RX ADMIN — INSULIN LISPRO 2 UNITS: 100 INJECTION, SOLUTION INTRAVENOUS; SUBCUTANEOUS at 23:39

## 2018-09-09 RX ADMIN — FAMOTIDINE 20 MG: 20 TABLET ORAL at 09:19

## 2018-09-09 RX ADMIN — CARVEDILOL 12.5 MG: 12.5 TABLET, FILM COATED ORAL at 16:56

## 2018-09-09 NOTE — PROGRESS NOTES
Problem: Mobility Impaired (Adult and Pediatric) Goal: *Acute Goals and Plan of Care (Insert Text) Physical Therapy Goals Initiated 9/7/2018 and to be accomplished within 3-7 day(s) 1. Patient will move from supine to sit and sit to supine  in bed with supervision/set-up. 2.  Patient will transfer from bed to chair and chair to bed with supervision/set-up using the least restrictive device. 3.  Patient will perform sit to stand with supervision/set-up. 4.  Patient will ambulate with supervision/set-up for 150 feet with the least restrictive device. Outcome: Progressing Towards Goal 
physical Therapy TREATMENT Patient: Viry Kasper (67 y.o. male) Date: 9/9/2018 Diagnosis: Acute respiratory failure with hypoxia and hypercarbia (HCC) Acute on chronic respiratory failure with hypoxia and hypercapnia (HCC) Precautions: Aspiration, Fall Chart, physical therapy assessment, plan of care and goals were reviewed. ASSESSMENT: 
Pt able to participate in transfer training and gt training. Pt able to gt train in hallway relying on RW for stability but intermittently has path deviation to L requiring A and vc for self correction. Pt increased gt distance. Pt returned to sitting EOB w/ all needs within reach. Recommend LTCF and RW for d/c. Progression toward goals: 
[]      Improving appropriately and progressing toward goals [x]      Improving slowly and progressing toward goals 
[]      Not making progress toward goals and plan of care will be adjusted PLAN: 
Patient continues to benefit from skilled intervention to address the above impairments. Continue treatment per established plan of care. Discharge Recommendations:  LTCF Further Equipment Recommendations for Discharge:  rolling walker SUBJECTIVE:  
Patient stated I can walk with you if that is what you are here for.  OBJECTIVE DATA SUMMARY:  
Critical Behavior: 
Neurologic State: Alert, Irritable Orientation Level: Oriented to person, Oriented to place Cognition: Follows commands Safety/Judgement: Decreased awareness of environment, Decreased awareness of need for assistance, Decreased awareness of need for safety, Lack of insight into deficits Functional Mobility Training: 
Bed Mobility: 
Supine to Sit: Stand-by assistance Sit to Supine: Stand-by assistance Scooting: Contact guard assistance Transfers: 
Sit to Stand: Stand-by assistance (vc) Stand to Sit: Supervision Balance: 
Sitting: Intact Sitting - Static: Good (unsupported) Sitting - Dynamic: Fair (occasional) Standing: Intact; With support Standing - Static: Fair;Constant support Standing - Dynamic : Fair Range Of Motion: 
Ambulation/Gait Training: 
Distance (ft): 425 Feet (ft) Assistive Device: Walker, rolling;Gait belt Ambulation - Level of Assistance: Contact guard assistance (vc) 
Gait Abnormalities: Decreased step clearance; Path deviations Base of Support: Widened Speed/Carie: Slow Step Length: Left shortened;Right shortened Neuro Re-Education: 
Therapeutic Exercises:  
Pain: 
Pain Scale 1: Numeric (0 - 10) Pain Intensity 1: 0 Activity Tolerance:  
Fair + Please refer to the flowsheet for vital signs taken during this treatment. After treatment:  
[x] Patient left in no apparent distress sitting up EOB [] Patient left in no apparent distress in bed 
[x] Call bell left within reach [x] Nursing notified 
[] Caregiver present 
[] Bed alarm activated Steve Knight PT Time Calculation: 27 mins

## 2018-09-09 NOTE — PROGRESS NOTES
Shift summary Patient had a fair night,alert and oriented with intermittent confusion,wore C-PAP for the most part. declines having any pain and VSS. No acute changes in status. Patient Vitals for the past 12 hrs: 
 Temp Pulse Resp BP SpO2  
09/09/18 0319 98.1 °F (36.7 °C) 67 20 123/83 94 % 09/08/18 2342 98.1 °F (36.7 °C) 83 18 126/63 97 % 09/08/18 1845 98 °F (36.7 °C) 76 18 132/65 96 %

## 2018-09-09 NOTE — ROUTINE PROCESS
Bedside and Verbal shift change report given to Maurice Dumas RN (oncoming nurse) by Delvis Sepulveda RN (offgoing nurse). Report included the following information SBAR, Kardex and Intake/Output.

## 2018-09-09 NOTE — PROGRESS NOTES
[facility] Pulmonary, Critical Care, and Sleep Medicine Name: Becky Ontiveros. MRN: [id number] : 1945 Hospital: [facility] [place] Date: 2018 [facility] Note IMPRESSION:  
· Acute on chronic hypoxemic and hypercapneic respiratory failure, failed bipap, intubated 18, extubated 18 · Blood cx positive strep viridans: ?bacteremia vs skin contaminant · Metabolic encephalopathy from CO2 narcosis, dementia · COPD with home O2-FEV1 of 1.23 or 55% predicted, normal FEV1%, follows with Dr. Melany Palma - on Spiriva and advair at home- Recent 6 min walk in May suggested requires home oxygen 2-4 liter · Acute on chronic diastolic CHF · CKD stage 3 with ARF 
· HTN 
· Severe complex sleep apnea - AHI 34/hr RUPERTO and CSA events; on BIPAP2 non compliant - not able to tolerate but best response noted on BIPAP / in hospital.  
· On trilogy at home per wife. · Hx of ETOH abuse in past (residing in rehab facility) · Chronic leukocytosis, thrombocytopenia with hx of CLL · Anemia · Dementia - severity not known, but advanced per prior notes [facility] 18 Dr Yoly Gagnon AHI 34/hr RUPERTO and CSA events; O2 desats BIPAP titration - snoring and apneas could not be eliminated with 24/20 pressures; central apneas with higher pressures; poorly tolerated overall LVEF 66-70%, grade 1 DD on echo 18. RECOMMENDATIONS: 
-- Change to po prednisone taper over few days -- BIPAP at night Respiratory: Tolerated bipap 24/12 last night. On and off refuses to use it Mental status normal 
D/w RT to monitor closely on medical floor and make sure he uses bipap Off O2 and use it only if spo2 <92%, since o2 will cause co2 retention. Passed swallow eval with mechanichal soft diet Keep SPO2 >=92%. HOB 30 degree elevation all the time. Aggressive pulmonary toileting. Aspiration precautions. CVS: BP stable. D/c'ed lasix 20 bid as Na, Creat, BUN increasing on 9/7/18. No more leg edema, resolved. Restart lasix when labs improved. ID:  cxr b/l infiltrates vs chf. RPT xray marked improvement Sputum cx mod candida (colonized), normal maynor. Blood cx 9/2/18 1/2 positive for strep viridans, likely skin contaminant. Repeat blood cx NGTD. Low grade temp on 9/4. WBC increasing could be due to steroids/CLL. D/c'ed zosyn and vanco on 9/4/18 Completed levaquin for 5 days. Monitor off Abx. Follow repeat blood cx results. Hematology/Oncology: chronic CLL with leucocytosis, thrombocytopenia, anemia. Renal: off lasix since 9/6/18. NA BUN and CR is better Overall doing well post ICU transfer I will sign out to Dr. Cherry Jacobs to follow up on floor but overall seems like doing well and we might see intermittently till he gets placement Patient follows with Dr. Marycruz Kelly as out patient I spent 35 min of time excluding procedure, with face to face evaluation  >50% on complex decision making, coordination of care and counseling patient. Subjective/History of Present Illness:  
 
Patient is a 74 y.o. male with hx of COPD, home O2, chronic hypercapneic resp failure, dementia, CLL, diastolic CHF, HTN, CKD, severe complex sleep apnea who has recurrent hospitalization for hypercarbic respiratory failure. 9/9/18: 
Overall doing well No new issue No cough no sob Sitting up in bed without any issues Tolerated bipap last night No fever chills cough dyspnea Leg edema resolved No overnight issues. Prior to Admission medications Medication Sig Start Date End Date Taking? Authorizing Provider OLANZapine (ZYPREXA) 2.5 mg tablet Take 0.5 Tabs by mouth two (2) times a day. 7/25/18   Breanna Garcia MD  
amLODIPine (NORVASC) 10 mg tablet Take 1 Tab by mouth daily. 7/25/18   Breanna Garcia MD  
hydrALAZINE (APRESOLINE) 25 mg tablet Take 1 Tab by mouth three (3) times daily.  7/25/18   Breanna Garcia MD  
 predniSONE (STERAPRED) 5 mg dose pack See administration instruction per 5mg dose pack 7/25/18   Saul Breaux MD  
ALPRAZolam Petros Brisk) 0.5 mg tablet Take 1 Tab by mouth three (3) times daily as needed for Anxiety. Max Daily Amount: 1.5 mg. Indications: anxiety 7/20/18   Saul Breaux MD  
carvedilol (COREG) 12.5 mg tablet Take 1 Tab by mouth two (2) times daily (with meals). 7/12/18   Freddy Armenta MD  
docusate sodium (COLACE) 100 mg capsule Take 1 Cap by mouth two (2) times a day for 90 days. 7/12/18 10/10/18  Freddy Armenta MD  
alfuzosin SR (UROXATRAL) 10 mg SR tablet Take 10 mg by mouth daily. Ronni Hernandez MD  
aspirin 81 mg chewable tablet Take 81 mg by mouth daily. Ronni Hernandez MD  
cycloSPORINE (RESTASIS) 0.05 % ophthalmic emulsion Administer 1 Drop to both eyes two (2) times a day. Ronni Hernandez MD  
famotidine (PEPCID) 20 mg tablet Take 20 mg by mouth two (2) times a day. Ronni Hernandez MD  
latanoprost (XALATAN) 0.005 % ophthalmic solution Administer 1 Drop to both eyes nightly. Ronni Hernandez MD  
furosemide (LASIX) 40 mg tablet Take  by mouth daily. Ronni Hernandez MD  
Oxygen     Ronni Hernandez MD  
pravastatin sodium (PRAVASTATIN PO) Take  by mouth. Ronni Hernandez MD  
tiotropium (SPIRIVA WITH HANDIHALER) 18 mcg inhalation capsule Take 1 Cap by inhalation daily. Ronni Hernandez MD  
fluticasone/salmeterol (ADVAIR HFA IN) Take  by inhalation. Ronni Hernandez MD  
ALBUTEROL IN Take  by inhalation. Ronni Hernandez MD  
 
Current Facility-Administered Medications Medication Dose Route Frequency  insulin lispro (HUMALOG) injection   SubCUTAneous AC&HS  [START ON 9/10/2018] predniSONE (DELTASONE) tablet 20 mg  20 mg Oral DAILY WITH BREAKFAST  famotidine (PEPCID) tablet 20 mg  20 mg Oral BID  enoxaparin (LOVENOX) injection 40 mg  40 mg SubCUTAneous Q24H  
 amLODIPine (NORVASC) tablet 10 mg  10 mg Oral DAILY  aspirin chewable tablet 81 mg  81 mg Oral DAILY  carvedilol (COREG) tablet 12.5 mg  12.5 mg Oral BID WITH MEALS  latanoprost (XALATAN) 0.005 % ophthalmic solution 1 Drop  1 Drop Both Eyes QHS Objective:  
Vital Signs:   
Visit Vitals  /59 (BP 1 Location: Left arm, BP Patient Position: Sitting)  Pulse 80  Temp 97.8 °F (36.6 °C)  Resp 20  
 Ht 5' 6\" (1.676 m)  Wt 91.8 kg (202 lb 6.1 oz)  SpO2 95%  BMI 32.67 kg/m2 O2 Device: Room air O2 Flow Rate (L/min): 3 l/min Temp (24hrs), Av.9 °F (36.6 °C), Min:97.4 °F (36.3 °C), Max:98.1 °F (36.7 °C) Intake/Output:  
Last shift:        
 
Last 3 shifts:  1901 -  0700 In: 740 [P.O.:740] Out: 450 [Urine:450] Intake/Output Summary (Last 24 hours) at 18 1208 Last data filed at 18 8675 Gross per 24 hour Intake              590 ml Output              400 ml Net              190 ml Last 3 Recorded Weights in this Encounter 18 0327 18 0015 18 0153 Weight: 93.3 kg (205 lb 11 oz) 91.8 kg (202 lb 6.4 oz) 91.8 kg (202 lb 6.1 oz) Ventilator Settings: 
Mode Rate Tidal Volume Pressure FiO2 PEEP  
CPAP, Pressure support   340 ml  5 cm H2O 24 % 5 cm H20 Peak airway pressure: 15 cm H2O Plateau pressure:    
Tidal volume:   
Minute ventilation: 10.6 l/min SPO2 98 Physical Exam:  
 
General/Neurology: obese. Aao, NAD. Head:   Normocephalic, without obvious abnormality, atraumatic. Lung: Moderate air entry bilateral equal. No rales. No rhonchi. No wheezing. No stridors. No prolongded expiration. No accessory muscle use. Heart:   Regular rate & rhythm. S1 S2 present. No murmur. No JVD. Abdomen:  Soft. NT. ND. +BS. Obese. Extremities:  No pedal edema. No cyanosis. No clubbing. Pulses: 2+ and symmetric in DP. Capillary refill: normal 
Skin:   Color, texture, turgor normal. No rashes or lesions. Data:  
   
Recent Results (from the past 24 hour(s)) GLUCOSE, POC  
 Collection Time: 09/09/18  1:26 AM  
Result Value Ref Range Glucose (POC) 114 (H) 70 - 110 mg/dL PHOSPHORUS Collection Time: 09/09/18  5:15 AM  
Result Value Ref Range Phosphorus 3.1 2.5 - 4.9 MG/DL MAGNESIUM Collection Time: 09/09/18  5:15 AM  
Result Value Ref Range Magnesium 2.1 1.6 - 2.6 mg/dL CALCIUM, IONIZED Collection Time: 09/09/18  5:15 AM  
Result Value Ref Range Ionized Calcium 1.09 (L) 1.12 - 1.32 MMOL/L  
POTASSIUM Collection Time: 09/09/18  5:15 AM  
Result Value Ref Range Potassium 4.0 3.5 - 5.5 mmol/L  
GLUCOSE, POC Collection Time: 09/09/18  6:03 AM  
Result Value Ref Range Glucose (POC) 107 70 - 110 mg/dL Chemistry Recent Labs  
   09/09/18 
 0515  09/08/18 
 0662  09/07/18 0361 [id number]  09/07/18 
 0450 GLU   --   90   --   98  
NA   --   141   --   143  
K  4.0  3.9  4.6  3.3*  
CL   --   106   --   104 CO2   --   27   --   28 BUN   --   44*   --   55* CREA   --   1.43*   --   1.68* CA   --   8.9   --   9.4 MG  2.1  2.1   --   2.4 PHOS  3.1  3.9   --   4.3 AGAP   --   8   --   11  
BUCR   --   31*   --   33* Lactic Acid Lactic acid Date Value Ref Range Status 09/01/2018 0.7 0.4 - 2.0 MMOL/L Final  
 
No results for input(s): LAC in the last 72 hours. Liver Enzymes Protein, total  
Date Value Ref Range Status 09/01/2018 7.1 6.4 - 8.2 g/dL Final  
 
Albumin Date Value Ref Range Status 09/01/2018 3.1 (L) 3.4 - 5.0 g/dL Final  
 
Globulin Date Value Ref Range Status 09/01/2018 4.0 2.0 - 4.0 g/dL Final  
 
A-G Ratio Date Value Ref Range Status 09/01/2018 0.8 0.8 - 1.7   Final  
 
AST (SGOT) Date Value Ref Range Status 09/01/2018 19 15 - 37 U/L Final  
 
Alk. phosphatase Date Value Ref Range Status 09/01/2018 95 45 - 117 U/L Final  
 
No results for input(s): TP, ALB, GLOB, AGRAT, SGOT, GPT, AP, TBIL in the last 72 hours. No lab exists for component: DBIL  
 
CBC w/Diff Recent Labs  
   09/08/18 1530  09/07/18 
 5842 WBC  19.7*  16.0*  
RBC  4.21*  4.68* HGB  9.6*  11.0*  
HCT  31.9*  36.0 PLT  140  159 GRANS  42  49 LYMPH  48  47 EOS  0  0 Cardiac Enzymes No results found for: CPK, CK, CKMMB, CKMB, RCK3, CKMBT, CKNDX, CKND1, LOREN, TROPT, TROIQ, APRIL, TROPT, TNIPOC, BNP, BNPP  
 
BNP No results found for: BNP, BNPP, XBNPT Coagulation No results for input(s): PTP, INR, APTT in the last 72 hours. No lab exists for component: INREXT, INREXT Thyroid  No results found for: T4, T3U, TSH, TSHEXT, TSHEXT No results found for: T4  
 
Urinalysis Lab Results Component Value Date/Time Color YELLOW 07/16/2018 06:00 PM  
 Appearance CLEAR 07/16/2018 06:00 PM  
 Specific gravity 1.024 07/16/2018 06:00 PM  
 pH (UA) 5.0 07/16/2018 06:00 PM  
 Protein 300 (A) 07/16/2018 06:00 PM  
 Glucose NEGATIVE  07/16/2018 06:00 PM  
 Ketone NEGATIVE  07/16/2018 06:00 PM  
 Bilirubin NEGATIVE  07/16/2018 06:00 PM  
 Urobilinogen 0.2 07/16/2018 06:00 PM  
 Nitrites NEGATIVE  07/16/2018 06:00 PM  
 Leukocyte Esterase NEGATIVE  07/16/2018 06:00 PM  
 Epithelial cells FEW 07/16/2018 06:00 PM  
 Bacteria 1+ (A) 07/16/2018 06:00 PM  
 WBC 0 to 3 07/16/2018 06:00 PM  
 RBC 0 to 3 07/16/2018 06:00 PM  
  
 
Micro  No results for input(s): SDES, CULT in the last 72 hours. No results for input(s): CULT in the last 72 hours. ABG No results for input(s): PHI, PHI, POC2, PCO2I, PO2, PO2I, HCO3, HCO3I, FIO2, FIO2I in the last 72 hours. PFT Ultrasound LE Doppler ECHO 9/2/18 · Technically difficult study due to patient's body habitus and procedure performed with the patient in a supine position. Definity contrast was given to enhance imaging. · Estimated left ventricular ejection fraction is 66 - 70%. Biplane method used to measure ejection fraction. Left ventricular moderate concentric hypertrophy.  Normal left ventricular wall motion, no regional wall motion abnormality noted. Mild (grade 1) left ventricular diastolic dysfunction. · There is a moderate left pleural effusion. CT (Most Recent) (CT chest reviewed by me) Results from Hospital Encounter encounter on 07/16/18 CT HEAD WO CONT Narrative EXAM:  CT of the brain without intravenous contrast. 
 
COMPARISON: CT July 3, 2018. REASON FOR EXAM: \"Decreased alertness; facial droop and ams\". DOSE REDUCTION:  One or more dose reduction techniques were used on this CT: 
automated exposure control, adjustment of the mAs and/or kVp according to 
patient's size, and iterative reconstruction techniques. The specific techniques 
utilized on this CT exam have been documented in the patient's electronic 
medical record. 
 
_______________ FINDINGS:  
 
    IMAGE QUALITY:  The images are mildly degraded by motion artifact. BRAIN AND EXTRA-AXIAL SPACE:   
 
         SUBACUTE TERRITORIAL TRANSCORTICAL INFARCTION:  None detected. MASS:  None detected. HEMORRHAGE:  None. SUBDURAL FLUID COLLECTION:  None detected. HYDROCEPHALUS:  None. REMOTE CORTICAL INFARCTION:  None detected. REMOTE CEREBELLAR INFARCTION:  None detected. MISCELLANEOUS:  Non-applicable. STRIVE (STandards for Reporting Vascular changes on nEuroimaging): 
                   --[place] (age-indeterminate) or perivascular spaces of 
\"presumed vascular origin\":  None definite. --[place] of white matter hypodensity of \"presumed vascular 
origin\":  Low grade. --Degree of brain atrophy (subjective): Low grade. BURDEN OF CALCIFIC INTRACRANIAL ATHEROSCLEROSIS:  None significant. HEENT: 
 
         INCLUDED UPPER ORBITS:  The lenses are replaced bilaterally. INCLUDED UPPER PARANASAL SINUSES:  Predominantly clear. MASTOID AIR CELLS:  Predominantly clear. BONES:  Unremarkable. SCALP:  Unremarkable. 
 
_______________ Impression IMPRESSION: 
 
Low-grade generalized chronic changes, however, no acute findings. _______________ Note: Dr. Omar Vila discussed the stroke code results with Dr. Sabino Oppenheim at [phone] on 
7/16/2018. XR (Most Recent). CXR  reviewed by me and compared with previous CXR Results from Hospital Encounter encounter on 09/01/18 XR CHEST PORT Narrative Chest, single view Indication: Congestive heart failure Comparison: Several prior chest radiographs, most recently 9/5/2018 Findings:  Portable upright AP view of the chest was obtained. Interval 
endotracheal extubation. Improved aeration in the lung bases is noted 
bilaterally, with mild persistent pulmonary hypoventilation. No focal pneumonic 
consolidation. No pneumothorax or pleural effusion. Stable cardiac size and 
mediastinal contours. No acute osseous abnormality. Impression Impression: Interval extubation with improved aeration of the lung bases. While lung volumes 
remain low, no superimposed acute radiographic abnormality noted. Abelardo Larsen MD 
9/9/2018

## 2018-09-09 NOTE — PROGRESS NOTES
Bedside and Verbal shift change report given to LORENA Mullins RN (oncoming nurse) by Naomi Rasmussen RN 
 (offgoing nurse). Report included the following information SBAR, Kardex and MAR.

## 2018-09-09 NOTE — PROGRESS NOTES
Hospitalist Progress Note Patient: Jung Allen. MRN: [id number]  CSN: [id number] YOB: 1945  Age: 74 y.o. Sex: male DOA: 9/1/2018 LOS:  LOS: 7 days Assessment/Plan 1. Acute on chronichypoxemic & hypoxic respiratory failure secondary to COPD exacerbation, diastolic CHF, improving 2. Metabolic encephalopahy superimposed on dementia- confusion persists  Unclear of basline 3. JAZMIN/CKD improving 4. HTN controlled 5. CLL w chronic leukocytosis 6. + blood cultures= acquisition contaminant Plan: 
- continue steroids, levaquin, bipap 
- bipap placed back on 
- awaiting placement Patient Active Problem List  
Diagnosis Code  Hypercapnia R06.89  
 Acute on chronic respiratory failure with hypoxia and hypercapnia ([facility]) J96.21, J96.22  
 CLL (chronic lymphocytic leukemia) ([facility]) C91.90  Emphysema lung ([address] J43.9  Elevated serum creatinine R79.89  
 COPD (chronic obstructive pulmonary disease) ([facility]) J44.9  RUPERTO (obstructive sleep apnea) G47.33  
 Stage 3 chronic kidney disease N18.3  Dementia F03.90  
 HTN (hypertension) I10  
 Altered mental status R41.82  Respiratory distress R06.03  
 Hypoxia R09.02  
 CO2 narcosis R06.89  
 Hypernatremia E87.0  Acute respiratory failure with hypoxia and hypercarbia ([facility]) J96.01, J96.02 Subjective:  
 cc: SOB No acute events overnight Pt sleeping, BIPAP off, confused REVIEW OF SYSTEMS: 
General: No fevers or chills. Cardiovascular: No chest pain or pressure. No palpitations. Pulmonary: No shortness of breath. Gastrointestinal: No nausea, vomiting. Objective:  
  
 
Vital signs/Intake and Output: 
Visit Vitals  /61 (BP 1 Location: Left arm, BP Patient Position: At rest)  Pulse 69  Temp 97.7 °F (36.5 °C)  Resp 20  
 Ht 5' 6\" (1.676 m)  Wt 91.8 kg (202 lb 6.1 oz)  SpO2 100%  BMI 32.67 kg/m2 Current Shift:    
 Last three shifts:  09/07 1901 - 09/09 0700 In: 740 [P.O.:740] Out: 450 [Urine:450] Body mass index is 32.67 kg/(m^2). Physical Exam: 
GEN: arousable to verbal stimuli EYES: conjunctiva normal, lids with out lesions HEENT: MMM, No thyromegaly, no lymphadenopathy HEART: RRR +S1 +S2, no JVD, pulses 2+ distally LUNGS:scattered rhonchi, no wheezing ABDOMEN: + BS, soft NT/ND no organomegaly,  No rebound EXTREMITIES: No edema cyanosis, cap refill normal 
 SKIN: no rashes or skin breakdown, no nodules, normal turgor Current Facility-Administered Medications Medication Dose Route Frequency  methylPREDNISolone (PF) (SOLU-MEDROL) injection 20 mg  20 mg IntraVENous Q24H  
 albuterol-ipratropium (DUO-NEB) 2.5 MG-0.5 MG/3 ML  3 mL Nebulization Q4H PRN  
 famotidine (PEPCID) tablet 20 mg  20 mg Oral BID  enoxaparin (LOVENOX) injection 40 mg  40 mg SubCUTAneous Q24H  
 ELECTROLYTE REPLACEMENT PROTOCOL - Potassium  1 Each Other PRN  
 ELECTROLYTE REPLACEMENT PROTOCOL - Magnesium  1 Each Other PRN  
 ELECTROLYTE REPLACEMENT PROTOCOL - Phosphorous  1 Each Other PRN  
 ELECTROLYTE REPLACEMENT PROTOCOL - Calcium  1 Each Other PRN  
 insulin lispro (HUMALOG) injection   SubCUTAneous Q6H  
 glucose chewable tablet 16 g  4 Tab Oral PRN  
 glucagon (GLUCAGEN) injection 1 mg  1 mg IntraMUSCular PRN  
 dextrose (D50W) injection syrg 12.5-25 g  25-50 mL IntraVENous PRN  
 amLODIPine (NORVASC) tablet 10 mg  10 mg Oral DAILY  aspirin chewable tablet 81 mg  81 mg Oral DAILY  carvedilol (COREG) tablet 12.5 mg  12.5 mg Oral BID WITH MEALS  latanoprost (XALATAN) 0.005 % ophthalmic solution 1 Drop  1 Drop Both Eyes QHS  acetaminophen (TYLENOL) suppository 650 mg  650 mg Rectal Q4H PRN  
 ondansetron (ZOFRAN) injection 4 mg  4 mg IntraVENous Q6H PRN  
 sodium chloride (NS) flush 5-10 mL  5-10 mL IntraVENous PRN All the patient's labs over the past 24 hours were reviewed both during my initial daily workflow process and at the time notated as \"note time\" in [facility]. (It is not time stamped separately in this workflow.)  Select labs are listed below. Labs: Results:  
   
Chemistry Recent Labs  
   09/09/18 
 0515  09/08/18 
 5996  09/07/18 0361 [id number]  09/07/18 
 0450 GLU   --   90   --   98  
NA   --   141   --   143  
K  4.0  3.9  4.6  3.3*  
CL   --   106   --   104 CO2   --   27   --   28 BUN   --   44*   --   55* CREA   --   1.43*   --   1.68* CA   --   8.9   --   9.4 AGAP   --   8   --   11  
BUCR   --   31*   --   33* CBC w/Diff Recent Labs  
   09/08/18 
 0312  09/07/18 
 0450 WBC  19.7*  16.0*  
RBC  4.21*  4.68* HGB  9.6*  11.0*  
HCT  31.9*  36.0 PLT  140  159 GRANS  42  49 LYMPH  48  47 EOS  0  0 Procedures/imaging: see electronic medical records for all procedures/Xrays and details which were not copied into this note but were reviewed prior to creation of Plan Gonzalez Peres DO Internal Medicine/Geriatrics

## 2018-09-10 LAB
ANION GAP SERPL CALC-SCNC: 6 MMOL/L (ref 3–18)
BUN SERPL-MCNC: 31 MG/DL (ref 7–18)
BUN/CREAT SERPL: 25 (ref 12–20)
CA-I SERPL-SCNC: 1.03 MMOL/L (ref 1.12–1.32)
CALCIUM SERPL-MCNC: 8.7 MG/DL (ref 8.5–10.1)
CHLORIDE SERPL-SCNC: 105 MMOL/L (ref 100–108)
CO2 SERPL-SCNC: 32 MMOL/L (ref 21–32)
CREAT SERPL-MCNC: 1.26 MG/DL (ref 0.6–1.3)
ERYTHROCYTE [DISTWIDTH] IN BLOOD BY AUTOMATED COUNT: 17.4 % (ref 11.6–14.5)
GLUCOSE BLD STRIP.AUTO-MCNC: 121 MG/DL (ref 70–110)
GLUCOSE BLD STRIP.AUTO-MCNC: 122 MG/DL (ref 70–110)
GLUCOSE BLD STRIP.AUTO-MCNC: 140 MG/DL (ref 70–110)
GLUCOSE BLD STRIP.AUTO-MCNC: 212 MG/DL (ref 70–110)
GLUCOSE SERPL-MCNC: 130 MG/DL (ref 74–99)
HCT VFR BLD AUTO: 33.4 % (ref 36–48)
HGB BLD-MCNC: 10.1 G/DL (ref 13–16)
MAGNESIUM SERPL-MCNC: 2.1 MG/DL (ref 1.6–2.6)
MCH RBC QN AUTO: 22.9 PG (ref 24–34)
MCHC RBC AUTO-ENTMCNC: 30.2 G/DL (ref 31–37)
MCV RBC AUTO: 75.6 FL (ref 74–97)
PHOSPHATE SERPL-MCNC: 3.3 MG/DL (ref 2.5–4.9)
PLATELET # BLD AUTO: 155 K/UL (ref 135–420)
PMV BLD AUTO: 10.7 FL (ref 9.2–11.8)
POTASSIUM SERPL-SCNC: 3.9 MMOL/L (ref 3.5–5.5)
POTASSIUM SERPL-SCNC: 4.3 MMOL/L (ref 3.5–5.5)
RBC # BLD AUTO: 4.42 M/UL (ref 4.7–5.5)
SODIUM SERPL-SCNC: 143 MMOL/L (ref 136–145)
WBC # BLD AUTO: 13.8 K/UL (ref 4.6–13.2)

## 2018-09-10 PROCEDURE — 74011250636 HC RX REV CODE- 250/636: Performed by: INTERNAL MEDICINE

## 2018-09-10 PROCEDURE — 74011636637 HC RX REV CODE- 636/637: Performed by: INTERNAL MEDICINE

## 2018-09-10 PROCEDURE — 94660 CPAP INITIATION&MGMT: CPT

## 2018-09-10 PROCEDURE — 83735 ASSAY OF MAGNESIUM: CPT | Performed by: INTERNAL MEDICINE

## 2018-09-10 PROCEDURE — 82330 ASSAY OF CALCIUM: CPT | Performed by: INTERNAL MEDICINE

## 2018-09-10 PROCEDURE — 65660000000 HC RM CCU STEPDOWN

## 2018-09-10 PROCEDURE — 74011636637 HC RX REV CODE- 636/637: Performed by: HOSPITALIST

## 2018-09-10 PROCEDURE — 80048 BASIC METABOLIC PNL TOTAL CA: CPT | Performed by: INTERNAL MEDICINE

## 2018-09-10 PROCEDURE — 97116 GAIT TRAINING THERAPY: CPT

## 2018-09-10 PROCEDURE — 82962 GLUCOSE BLOOD TEST: CPT

## 2018-09-10 PROCEDURE — 84100 ASSAY OF PHOSPHORUS: CPT | Performed by: INTERNAL MEDICINE

## 2018-09-10 PROCEDURE — 74011250637 HC RX REV CODE- 250/637: Performed by: HOSPITALIST

## 2018-09-10 PROCEDURE — 36415 COLL VENOUS BLD VENIPUNCTURE: CPT | Performed by: INTERNAL MEDICINE

## 2018-09-10 PROCEDURE — 77010033678 HC OXYGEN DAILY

## 2018-09-10 PROCEDURE — 85027 COMPLETE CBC AUTOMATED: CPT | Performed by: HOSPITALIST

## 2018-09-10 RX ADMIN — ENOXAPARIN SODIUM 40 MG: 40 INJECTION, SOLUTION INTRAVENOUS; SUBCUTANEOUS at 12:19

## 2018-09-10 RX ADMIN — INSULIN LISPRO 4 UNITS: 100 INJECTION, SOLUTION INTRAVENOUS; SUBCUTANEOUS at 16:27

## 2018-09-10 RX ADMIN — PREDNISONE 20 MG: 20 TABLET ORAL at 09:27

## 2018-09-10 RX ADMIN — CARVEDILOL 12.5 MG: 12.5 TABLET, FILM COATED ORAL at 09:27

## 2018-09-10 RX ADMIN — CARVEDILOL 12.5 MG: 12.5 TABLET, FILM COATED ORAL at 16:27

## 2018-09-10 RX ADMIN — FAMOTIDINE 20 MG: 20 TABLET ORAL at 20:09

## 2018-09-10 RX ADMIN — LATANOPROST 1 DROP: 50 SOLUTION OPHTHALMIC at 22:35

## 2018-09-10 RX ADMIN — ASPIRIN 81 MG: 81 TABLET, CHEWABLE ORAL at 09:27

## 2018-09-10 RX ADMIN — FAMOTIDINE 20 MG: 20 TABLET ORAL at 09:27

## 2018-09-10 RX ADMIN — AMLODIPINE BESYLATE 10 MG: 5 TABLET ORAL at 09:27

## 2018-09-10 NOTE — ROUTINE PROCESS
Bedside and Verbal shift change report given to Millie (oncoming nurse) by Radha Fine RN (offgoing nurse). Report included the following information SBAR, Kardex, MAR and Recent Results.

## 2018-09-10 NOTE — PROGRESS NOTES
Hospitalist Progress Note Patient: Mich aBng MRN: [id number]  CSN: [id number] YOB: 1945  Age: 74 y.o. Sex: male DOA: 9/1/2018 LOS:  LOS: 8 days Chief Complaint: Ac resp failure Assessment/Plan 1. Acute on chronic hypoxemic & hypoxic respiratory failure due to COPD and diastolic CHF 2. Metabolic encephalopahy superimposed on dementia- I think he is at his baseline, he is cooperative and communicative, pleasant, NAD 3. JAZMIN superimposed on CKD improving 4. HTN controlled 5. CLL 6. Contaminant blood culture 7.complicated RUPERTO 8.dementia Repeat BMP and CBC Continue current PO meds PT daily 
bipap at night here Working on SNF plan for d/c for him Disposition : 
Patient Active Problem List  
Diagnosis Code  Hypercapnia R06.89  
 Acute on chronic respiratory failure with hypoxia and hypercapnia ([facility]) J96.21, J96.22  
 CLL (chronic lymphocytic leukemia) ([facility]) C91.90  Emphysema lung ([address] J43.9  Elevated serum creatinine R79.89  
 COPD (chronic obstructive pulmonary disease) ([facility]) J44.9  RUPERTO (obstructive sleep apnea) G47.33  
 Stage 3 chronic kidney disease N18.3  Dementia F03.90  
 HTN (hypertension) I10  
 Altered mental status R41.82  Respiratory distress R06.03  
 Hypoxia R09.02  
 CO2 narcosis R06.89  
 Hypernatremia E87.0  Acute respiratory failure with hypoxia and hypercarbia ([facility]) J96.01, J96.02 Subjective: 
 
Denies CP, SOB, palp, HA No new issues identified by nurse Review of systems: 
 
Constitutional: denies fevers, chills, myalgias Respiratory: denies cough Cardiovascular: denies palpitations Gastrointestinal: denies nausea, vomiting, diarrhea Vital signs/Intake and Output: 
Visit Vitals  /55 (BP 1 Location: Left arm, BP Patient Position: At rest)  Pulse 72  Temp 98.4 °F (36.9 °C)  Resp 18  Ht 5' 6\" (1.676 m)  Wt 91.8 kg (202 lb 6.1 oz)  SpO2 95%  BMI 32.67 kg/m2 Current Shift:    
Last three shifts:  09/08 1901 - 09/10 0700 In: 1330 [P.O.:1330] Out: 700 [Urine:700] Exam: 
 
General: obese debilitated elderly BM, NAD, ox2 CVS:Regular rate and rhythm, no M/R/G, S1/S2 heard, no thrill Lungs:Clear to auscultation bilaterally, no wheezes, rhonchi, or rales Abdomen: Soft, Nontender, No distention, Normal Bowel sounds, No hepatomegaly Extremities: 1 plus LE edema, DP palp BL Neuro:grossly normal , follows commands Psych:appropriate Labs: Results:  
   
Chemistry Recent Labs  
   09/10/18 
 0421  09/09/18 
 6837  09/08/18 
 9809 GLU   --    --   90  
NA   --    --   141  
K  3.9  4.0  3.9 CL   --    --   106 CO2   --    --   27 BUN   --    --   44* CREA   --    --   1.43* CA   --    --   8.9 AGAP   --    --   8  
BUCR   --    --   31* CBC w/Diff Recent Labs  
   09/08/18 
 1486 WBC  19.7*  
RBC  4.21* HGB  9.6* HCT  31.9*  
PLT  140 GRANS  42 LYMPH  48 EOS  0 Cardiac Enzymes No results for input(s): CPK, CKND1, LOREN in the last 72 hours. No lab exists for component: Ryan Bean Coagulation No results for input(s): PTP, INR, APTT in the last 72 hours. No lab exists for component: INREXT Lipid Panel No results found for: CHOL, CHOLPOCT, CHOLX, CHLST, CHOLV, 570002, HDL, LDL, LDLC, DLDLP, 264120, VLDLC, VLDL, TGLX, TRIGL, TRIGP, TGLPOCT, CHHD, CHHDX  
BNP No results for input(s): BNPP in the last 72 hours. Liver Enzymes No results for input(s): TP, ALB, TBIL, AP, SGOT, GPT in the last 72 hours. No lab exists for component: DBIL Thyroid Studies No results found for: T4, T3U, TSH, TSHEXT Procedures/imaging: see electronic medical records for all procedures/Xrays and details which were not copied into this note but were reviewed prior to creation of Plan Hetal Rasmussen MD

## 2018-09-10 NOTE — PROGRESS NOTES
Speech Therapy Note: 
 
Could not progress with ST treatment because patient :  
 
[X] was lethargic, not appropriate for PO intake [ ] deferred due to:  
[ ] was off the unit [ ] on hold 
[ ] NPO for procedure SLP will re-attempt treatment later this day or as schedule permits. Lashell Owens M.S., CF-SLP Speech Language Pathologist

## 2018-09-10 NOTE — PROGRESS NOTES
Shift summary Patient had  A calm uneventful night,used declined C-PAP use for the most part of the night up until 0300 am.No acute changes in status. Patient Vitals for the past 12 hrs: 
 Temp Pulse Resp BP SpO2  
09/10/18 0327 98.1 °F (36.7 °C) 71 18 142/80 100 % 09/09/18 2300 98 °F (36.7 °C) 77 18 140/62 98 % 09/09/18 1950 98.3 °F (36.8 °C) 81 18 145/69 96 %

## 2018-09-10 NOTE — PROGRESS NOTES
[facility] Pulmonary, Critical Care, and Sleep Medicine Name: Lindsay Pickard MRN: [id number] : 1945 Hospital: [facility] [place] [facility] Subjective/History of Present Illness:  
 
Patient is a 74 y.o. male with hx of COPD, home O2, chronic hypercapneic resp failure, dementia, CLL, diastolic CHF, HTN, CKD, severe complex sleep apnea who has recurrent hospitalization for hypercarbic respiratory failure. Patient was intubated -18. 
 
9/10/18: 
Patient transferred out of icu Breathing stable Baseline dementia with confused state No cough or CP reported BIPAP at night as tolerated ROS - limited due to patient condition Current Facility-Administered Medications Medication Dose Route Frequency  insulin lispro (HUMALOG) injection   SubCUTAneous AC&HS  predniSONE (DELTASONE) tablet 20 mg  20 mg Oral DAILY WITH BREAKFAST  famotidine (PEPCID) tablet 20 mg  20 mg Oral BID  enoxaparin (LOVENOX) injection 40 mg  40 mg SubCUTAneous Q24H  
 amLODIPine (NORVASC) tablet 10 mg  10 mg Oral DAILY  aspirin chewable tablet 81 mg  81 mg Oral DAILY  carvedilol (COREG) tablet 12.5 mg  12.5 mg Oral BID WITH MEALS  latanoprost (XALATAN) 0.005 % ophthalmic solution 1 Drop  1 Drop Both Eyes QHS Objective:  
Vital Signs:   
Visit Vitals  /55 (BP 1 Location: Left arm, BP Patient Position: At rest)  Pulse 72  Temp 98.4 °F (36.9 °C)  Resp 18  Ht 5' 6\" (1.676 m)  Wt 91.8 kg (202 lb 6.1 oz)  SpO2 95%  BMI 32.67 kg/m2 O2 Device: BIPAP  
O2 Flow Rate (L/min): 3 l/min Temp (24hrs), Av.1 °F (36.7 °C), Min:97.7 °F (36.5 °C), Max:98.4 °F (36.9 °C) Intake/Output:  
Last shift:        
 
Last 3 shifts:  1901 - 09/10 0700 In: 1330 [P.O.:1330] Out: 700 [Urine:700] Intake/Output Summary (Last 24 hours) at 09/10/18 2751 Last data filed at 09/10/18 9111 Gross per 24 hour Intake              500 ml Output              300 ml Net              200 ml Last 3 Recorded Weights in this Encounter 09/03/18 0327 09/08/18 0015 09/09/18 0153 Weight: 93.3 kg (205 lb 11 oz) 91.8 kg (202 lb 6.4 oz) 91.8 kg (202 lb 6.1 oz) Physical Exam:  
Comfortable; on 3 lits nc; acyanotic HEENT: pupils not dilated, reactive, no scleral jaundice Neck: No adenopathy or thyroid swelling CVS: S1S2 no murmurs; JVD not elevated RS: Mod air entry bilaterally, decreased BS at bases, no wheezes or crackles Abd: soft, non tender, no abd distension Neuro: non focal, awake, confused Extrm: no leg edema or swelling or clubbing Skin: no rash Lymphatic: no cervical or supraclavicular adenopathy 
 
 
Data:  
   
Recent Results (from the past 24 hour(s)) GLUCOSE, POC Collection Time: 09/09/18  4:48 PM  
Result Value Ref Range Glucose (POC) 121 (H) 70 - 110 mg/dL GLUCOSE, POC Collection Time: 09/09/18  9:24 PM  
Result Value Ref Range Glucose (POC) 190 (H) 70 - 110 mg/dL CALCIUM, IONIZED Collection Time: 09/10/18  4:00 AM  
Result Value Ref Range Ionized Calcium 1.03 (L) 1.12 - 1.32 MMOL/L  
PHOSPHORUS Collection Time: 09/10/18  4:21 AM  
Result Value Ref Range Phosphorus 3.3 2.5 - 4.9 MG/DL MAGNESIUM Collection Time: 09/10/18  4:21 AM  
Result Value Ref Range Magnesium 2.1 1.6 - 2.6 mg/dL POTASSIUM Collection Time: 09/10/18  4:21 AM  
Result Value Ref Range Potassium 3.9 3.5 - 5.5 mmol/L  
GLUCOSE, POC Collection Time: 09/10/18  6:04 AM  
Result Value Ref Range Glucose (POC) 122 (H) 70 - 110 mg/dL GLUCOSE, POC Collection Time: 09/10/18 12:12 PM  
Result Value Ref Range Glucose (POC) 121 (H) 70 - 110 mg/dL CBC W/O DIFF Collection Time: 09/10/18  2:17 PM  
Result Value Ref Range WBC 13.8 (H) 4.6 - 13.2 K/uL  
 RBC 4.42 (L) 4.70 - 5.50 M/uL  
 HGB 10.1 (L) 13.0 - 16.0 g/dL HCT 33.4 (L) 36.0 - 48.0 % MCV 75.6 74.0 - 97.0 FL  
 MCH 22.9 (L) 24.0 - 34.0 PG  
 MCHC 30.2 (L) 31.0 - 37.0 g/dL  
 RDW 17.4 (H) 11.6 - 14.5 % PLATELET 166 387 - 390 K/uL MPV 10.7 9.2 - 11.8 FL Chemistry Recent Labs  
   09/10/18 
 0421  09/09/18 
 0937  09/08/18 
 5073 GLU   --    --   90  
NA   --    --   141  
K  3.9  4.0  3.9 CL   --    --   106 CO2   --    --   27 BUN   --    --   44* CREA   --    --   1.43* CA   --    --   8.9 MG  2.1  2.1  2.1 PHOS  3.3  3.1  3.9 AGAP   --    --   8  
BUCR   --    --   31* ECHO 9/2/18 Technically difficult study due to patient's body habitus and procedure performed with the patient in a supine position. Definity contrast was given to enhance imaging. Estimated left ventricular ejection fraction is 66 - 70%. Biplane method used to measure ejection fraction. Left ventricular moderate concentric hypertrophy. Normal left ventricular wall motion, no regional wall motion abnormality noted. Mild (grade 1) left ventricular diastolic dysfunction. There is a moderate left pleural effusion. CXR 9/8/18 Results from Hospital Encounter encounter on 09/01/18 XR CHEST PORT Narrative Chest, single view Indication: Congestive heart failure Comparison: Several prior chest radiographs, most recently 9/5/2018 Findings:  Portable upright AP view of the chest was obtained. Interval 
endotracheal extubation. Improved aeration in the lung bases is noted 
bilaterally, with mild persistent pulmonary hypoventilation. No focal pneumonic 
consolidation. No pneumothorax or pleural effusion. Stable cardiac size and 
mediastinal contours. No acute osseous abnormality. Impression Impression: Interval extubation with improved aeration of the lung bases. While lung volumes 
remain low, no superimposed acute radiographic abnormality noted.    
  
 
 
IMPRESSION:  
 · Acute on chronic hypoxemic and hypercapneic respiratory failure, failed bipap, intubated 9/1/18, extubated 9/5/18 · Blood cx positive strep viridans: ?bacteremia vs skin contaminant · Metabolic encephalopathy from CO2 narcosis, dementia · COPD with home O2-FEV1 of 1.23 or 55% predicted, normal FEV1%, follows with Dr. Dominick Jarvis - on Spiriva and advair at home- Recent 6 min walk in May suggested requires home oxygen 2-4 liter · Acute on chronic diastolic CHF · CKD stage 3 with ARF 
· HTN 
· Severe complex sleep apnea - AHI 34/hr RUPERTO and CSA events; on BIPAP21/17 non compliant - not able to tolerate but best response noted on BIPAP 24/12 in hospital.  
· On trilogy at home per wife. · Hx of ETOH abuse in past (residing in rehab facility) · Chronic leukocytosis, thrombocytopenia with hx of CLL · Anemia · Dementia - severity not known, but advanced per prior notes [facility] 7/2/18 Dr Seamus Miles AHI 34/hr RUPERTO and CSA events; O2 desats BIPAP titration - snoring and apneas could not be eliminated with 24/20 pressures; central apneas with higher pressures; poorly tolerated overall LVEF 66-70%, grade 1 DD on echo 9/2/18. RECOMMENDATIONS:  
Respiratory: Continue BIPAP as tolerated at night; hx of non-compliance Keep SPO2 >=92%. HOB 30 degree elevation all the time. Aggressive pulmonary toileting. Aspiration precautions. CVS: BP stable. Continue cardiac meds and prn lasix. .  
ID:  D/c'ed zosyn and vanco on 9/4/18; completed levaquin for 5 days. Hematology/Oncology: chronic CLL and anemia. Oral dysphagia diet with aspiration precautions DVT proph: lovenox GI proh: pepcid Patient follows with Dr. Dominick Jarvis as out patient Bertha Reese MD 
9/10/2018

## 2018-09-10 NOTE — PROGRESS NOTES
Problem: Mobility Impaired (Adult and Pediatric) Goal: *Acute Goals and Plan of Care (Insert Text) Physical Therapy Goals Initiated 9/7/2018 and to be accomplished within 3-7 day(s) 1. Patient will move from supine to sit and sit to supine  in bed with supervision/set-up. 2.  Patient will transfer from bed to chair and chair to bed with supervision/set-up using the least restrictive device. 3.  Patient will perform sit to stand with supervision/set-up. 4.  Patient will ambulate with supervision/set-up for 150 feet with the least restrictive device. Outcome: Progressing Towards Goal 
physical Therapy TREATMENT Patient: Rita Morton (67 y.o. male) Date: 9/10/2018 Diagnosis: Acute respiratory failure with hypoxia and hypercarbia (H[facility]) Acute on chronic respiratory failure with hypoxia and hypercapnia ([facility]) Precautions: Aspiration, Fall Chart, physical therapy assessment, plan of care and goals were reviewed. ASSESSMENT: 
Pt found supine in bed on PT arrival.  Demonstrates flat affect throughout session and often does not respond to verbalizations, but agreeable to participate. Requires vc for direction. Gait pattern slow with decreased foot clearance. Step length more normalized. O2 sat high 90's during session. Pt returned to bedside and left seated EOB with bed alarm engaged. Continue to rec SNF at discharge. Progression toward goals: 
[x]      Improving appropriately and progressing toward goals 
[]      Improving slowly and progressing toward goals 
[]      Not making progress toward goals and plan of care will be adjusted PLAN: 
Patient continues to benefit from skilled intervention to address the above impairments. Continue treatment per established plan of care. Discharge Recommendations:  Aftab Huang Further Equipment Recommendations for Discharge:  N/A  
 
SUBJECTIVE:  
Patient stated What do you want me to do?  OBJECTIVE DATA SUMMARY:  
 Critical Behavior: 
Neurologic State: Alert, Appropriate for age Orientation Level: Oriented X4 Cognition: Follows commands Safety/Judgement: Decreased awareness of environment, Decreased awareness of need for assistance, Decreased awareness of need for safety, Lack of insight into deficits Functional Mobility Training: 
Bed Mobility: 
Supine to Sit: Supervision (vc) Sit to Supine: Supervision (vc) Transfers: 
Sit to Stand: Supervision;Stand-by assistance; Other (comment) (vc) Stand to Sit: Supervision;Stand-by assistance; Other (comment) (vc) 
Balance: 
Sitting: Intact Sitting - Static: Good (unsupported) Sitting - Dynamic: Fair (occasional) Standing: Intact; With support Standing - Static: Fair Standing - Dynamic : Fair Ambulation/Gait Training: 
Distance (ft): 450 Feet (ft) Assistive Device: Gait belt;Walker, rolling Ambulation - Level of Assistance: Contact guard assistance; Other (comment) (vc for direction) Gait Abnormalities: Decreased step clearance Speed/Carie: Slow Step Length: Right shortened;Left shortened Pain: 
Pain Scale 1: Adult Nonverbal Pain Scale Pain Intensity 1: 0 Activity Tolerance:  
Fair Please refer to the flowsheet for vital signs taken during this treatment. After treatment:  
[x] Patient left in no apparent distress sitting up EOB [] Patient left in no apparent distress in bed 
[x] Call bell left within reach [x] Nursing notified 
[] Caregiver present 
[] Bed alarm activated Ck Rawls PT Time Calculation: 23 mins

## 2018-09-10 NOTE — ROUTINE PROCESS
Bedside and Verbal shift change report given to KEYLA Cline RN (oncoming nurse) by Yasmine Rosales RN 
 (offgoing nurse). Report included the following information SBAR, Kardex and MAR.

## 2018-09-10 NOTE — PROGRESS NOTES
Transition of care Met with patient at bedside patient states his wife does not live with him but they are still  and she does help make decisions for him. He states he is alert to person, year and president. Reports he does have trilogy and a bipap machine at home. Patient would like to go to wherever his wife wants him to go. Pulmonologist has informed him at this time he recommends bipap. His wife is present at this time and she reports she does not have a bipap at home. She informed cm that she spoke with Gomez from [facility] and she informed the wife that she would be able to get the bipap and take patient. Cm will reach out to Gomez at [facility] to see if she is able to accommodate. Gomez has placed acceptance for this patient for [facility]. Wife and patient would like for patient to go there if they are able to get bipap

## 2018-09-10 NOTE — PROGRESS NOTES
1193 
Assumed care of pt at this time. Assessment complete. Pt alert and oriented x 4 but very sleepy. Denies SOB and chest pain. Pt lungs clear but slightly diminished  Bilaterally. 2L NC in place Cap refill  less than 3 seconds. Pt denies numbness and tingling to all extremities. Stated pain 0/10. Pt has 22 G IV to R AC. Pt has no dressing Skin intact . SCD's in place. Call light and possessions within reach. Bed in low position. Will continue to monitor. [facility] Monitor tech made nurse aware that pt is reading NSR on box 28 
 
2601 Paged Dr Cara Pineda to make aware that pt WBC 13.8. Awaiting return call [phone] Dr Cara Pineda aware. Nurse informed that is pt baseline. WBC 13.8 has trended down since last draw. Shift summary Pt is alert and oriented x 4. Pt had uneventful shift. Pt ambulating and voiding sufficient amounts. Pt is assist x1 to bathroom. Bed alarm on while in bed. Pt likes to get up by himself but has unsteady gait. Current Plan to monitor pt resp, o2 sat. Pt to d/c to rehab at d/c date

## 2018-09-11 VITALS
HEART RATE: 71 BPM | OXYGEN SATURATION: 97 % | TEMPERATURE: 97.6 F | RESPIRATION RATE: 18 BRPM | WEIGHT: 202.38 LBS | SYSTOLIC BLOOD PRESSURE: 102 MMHG | DIASTOLIC BLOOD PRESSURE: 65 MMHG | HEIGHT: 66 IN | BODY MASS INDEX: 32.53 KG/M2

## 2018-09-11 LAB
BACTERIA SPEC CULT: NORMAL
CA-I SERPL-SCNC: 1.14 MMOL/L (ref 1.12–1.32)
GLUCOSE BLD STRIP.AUTO-MCNC: 107 MG/DL (ref 70–110)
GLUCOSE BLD STRIP.AUTO-MCNC: 88 MG/DL (ref 70–110)
MAGNESIUM SERPL-MCNC: 2 MG/DL (ref 1.6–2.6)
SERVICE CMNT-IMP: NORMAL

## 2018-09-11 PROCEDURE — 97535 SELF CARE MNGMENT TRAINING: CPT

## 2018-09-11 PROCEDURE — 82962 GLUCOSE BLOOD TEST: CPT

## 2018-09-11 PROCEDURE — 74011250637 HC RX REV CODE- 250/637: Performed by: HOSPITALIST

## 2018-09-11 PROCEDURE — 77010033678 HC OXYGEN DAILY

## 2018-09-11 PROCEDURE — 36415 COLL VENOUS BLD VENIPUNCTURE: CPT | Performed by: INTERNAL MEDICINE

## 2018-09-11 PROCEDURE — 83735 ASSAY OF MAGNESIUM: CPT | Performed by: INTERNAL MEDICINE

## 2018-09-11 PROCEDURE — 74011636637 HC RX REV CODE- 636/637: Performed by: INTERNAL MEDICINE

## 2018-09-11 PROCEDURE — 82330 ASSAY OF CALCIUM: CPT | Performed by: INTERNAL MEDICINE

## 2018-09-11 RX ORDER — PREDNISONE 20 MG/1
TABLET ORAL
Qty: 9 TAB | Refills: 0 | Status: SHIPPED
Start: 2018-09-11

## 2018-09-11 RX ORDER — PRAVASTATIN SODIUM 40 MG/1
40 TABLET ORAL DAILY
Qty: 1 TAB | Refills: 0 | Status: SHIPPED
Start: 2018-09-11

## 2018-09-11 RX ORDER — IPRATROPIUM BROMIDE AND ALBUTEROL SULFATE 2.5; .5 MG/3ML; MG/3ML
3 SOLUTION RESPIRATORY (INHALATION)
Qty: 30 NEBULE | Refills: 0 | Status: SHIPPED
Start: 2018-09-11

## 2018-09-11 RX ADMIN — ASPIRIN 81 MG: 81 TABLET, CHEWABLE ORAL at 08:37

## 2018-09-11 RX ADMIN — PREDNISONE 20 MG: 20 TABLET ORAL at 08:37

## 2018-09-11 RX ADMIN — AMLODIPINE BESYLATE 10 MG: 5 TABLET ORAL at 08:36

## 2018-09-11 RX ADMIN — CARVEDILOL 12.5 MG: 12.5 TABLET, FILM COATED ORAL at 08:37

## 2018-09-11 RX ADMIN — FAMOTIDINE 20 MG: 20 TABLET ORAL at 08:37

## 2018-09-11 NOTE — PROGRESS NOTES
Pt uses bipap at night with full face mask size medium,his settings are Ipap 24 Epap 12 with fio2 at 24%.

## 2018-09-11 NOTE — PROGRESS NOTES
1130- Assumed care for patient at this time, Bedside report from [facility]. SBAR, MAR, Kardex and plan of care reviewed.

## 2018-09-11 NOTE — DISCHARGE SUMMARY
Discharge Summary    Patient: Rita Morton MRN: [id number]  CSN: [id number]    YOB: 1945  Age: 74 y.o.   Sex: male    DOA: 9/1/2018 LOS:  LOS: 9 days   Discharge Date:      Primary Care Provider:  Cristian Escalante MD    Admission Diagnoses: Acute respiratory failure with hypoxia and hypercarbia [facility])    Discharge Diagnoses:    Problem List as of 9/11/2018  Never Reviewed          Codes Class Noted - Resolved    Acute respiratory failure with hypoxia and hypercarbia (HCC) ICD-10-CM: J96.01, J96.02  ICD-9-CM: 518.81  9/2/2018 - Present        Altered mental status ICD-10-CM: R41.82  ICD-9-CM: 780.97  7/16/2018 - Present        Respiratory distress ICD-10-CM: R06.03  ICD-9-CM: 786.09  7/16/2018 - Present        Hypoxia ICD-10-CM: R09.02  ICD-9-CM: 799.02  7/16/2018 - Present        CO2 narcosis ICD-10-CM: R06.89  ICD-9-CM: 786.09  7/16/2018 - Present        Hypernatremia ICD-10-CM: E87.0  ICD-9-CM: 276.0  7/16/2018 - Present        COPD (chronic obstructive pulmonary disease) ([facility] ICD-10-CM: J44.9  ICD-9-CM: 496  7/12/2018 - Present        RUPERTO (obstructive sleep apnea) ICD-10-CM: G47.33  ICD-9-CM: 327.23  7/12/2018 - Present        Stage 3 chronic kidney disease ICD-10-CM: N18.3  ICD-9-CM: 585.3  7/12/2018 - Present        Dementia ICD-10-CM: F03.90  ICD-9-CM: 294.20  7/12/2018 - Present        HTN (hypertension) ICD-10-CM: I10  ICD-9-CM: 401.9  7/12/2018 - Present        Elevated serum creatinine ICD-10-CM: R79.89  ICD-9-CM: 790.99  7/5/2018 - Present        Hypercapnia ICD-10-CM: R06.89  ICD-9-CM: 786.09  7/3/2018 - Present        * (Principal)Acute on chronic respiratory failure with hypoxia and hypercapnia (HCC) ICD-10-CM: J96.21, J96.22  ICD-9-CM: 518.84, 786.09, 799.02  7/3/2018 - Present        CLL (chronic lymphocytic leukemia) ([facility] ICD-10-CM: C91.90  ICD-9-CM: 204.10  7/3/2018 - Present        Emphysema lung ([facility] ICD-10-CM: J43.9  ICD-9-CM: 492.8  7/3/2018 - Present        RESOLVED: Memory loss ICD-10-CM: R41.3  ICD-9-CM: 780.93  7/3/2018 - 9/2/2018        RESOLVED: Acute encephalopathy ICD-10-CM: G93.40  ICD-9-CM: 348.30  7/3/2018 - 9/2/2018              Discharge Medications:     Current Discharge Medication List      START taking these medications    Details   predniSONE (DELTASONE) 20 mg tablet 2 tabs daily x 3d, 1 tab daily x 3d then stop  Qty: 9 Tab, Refills: 0      albuterol-ipratropium (DUO-NEB) 2.5 mg-0.5 mg/3 ml nebu 3 mL by Nebulization route every four (4) hours as needed. Qty: 30 Nebule, Refills: 0         CONTINUE these medications which have CHANGED    Details   fluticasone-salmeterol (ADVAIR HFA) 115-21 mcg/actuation inhaler Take 2 Puffs by inhalation two (2) times a day. Qty: 1 Inhaler, Refills: 0      pravastatin (PRAVACHOL) 40 mg tablet Take 1 Tab by mouth daily. Qty: 1 Tab, Refills: 0         CONTINUE these medications which have NOT CHANGED    Details   OLANZapine (ZYPREXA) 2.5 mg tablet Take 0.5 Tabs by mouth two (2) times a day. Qty: 30 Tab, Refills: 0      amLODIPine (NORVASC) 10 mg tablet Take 1 Tab by mouth daily. Qty: 30 Tab, Refills: 0      carvedilol (COREG) 12.5 mg tablet Take 1 Tab by mouth two (2) times daily (with meals). Qty: 60 Tab, Refills: 0      alfuzosin SR (UROXATRAL) 10 mg SR tablet Take 10 mg by mouth daily. aspirin 81 mg chewable tablet Take 81 mg by mouth daily. cycloSPORINE (RESTASIS) 0.05 % ophthalmic emulsion Administer 1 Drop to both eyes two (2) times a day. famotidine (PEPCID) 20 mg tablet Take 20 mg by mouth two (2) times a day. latanoprost (XALATAN) 0.005 % ophthalmic solution Administer 1 Drop to both eyes nightly. furosemide (LASIX) 40 mg tablet Take  by mouth daily. Oxygen       tiotropium (SPIRIVA WITH HANDIHALER) 18 mcg inhalation capsule Take 1 Cap by inhalation daily.          STOP taking these medications       hydrALAZINE (APRESOLINE) 25 mg tablet Comments:   Reason for Stopping:         predniSONE (STERAPRED) 5 mg dose pack Comments:   Reason for Stopping:         ALPRAZolam (XANAX) 0.5 mg tablet Comments:   Reason for Stopping:         docusate sodium (COLACE) 100 mg capsule Comments:   Reason for Stopping:         ALBUTEROL IN Comments:   Reason for Stopping:               Discharge Condition: Good    Procedures : None    Consults: Pulmonary/Critical Care      PHYSICAL EXAM   Visit Vitals    /65 (BP 1 Location: Left arm, BP Patient Position: At rest)    Pulse 71    Temp 97.6 °F (36.4 °C)    Resp 18    Ht 5' 6\" (1.676 m)    Wt 91.8 kg (202 lb 6.1 oz)    SpO2 97%    BMI 32.67 kg/m2     General: Awake, cooperative, no acute distress    HEENT: mmm  Lungs:  A few faint wheezes otherwise clear. Everton Shillings Heart:  Regular  rhythm,  No murmur, No Rubs, No Gallops  Abdomen: Soft, Non distended, Non tender. +Bowel sounds,   Extremities: No c/c/e  Psych:   Not anxious or agitated. Neurologic:  No acute neurological deficits. Admission HPI :     HISTORY OF PRESENT ILLNESS:  Looks like the patient was last in the hospital at the end of July for respiratory distress at that point as well. He has an extensive history of COPD and obstructive sleep apnea. He is followed by Dr. Dominick Jarvis at [facility], and he is supposed to be on BiPAP at night. He does have some dementia as well and after last stay, he was set up with a home Trilogy device. With this current hospitalization, the patient came into the emergency room with worsening shortness of breath and wheezing for about 3 days. He was having a hard time wearing the Trilogy machine. He had been taking it off regularly. The patient's wife was giving him breathing treatments, but he was not getting better despite his oxygen as well. He was having a worsening cough, mental status changes. He had recently been put on prednisone by his pulmonologist about 8 days prior. His Lasix dose has been increased.   He had apparently had a fall at home so nonetheless, when he came in, he was not in great shape. Pulmonology was contacted. They recommended proceeding with intubation due to his severe complex sleep apnea and hypercapnic acute on chronic respiratory failure. Thus he was intubated and sent over to the intensive care unit. He is not able to give any medical history at this point in time.       PAST MEDICAL HISTORY:  As noted, chronic respiratory failure on oxygen, obstructive sleep apnea, CLL, dementia, hypertension, stage III chronic kidney disease. Hospital Course :     Admitted to the hospital by Dr. Brenda Wu. Diagnosed with acute/chronic respiratory failure and copd exacerbation. Was initially admitted to the ICU. Required intubation and mechanical ventilation for a time (9/1-9/5/18). He was started on broad spectrum antibiotics including zosyn, vancomycin and levaquin. Pulmonology was consulted. They have been following him daily. Had a positive blood culture on admission. Strept viridians isolated. Three other cultures were negative. Ultimately, this was determined to be a contaminant. While admitted he also had acute on chronic CHF. He was treated fro this. He was transferred out of the icu to the telemetry tabares on 9/7/18. He was treated with bipap at night. Inspiratory pressure = 24 and expiratory pressure = 14. He is currently stabilized. The plan is for him to leave the hospital and go to rehab today. He will continue bipap as listed above. Activity: Activity as tolerated    Diet: Cardiac Diet    Follow-up: F/u with receiving facility physician. Disposition: Rehab.     Minutes spent on discharge: 50      Labs: Results:       Chemistry Recent Labs      09/10/18   1417  09/10/18   0421  09/09/18   0515   GLU  130*   --    --    NA  143   --    --    K  4.3  3.9  4.0   CL  105   --    --    CO2  32   --    --    BUN  31*   --    --    CREA  1.26   --    --    CA  8.7   --    --    AGAP  6   --    -- BUCR  25*   --    --       CBC w/Diff Recent Labs      09/10/18   1417   WBC  13.8*   RBC  4.42*   HGB  10.1*   HCT  33.4*   PLT  155      Cardiac Enzymes No results for input(s): CPK, CKND1, LOREN in the last 72 hours. No lab exists for component: CKRMB, TROIP   Coagulation No results for input(s): PTP, INR, APTT in the last 72 hours. No lab exists for component: INREXT    Lipid Panel No results found for: CHOL, CHOLPOCT, CHOLX, CHLST, CHOLV, 289752, HDL, LDL, LDLC, DLDLP, 447919, VLDLC, VLDL, TGLX, TRIGL, TRIGP, TGLPOCT, CHHD, CHHDX   BNP No results for input(s): BNPP in the last 72 hours. Liver Enzymes No results for input(s): TP, ALB, TBIL, AP, SGOT, GPT in the last 72 hours. No lab exists for component: DBIL   Thyroid Studies No results found for: T4, T3U, TSH, TSHEXT         Significant Diagnostic Studies: Xr Chest Port    Result Date: 9/8/2018  Chest, single view Indication: Congestive heart failure Comparison: Several prior chest radiographs, most recently 9/5/2018 Findings:  Portable upright AP view of the chest was obtained. Interval endotracheal extubation. Improved aeration in the lung bases is noted bilaterally, with mild persistent pulmonary hypoventilation. No focal pneumonic consolidation. No pneumothorax or pleural effusion. Stable cardiac size and mediastinal contours. No acute osseous abnormality. Impression: Interval extubation with improved aeration of the lung bases. While lung volumes remain low, no superimposed acute radiographic abnormality noted. Xr Chest Port    Result Date: 9/5/2018  EXAM: Chest portable INDICATION: Respiratory failure. Intubated COMPARISON: 9/4/2018. Exams dating back to 7/20/2018. _______________ FINDINGS: AP portable chest film was performed. Endotracheal tube and NG tube continue be present in good position. Bilateral lower lung field opacities continue to be present and are probably not significantly changed.  These is been stable over past several exams but are new when compared to 9/1/2018. Cannot rule out mild stable blunting of the costophrenic angles. Heart is at the upper limits of normal. Pulmonary vascularity is normal. No evidence of pneumothorax. _______________     IMPRESSION: 1. Support lines and tubes in good position. 2. Stable chest with bilateral lower lobe areas of atelectasis and/or infiltrate with possible small effusions. Xr Chest Port    Result Date: 9/4/2018  --------------------------------------------------------------------------- <<<<<<<<<           [facility]           >>>>>>>>> --------------------------------------------------------------------------- CLINICAL HISTORY:  Respiratory failure. COMPARISON EXAMINATIONS:  September 3, 2018. ---  SINGLE FRONTAL VIEW OF THE CHEST  --- The cardiomediastinal silhouette is stable. There is an endotracheal tube seen below the clavicles and above the don. A gastric tube extends below the diaphragm into the upper abdomen. There are faint lung base opacities and blunting of the costophrenic angles similar to prior. No significant osseous abnormalities are identified.  --------------    Impression: -------------- Endotracheal and gastric tubes in place. Faint left lung base opacities associated with blunting of the costophrenic angles similar to previous suggesting trace/small pleural effusions with atelectasis and/or infiltrates. No significant interval change. Xr Chest Port    Result Date: 9/3/2018  Chest, single view Indication: Intubation/respiratory failure Comparison: September 2, 2018 Findings:  Portable upright AP view of the chest was obtained. Endotracheal tube is in place with the tip terminating approximate 3.0 cm above the don. A nasogastric tube courses inferiorly off the field-of-view. Overlying telemetry leads are noted.  The cardiomediastinal silhouette stable in size and contour with obscuration of the left heart border secondary to opacity the left lung base. The pulmonary vasculature is unremarkable. Streaky opacity at the right lung base with associated volume loss is also noted. Findings are similar compared to the prior study. No acute osseous abnormality. Impression: 1. Support lines and tubes as above in satisfactory position. 2. Left basilar opacity, likely representing a combination of atelectasis and pneumonia with possible small pleural effusion. Mild right basilar atelectasis. Xr Chest Port    Result Date: 9/2/2018  Chest, single view Indication: Intubation Comparison: September 2, 2018 Findings:  Portable upright AP view of the chest was obtained. The endotracheal tube terminates approximately 2.6 in meters above the don. Nasogastric tube courses inferiorly off the field-of-view. Multiple overlying monitor leads are noted. The cardiomediastinal silhouette is stable in size and contour with partial obscuration of the left heart border related to left basilar airspace opacity. The diaphragm is also partially obscured with blunting of the prosthetic sulci bilaterally. Shaky opacities in the right lung base also noted. No evidence of pneumothorax. No acute osseous findings. Impression: Nasogastric and endotracheal tubes in satisfactory position. Bibasilar airspace opacities, likely atelectasis and possible small pleural effusions. Cannot exclude superimposed pneumonia. Xr Chest Port    Result Date: 9/2/2018  Chest, single view Indication: Status post intubation Comparison: September 1, 2018 Findings:  Portable upright AP view of the chest was obtained. Endotracheal tube terminates approximately 2.8 cm above the don. The lungs are underexpanded. The cardiomediastinal silhouette stable in size and contour although the left heart border is partially obscured by opacity in the left lung base. There is also prominent right infrahilar opacity which is more conspicuous compared to prior studies from July 2018. Bronchovascular crowding and central pulmonary vascular congestion is noted. No pneumothorax. No acute osseous abnormality. Impression: 1. Status post intubation with the tip of endotracheal tube approximately 2.8 cm above the don. 2. Low lung lungs with bronchovascular crowding and central pulmonary vascular congestion possibly with a component of mild interstitial edema. 3. Suspected small left pleural effusion, possible superimposed bibasilar infiltrates. Xr Chest Port    Result Date: 9/1/2018  EXAM: AP portable upright chest INDICATION: Shortness of breath COMPARISON: July 20, 2018 _______________ FINDINGS: Heart and mediastinal contours are normal. There is right lung base atelectasis and/or infiltrate. There are no effusions or pneumothorax. There is diffuse idiopathic skeletal hyperostosis. _______________     IMPRESSION: Right lung base atelectasis or infiltrate     Xr Abd Port  1 V    Result Date: 9/2/2018  Abdominal radiograph, single frontal view. INDICATION: OG tube placement. COMPARISON: None. FINDINGS: A nasogastric tube is in place with the tip looped in the left upper quadrant, side-port distal to the GE junction. Overlying monitor leads are noted. Lungs are underexpanded with bibasilar atelectasis, worse on the left. Positive bowel gas. IMPRESSION: Nasogastric tube in satisfactory position. No results found for this or any previous visit.         CC: Reji Hendrix MD

## 2018-09-11 NOTE — PROGRESS NOTES
Patient broke his BIPAP mask while taking it off, stating that he did not want it on any longer. Currently breathing without difficulty on Room Air. I asked him to wear it for the rest of the night but he refused, will continue to monitor.

## 2018-09-11 NOTE — PROGRESS NOTES
1930: Bedside report received from Yola Escalera RN. Patient is resting in bed in no acute distress. Patient expressed no needs at this time. Bed is locked in low position. Call light in reach. 2238: Patient request to be on BiPAP. Respiratory paged. 0050: Patient refused to be put back on BiPAP. 0630: Uneventful shift.

## 2018-09-11 NOTE — PROGRESS NOTES
Transition of care: anticipate to [facility] today Met with patient at bedside plan for D/c to [facility] today. Telephone call with Gomez at [facility] asked transportation to be set up for 1400. Yara Rodarte  set up for next closest time for 1430. Telephone call with wife informed her of d/c today. Agrees with reviewed JENNIFER with wife she concurs with d/c. Gomez has bipap available to patient no other needs continue with d/c 
RN transpiration has been set up for 1430 RN please call report to [facility] at [address]. Report to [phone]. Please include all hard scripts for controlled substances, med rec and dc summary in packet. Please medicate for pain prior to dc if possible and needed to help offset delay when patient first arrives to facility. Care Management Interventions PCP Verified by CM: Yes Mode of Transport at Discharge: BLS Transition of Care Consult (CM Consult): SNF Partner SNF: Yes Health Maintenance Reviewed: Yes Physical Therapy Consult: Yes Occupational Therapy Consult: Yes Current Support Network: Lives Alone Confirm Follow Up Transport: Family Plan discussed with Pt/Family/Caregiver: Yes Freedom of Choice Offered: Yes Discharge Location Discharge Placement: Skilled nursing facility

## 2018-09-11 NOTE — ROUTINE PROCESS
Bedside shift change report given to Keira Waddell (oncoming nurse) by Kelsi HAIRSTON RN (offgoing nurse). Report included the following information SBAR, Kardex and MAR.

## 2018-09-11 NOTE — PROGRESS NOTES
Problem: Falls - Risk of 
Goal: *Absence of Falls Document Darinel Frausto Fall Risk and appropriate interventions in the flowsheet. Outcome: Progressing Towards Goal 
Fall Risk Interventions: 
Mobility Interventions: OT consult for ADLs, Patient to call before getting OOB, PT Consult for mobility concerns, Bed/chair exit alarm Mentation Interventions: Door open when patient unattended, More frequent rounding Medication Interventions: Assess postural VS orthostatic hypotension, Patient to call before getting OOB, Teach patient to arise slowly Elimination Interventions: Elevated toilet seat, Call light in reach, Toileting schedule/hourly rounds, Urinal in reach Problem: Pressure Injury - Risk of 
Goal: *Prevention of pressure injury Document Percy Scale and appropriate interventions in the flowsheet. Outcome: Progressing Towards Goal 
Pressure Injury Interventions: 
Sensory Interventions: Minimize linen layers, Keep linens dry and wrinkle-free Moisture Interventions: Absorbent underpads Activity Interventions: Increase time out of bed, Pressure redistribution bed/mattress(bed type), PT/OT evaluation Mobility Interventions: HOB 30 degrees or less, Pressure redistribution bed/mattress (bed type), PT/OT evaluation Nutrition Interventions: Document food/fluid/supplement intake Friction and Shear Interventions: HOB 30 degrees or less

## 2018-09-11 NOTE — PROGRESS NOTES
[facility] Pulmonary, Critical Care, and Sleep Medicine Name: Gita Rodríguez. MRN: [id number] : 1945 Hospital: [facility] [place] Date: 2018 PCCM Note Subjective/History of Present Illness:  
 
Patient is a 74 y.o. male with hx of COPD, home O2, chronic hypercapneic resp failure, dementia, CLL, diastolic CHF, HTN, CKD, severe complex sleep apnea who has recurrent hospitalization for hypercarbic respiratory failure. Patient was intubated -18. 
 
18: 
Patient transferred out of icu few days ago He seems to be doing well Breathing stable; no worsening dyspnea Baseline dementia with confused state No cough or CP reported BIPAP at night as tolerated ROS - limited due to patient condition Current Facility-Administered Medications Medication Dose Route Frequency  insulin lispro (HUMALOG) injection   SubCUTAneous AC&HS  predniSONE (DELTASONE) tablet 20 mg  20 mg Oral DAILY WITH BREAKFAST  famotidine (PEPCID) tablet 20 mg  20 mg Oral BID  enoxaparin (LOVENOX) injection 40 mg  40 mg SubCUTAneous Q24H  
 amLODIPine (NORVASC) tablet 10 mg  10 mg Oral DAILY  aspirin chewable tablet 81 mg  81 mg Oral DAILY  carvedilol (COREG) tablet 12.5 mg  12.5 mg Oral BID WITH MEALS  latanoprost (XALATAN) 0.005 % ophthalmic solution 1 Drop  1 Drop Both Eyes QHS Objective:  
Vital Signs:   
Visit Vitals  /65 (BP 1 Location: Left arm, BP Patient Position: At rest)  Pulse 71  Temp 97.6 °F (36.4 °C)  Resp 18  Ht 5' 6\" (1.676 m)  Wt 91.8 kg (202 lb 6.1 oz)  SpO2 97%  BMI 32.67 kg/m2 O2 Device: Room air O2 Flow Rate (L/min): 3 l/min Temp (24hrs), Av °F (36.7 °C), Min:97.6 °F (36.4 °C), Max:98.4 °F (36.9 °C) Intake/Output:  
Last shift:        
 
Last 3 shifts:  1901 -  0700 In: 200 [P.O.:980] Out: 300 [Urine:300] Intake/Output Summary (Last 24 hours) at 09/11/18 1320 Last data filed at 09/11/18 8601 Gross per 24 hour Intake              480 ml Output                0 ml Net              480 ml Last 3 Recorded Weights in this Encounter 09/03/18 0327 09/08/18 0015 09/09/18 0153 Weight: 93.3 kg (205 lb 11 oz) 91.8 kg (202 lb 6.4 oz) 91.8 kg (202 lb 6.1 oz) Physical Exam:  
Comfortable; on room air now; acyanotic HEENT: no scleral jaundice Neck: No adenopathy or thyroid swelling CVS: S1S2 no murmurs; JVD not elevated RS: Mod air entry bilaterally, symmetrical breath sounds, no wheezes or crackles Abd: soft, non tender, no abd distension Neuro: non focal, awake, mildly confused Extrm: no leg edema or swelling or clubbing Skin: no rash Lymphatic: no cervical or supraclavicular adenopathy 
 
 
Data:  
   
Recent Results (from the past 24 hour(s)) CBC W/O DIFF Collection Time: 09/10/18  2:17 PM  
Result Value Ref Range WBC 13.8 (H) 4.6 - 13.2 K/uL  
 RBC 4.42 (L) 4.70 - 5.50 M/uL  
 HGB 10.1 (L) 13.0 - 16.0 g/dL HCT 33.4 (L) 36.0 - 48.0 % MCV 75.6 74.0 - 97.0 FL  
 MCH 22.9 (L) 24.0 - 34.0 PG  
 MCHC 30.2 (L) 31.0 - 37.0 g/dL  
 RDW 17.4 (H) 11.6 - 14.5 % PLATELET 681 944 - 510 K/uL MPV 10.7 9.2 - 42.0 FL  
METABOLIC PANEL, BASIC Collection Time: 09/10/18  2:17 PM  
Result Value Ref Range Sodium 143 136 - 145 mmol/L Potassium 4.3 3.5 - 5.5 mmol/L Chloride 105 100 - 108 mmol/L  
 CO2 32 21 - 32 mmol/L Anion gap 6 3.0 - 18 mmol/L Glucose 130 (H) 74 - 99 mg/dL BUN 31 (H) 7.0 - 18 MG/DL Creatinine 1.26 0.6 - 1.3 MG/DL  
 BUN/Creatinine ratio 25 (H) 12 - 20 GFR est AA >60 >60 ml/min/1.73m2 GFR est non-AA 56 (L) >60 ml/min/1.73m2 Calcium 8.7 8.5 - 10.1 MG/DL  
GLUCOSE, POC Collection Time: 09/10/18  4:16 PM  
Result Value Ref Range Glucose (POC) 212 (H) 70 - 110 mg/dL GLUCOSE, POC  Collection Time: 09/10/18  9:13 PM  
 Result Value Ref Range Glucose (POC) 140 (H) 70 - 110 mg/dL MAGNESIUM Collection Time: 09/11/18  4:33 AM  
Result Value Ref Range Magnesium 2.0 1.6 - 2.6 mg/dL CALCIUM, IONIZED Collection Time: 09/11/18  4:33 AM  
Result Value Ref Range Ionized Calcium 1.14 1.12 - 1.32 MMOL/L  
GLUCOSE, POC Collection Time: 09/11/18  6:49 AM  
Result Value Ref Range Glucose (POC) 88 70 - 110 mg/dL GLUCOSE, POC Collection Time: 09/11/18 11:50 AM  
Result Value Ref Range Glucose (POC) 107 70 - 110 mg/dL Chemistry Recent Labs  
   09/11/18 
 0433  09/10/18 0478 79 92 20  09/10/18 
 0421  09/09/18 
 9994 GLU   --   130*   --    --   
NA   --   143   --    --   
K   --   4.3  3.9  4.0  
CL   --   105   --    --   
CO2   --   32   --    --   
BUN   --   31*   --    --   
CREA   --   1.26   --    --   
CA   --   8.7   --    --   
MG  2.0   --   2.1  2.1 PHOS   --    --   3.3  3.1 AGAP   --   6   --    --   
BUCR   --   25*   --    --   
  
 
 
ECHO 9/2/18 Technically difficult study due to patient's body habitus and procedure performed with the patient in a supine position. Definity contrast was given to enhance imaging. Estimated left ventricular ejection fraction is 66 - 70%. Biplane method used to measure ejection fraction. Left ventricular moderate concentric hypertrophy. Normal left ventricular wall motion, no regional wall motion abnormality noted. Mild (grade 1) left ventricular diastolic dysfunction. There is a moderate left pleural effusion. CXR 9/8/18 Results from Hospital Encounter encounter on 09/01/18 XR CHEST PORT Narrative Chest, single view Indication: Congestive heart failure Comparison: Several prior chest radiographs, most recently 9/5/2018 Findings:  Portable upright AP view of the chest was obtained. Interval 
endotracheal extubation. Improved aeration in the lung bases is noted 
bilaterally, with mild persistent pulmonary hypoventilation.  No focal pneumonic 
consolidation. No pneumothorax or pleural effusion. Stable cardiac size and 
mediastinal contours. No acute osseous abnormality. Impression Impression: Interval extubation with improved aeration of the lung bases. While lung volumes 
remain low, no superimposed acute radiographic abnormality noted. IMPRESSION:  
· Acute on chronic hypoxemic and hypercapneic respiratory failure, failed bipap, intubated 9/1/18, extubated 9/5/18 · Blood cx positive strep viridans: ?bacteremia vs skin contaminant · Metabolic encephalopathy from CO2 narcosis, dementia · COPD with home O2-FEV1 of 1.23 or 55% predicted, normal FEV1%, follows with Dr. Franny Duvall - on Spiriva and advair at home- Recent 6 min walk in May suggested requires home oxygen 2-4 liter · Acute on chronic diastolic CHF · CKD stage 3 with ARF 
· HTN 
· Severe complex sleep apnea - AHI 34/hr RUPERTO and CSA events; on BIPAP21/17 non compliant - not able to tolerate but best response noted on BIPAP 24/12 in hospital.  
· On trilogy at home per wife. · Hx of ETOH abuse in past (residing in rehab facility) · Chronic leukocytosis, thrombocytopenia with hx of CLL · Anemia · Dementia - severity not known, but advanced per prior notes [facility] 7/2/18 Dr Latif Drain AHI 34/hr RUPERTO and CSA events; O2 desats BIPAP titration - snoring and apneas could not be eliminated with 24/20 pressures; central apneas with higher pressures; poorly tolerated overall LVEF 66-70%, grade 1 DD on echo 9/2/18. RECOMMENDATIONS:  
Respiratory: stable respirations; continue BIPAP as tolerated at night; hx of non-compliance Keep SPO2 >=92%. HOB 30 degree elevation all the time. Aggressive pulmonary toileting. Aspiration precautions. CVS: BP stable. Continue cardiac meds and prn lasix. .  
ID:  D/c'ed zosyn and vanco on 9/4/18; completed levaquin for 5 days. Hematology/Oncology: chronic CLL and anemia. Oral dysphagia diet with aspiration precautions DVT proph: lovenox GI proh: pepcid Patient follows with Dr. Shery Kocher as out patient Planned for dc to nursing home; has BIPAP at nursing home Lakshmi Morin MD 
9/11/2018

## 2018-09-11 NOTE — PROGRESS NOTES
Problem: Falls - Risk of 
Goal: *Absence of Falls Document Harman Louie Fall Risk and appropriate interventions in the flowsheet. Outcome: Progressing Towards Goal 
Fall Risk Interventions: 
Mobility Interventions: Communicate number of staff needed for ambulation/transfer, Patient to call before getting OOB Mentation Interventions: Door open when patient unattended, More frequent rounding Medication Interventions: Patient to call before getting OOB, Teach patient to arise slowly Elimination Interventions: Call light in reach Problem: Pressure Injury - Risk of 
Goal: *Prevention of pressure injury Document Percy Scale and appropriate interventions in the flowsheet. Outcome: Progressing Towards Goal 
Pressure Injury Interventions: 
Sensory Interventions: Minimize linen layers, Keep linens dry and wrinkle-free Moisture Interventions: Absorbent underpads Activity Interventions: Pressure redistribution bed/mattress(bed type) Mobility Interventions: Pressure redistribution bed/mattress (bed type) Nutrition Interventions: Document food/fluid/supplement intake Friction and Shear Interventions: HOB 30 degrees or less

## 2018-09-11 NOTE — PROGRESS NOTES
5649 Assumed care of pt at this time. Pt in bed with no signs of distress. Pt left with call light within reach and encouraged to call for assistance. 0830 Head to toe assessment completed at this time  Patient is A&OX4. Pt denies N/V chest pain and SOB or distress. Pt is calm and cooperative. Pedal pulses are present. Capillary refill less than 3 seconds. Skin in warm dry and intact. Lungs are clear bilaterally and pt is on room air. Bowel sounds are active. Abdomen is soft and non-tender. Pt is on Tele box 28. 20 g needle in the RAC. Pain scale explained to patient. Pain level is 0. Patient was oriented to call bell and bed function. Will continue to monitor

## 2018-09-11 NOTE — PROGRESS NOTES
Problem: Mobility Impaired (Adult and Pediatric) Goal: *Acute Goals and Plan of Care (Insert Text) Physical Therapy Goals Initiated 9/7/2018 and to be accomplished within 3-7 day(s) 1. Patient will move from supine to sit and sit to supine  in bed with supervision/set-up. 2.  Patient will transfer from bed to chair and chair to bed with supervision/set-up using the least restrictive device. 3.  Patient will perform sit to stand with supervision/set-up. 4.  Patient will ambulate with supervision/set-up for 150 feet with the least restrictive device. Outcome: Not Progressing Towards Goal 
Pt refused PT due to: 
[x]  Pt attempting to use telephone (Frustrated) []  Eating 
[]  Pain 
[]  Pt lethargic 
[]  Off Unit Assisted with dialing and waited, but pt continues to refuse. Will f/u later, as pt's schedule allows. Thank you.  
Feliberto Salcedo, PTA

## 2018-09-11 NOTE — PROGRESS NOTES
Problem: Self Care Deficits Care Plan (Adult) Goal: *Acute Goals and Plan of Care (Insert Text) Initial Occupational Therapy Goals (9/6/2018) Within 7 day(s): 1. Patient will perform grooming standing at sink with Supervision x 4-5 minutes & 1-2 verbal cues for attention to task for increased independence with ADLs. 2. Patient will perform UB dressing with setup & 1-2 verbal cues for attention to task for increased independence with ADLs. 3. Patient will perform LB dressing with setup/Jeimy & 1-2 verbal cues for attention to task for increased independence with ADLs. 4. Patient will perform all aspects of toileting with Supervision & 1-2 verbal cues for attention to task for increased independence in ADLs 5. Patient will independently apply energy conservation techniques with 1 verbal cue(s) for increased independence with ADLs. 6. Patient will utilize good body mechanics during ADLs with 1 verbal cue(s). 7. Patient will independently manage O2 tubing to safely ambulate to/from bathroom & 1-2 verbal cues for attention to task. Occupational Therapy TREATMENT Patient: Lindsay Pickard (67 y.o. male) Date: 9/11/2018 Diagnosis: Acute respiratory failure with hypoxia and hypercarbia (HCC) Acute on chronic respiratory failure with hypoxia and hypercapnia (HCC) Precautions: Fall Chart, occupational therapy assessment, plan of care, and goals were reviewed. ASSESSMENT: 
Pt sitting up in chair for session. Pt completed functional mobility in room with SBA and occasional CGA for one loss of balance. Pt went to sink to complete grooming but reported \"I don't like that toothbrush\" and refused to complete grooming activity. LB dressing sitting up in chair with CGA and verbal cues. Pt needed verbal cues for safety and direction throughout session. Pt trying to dial numbers on phone to reach wife at this time and perseverating on phone. Assisted pt with phone before leaving room. Progression toward goals: 
[x]          Improving appropriately and progressing toward goals 
[]          Improving slowly and progressing toward goals 
[]          Not making progress toward goals and plan of care will be adjusted PLAN: 
Patient continues to benefit from skilled intervention to address the above impairments. Continue treatment per established plan of care. Discharge Recommendations:  Rehab Further Equipment Recommendations for Discharge:  N/A  
 
SUBJECTIVE:  
Patient stated Shannan Cline you work this phone? Boraranjit Velazco OBJECTIVE DATA SUMMARY:  
Cognitive/Behavioral Status: 
Neurologic State: Alert, Appropriate for age Orientation Level: Oriented X4 Cognition: Appropriate decision making, Appropriate for age attention/concentration, Appropriate safety awareness, Follows commands Safety/Judgement: Decreased awareness of environment, Decreased awareness of need for assistance, Decreased awareness of need for safety, Lack of insight into deficits Functional Mobility and Transfers for ADLs: 
 Bed Mobility: 
 sitting up in chair Transfers: 
Sit to Stand: Supervision;Contact guard assistance Balance: 
Sitting: Intact Standing: Intact; With support ADL Intervention: LB dressing: CGA Pain: 
Pain Scale 1: Numeric (0 - 10) Pain Intensity 1: 0 Activity Tolerance:   
Fair + Please refer to the flowsheet for vital signs taken during this treatment. After treatment:  
[x]  Patient left in no apparent distress sitting up in chair 
[]  Patient left in no apparent distress in bed 
[x]  Call bell left within reach [x]  Nursing notified 
[]  Caregiver present 
[]  Bed alarm activated ABBEY Bright Time Calculation: 13 mins

## 2018-09-11 NOTE — PROGRESS NOTES
NUTRITION FOLLOW-UP 
 
RECOMMENDATIONS/PLAN:  
 Continue to encourage PO intake >75% at most meals Monitor labs/lytes, PO intakes, skin integrity, wt, fluid status, BM 
  
 
NUTRITION ASSESSMENT:  
Client Update: 74 yrs old Male with ac resp failure, metabolic encephalopahy, JAZMIN, contaminant blood culture, complicated RUPERTO. Working on d/c   
 
FOOD/NUTRITION INTAKE Diet Order:  [facility] Soft Food Allergies: NKFA Average PO Intake:     
Patient Vitals for the past 100 hrs: 
 % Diet Eaten 09/11/18 0624 100 % 09/10/18 0409 50 % 09/09/18 1111 100 % 09/09/18 0800 90 % 09/08/18 1337 100 % 09/08/18 1009 200 % 09/08/18 0641 0 % 09/07/18 1007 60 % Pertinent Medications:  [x] Reviewed; pepcid, prednisone Electrolyte Replacement Protocol: []K []Mg []PO4 Insulin:  []SSI  [x]Pre-meal   []Basal    []Drip  []None Cultural/Spiritism Food Preferences: None Identified BIOCHEMICAL DATA & MEDICAL TESTS Pertinent Labs:  [x] Reviewed; ANTHROPOMETRICS Height: 5' 6\" (167.6 cm)       Weight: 91.8 kg (202 lb 6.1 oz) BMI: 32.7 kg/m^2 obese (30%-39.9% BMI) Adm Weight: 205lbs                Weight change: -3lbs Adjusted Body Weight: 71.3kg NUTRITION-FOCUSED PHYSICAL ASSESSMENT Skin: No PU    
GI: +BM 9/10/18 NUTRITION PRESCRIPTION Calories: 1001-1274kcal/day based on 11-14 Protein: 73-91 g/day based on 0.8-1.0 g/kg CHO: 125-159 g/day based on 50% of total energy YSFPU: 5448-0839 IB/MPS based on 1 kcal/ml     
 
NUTRITION DIAGNOSES:  
1. Inadequate oral intake related to swallowing difficulties as evidence by need for modified diet  
  
NUTRITION INTERVENTIONS:  
INTERVENTIONS:                                                                                                                                                                         GOALS: 
1. Other:  Continue to encourage PO intake >75% at most meals 1. Continue to encourage PO intake >75% at most meals 
 by next review 5 days LEARNING NEEDS (Diet, Supplementation, Food/Nutrient-Drug Interaction): 
 [x] None Identified   [] Education provided/documented      Identified and patient: [] Declined   [] Was not appropriate/indicated NUTRITION MONITORING /EVALUATION:  
Follow PO intake Monitor wt Monitor renal labs, electrolytes, fluid status Previous Recommendations Implemented: yes Previous Goals Met:  yes - 
   
[] Participated in Interdisciplinary Rounds   
[x] Interdisciplinary Care Plan Reviewed DISCHARGE NUTRITION RECOMMENDATIONS ADDRESSED:  
   [x] To be determined closer to discharge NUTRITION RISK:           [x] At risk                        [] Not currently at risk Will follow-up per policy. Ed Whitley 9

## 2018-09-11 NOTE — ROUTINE PROCESS
Bedside and Verbal shift change report given to Lizet Alcantar RN (oncoming nurse) by Caryle Spring, RN (offgoing nurse). Report included the following information SBAR, Kardex and MAR.

## 2018-09-13 ENCOUNTER — PATIENT OUTREACH (OUTPATIENT)
Dept: CASE MANAGEMENT | Age: 73
End: 2018-09-13

## 2018-10-04 ENCOUNTER — PATIENT OUTREACH (OUTPATIENT)
Dept: CASE MANAGEMENT | Age: 73
End: 2018-10-04

## 2018-10-04 NOTE — PROGRESS NOTES
Community Care Team Documentation for Patient in [facility]     Patient discharged from [facility] 9/1/2018 - 9/11/2018 to [facility], on 9/11/2018. Hospital Discharge diagnosis:  COPD. SNF Attending Provider:      Anticipated discharge date from SNF:        PCP : Cordelia Tran MD    Nurse Navigator:     [facility] [place] rounds completed, updates provided by facility. Facility unable to participate in rounds x 2weeks. Notified today during scheduled rounds that patient was admitted to hospital on 9/29/2018 due to respiratory issues. Noted in chart review patient was admitted to [facility] ICU on 9/29 with dx of: \"Acute respiratory failure with hypercapnia (CMS/HCC) (Primary Dx); Facial contusion, initial encounter; Facial laceration, initial encounter;Nasal bones, open fracture; Dysphagia, unspecified type\"    Will close. High Risk            27       Total Score        3 Has Seen PCP in Last 6 Months (Yes=3, No=0)    2 . Living with Significant Other. Assisted Living. LTAC. SNF. or   Rehab    4 IP Visits Last 12 Months (1-3=4, 4=9, >4=11)    18 Charlson Comorbidity Score (Age + Comorbid Conditions)        Criteria that do not apply:    Patient Length of Stay (>5 days = 3)    Pt.  Coverage (Medicare=5 , Medicaid, or Self-Pay=4)      Active Ambulatory Problems     Diagnosis Date Noted    Hypercapnia 07/03/2018    Acute on chronic respiratory failure with hypoxia and hypercapnia (HCC) 07/03/2018    CLL (chronic lymphocytic leukemia) (HCC) 07/03/2018    Emphysema lung (HCC) 07/03/2018    Elevated serum creatinine 07/05/2018    COPD (chronic obstructive pulmonary disease) ([address] 07/12/2018    RUPERTO (obstructive sleep apnea) 07/12/2018    Stage 3 chronic kidney disease ([address] 07/12/2018    Dementia 07/12/2018    HTN (hypertension) 07/12/2018    Altered mental status 07/16/2018    Respiratory distress 07/16/2018    Hypoxia 07/16/2018    CO2 narcosis 07/16/2018    Hypernatremia 07/16/2018    Acute respiratory failure with hypoxia and hypercarbia (HCC) 09/02/2018     Resolved Ambulatory Problems     Diagnosis Date Noted    Memory loss 07/03/2018    Acute encephalopathy 07/03/2018     Past Medical History:   Diagnosis Date    Chronic kidney disease     Chronic obstructive pulmonary disease (HCC)     CLL (chronic lymphocytic leukemia) (HCC)     Delirium     Em[facility] lung ([facility]     Essential hypertension     Fall     Frequent hospital admissions     Gout     Hyperlipidemia     Hypoxemia     Memory loss     Noncompliance     Oxygen dependent     Pneumonia     Pulmonary nodules     Respiratory failure (HCC)     Risk for falls     Septic shock (HCC)     Sleep apnea     Tobacco abuse     Vitamin D deficiency

## 2018-10-04 NOTE — PROGRESS NOTES
Community Care Team Documentation for Patient in [facility]     Patient discharged from [facility] 9/1/2018 - 9/11/2018 to [facility], on 9/11/2018. Hospital Discharge diagnosis:  COPD. SNF Attending Provider:      Anticipated discharge date from SNF:        PCP : Vianey Macdonald MD    Nurse Navigator:     [facility] [place] rounds completed, updates provided by facility. wandering, wander guard on. PT eval done,  refused OT eval, will try again tomorrow. High Risk            27       Total Score        3 Has Seen PCP in Last 6 Months (Yes=3, No=0)    2 . Living with Significant Other. Assisted Living. LTAC. SNF. or   Rehab    4 IP Visits Last 12 Months (1-3=4, 4=9, >4=11)    18 Charlson Comorbidity Score (Age + Comorbid Conditions)        Criteria that do not apply:    Patient Length of Stay (>5 days = 3)    Pt.  Coverage (Medicare=5 , Medicaid, or Self-Pay=4)      Active Ambulatory Problems     Diagnosis Date Noted    Hypercapnia 07/03/2018    Acute on chronic respiratory failure with hypoxia and hypercapnia (HCC) 07/03/2018    CLL (chronic lymphocytic leukemia) (HCC) 07/03/2018    Emphysema lung (HCC) 07/03/2018    Elevated serum creatinine 07/05/2018    COPD (chronic obstructive pulmonary disease) ([address] 07/12/2018    RUPERTO (obstructive sleep apnea) 07/12/2018    Stage 3 chronic kidney disease ([address] 07/12/2018    Dementia 07/12/2018    HTN (hypertension) 07/12/2018    Altered mental status 07/16/2018    Respiratory distress 07/16/2018    Hypoxia 07/16/2018    CO2 narcosis 07/16/2018    Hypernatremia 07/16/2018    Acute respiratory failure with hypoxia and hypercarbia ([address] 09/02/2018     Resolved Ambulatory Problems     Diagnosis Date Noted    Memory loss 07/03/2018    Acute encephalopathy 07/03/2018     Past Medical History:   Diagnosis Date    Chronic kidney disease     Chronic obstructive pulmonary disease (HCC)     CLL ([facility]ronic [facility]ocytic leukemia) [facility]     Delirium     [facility]     Essential hypertension     Fall     Frequent hospital admissions     Gout     Hyperlipidemia     Hypoxemia     Memory loss     Noncompliance     Oxygen dependent     Pneumonia     Pulmonary nodules     Respiratory failure (HCC)     Risk for falls     Septic shock (HCC)     Sleep apnea     Tobacco abuse     Vitamin D deficiency

## 2021-02-26 NOTE — PROGRESS NOTES
[facility] Pulmonary, Critical Care, and Sleep Medicine Name: Jonah Fernandez. MRN: [id number] : 1945 Hospital: [facility] [place] Date: [facility]2018 [facility] Consult requesting physician: Dr. Costa Kyle Reason for Consult: acute on chronic respiratory failure IMPRESSION:  
· Acute on chronic hypoxemic and hypercapneic respiratory failure,, failed bipap, intubated 18 · Metabolic encephalopathy from CO2 narcosis, dementia · COPD with home O2-FEV1 of 1.23 or 55% predicted, normal FEV1%, follows with Dr. Ofelia Weiner - on Spiriva and advair at home- Recent 6 min walk in May suggested requires home oxygen 2-4 liter · Acute on chronic diastolic CHF · CKD stage 3 with ARF 
· HTN 
· Severe complex sleep apnea - AHI 34/hr RUPERTO and CSA events; on BIPAP2 non compliant - not able to tolerate but best response noted on BIPAP / in recent hospitalization · Hx of ETOH abuse in past (residing in rehab facility) · Chronic leukocytosis, thrombocytopenia with hx of CLL · Anemia · Dementia - severity not known, but advanced per prior notes [facility] 18 Dr Kina Dai AHI 34/hr RUPERTO and CSA events; O2 desats BIPAP titration - snoring and apneas could not be eliminated with 24/20 pressures; central apneas with higher pressures; poorly tolerated overall LVEF 66-70%, grade 1 DD on echo 18. RECOMMENDATIONS:  
Respiratory: failed bipap, intubated in ER on . ABG showing respiratory alkalosis. Reduced Vt and RR. Repeat ABG showed similar findings but improving pco2. Alert awake and following simple commands. sbt to day but no extubation. sbt in am and extubate to bipap if abg is ok. D/w RT at bedside. Ventilator bundle & Sedation protocol followed. Daily sedation holiday, assessment for readiness for SBT and then re-titrate if required. Chlorhexidine mouth washes. Keep SPO2 >=92%. HOB 30 degree elevation all the time. Aggressive pulmonary toileting. Aspiration precautions. CVS: BP stable. C/w lasix. ID:  cxr b/l infiltrates vs chf.  
Sputum cx mod candida. Blood cx ngtd. C/w broad spectrum abx, follow cx and narrow quickly. Sepsis bundle and protocol followed. Hematology/Oncology: chronic CLL with leucocytosis, thrombocytopenia, anemia. Bleeding at the right femoral central line stopped after holding lovenox and compression dressing. Restart lovenox as high risk for DVT. Renal: monitor renal function on lasix. Mildly increased creat. GI/: tolerating TF Endocrine: Maintain blood glucose 140-180. humalog ssi Neurology: sedated on vent. ams in er due to co2 retention with baseline dementia Skin/Wound: no acute issues Electrolytes: Replace electrolytes per ICU electrolyte replacement protocol. IVF: none Nutrition: start trickle feed Prophylaxis: DVT Prophylaxis with scd, hold lovenox due to central line site bleeding. GI Prophylaxis with protonix. Restraints: wrist soft- for patient interfering with medical therapy/management and patient safety. Lines/Tubes: PIV Right femoral cental line placed in ER 9/1/18 Will defer respective systems problem management to primary and other respective consultant and follow patient in ICU with primary and other medical team. 
Further recommendations will be based on the patient's response to recommended treatment and results of the investigation ordered. Quality Care: PPI, DVT prophylaxis, HOB elevated, Infection control all reviewed and addressed. Care of plan d/w RN, RT,  MDR. High complexity decision making was performed during the evaluation of this patient at high risk for decompensation with multiple organ involvement. Total critical care time spent rendering care exclusive of procedures: 37 minutes. Subjective/History of Present Illness: Patient is a 74 y.o. male with hx of COPD, home O2, chronic hypercapneic resp failure, dementia, CLL, diastolic CHF, HTN, CKD, severe complex sleep apnea who has recurrent hospitalization for hypercarbic respiratory failure. 9/3/18: On vent Off sedation following simple commands No fever No ett secretions 
abd as mentioned above Right femoral central line bleeding stopped No other overnight issue No Known Allergies Past Medical History:  
Diagnosis Date  Chronic kidney disease   
 stage 3 kidney disease  Chronic obstructive pulmonary disease ([address]  CLL (chronic lymphocytic leukemia) ([address]  Delirium  Emphysema lung ([address]  Essential hypertension  Fall  Frequent hospital admissions  Gout  Hyperlipidemia  Hypoxemia  Memory loss  Noncompliance  Oxygen dependent  Pneumonia  Pulmonary nodules  Respiratory failure ([address]  Risk for falls  Septic shock [address]  Sleep apnea  Tobacco abuse  Vitamin D deficiency Past Surgical History:  
Procedure Laterality Date  HX CATARACT REMOVAL    
 HX HERNIA REPAIR    
 HX NEPHROSTOMY Social History Substance Use Topics  Smoking status: Former Smoker  Smokeless tobacco: Never Used  Alcohol use No  
   Comment: former ETOH user; stopped December 2017 History reviewed. No pertinent family history. Prior to Admission medications Medication Sig Start Date End Date Taking? Authorizing Provider OLANZapine (ZYPREXA) 2.5 mg tablet Take 0.5 Tabs by mouth two (2) times a day. 7/25/18   Reg Park MD  
amLODIPine (NORVASC) 10 mg tablet Take 1 Tab by mouth daily. 7/25/18   Reg Park MD  
hydrALAZINE (APRESOLINE) 25 mg tablet Take 1 Tab by mouth three (3) times daily.  7/25/18   Reg Park MD  
predniSONE (STERAPRED) 5 mg dose pack See administration instruction per 5mg dose pack 7/25/18   Reg Park MD  
 ALPRAZolam (XANAX) 0.5 mg tablet Take 1 Tab by mouth three (3) times daily as needed for Anxiety. Max Daily Amount: 1.5 mg. Indications: anxiety 7/20/18   John Cali MD  
carvedilol (COREG) 12.5 mg tablet Take 1 Tab by mouth two (2) times daily (with meals). 7/12/18   Ubaldo Martinez MD  
docusate sodium (COLACE) 100 mg capsule Take 1 Cap by mouth two (2) times a day for 90 days. 7/12/18 10/10/18  Ubaldo Martinez MD  
alfuzosin SR (UROXATRAL) 10 mg SR tablet Take 10 mg by mouth daily. Ronni Hernandez MD  
aspirin 81 mg chewable tablet Take 81 mg by mouth daily. Ronni Hernandez MD  
cycloSPORINE (RESTASIS) 0.05 % ophthalmic emulsion Administer 1 Drop to both eyes two (2) times a day. Ronni Hernandez MD  
famotidine (PEPCID) 20 mg tablet Take 20 mg by mouth two (2) times a day. Ronni Hernandez MD  
latanoprost (XALATAN) 0.005 % ophthalmic solution Administer 1 Drop to both eyes nightly. Ronni Hernandez MD  
furosemide (LASIX) 40 mg tablet Take  by mouth daily. Ronni Hernandez MD  
Oxygen     Ronni Hernandez MD  
pravastatin sodium (PRAVASTATIN PO) Take  by mouth. Ronni Hernandez MD  
tiotropium (SPIRIVA WITH HANDIHALER) 18 mcg inhalation capsule Take 1 Cap by inhalation daily. Ronni Hernandez MD  
fluticasone/salmeterol (ADVAIR HFA IN) Take  by inhalation. Ronni Hernandez MD  
ALBUTEROL IN Take  by inhalation. Ronni Hernandez MD  
 
Current Facility-Administered Medications Medication Dose Route Frequency  propofol (DIPRIVAN) infusion  7 mcg/kg/min IntraVENous TITRATE  insulin lispro (HUMALOG) injection   SubCUTAneous Q6H  
 piperacillin-tazobactam (ZOSYN) 3.375 g in 0.9% sodium chloride (MBP/ADV) 100 mL MBP  3.375 g IntraVENous Q8H  
 levoFLOXacin (LEVAQUIN) 500 mg in D5W IVPB  500 mg IntraVENous Q24H  
 albuterol-ipratropium (DUO-NEB) 2.5 MG-0.5 MG/3 ML  3 mL Nebulization Q4H RT  
 amLODIPine (NORVASC) tablet 10 mg  10 mg Oral DAILY  aspirin chewable tablet 81 mg  81 mg Oral DAILY  carvedilol (COREG) tablet 12.5 mg  12.5 mg Oral BID WITH MEALS  latanoprost (XALATAN) 0.005 % ophthalmic solution 1 Drop  1 Drop Both Eyes QHS  furosemide (LASIX) injection 20 mg  20 mg IntraVENous BID  vancomycin (VANCOCIN) 1250 mg in  ml infusion  1,250 mg IntraVENous Q24H  Vancomycin- pharmacy to dose  1 Each Other Rx Dosing/Monitoring  methylPREDNISolone (PF) (SOLU-MEDROL) injection 40 mg  40 mg IntraVENous Q12H  chlorhexidine (PERIDEX) 0.12 % mouthwash 10 mL  10 mL Oral Q12H  pantoprazole (PROTONIX) 40 mg in sodium chloride 0.9% 10 mL injection  40 mg IntraVENous Q24H  propofol (DIPRIVAN) infusion  0-50 mcg/kg/min IntraVENous TITRATE Objective:  
Vital Signs:   
Visit Vitals  /71  Pulse 84  Temp 99.5 °F (37.5 °C)  Resp 26  
 Ht 5' 6\" (1.676 m)  Wt 93.3 kg (205 lb 11 oz)  SpO2 100%  BMI 33.2 kg/m2 O2 Device: Ventilator O2 Flow Rate (L/min): 3 l/min Temp (24hrs), Av.4 °F (37.4 °C), Min:98.9 °F (37.2 °C), Max:100.1 °F (37.8 °C) Intake/Output:  
Last shift:        
 
Last 3 shifts:  1901 -  0700 In: 1162.6 [I.V.:1062.6] Out: 3950 [TXAIS:4590] Intake/Output Summary (Last 24 hours) at 18 1143 Last data filed at 18 7959 Gross per 24 hour Intake          1162.58 ml Output             3450 ml Net         -2287.42 ml Last 3 Recorded Weights in this Encounter 18 1826 18 1427 18 0327 Weight: 91.2 kg (201 lb) 91.2 kg (201 lb) 93.3 kg (205 lb 11 oz) Ventilator Settings: 
Mode Rate Tidal Volume Pressure FiO2 PEEP Assist control   360 ml    40 % 5 cm H20 Peak airway pressure: 18 cm H2O Plateau pressure:    
Tidal volume:   
Minute ventilation: 9.09 l/min SPO2 98 Physical Exam:  
 
General/Neurology: intubated, awake and following simple commands. Obese. Head:   Normocephalic, without obvious abnormality, atraumatic. Eye:    no scleral icterus, no pallor, no cyanosis. Nose:   No discharge Throat:  ETT Neck:   Supple, symmetric. No lymphadenopathy. Trachea midline Lung: Moderate air entry bilateral equal. No rales. No rhonchi. No wheezing. No stridors. No prolongded expiration. No accessory muscle use. Heart:   Regular rate & rhythm. S1 S2 present. No murmur. No JVD. Abdomen:  Soft. NT. ND. +BS. Obese. Extremities:  No pedal edema. No cyanosis. No clubbing. Pulses: 2+ and symmetric in DP. Capillary refill: normal 
Lymphatic:  No cervical or supraclavicular palpable lymphadenopathy. Skin:   Color, texture, turgor normal. No rashes or lesions. Data:  
   
Recent Results (from the past 24 hour(s)) GLUCOSE, POC Collection Time: 09/02/18 11:59 AM  
Result Value Ref Range Glucose (POC) 137 (H) 70 - 110 mg/dL CBC WITH AUTOMATED DIFF Collection Time: 09/02/18  2:20 PM  
Result Value Ref Range WBC 13.1 4.6 - 13.2 K/uL  
 RBC 3.72 (L) 4.70 - 5.50 M/uL HGB 8.6 (L) 13.0 - 16.0 g/dL HCT 30.0 (L) 36.0 - 48.0 % MCV 80.6 74.0 - 97.0 FL  
 MCH 23.1 (L) 24.0 - 34.0 PG  
 MCHC 28.7 (L) 31.0 - 37.0 g/dL  
 RDW 17.2 (H) 11.6 - 14.5 % PLATELET 822 226 - 179 K/uL MPV 10.1 9.2 - 11.8 FL  
 NEUTROPHILS 67 40 - 73 % LYMPHOCYTES 26 21 - 52 % MONOCYTES 7 3 - 10 % EOSINOPHILS 0 0 - 5 % BASOPHILS 0 0 - 2 %  
 ABS. NEUTROPHILS 8.9 (H) 1.8 - 8.0 K/UL  
 ABS. LYMPHOCYTES 3.4 0.9 - 3.6 K/UL  
 ABS. MONOCYTES 0.9 0.05 - 1.2 K/UL  
 ABS. EOSINOPHILS 0.0 0.0 - 0.4 K/UL  
 ABS. BASOPHILS 0.0 0.0 - 0.1 K/UL  
 DF AUTOMATED METABOLIC PANEL, BASIC Collection Time: 09/02/18  2:20 PM  
Result Value Ref Range Sodium 144 136 - 145 mmol/L Potassium 3.6 3.5 - 5.5 mmol/L Chloride 103 100 - 108 mmol/L  
 CO2 39 (H) 21 - 32 mmol/L Anion gap 2 (L) 3.0 - 18 mmol/L Glucose 133 (H) 74 - 99 mg/dL BUN 24 (H) 7.0 - 18 MG/DL  Creatinine 1.58 (H) 0.6 - 1.3 MG/DL  
 BUN/Creatinine ratio 15 12 - 20 GFR est AA 52 (L) >60 ml/min/1.73m2 GFR est non-AA 43 (L) >60 ml/min/1.73m2 Calcium 8.6 8.5 - 10.1 MG/DL PROTHROMBIN TIME + INR Collection Time: 09/02/18  2:20 PM  
Result Value Ref Range Prothrombin time 14.0 11.5 - 15.2 sec INR 1.1 0.8 - 1.2 ECHO ADULT COMPLETE Collection Time: 09/02/18  3:08 PM  
Result Value Ref Range Right Atrial Area 4C 16.04 cm2 Ao Root D 2.81 cm  
 AO ASC D 2.77 cm Aortic Valve Systolic Peak Velocity 746.54 cm/s AoV VTI 24.89 cm Aortic Valve Area by Continuity of Peak Velocity 3.4 cm2 Aortic Valve Area by Continuity of VTI 3.0 cm2 AoV PG 6.1 mmHg LVIDd 4.08 (A) 4.2 - 5.9 cm  
 LVPWd 1.37 (A) 0.6 - 1.0 cm LVIDs 2.74 cm IVSd 1.39 (A) 0.6 - 1.0 cm  
 LV ED Vol A2C 79.2 mL  
 LV ES Vol A4C 25.4 mL  
 LV ES Vol BP 27.4 22 - 58 mL  
 LVOT d 2.14 cm  
 LVOT Peak Velocity 117.03 cm/s LVOT Peak Gradient 5.5 mmHg LVOT VTI 20.80 cm LV E' Septal Velocity 0.05 cm/s  
 MVA (PHT) 2.9 cm2  
 MV A Roger 89.90 cm/s  
 MV E Roger 0.70 cm/s  
 MV E/A 0.01   
 RVIDd 3.92 cm Aortic Valve Systolic Mean Gradient 3.8 mmHg BP EF 68.3 55 - 100 % LV Ejection Fraction MOD 4C 73 % LV Ejection Fraction MOD 2C 67 % LA Vol 4C 62.62 (A) 18 - 58 mL  
 LV Mass .9 (A) 88 - 224 g LV Mass AL Index 123.7 g/m2 E/E' septal 14.00 IVC proximal 1.96 cm  
 LV ES Vol A2C 25.9 mL  
 LVES Vol Index BP 13.7 mL/m2 LV ED Vol A4C 93.0 mL  
 LVED Vol Index BP 43.1 mL/m2 Mitral Valve E Wave Deceleration Time 262.3 ms  
 Mitral Valve Pressure Half-time 76.1 ms Left Atrium Major Axis 2.97 cm Tapse 2.60 1.5 - 2.0 cm  
 LV ED Vol BP 86.3 67 - 155 ml  
 LA Vol Index 31.24 ml/m2 LVED Vol Index A4C 46.4 mL/m2 LVED Vol Index A2C 39.5 mL/m2 LVES Vol Index A4C 12.7 mL/m2 LVES Vol Index A2C 12.9 mL/m2 NAPOLEON/BSA Pk Roger 1.7 cm2/m2 NAPOLEON/BSA VTI 1.5 cm2/m2 POC VENOUS BLOOD GAS  
 Collection Time: 09/02/18  4:08 PM  
Result Value Ref Range Device: VENT    
 FIO2 (POC) 40 %  
 pH, venous (POC) 7.555 (HH) 7.32 - 7.42    
 pCO2, venous (POC) 46.9 41 - 51 MMHG  
 pO2, venous (POC) 33 25 - 40 mmHg HCO3, venous (POC) 41.4 (H) 23.0 - 28.0 MMOL/L  
 sO2, venous (POC) 71 65 - 88 % Base excess, venous (POC) 19 mmol/L Mode ASSIST CONTROL Tidal volume 360 ml Set Rate 16 bpm  
 PEEP/CPAP (POC) 5 cmH2O Allens test (POC) N/A Total resp. rate 21 Site RIGHT FEMORAL Specimen type (POC) VENOUS BLOOD Performed by Kathi Fix Volume control YES    
GLUCOSE, POC Collection Time: 09/02/18  5:53 PM  
Result Value Ref Range Glucose (POC) 134 (H) 70 - 110 mg/dL GLUCOSE, POC Collection Time: 09/02/18 11:55 PM  
Result Value Ref Range Glucose (POC) 150 (H) 70 - 110 mg/dL CBC WITH AUTOMATED DIFF Collection Time: 09/03/18  3:30 AM  
Result Value Ref Range WBC 13.4 (H) 4.6 - 13.2 K/uL  
 RBC 3.81 (L) 4.70 - 5.50 M/uL HGB 8.7 (L) 13.0 - 16.0 g/dL HCT 29.7 (L) 36.0 - 48.0 % MCV 78.0 74.0 - 97.0 FL  
 MCH 22.8 (L) 24.0 - 34.0 PG  
 MCHC 29.3 (L) 31.0 - 37.0 g/dL  
 RDW 17.4 (H) 11.6 - 14.5 % PLATELET 221 352 - 650 K/uL MPV 10.9 9.2 - 11.8 FL  
 NEUTROPHILS 67 40 - 73 % LYMPHOCYTES 27 21 - 52 % MONOCYTES 6 3 - 10 % EOSINOPHILS 0 0 - 5 % BASOPHILS 0 0 - 2 %  
 ABS. NEUTROPHILS 9.0 (H) 1.8 - 8.0 K/UL  
 ABS. LYMPHOCYTES 3.6 0.9 - 3.6 K/UL  
 ABS. MONOCYTES 0.8 0.05 - 1.2 K/UL  
 ABS. EOSINOPHILS 0.0 0.0 - 0.4 K/UL  
 ABS. BASOPHILS 0.0 0.0 - 0.1 K/UL  
 DF AUTOMATED METABOLIC PANEL, BASIC Collection Time: 09/03/18  3:30 AM  
Result Value Ref Range Sodium 144 136 - 145 mmol/L Potassium 3.4 (L) 3.5 - 5.5 mmol/L Chloride 103 100 - 108 mmol/L  
 CO2 35 (H) 21 - 32 mmol/L Anion gap 6 3.0 - 18 mmol/L Glucose 149 (H) 74 - 99 mg/dL BUN 27 (H) 7.0 - 18 MG/DL  Creatinine 1.78 (H) 0.6 - 1.3 MG/DL  
 BUN/Creatinine ratio 15 12 - 20 GFR est AA 45 (L) >60 ml/min/1.73m2 GFR est non-AA 37 (L) >60 ml/min/1.73m2 Calcium 8.9 8.5 - 10.1 MG/DL  
PHOSPHORUS Collection Time: 09/03/18  3:30 AM  
Result Value Ref Range Phosphorus 2.8 2.5 - 4.9 MG/DL MAGNESIUM Collection Time: 09/03/18  3:30 AM  
Result Value Ref Range Magnesium 2.0 1.6 - 2.6 mg/dL GLUCOSE, POC Collection Time: 09/03/18  5:19 AM  
Result Value Ref Range Glucose (POC) 173 (H) 70 - 110 mg/dL POC G3 Collection Time: 09/03/18  5:19 AM  
Result Value Ref Range Device: VENT    
 FIO2 (POC) 40 % pH (POC) 7.523 (H) 7.35 - 7.45    
 pCO2 (POC) 44.5 35.0 - 45.0 MMHG  
 pO2 (POC) 60 (L) 80 - 100 MMHG  
 HCO3 (POC) 36.6 (H) 22 - 26 MMOL/L  
 sO2 (POC) 93 92 - 97 % Base excess (POC) 14 mmol/L Mode ASSIST CONTROL Tidal volume 360 ml Set Rate 16 bpm  
 PEEP/CPAP (POC) 5 cmH2O Allens test (POC) YES Total resp. rate 20 Site LEFT RADIAL Patient temp. 99.1 Specimen type (POC) ARTERIAL Performed by Ninoska Lua Volume control YES    
CULTURE, BLOOD Collection Time: 09/03/18  6:05 AM  
Result Value Ref Range Special Requests: NO SPECIAL REQUESTS Culture result: NO GROWTH AFTER 1 HOUR    
CULTURE, BLOOD Collection Time: 09/03/18  6:25 AM  
Result Value Ref Range Special Requests: NO SPECIAL REQUESTS Culture result: NO GROWTH AFTER 1 HOUR Chemistry Recent Labs  
   09/03/18 
 0330  09/02/18 
 1420  09/01/18 
 1952 GLU  149*  133*  115* NA  144  144  145  
K  3.4*  3.6  3.7 CL  103  103  101 CO2  35*  39*  >45* BUN  27*  24*  26* CREA  1.78*  1.58*  1.45* CA  8.9  8.6  8.9 MG  2.0   --    --   
PHOS  2.8   --    --   
AGAP  6  2*  Cannot be calculated BUCR  15  15  18 AP   --    --   95  
TP   --    --   7.1 ALB   --    --   3.1*  
GLOB   --    --   4.0 AGRAT   --    --   0.8 Lactic Acid Lactic acid Date Value Ref Range Status 09/01/2018 0.7 0.4 - 2.0 MMOL/L Final  
 
Recent Labs  
   09/01/18 
 1950 LAC  0.7 Liver Enzymes Protein, total  
Date Value Ref Range Status 09/01/2018 7.1 6.4 - 8.2 g/dL Final  
 
Albumin Date Value Ref Range Status 09/01/2018 3.1 (L) 3.4 - 5.0 g/dL Final  
 
Globulin Date Value Ref Range Status 09/01/2018 4.0 2.0 - 4.0 g/dL Final  
 
A-G Ratio Date Value Ref Range Status 09/01/2018 0.8 0.8 - 1.7   Final  
 
AST (SGOT) Date Value Ref Range Status 09/01/2018 19 15 - 37 U/L Final  
 
Alk. phosphatase Date Value Ref Range Status 09/01/2018 95 45 - 117 U/L Final  
 
Recent Labs  
   09/01/18 
 1950 TP  7.1 ALB  3.1*  
GLOB  4.0 AGRAT  0.8 SGOT  19 AP  95 CBC w/Diff Recent Labs  
   09/03/18 
 0330  09/02/18 
 1420  09/01/18 
 1950 WBC  13.4*  13.1  18.9*  
RBC  3.81*  3.72*  4.31* HGB  8.7*  8.6*  9.8* HCT  29.7*  30.0*  35.4*  
PLT  184  168  195 GRANS  67  67  69 LYMPH  27  26  25 EOS  0  0  0 Cardiac Enzymes No results found for: CPK, CK, CKMMB, CKMB, RCK3, CKMBT, CKNDX, CKND1, LOREN, TROPT, TROIQ, APRIL, TROPT, TNIPOC, BNP, BNPP  
 
BNP No results found for: BNP, BNPP, XBNPT Coagulation Recent Labs  
   09/02/18 
 1420 PTP  14.0 INR  1.1 Thyroid  No results found for: T4, T3U, TSH, TSHEXT, TSHEXT No results found for: T4  
 
Urinalysis Lab Results Component Value Date/Time  Color YELLOW 07/16/2018 06:00 PM  
 Appearance CLEAR 07/16/2018 06:00 PM  
 Specific gravity 1.024 07/16/2018 06:00 PM  
 pH (UA) 5.0 07/16/2018 06:00 PM  
 Protein 300 (A) 07/16/2018 06:00 PM  
 Glucose NEGATIVE  07/16/2018 06:00 PM  
 Ketone NEGATIVE  07/16/2018 06:00 PM  
 Bilirubin NEGATIVE  07/16/2018 06:00 PM  
 Urobilinogen 0.2 07/16/2018 06:00 PM  
 Nitrites NEGATIVE  07/16/2018 06:00 PM  
 Leukocyte Esterase NEGATIVE  07/16/2018 06:00 PM  
 Epithelial cells FEW 07/16/2018 06:00 PM  
 Bacteria 1+ (A) 07/16/2018 06:00 PM  
 CAD/MI/CVA WBC 0 to 3 07/16/2018 06:00 PM  
 RBC 0 to 3 07/16/2018 06:00 PM  
  
 
Micro  Recent Labs  
   09/03/18 
 0625  09/03/18 
 0605  09/02/18 
 0150 CULT  NO GROWTH AFTER 1 HOUR  NO GROWTH AFTER 1 HOUR  CULTURE IN PROGRESS,FURTHER UPDATES TO FOLLOW  Sent to [facility] for ID/Susceptibility if indicated Recent Labs  
   09/03/18 
 0625  09/03/18 
 0605  09/02/18 
 0150  09/02/18 
 0130  09/02/18 
 0100 CULT  NO GROWTH AFTER 1 HOUR  NO GROWTH AFTER 1 HOUR  CULTURE IN PROGRESS,FURTHER UPDATES TO FOLLOW  Sent to [facility] for ID/Susceptibility if indicated  MODERATE NAYAN ALBICANS*  FEW NORMAL RESPIRATORY JAIRON  NO GROWTH 1 DAY ABG Recent Labs  
   09/03/18 
 0519  09/02/18 
 1608  09/02/18 
 0316  09/01/18 
 2109 PHI  7.523*   --   7.371  7.264* PCO2I  44.5   --   79.9*  114.5*  
PO2I  60*   --   50*  217* HCO3I  36.6*   --   46.4*  52.0*  
FIO2I  40  40  40  65 PFT Ultrasound LE Doppler ECHO 9/2/18 · Technically difficult study due to patient's body habitus and procedure performed with the patient in a supine position. Definity contrast was given to enhance imaging. · Estimated left ventricular ejection fraction is 66 - 70%. Biplane method used to measure ejection fraction. Left ventricular moderate concentric hypertrophy. Normal left ventricular wall motion, no regional wall motion abnormality noted. Mild (grade 1) left ventricular diastolic dysfunction. · There is a moderate left pleural effusion. CT (Most Recent) (CT chest reviewed by me) Results from Hospital Encounter encounter on 07/16/18 CT HEAD WO CONT Narrative EXAM:  CT of the brain without intravenous contrast. 
 
COMPARISON: CT July 3, 2018. REASON FOR EXAM: \"Decreased alertness; facial droop and ams\". DOSE REDUCTION:  One or more dose reduction techniques were used on this CT: 
automated exposure control, adjustment of the mAs and/or kVp according to patient's size, and iterative reconstruction techniques. The specific techniques 
utilized on this CT exam have been documented in the patient's electronic 
medical record. 
 
_______________ FINDINGS:  
 
    IMAGE QUALITY:  The images are mildly degraded by motion artifact. BRAIN AND EXTRA-AXIAL SPACE:   
 
         SUBACUTE TERRITORIAL TRANSCORTICAL INFARCTION:  None detected. MASS:  None detected. HEMORRHAGE:  None. SUBDURAL FLUID COLLECTION:  None detected. HYDROCEPHALUS:  None. REMOTE CORTICAL INFARCTION:  None detected. REMOTE CEREBELLAR INFARCTION:  None detected. MISCELLANEOUS:  Non-applicable. STRIVE (STandards for Reporting Vascular changes on nEuroimaging): 
                   --[place] (age-indeterminate) or perivascular spaces of 
\"presumed vascular origin\":  None definite. --[place] of white matter hypodensity of \"presumed vascular 
origin\":  Low grade. --Degree of brain atrophy (subjective): Low grade. BURDEN OF CALCIFIC INTRACRANIAL ATHEROSCLEROSIS:  None significant. HEENT: 
 
         INCLUDED UPPER ORBITS:  The lenses are replaced bilaterally. INCLUDED UPPER PARANASAL SINUSES:  Predominantly clear. MASTOID AIR CELLS:  Predominantly clear. BONES:  Unremarkable. SCALP:  Unremarkable. 
 
_______________ Impression IMPRESSION: 
 
Low-grade generalized chronic changes, however, no acute findings. _______________ Note: Dr. Jorge Harris discussed the stroke code results with Dr. Rodrick Mulligan at [phone] on 
7/16/2018. XR (Most Recent). CXR  reviewed by me and compared with previous CXR Results from Hospital Encounter encounter on 09/01/18 XR ABD PORT  1 V Narrative Abdominal radiograph, single frontal view. INDICATION: OG tube placement. COMPARISON: None. FINDINGS: A nasogastric tube is in place with the tip looped in the left upper 
quadrant, side-port distal to the GE junction. Overlying monitor leads are 
noted. Lungs are underexpanded with bibasilar atelectasis, worse on the left. Positive bowel gas. Impression IMPRESSION: Nasogastric tube in satisfactory position. Denita Menard MD 
9/3/2018

## 2022-06-03 NOTE — PROGRESS NOTES
INITIAL NUTRITION ASSESSMENT     RECOMMENDATIONS/PLAN:   Continue w/ POC  Monitor labs/lytes, PO intakes, skin integrity, wt, fluid status, BM    REASON FOR ASSESSMENT:       [x] LOS    NUTRITION ASSESSMENT:   Client History: 74 yrs old Male admitted with acute on chronic resp failure, acute encephalopathy, hypernatremia, co2 narcosis. SLP following pt      PMHx: CKD stage III, COPD, CLL, delirium, fall, emphysema lung, frequent hospital admissions, gout, hyperlipidemia, hypoxemia, memory loss, noncompliance, oxygen dependent, PNA, pulmonary nodules, respiratory failure, risk or falls, septic shock, sleep apnea, tobacco abuse, vit d deficiency    Cultural/Voodoo Food Preferences: None Identified    FOOD/NUTRITION HISTORY  Diet History: pt has had a good appetite while at THE [facility]. SLP following pt   Food Allergies:  [x] NKFA    Pertinent PTA Medications: colace, pepcid, lasix, pravastatin,      NUTRITION INTAKE   Diet Order:  [facility] Soft       Average PO Intake:       Patient Vitals for the past 100 hrs:   % Diet Eaten   07/23/18 1340 80 %   07/23/18 0947 100 %   07/22/18 1931 50 %   07/22/18 1003 100 %   07/21/18 1706 95 %   07/21/18 1400 75 %   07/20/18 1253 50 %      Pertinent Medications:  [x] Reviewed; pepcid, deltasone   Electrolyte Replacement Protocol: []K  []Mg  []PO4    Insulin:  [] SSI  [x] Pre-meal   []  Basal   [] Drip  [] None  Pt expected to meet estimated nutrient needs through next review:          [x]  Yes     [] No; ANTHROPOMETRICS  Height:  5'6       Weight: 84.3 kg (185 lb 13.6 oz)    BMI:  29.9 kg/m^2  -  overweight (25.0%-29.9% BMI)        Weight change: pt was 206# back in 11/2017 currently pt is 185# which is not a significant amount of wt loss                                   Comparison to Reference Standards:  IBW: 142 lbs      %IBW: 130%      AdjBW: n/a    NUTRITION-FOCUSED PHYSICAL ASSESSMENT  Skin: No PU.      GI: +BM 7/23/18    BIOCHEMICAL DATA & MEDICAL TESTS  Pertinent Labs:  [x] Reviewed;      NUTRITION PRESCRIPTION  Calories: 2313 kcal/day based on [place] x 1.2 x 1.3  Protein: 50-67 g/day based on 0.6-0.8 g/kg  CHO: 289 g/day based on 50% of total energy  Fluid: 2313 ml/day based on 1 kcal/ml      NUTRITION DIAGNOSES:   1. At risk for inadequate oral intake related to LOS as evidence by day 7 at [facility] [address]:   INTERVENTIONS:        GOALS:  1. Other:continue w/ POC 1. Continue to encourage PO intake >50% at most meals by next review 5 days     LEARNING NEEDS (Diet, Supplementation, Food/Nutrient-Drug Interaction):   [x] None Identified  [] Inpatient education provided/documented    [] Identified and patient:  [] Declined     [] Was not appropriate/indicated  NUTRITION MONITORING /EVALUATION:   Follow PO intake  Monitor wt  Monitor renal labs, electrolytes, fluid status  Monitor for additional supplement needs    [] Participated in Interdisciplinary Rounds  [x] [address] [place] [address] Reviewed/Documented  DISCHARGE NUTRITION RECOMMENDATIONS ADDRESSED:        [x] To be determined closer to discharge    NUTRITION RISK:     [x]  At risk                     []  Not currently at risk     Will follow-up per policy.   Waleska Cheney, [id number] [address] Consultant

## 2022-06-10 NOTE — PROGRESS NOTES
[facility]  Pulmonary, Critical Care, and Sleep Medicine    Pulm/CC    Name: Gita Rodríguez. MRN: [id number]   : 1945 Hospital: [facility] [place]   Date: 2018  Room: [facility]/     Subjective: This patient has been seen and evaluated at the request of Dr. Compa Nagy for patient on BIPAP. Patient is a 74 y. o.AA male with hx of COPD, home o2, chronic hypercapneic resp failure, dementia, CLL, diast CHF, CKD who was recently in [facility] 2 weeks ago for hypercapneic resp failure needing BIPAP at high settings. He was subseqeuntly discharged to rehab. He has been sent to ER on  with altered mentation and elevated pCO2. He is wheezing.   He has been on BIPAP 24/12 pressures in ER.    18  Patient confused and agitated  He used BIPAP with haldol  Episodic SOB; no cough or vomiting  ROS - limited due to dementia at baseline  UOP 2.2 lits yesterday    PSG [facility] 18 Dr Lazarus Campbell  AHI 34/hr RUPERTO and CSA events; O2 desats  BIPAP titration - snoring and apneas could not be eliminated with 24/20 pressures; central apneas with higher pressures; poorly tolerated overall    Past Medical History:   Diagnosis Date    Chronic kidney disease     stage 3 kidney disease    Chronic obstructive pulmonary disease (HCC)     CLL (chronic lymphocytic leukemia) (HCC)     Delirium     Emphysema lung ([address]     Fall     Frequent hospital admissions     Gout     Hyperlipidemia     Hypoxemia     Memory loss     Noncompliance     Oxygen dependent     Pneumonia     Pulmonary nodules     Respiratory failure (HCC)     Risk for falls     Septic shock (HCC)     Sleep apnea     Tobacco abuse     Vitamin D deficiency       Past Surgical History:   Procedure Laterality Date    HX CATARACT REMOVAL      HX HERNIA REPAIR      HX NEPHROSTOMY        No Known Allergies   Social History   Substance Use Topics    Smoking status: Former Smoker    Smokeless tobacco: Never Used    Alcohol use No      Comment: former ETOH user; stopped 2017        Current Facility-Administered Medications   Medication Dose Route Frequency    [START ON 2018] famotidine (PEPCID) tablet 20 mg  20 mg Oral DAILY    dextrose 5% infusion  40 mL/hr IntraVENous CONTINUOUS    methylPREDNISolone (PF) (SOLU-MEDROL) injection 40 mg  40 mg IntraVENous Q6H    azithromycin (ZITHROMAX) tablet 500 mg  500 mg Oral DAILY    heparin (porcine) injection 5,000 Units  5,000 Units SubCUTAneous Q8H    albuterol-ipratropium (DUO-NEB) 2.5 MG-0.5 MG/3 ML  3 mL Nebulization Q4H RT    budesonide (PULMICORT) 500 mcg/2 ml nebulizer suspension  500 mcg Nebulization BID RT    furosemide (LASIX) injection 40 mg  40 mg IntraVENous DAILY    insulin lispro (HUMALOG) injection   SubCUTAneous Q6H       Objective:   Vital Signs:    Visit Vitals    /83    Pulse 82    Temp 98.1 °F (36.7 °C)    Resp (!) 31    Wt 91.6 kg (201 lb 15.1 oz)    SpO2 100%    BMI 32.59 kg/m2       O2 Device: BIPAP   O2 Flow Rate (L/min): 2 l/min   Temp (24hrs), Av.1 °F (36.7 °C), Min:98 °F (36.7 °C), Max:98.2 °F (36.8 °C)       Intake/Output:   Last shift:         Last 3 shifts:  1901 -  0700  In: 574.5 [I.V.:574.5]  Out: 9031 [Urine:5175]    Intake/Output Summary (Last 24 hours) at 18 1113  Last data filed at 18 0400   Gross per 24 hour   Intake                0 ml   Output             2275 ml   Net            -2275 ml            Physical Exam:   Comfortable; on BIPAP 24/12 pressures currently;  Fio2 at 24%; acyanotic  HEENT: pupils not dilated, reactive, no scleral jaundice  Neck: No adenopathy or thyroid swelling  CVS: S1S2 no murmurs; JVD not elevated  RS: Mod air entry bilaterally, decreased BS at bases, no wheezes, + bi-basal crackles  Abd: soft, non tender, no hepatosplenomegaly, no abd distension  Neuro: limited exam; arousable; episodic agitations; confused at baseline  Extrm: mild bilateral pitting leg edema   Skin: no rash  Lymphatic: no cervical or supraclavicular adenopathy    Telemetry: sinus rhythm    Lines/Tubes:  PIVs    Data review:     Recent Results (from the past 24 hour(s))   GLUCOSE, POC    Collection Time: 07/17/18 12:26 PM   Result Value Ref Range    Glucose (POC) 123 (H) 70 - 110 mg/dL   GLUCOSE, POC    Collection Time: 07/17/18  5:43 PM   Result Value Ref Range    Glucose (POC) 142 (H) 70 - 110 mg/dL   GLUCOSE, POC    Collection Time: 07/17/18 11:43 PM   Result Value Ref Range    Glucose (POC) 145 (H) 70 - 210 mg/dL   METABOLIC PANEL, BASIC    Collection Time: 07/18/18  4:57 AM   Result Value Ref Range    Sodium 148 (H) 136 - 145 mmol/L    Potassium 4.5 3.5 - 5.5 mmol/L    Chloride 105 100 - 108 mmol/L    CO2 43 (HH) 21 - 32 mmol/L    Anion gap 0 (L) 3.0 - 18 mmol/L    Glucose 133 (H) 74 - 99 mg/dL    BUN 29 (H) 7.0 - 18 MG/DL    Creatinine 1.60 (H) 0.6 - 1.3 MG/DL    BUN/Creatinine ratio 18 12 - 20      GFR est AA 51 (L) >60 ml/min/1.73m2    GFR est non-AA 42 (L) >60 ml/min/1.73m2    Calcium 8.9 8.5 - 10.1 MG/DL   MAGNESIUM    Collection Time: 07/18/18  4:57 AM   Result Value Ref Range    Magnesium 2.1 1.6 - 2.6 mg/dL   CBC WITH AUTOMATED DIFF    Collection Time: 07/18/18  4:57 AM   Result Value Ref Range    WBC 18.1 (H) 4.6 - 13.2 K/uL    RBC 4.76 4.70 - 5.50 M/uL    HGB 11.4 (L) 13.0 - 16.0 g/dL    HCT 39.8 36.0 - 48.0 %    MCV 83.6 74.0 - 97.0 FL    MCH 23.9 (L) 24.0 - 34.0 PG    MCHC 28.6 (L) 31.0 - 37.0 g/dL    RDW 16.9 (H) 11.6 - 14.5 %    PLATELET 197 (L) 541 - 420 K/uL    MPV 11.8 9.2 - 11.8 FL    NEUTROPHILS 68 40 - 73 %    LYMPHOCYTES 26 21 - 52 %    MONOCYTES 6 3 - 10 %    EOSINOPHILS 0 0 - 5 %    BASOPHILS 0 0 - 2 %    ABS. NEUTROPHILS 12.4 (H) 1.8 - 8.0 K/UL    ABS. LYMPHOCYTES 4.6 (H) 0.9 - 3.6 K/UL    ABS. MONOCYTES 1.0 0.05 - 1.2 K/UL    ABS. EOSINOPHILS 0.0 0.0 - 0.4 K/UL    ABS.  BASOPHILS 0.1 0.0 - 0.1 K/UL    DF AUTOMATED     GLUCOSE, POC    Collection Time: 07/18/18  5:36 AM   Result Value Ref Range    Glucose (POC) 133 (H) 70 - 110 mg/dL           Recent Labs      07/17/18   0552  07/16/18   1626  07/16/18   1436   FIO2I  28  24  24   HCO3I  41.4*  41.0*  44.5*   PCO2I  85.3*  81.6*  94.9*   PHI  7.293*  7.308*  7.279*   PO2I  83  69*  82       All Micro Results     Procedure Component Value Units Date/Time    CULTURE, URINE [[id number]] Collected:  07/16/18 1800    Order Status:  Completed Specimen:  Urine from [place] Specimen Updated:  07/18/18 1047     Special Requests: NO SPECIAL REQUESTS        Culture result:         CULTURE IN PROGRESS,FURTHER UPDATES TO FOLLOW    CULTURE, BLOOD [[id number]] Collected:  07/16/18 0438    Order Status:  Completed Specimen:  Blood from Blood Updated:  07/18/18 0949     Special Requests: NO SPECIAL REQUESTS        Culture result: NO GROWTH 1 DAY       CULTURE, BLOOD [[id number]] Collected:  07/16/18 1730    Order Status:  Canceled Specimen:  Blood from Blood           Imaging:  [x]I have personally reviewed the patients chest radiographs images and report   CXR 7/18    Results from Hospital Encounter encounter on 07/16/18   XR CHEST PORT   Narrative History: COPD    Technique: AP CHEST: Portable chest obtained at 5:56 AM on 07/18/18 and is  compared to the prior exam of 07/17/18. Findings: External monitoring wires and leads partially obscures visibility. Mild streaky opacities are present in the left base. No acute consolidation,  congestive heart failure, pleural effusion or pneumothorax. Impression IMPRESSION:    No acute cardiopulmonary disease, allowing for technique. Results from [place] encounter on 07/16/18   CT HEAD WO CONT   Narrative EXAM:  CT of the brain without intravenous contrast.    COMPARISON: CT July 3, 2018. REASON FOR EXAM: \"Decreased alertness; facial droop and ams\".     DOSE REDUCTION:  One or more dose reduction techniques were used on this CT:  automated exposure control, adjustment of the mAs and/or kVp according to  patient's size, and iterative reconstruction techniques. The specific techniques  utilized on this CT exam have been documented in the patient's electronic  medical record.    _______________    FINDINGS:         IMAGE QUALITY:  The images are mildly degraded by motion artifact. BRAIN AND EXTRA-AXIAL SPACE:               SUBACUTE TERRITORIAL TRANSCORTICAL INFARCTION:  None detected. MASS:  None detected. HEMORRHAGE:  None. SUBDURAL FLUID COLLECTION:  None detected. HYDROCEPHALUS:  None. REMOTE CORTICAL INFARCTION:  None detected. REMOTE CEREBELLAR INFARCTION:  None detected. MISCELLANEOUS:  Non-applicable. STRIVE (STandards for Reporting Vascular changes on nEuroimaging):                     --Lacunes (age-indeterminate) or perivascular spaces of  \"presumed vascular origin\":  None definite. --Estero of white matter hypodensity of \"presumed vascular  origin\":  Low grade. --Degree of brain atrophy (subjective): Low grade. BURDEN OF CALCIFIC INTRACRANIAL ATHEROSCLEROSIS:  None significant. HEENT:             INCLUDED UPPER ORBITS:  The lenses are replaced bilaterally. INCLUDED UPPER PARANASAL SINUSES:  Predominantly clear. MASTOID AIR CELLS:  Predominantly clear. BONES:  Unremarkable. SCALP:  Unremarkable.    _______________         Impression IMPRESSION:    Low-grade generalized chronic changes, however, no acute findings. _______________                  Note: Dr. Giovanny Mercado discussed the stroke code results with Dr. Aleksandar Luna at [id number] on  7/16/2018.                IMPRESSION:   · Acute metabolic encephalopathy from CO2 narcosis   · Dementia - severity not known, but advanced per prior notes  · COPD with home O2-FEV1 of 1.23 or 55% predicted but normal ratio follows with Dr. Alberto Sifuentes - on Spiriva and advair at home- Recent 6 min walk in may suggested requires home oxygen 2-4 liter  · Acute on chronic hypoxemic and hypercapneic respiratory failure  · Acute on chronic diast CHF  · CKD stage 3 with ARF  · HTN  · RUPERTO unknown severity on BIPAP21/17 non compliant-- not able to tolerate but best response noted on BIPAP 24/12 in recent hospitalization  · Hx of ETOH abuse (now residing in rehab facility)  · Chronic Leucocytosis with hx of CLL      RECOMMENDATIONS:   · Pulm: BIPAP prn with haldol - 24/12 pressures; ABG shows resp acidosis; patient poorly compliant with bipap due to dementia; wean for O2 sats >92%; Duonebs q 4hrly; Budesonide nebs bid; IV solumedrol weaned to 30 mg q8  · Lasix 40 mg daily; stable renal fn; watch IOs  · Ab - CXR clear - likely had left basal atelectasis; leucocytosis due to CLL; blood cx neg so far; UA neg; Zithromax x 5 days for possible bacterial bronchitis  · D5W 40/hr for mild hypernatremia and poor oral intake  · Prophylaxis  -DVT Px: heparin  -GI Px: pepcid  · Full code status  · D/w family-updated wife at bedside on 7/17; she says she does not live with patient for 16 years but still  legally; reviewed patient having end stage lung disease with hypercapnea, advanced dementia, complex sleep apnea, hx of CLL - discussed management; advised against aggressive measures such as CPR or vent support/breathing tube - this will cause pain, suffering, poor QOL, vent dependency with need for trach and peg etc.  He is also non-compliant due to dementia and hence likelihood of cardio-resp complications. Wife wishes full code. Will defer respective systems problem management to primary and other consultant and follow patient in ICU with primary and other medical team  Further recommendations will be based on the patient's response to recommended treatment and results of the investigation ordered. Quality Care: PPI, DVT prophylaxis, HOB elevated, Infection control all reviewed and addressed.   PAIN AND Call primary care provider for follow up after discharge/Activities as tolerated/Low salt diet/Monitor weight daily/Report signs and symptoms to primary care provider SEDATION: none   · Skin/Wound: no active issues  · Nutrition: NPO while on BIPAP  · Prophylaxis: DVT and GI Prophylaxis reviewed  · Restraints: none  · PT/OT eval and treat: as needed  · Lines/Tubes: PIVs  ADVANCE DIRECTIVE: Full Code  Disposition: likely can transfer back to rehab tomorrow; poor baseline with advanced lung disease and dementia with poor compliance due to dementia state. Noted plans for Dr Sharath Macedo to try Trilogy non-invasive vent at night in the rehab. I suspect patient needs NH placement for further care.    Consulted Palliative care medicine      High complexity decision making was performed in this consultation and evaluation of this patient who is at high risk for decompensation with multiple organ involvement       Reji Barbosa MD
